# Patient Record
Sex: FEMALE | Race: WHITE | Employment: OTHER | URBAN - NONMETROPOLITAN AREA
[De-identification: names, ages, dates, MRNs, and addresses within clinical notes are randomized per-mention and may not be internally consistent; named-entity substitution may affect disease eponyms.]

---

## 2017-03-03 ENCOUNTER — HOSPITAL ENCOUNTER (OUTPATIENT)
Dept: MRI IMAGING | Age: 62
Discharge: HOME | End: 2017-03-03
Payer: COMMERCIAL

## 2017-03-03 DIAGNOSIS — M54.16 LUMBAR RADICULAR PAIN: ICD-10-CM

## 2017-03-03 DIAGNOSIS — M54.5 LOW BACK PAIN, UNSPECIFIED BACK PAIN LATERALITY, UNSPECIFIED CHRONICITY, WITH SCIATICA PRESENCE UNSPECIFIED: ICD-10-CM

## 2017-03-03 PROCEDURE — 72148 MRI LUMBAR SPINE W/O DYE: CPT

## 2017-05-04 ENCOUNTER — EMPLOYEE WELLNESS (OUTPATIENT)
Dept: OTHER | Age: 62
End: 2017-05-04

## 2017-05-04 LAB
CHOLESTEROL, TOTAL: 293 MG/DL (ref 160–199)
GLUCOSE BLD-MCNC: 81 MG/DL (ref 74–109)
HDLC SERPL-MCNC: 40 MG/DL (ref 65–121)
LDL CHOLESTEROL CALCULATED: ABNORMAL MG/DL
LDL CHOLESTEROL DIRECT: 185 MG/DL
TRIGL SERPL-MCNC: 429 MG/DL (ref 150–199)

## 2017-07-05 ENCOUNTER — HOSPITAL ENCOUNTER (EMERGENCY)
Age: 62
Discharge: HOME | End: 2017-07-05
Payer: COMMERCIAL

## 2017-07-05 VITALS
OXYGEN SATURATION: 96 % | BODY MASS INDEX: 25.76 KG/M2 | SYSTOLIC BLOOD PRESSURE: 150 MMHG | HEIGHT: 62 IN | RESPIRATION RATE: 18 BRPM | DIASTOLIC BLOOD PRESSURE: 88 MMHG | HEART RATE: 68 BPM | TEMPERATURE: 98 F | WEIGHT: 140 LBS

## 2017-07-05 DIAGNOSIS — I10 ESSENTIAL HYPERTENSION: ICD-10-CM

## 2017-07-05 LAB
ALBUMIN SERPL-MCNC: 5 G/DL (ref 3.5–5.2)
ALP BLD-CCNC: 103 U/L (ref 35–104)
ALT SERPL-CCNC: 12 U/L (ref 5–33)
ANION GAP SERPL CALCULATED.3IONS-SCNC: 14 MMOL/L (ref 7–19)
AST SERPL-CCNC: 17 U/L (ref 5–32)
BASOPHILS ABSOLUTE: 0.1 K/UL (ref 0–0.2)
BASOPHILS RELATIVE PERCENT: 0.7 % (ref 0–1)
BILIRUB SERPL-MCNC: 0.3 MG/DL (ref 0.2–1.2)
BUN BLDV-MCNC: 24 MG/DL (ref 8–23)
CALCIUM SERPL-MCNC: 10.6 MG/DL (ref 8.8–10.2)
CHLORIDE BLD-SCNC: 98 MMOL/L (ref 98–111)
CO2: 30 MMOL/L (ref 22–29)
CREAT SERPL-MCNC: 1.2 MG/DL (ref 0.5–0.9)
EOSINOPHILS ABSOLUTE: 0.1 K/UL (ref 0–0.6)
EOSINOPHILS RELATIVE PERCENT: 0.7 % (ref 0–5)
GFR NON-AFRICAN AMERICAN: 45
GLUCOSE BLD-MCNC: 112 MG/DL (ref 74–109)
HCT VFR BLD CALC: 40.1 % (ref 37–47)
HEMOGLOBIN: 13.5 G/DL (ref 12–16)
LYMPHOCYTES ABSOLUTE: 2.5 K/UL (ref 1.1–4.5)
LYMPHOCYTES RELATIVE PERCENT: 27.5 % (ref 20–40)
MCH RBC QN AUTO: 33.4 PG (ref 27–31)
MCHC RBC AUTO-ENTMCNC: 33.7 G/DL (ref 33–37)
MCV RBC AUTO: 99.3 FL (ref 81–99)
MONOCYTES ABSOLUTE: 0.6 K/UL (ref 0–0.9)
MONOCYTES RELATIVE PERCENT: 7.1 % (ref 0–10)
NEUTROPHILS ABSOLUTE: 5.7 K/UL (ref 1.5–7.5)
NEUTROPHILS RELATIVE PERCENT: 63.6 % (ref 50–65)
PDW BLD-RTO: 12.4 % (ref 11.5–14.5)
PERFORMED ON: NORMAL
PLATELET # BLD: 364 K/UL (ref 130–400)
PMV BLD AUTO: 9.4 FL (ref 9.4–12.3)
POC TROPONIN I: 0 NG/ML (ref 0–0.08)
POTASSIUM SERPL-SCNC: 4.3 MMOL/L (ref 3.5–5)
RBC # BLD: 4.04 M/UL (ref 4.2–5.4)
SODIUM BLD-SCNC: 142 MMOL/L (ref 136–145)
TOTAL PROTEIN: 8.6 G/DL (ref 6.6–8.7)
TROPONIN: <0.01 NG/ML (ref 0–0.03)
WBC # BLD: 9 K/UL (ref 4.8–10.8)

## 2017-07-05 PROCEDURE — 84484 ASSAY OF TROPONIN QUANT: CPT

## 2017-07-05 PROCEDURE — 85025 COMPLETE CBC W/AUTO DIFF WBC: CPT

## 2017-07-05 PROCEDURE — 6360000002 HC RX W HCPCS: Performed by: NURSE PRACTITIONER

## 2017-07-05 PROCEDURE — 6370000000 HC RX 637 (ALT 250 FOR IP): Performed by: NURSE PRACTITIONER

## 2017-07-05 PROCEDURE — 80053 COMPREHEN METABOLIC PANEL: CPT

## 2017-07-05 PROCEDURE — 99283 EMERGENCY DEPT VISIT LOW MDM: CPT | Performed by: NURSE PRACTITIONER

## 2017-07-05 PROCEDURE — 99283 EMERGENCY DEPT VISIT LOW MDM: CPT

## 2017-07-05 PROCEDURE — 2580000003 HC RX 258: Performed by: NURSE PRACTITIONER

## 2017-07-05 PROCEDURE — 36415 COLL VENOUS BLD VENIPUNCTURE: CPT

## 2017-07-05 PROCEDURE — 96374 THER/PROPH/DIAG INJ IV PUSH: CPT

## 2017-07-05 PROCEDURE — 93005 ELECTROCARDIOGRAM TRACING: CPT

## 2017-07-05 RX ORDER — CLONIDINE HYDROCHLORIDE 0.1 MG/1
0.1 TABLET ORAL ONCE
Status: COMPLETED | OUTPATIENT
Start: 2017-07-05 | End: 2017-07-05

## 2017-07-05 RX ORDER — 0.9 % SODIUM CHLORIDE 0.9 %
500 INTRAVENOUS SOLUTION INTRAVENOUS ONCE
Status: COMPLETED | OUTPATIENT
Start: 2017-07-05 | End: 2017-07-05

## 2017-07-05 RX ORDER — ONDANSETRON 4 MG/1
4 TABLET, ORALLY DISINTEGRATING ORAL EVERY 8 HOURS PRN
Qty: 10 TABLET | Refills: 0 | Status: SHIPPED | OUTPATIENT
Start: 2017-07-05 | End: 2017-07-05

## 2017-07-05 RX ORDER — ONDANSETRON 4 MG/1
4 TABLET, ORALLY DISINTEGRATING ORAL EVERY 8 HOURS PRN
Qty: 10 TABLET | Refills: 0 | Status: SHIPPED | OUTPATIENT
Start: 2017-07-05 | End: 2019-02-23

## 2017-07-05 RX ORDER — ONDANSETRON 2 MG/ML
4 INJECTION INTRAMUSCULAR; INTRAVENOUS ONCE
Status: COMPLETED | OUTPATIENT
Start: 2017-07-05 | End: 2017-07-05

## 2017-07-05 RX ADMIN — SODIUM CHLORIDE 500 ML: 9 INJECTION, SOLUTION INTRAVENOUS at 19:27

## 2017-07-05 RX ADMIN — CLONIDINE HYDROCHLORIDE 0.1 MG: 0.1 TABLET ORAL at 19:27

## 2017-07-05 RX ADMIN — ONDANSETRON HYDROCHLORIDE 4 MG: 2 INJECTION, SOLUTION INTRAVENOUS at 19:27

## 2017-07-06 ASSESSMENT — ENCOUNTER SYMPTOMS
VOMITING: 0
EYE DISCHARGE: 0
COUGH: 0
DIARRHEA: 0
BACK PAIN: 0
NAUSEA: 1
SORE THROAT: 0
WHEEZING: 0
SHORTNESS OF BREATH: 0
ABDOMINAL PAIN: 0

## 2017-07-07 ENCOUNTER — APPOINTMENT (OUTPATIENT)
Dept: CT IMAGING | Age: 62
End: 2017-07-07
Payer: COMMERCIAL

## 2017-07-07 ENCOUNTER — HOSPITAL ENCOUNTER (EMERGENCY)
Age: 62
Discharge: HOME | End: 2017-07-07
Attending: EMERGENCY MEDICINE
Payer: COMMERCIAL

## 2017-07-07 VITALS
TEMPERATURE: 98.6 F | OXYGEN SATURATION: 95 % | SYSTOLIC BLOOD PRESSURE: 134 MMHG | DIASTOLIC BLOOD PRESSURE: 86 MMHG | HEART RATE: 89 BPM | RESPIRATION RATE: 18 BRPM

## 2017-07-07 DIAGNOSIS — I10 ESSENTIAL HYPERTENSION: Primary | ICD-10-CM

## 2017-07-07 LAB
ALBUMIN SERPL-MCNC: 5 G/DL (ref 3.5–5.2)
ALP BLD-CCNC: 99 U/L (ref 35–104)
ALT SERPL-CCNC: 11 U/L (ref 5–33)
ANION GAP SERPL CALCULATED.3IONS-SCNC: 16 MMOL/L (ref 7–19)
AST SERPL-CCNC: 16 U/L (ref 5–32)
BASOPHILS ABSOLUTE: 0.1 K/UL (ref 0–0.2)
BASOPHILS RELATIVE PERCENT: 0.8 % (ref 0–1)
BILIRUB SERPL-MCNC: 0.4 MG/DL (ref 0.2–1.2)
BUN BLDV-MCNC: 25 MG/DL (ref 8–23)
CALCIUM SERPL-MCNC: 10.1 MG/DL (ref 8.8–10.2)
CHLORIDE BLD-SCNC: 97 MMOL/L (ref 98–111)
CO2: 25 MMOL/L (ref 22–29)
CREAT SERPL-MCNC: 1 MG/DL (ref 0.5–0.9)
EOSINOPHILS ABSOLUTE: 0.1 K/UL (ref 0–0.6)
EOSINOPHILS RELATIVE PERCENT: 0.8 % (ref 0–5)
GFR NON-AFRICAN AMERICAN: 56
GLUCOSE BLD-MCNC: 128 MG/DL (ref 74–109)
HCT VFR BLD CALC: 41.6 % (ref 37–47)
HEMOGLOBIN: 13.8 G/DL (ref 12–16)
LYMPHOCYTES ABSOLUTE: 4.6 K/UL (ref 1.1–4.5)
LYMPHOCYTES RELATIVE PERCENT: 36.5 % (ref 20–40)
MCH RBC QN AUTO: 33.7 PG (ref 27–31)
MCHC RBC AUTO-ENTMCNC: 33.2 G/DL (ref 33–37)
MCV RBC AUTO: 101.5 FL (ref 81–99)
MONOCYTES ABSOLUTE: 0.9 K/UL (ref 0–0.9)
MONOCYTES RELATIVE PERCENT: 7.1 % (ref 0–10)
NEUTROPHILS ABSOLUTE: 6.8 K/UL (ref 1.5–7.5)
NEUTROPHILS RELATIVE PERCENT: 54.2 % (ref 50–65)
PDW BLD-RTO: 12.7 % (ref 11.5–14.5)
PERFORMED ON: NORMAL
PLATELET # BLD: 374 K/UL (ref 130–400)
PMV BLD AUTO: 9.6 FL (ref 9.4–12.3)
POC TROPONIN I: 0.01 NG/ML (ref 0–0.08)
POTASSIUM SERPL-SCNC: 4 MMOL/L (ref 3.5–5)
RBC # BLD: 4.1 M/UL (ref 4.2–5.4)
SODIUM BLD-SCNC: 138 MMOL/L (ref 136–145)
TOTAL PROTEIN: 8.2 G/DL (ref 6.6–8.7)
WBC # BLD: 12.5 K/UL (ref 4.8–10.8)

## 2017-07-07 PROCEDURE — 2500000003 HC RX 250 WO HCPCS: Performed by: EMERGENCY MEDICINE

## 2017-07-07 PROCEDURE — 36415 COLL VENOUS BLD VENIPUNCTURE: CPT

## 2017-07-07 PROCEDURE — 85025 COMPLETE CBC W/AUTO DIFF WBC: CPT

## 2017-07-07 PROCEDURE — 99284 EMERGENCY DEPT VISIT MOD MDM: CPT

## 2017-07-07 PROCEDURE — 96376 TX/PRO/DX INJ SAME DRUG ADON: CPT

## 2017-07-07 PROCEDURE — 70450 CT HEAD/BRAIN W/O DYE: CPT

## 2017-07-07 PROCEDURE — 93005 ELECTROCARDIOGRAM TRACING: CPT

## 2017-07-07 PROCEDURE — 80053 COMPREHEN METABOLIC PANEL: CPT

## 2017-07-07 PROCEDURE — 84484 ASSAY OF TROPONIN QUANT: CPT

## 2017-07-07 PROCEDURE — 6370000000 HC RX 637 (ALT 250 FOR IP): Performed by: EMERGENCY MEDICINE

## 2017-07-07 PROCEDURE — 96375 TX/PRO/DX INJ NEW DRUG ADDON: CPT

## 2017-07-07 PROCEDURE — 6360000002 HC RX W HCPCS: Performed by: EMERGENCY MEDICINE

## 2017-07-07 PROCEDURE — 99283 EMERGENCY DEPT VISIT LOW MDM: CPT | Performed by: EMERGENCY MEDICINE

## 2017-07-07 PROCEDURE — 96374 THER/PROPH/DIAG INJ IV PUSH: CPT

## 2017-07-07 RX ORDER — MORPHINE SULFATE 4 MG/ML
4 INJECTION, SOLUTION INTRAMUSCULAR; INTRAVENOUS ONCE
Status: COMPLETED | OUTPATIENT
Start: 2017-07-07 | End: 2017-07-07

## 2017-07-07 RX ORDER — LABETALOL HYDROCHLORIDE 5 MG/ML
10 INJECTION, SOLUTION INTRAVENOUS ONCE
Status: COMPLETED | OUTPATIENT
Start: 2017-07-07 | End: 2017-07-07

## 2017-07-07 RX ORDER — CLONIDINE HYDROCHLORIDE 0.1 MG/1
0.1 TABLET ORAL ONCE
Status: COMPLETED | OUTPATIENT
Start: 2017-07-07 | End: 2017-07-07

## 2017-07-07 RX ORDER — PROMETHAZINE HYDROCHLORIDE 25 MG/ML
12.5 INJECTION, SOLUTION INTRAMUSCULAR; INTRAVENOUS ONCE
Status: COMPLETED | OUTPATIENT
Start: 2017-07-07 | End: 2017-07-07

## 2017-07-07 RX ORDER — LABETALOL HYDROCHLORIDE 5 MG/ML
5 INJECTION, SOLUTION INTRAVENOUS ONCE
Status: COMPLETED | OUTPATIENT
Start: 2017-07-07 | End: 2017-07-07

## 2017-07-07 RX ORDER — CLONIDINE HYDROCHLORIDE 0.1 MG/1
0.1 TABLET ORAL EVERY 8 HOURS PRN
Qty: 20 TABLET | Refills: 0 | Status: SHIPPED | OUTPATIENT
Start: 2017-07-07 | End: 2019-02-23 | Stop reason: ALTCHOICE

## 2017-07-07 RX ADMIN — PROMETHAZINE HYDROCHLORIDE 12.5 MG: 25 INJECTION INTRAMUSCULAR; INTRAVENOUS at 12:24

## 2017-07-07 RX ADMIN — CLONIDINE HYDROCHLORIDE 0.1 MG: 0.1 TABLET ORAL at 14:24

## 2017-07-07 RX ADMIN — LABETALOL HYDROCHLORIDE 5 MG: 5 INJECTION, SOLUTION INTRAVENOUS at 15:45

## 2017-07-07 RX ADMIN — LABETALOL HYDROCHLORIDE 10 MG: 5 INJECTION, SOLUTION INTRAVENOUS at 12:30

## 2017-07-07 RX ADMIN — MORPHINE SULFATE 4 MG: 4 INJECTION, SOLUTION INTRAMUSCULAR; INTRAVENOUS at 12:24

## 2017-07-07 ASSESSMENT — ENCOUNTER SYMPTOMS
BACK PAIN: 0
SHORTNESS OF BREATH: 0
VOMITING: 0
RHINORRHEA: 0
ABDOMINAL PAIN: 0
DIARRHEA: 0
NAUSEA: 1
SORE THROAT: 0

## 2017-07-07 ASSESSMENT — PAIN SCALES - GENERAL
PAINLEVEL_OUTOF10: 2
PAINLEVEL_OUTOF10: 0
PAINLEVEL_OUTOF10: 5
PAINLEVEL_OUTOF10: 4
PAINLEVEL_OUTOF10: 7
PAINLEVEL_OUTOF10: 0

## 2017-07-07 ASSESSMENT — PAIN - FUNCTIONAL ASSESSMENT: PAIN_FUNCTIONAL_ASSESSMENT: 0-10

## 2017-07-10 LAB
EKG P AXIS: 73 DEGREES
EKG P-R INTERVAL: 158 MS
EKG Q-T INTERVAL: 442 MS
EKG QRS DURATION: 92 MS
EKG QTC CALCULATION (BAZETT): 442 MS
EKG T AXIS: 71 DEGREES

## 2017-07-11 LAB
EKG P AXIS: 54 DEGREES
EKG P-R INTERVAL: 146 MS
EKG Q-T INTERVAL: 464 MS
EKG QRS DURATION: 80 MS
EKG QTC CALCULATION (BAZETT): 454 MS
EKG T AXIS: 50 DEGREES

## 2018-03-20 VITALS — WEIGHT: 143 LBS | BODY MASS INDEX: 26.16 KG/M2

## 2019-02-23 ENCOUNTER — HOSPITAL ENCOUNTER (INPATIENT)
Age: 64
LOS: 5 days | Discharge: HOME | DRG: 069 | End: 2019-02-28
Attending: FAMILY MEDICINE | Admitting: INTERNAL MEDICINE
Payer: COMMERCIAL

## 2019-02-23 ENCOUNTER — APPOINTMENT (OUTPATIENT)
Dept: CT IMAGING | Age: 64
DRG: 069 | End: 2019-02-23
Payer: COMMERCIAL

## 2019-02-23 ENCOUNTER — APPOINTMENT (OUTPATIENT)
Dept: MRI IMAGING | Age: 64
DRG: 069 | End: 2019-02-23
Payer: COMMERCIAL

## 2019-02-23 ENCOUNTER — APPOINTMENT (OUTPATIENT)
Dept: GENERAL RADIOLOGY | Age: 64
DRG: 069 | End: 2019-02-23
Payer: COMMERCIAL

## 2019-02-23 DIAGNOSIS — R42 VERTIGO: ICD-10-CM

## 2019-02-23 DIAGNOSIS — I63.9 CEREBROVASCULAR ACCIDENT (CVA), UNSPECIFIED MECHANISM (HCC): Primary | ICD-10-CM

## 2019-02-23 DIAGNOSIS — R41.82 ALTERED MENTAL STATUS, UNSPECIFIED ALTERED MENTAL STATUS TYPE: ICD-10-CM

## 2019-02-23 LAB
ACETAMINOPHEN LEVEL: <15 UG/ML
ALBUMIN SERPL-MCNC: 4.7 G/DL (ref 3.5–5.2)
ALP BLD-CCNC: 98 U/L (ref 35–104)
ALT SERPL-CCNC: 11 U/L (ref 5–33)
AMMONIA: 20 UMOL/L (ref 11–51)
AMPHETAMINE SCREEN, URINE: NEGATIVE
ANION GAP SERPL CALCULATED.3IONS-SCNC: 16 MMOL/L (ref 7–19)
AST SERPL-CCNC: 21 U/L (ref 5–32)
BACTERIA: NEGATIVE /HPF
BARBITURATE SCREEN URINE: NEGATIVE
BASOPHILS ABSOLUTE: 0.1 K/UL (ref 0–0.2)
BASOPHILS RELATIVE PERCENT: 0.7 % (ref 0–1)
BENZODIAZEPINE SCREEN, URINE: POSITIVE
BILIRUB SERPL-MCNC: 0.4 MG/DL (ref 0.2–1.2)
BILIRUBIN URINE: NEGATIVE
BLOOD, URINE: ABNORMAL
BUN BLDV-MCNC: 32 MG/DL (ref 8–23)
C-REACTIVE PROTEIN: 1.88 MG/DL (ref 0–0.5)
CALCIUM SERPL-MCNC: 9.6 MG/DL (ref 8.8–10.2)
CANNABINOID SCREEN URINE: NEGATIVE
CHLORIDE BLD-SCNC: 100 MMOL/L (ref 98–111)
CLARITY: CLEAR
CO2: 22 MMOL/L (ref 22–29)
COCAINE METABOLITE SCREEN URINE: NEGATIVE
COLOR: YELLOW
CREAT SERPL-MCNC: 1.3 MG/DL (ref 0.5–0.9)
EOSINOPHILS ABSOLUTE: 0.1 K/UL (ref 0–0.6)
EOSINOPHILS RELATIVE PERCENT: 1 % (ref 0–5)
EPITHELIAL CELLS, UA: 2 /HPF (ref 0–5)
ETHANOL: <10 MG/DL (ref 0–0.08)
GFR NON-AFRICAN AMERICAN: 41
GLUCOSE BLD-MCNC: 91 MG/DL (ref 74–109)
GLUCOSE URINE: NEGATIVE MG/DL
HCT VFR BLD CALC: 39.7 % (ref 37–47)
HEMOGLOBIN: 12.6 G/DL (ref 12–16)
HYALINE CASTS: 0 /HPF (ref 0–8)
KETONES, URINE: NEGATIVE MG/DL
LEUKOCYTE ESTERASE, URINE: NEGATIVE
LYMPHOCYTES ABSOLUTE: 2.4 K/UL (ref 1.1–4.5)
LYMPHOCYTES RELATIVE PERCENT: 20.7 % (ref 20–40)
Lab: ABNORMAL
MAGNESIUM: 1.5 MG/DL (ref 1.6–2.4)
MCH RBC QN AUTO: 32 PG (ref 27–31)
MCHC RBC AUTO-ENTMCNC: 31.7 G/DL (ref 33–37)
MCV RBC AUTO: 100.8 FL (ref 81–99)
MONOCYTES ABSOLUTE: 0.9 K/UL (ref 0–0.9)
MONOCYTES RELATIVE PERCENT: 7.8 % (ref 0–10)
NEUTROPHILS ABSOLUTE: 7.9 K/UL (ref 1.5–7.5)
NEUTROPHILS RELATIVE PERCENT: 69.4 % (ref 50–65)
NITRITE, URINE: NEGATIVE
OPIATE SCREEN URINE: POSITIVE
PDW BLD-RTO: 11.9 % (ref 11.5–14.5)
PH UA: 5.5
PHOSPHORUS: 3.2 MG/DL (ref 2.5–4.5)
PLATELET # BLD: 324 K/UL (ref 130–400)
PMV BLD AUTO: 10.3 FL (ref 9.4–12.3)
POTASSIUM SERPL-SCNC: 4.8 MMOL/L (ref 3.5–5)
PROTEIN UA: NEGATIVE MG/DL
RBC # BLD: 3.94 M/UL (ref 4.2–5.4)
RBC UA: 0 /HPF (ref 0–4)
RHEUMATOID FACTOR: <10 IU/ML
SALICYLATE, SERUM: <3 MG/DL (ref 3–10)
SEDIMENTATION RATE, ERYTHROCYTE: 54 MM/HR (ref 0–25)
SODIUM BLD-SCNC: 138 MMOL/L (ref 136–145)
SPECIFIC GRAVITY UA: 1.01
T3 FREE: 2.3 PG/ML (ref 2–4.4)
T4 TOTAL: 7.3 UG/DL (ref 4.5–11.7)
TOTAL PROTEIN: 8.1 G/DL (ref 6.6–8.7)
TROPONIN: <0.01 NG/ML (ref 0–0.03)
TSH SERPL DL<=0.05 MIU/L-ACNC: 2.37 UIU/ML (ref 0.27–4.2)
URINE REFLEX TO CULTURE: ABNORMAL
UROBILINOGEN, URINE: 0.2 E.U./DL
VITAMIN D 25-HYDROXY: 6.9 NG/ML
WBC # BLD: 11.4 K/UL (ref 4.8–10.8)
WBC UA: 4 /HPF (ref 0–5)

## 2019-02-23 PROCEDURE — 86255 FLUORESCENT ANTIBODY SCREEN: CPT

## 2019-02-23 PROCEDURE — 93005 ELECTROCARDIOGRAM TRACING: CPT

## 2019-02-23 PROCEDURE — 84484 ASSAY OF TROPONIN QUANT: CPT

## 2019-02-23 PROCEDURE — 87040 BLOOD CULTURE FOR BACTERIA: CPT

## 2019-02-23 PROCEDURE — 86617 LYME DISEASE ANTIBODY: CPT

## 2019-02-23 PROCEDURE — 36415 COLL VENOUS BLD VENIPUNCTURE: CPT

## 2019-02-23 PROCEDURE — 83655 ASSAY OF LEAD: CPT

## 2019-02-23 PROCEDURE — 99223 1ST HOSP IP/OBS HIGH 75: CPT | Performed by: PSYCHIATRY & NEUROLOGY

## 2019-02-23 PROCEDURE — 83825 ASSAY OF MERCURY: CPT

## 2019-02-23 PROCEDURE — G0480 DRUG TEST DEF 1-7 CLASSES: HCPCS

## 2019-02-23 PROCEDURE — 81001 URINALYSIS AUTO W/SCOPE: CPT

## 2019-02-23 PROCEDURE — 86038 ANTINUCLEAR ANTIBODIES: CPT

## 2019-02-23 PROCEDURE — 85025 COMPLETE CBC W/AUTO DIFF WBC: CPT

## 2019-02-23 PROCEDURE — 82306 VITAMIN D 25 HYDROXY: CPT

## 2019-02-23 PROCEDURE — 86431 RHEUMATOID FACTOR QUANT: CPT

## 2019-02-23 PROCEDURE — 6370000000 HC RX 637 (ALT 250 FOR IP): Performed by: INTERNAL MEDICINE

## 2019-02-23 PROCEDURE — 2580000003 HC RX 258: Performed by: FAMILY MEDICINE

## 2019-02-23 PROCEDURE — 99285 EMERGENCY DEPT VISIT HI MDM: CPT

## 2019-02-23 PROCEDURE — 85652 RBC SED RATE AUTOMATED: CPT

## 2019-02-23 PROCEDURE — 82607 VITAMIN B-12: CPT

## 2019-02-23 PROCEDURE — 6370000000 HC RX 637 (ALT 250 FOR IP): Performed by: FAMILY MEDICINE

## 2019-02-23 PROCEDURE — 82140 ASSAY OF AMMONIA: CPT

## 2019-02-23 PROCEDURE — 70450 CT HEAD/BRAIN W/O DYE: CPT

## 2019-02-23 PROCEDURE — 83735 ASSAY OF MAGNESIUM: CPT

## 2019-02-23 PROCEDURE — 6360000002 HC RX W HCPCS: Performed by: PSYCHIATRY & NEUROLOGY

## 2019-02-23 PROCEDURE — 99223 1ST HOSP IP/OBS HIGH 75: CPT | Performed by: INTERNAL MEDICINE

## 2019-02-23 PROCEDURE — 99285 EMERGENCY DEPT VISIT HI MDM: CPT | Performed by: FAMILY MEDICINE

## 2019-02-23 PROCEDURE — 84100 ASSAY OF PHOSPHORUS: CPT

## 2019-02-23 PROCEDURE — 84443 ASSAY THYROID STIM HORMONE: CPT

## 2019-02-23 PROCEDURE — 86140 C-REACTIVE PROTEIN: CPT

## 2019-02-23 PROCEDURE — 71045 X-RAY EXAM CHEST 1 VIEW: CPT

## 2019-02-23 PROCEDURE — 82131 AMINO ACIDS SINGLE QUANT: CPT

## 2019-02-23 PROCEDURE — 1210000000 HC MED SURG R&B

## 2019-02-23 PROCEDURE — 86592 SYPHILIS TEST NON-TREP QUAL: CPT

## 2019-02-23 PROCEDURE — 70551 MRI BRAIN STEM W/O DYE: CPT

## 2019-02-23 PROCEDURE — 80307 DRUG TEST PRSMV CHEM ANLYZR: CPT

## 2019-02-23 PROCEDURE — 84481 FREE ASSAY (FT-3): CPT

## 2019-02-23 PROCEDURE — 82175 ASSAY OF ARSENIC: CPT

## 2019-02-23 PROCEDURE — 84436 ASSAY OF TOTAL THYROXINE: CPT

## 2019-02-23 PROCEDURE — 80053 COMPREHEN METABOLIC PANEL: CPT

## 2019-02-23 RX ORDER — SODIUM CHLORIDE 0.9 % (FLUSH) 0.9 %
10 SYRINGE (ML) INJECTION EVERY 12 HOURS SCHEDULED
Status: DISCONTINUED | OUTPATIENT
Start: 2019-02-23 | End: 2019-02-28 | Stop reason: HOSPADM

## 2019-02-23 RX ORDER — ACETAMINOPHEN 325 MG/1
650 TABLET ORAL EVERY 4 HOURS PRN
Status: DISCONTINUED | OUTPATIENT
Start: 2019-02-23 | End: 2019-02-28 | Stop reason: HOSPADM

## 2019-02-23 RX ORDER — PANTOPRAZOLE SODIUM 20 MG/1
20 TABLET, DELAYED RELEASE ORAL DAILY
COMMUNITY
End: 2019-10-31 | Stop reason: SDUPTHER

## 2019-02-23 RX ORDER — PANTOPRAZOLE SODIUM 20 MG/1
20 TABLET, DELAYED RELEASE ORAL DAILY
Status: DISCONTINUED | OUTPATIENT
Start: 2019-02-23 | End: 2019-02-28 | Stop reason: HOSPADM

## 2019-02-23 RX ORDER — SODIUM CHLORIDE 0.9 % (FLUSH) 0.9 %
10 SYRINGE (ML) INJECTION PRN
Status: DISCONTINUED | OUTPATIENT
Start: 2019-02-23 | End: 2019-02-28 | Stop reason: HOSPADM

## 2019-02-23 RX ORDER — ESCITALOPRAM OXALATE 10 MG/1
20 TABLET ORAL DAILY
Status: DISCONTINUED | OUTPATIENT
Start: 2019-02-24 | End: 2019-02-28 | Stop reason: HOSPADM

## 2019-02-23 RX ORDER — ATORVASTATIN CALCIUM 80 MG/1
80 TABLET, FILM COATED ORAL NIGHTLY
Status: DISCONTINUED | OUTPATIENT
Start: 2019-02-23 | End: 2019-02-28 | Stop reason: HOSPADM

## 2019-02-23 RX ORDER — ONDANSETRON 4 MG/1
4 TABLET, ORALLY DISINTEGRATING ORAL ONCE
Status: COMPLETED | OUTPATIENT
Start: 2019-02-23 | End: 2019-02-23

## 2019-02-23 RX ORDER — ALPRAZOLAM 0.5 MG/1
0.5 TABLET ORAL NIGHTLY PRN
Status: DISCONTINUED | OUTPATIENT
Start: 2019-02-23 | End: 2019-02-28 | Stop reason: HOSPADM

## 2019-02-23 RX ORDER — ASCORBIC ACID 500 MG
1000 TABLET ORAL DAILY
Status: DISCONTINUED | OUTPATIENT
Start: 2019-02-23 | End: 2019-02-28 | Stop reason: HOSPADM

## 2019-02-23 RX ORDER — ASPIRIN 81 MG/1
81 TABLET, CHEWABLE ORAL DAILY
Status: DISCONTINUED | OUTPATIENT
Start: 2019-02-23 | End: 2019-02-24

## 2019-02-23 RX ORDER — SODIUM CHLORIDE 9 MG/ML
INJECTION, SOLUTION INTRAVENOUS CONTINUOUS
Status: DISCONTINUED | OUTPATIENT
Start: 2019-02-23 | End: 2019-02-28

## 2019-02-23 RX ORDER — OXYCODONE HYDROCHLORIDE AND ACETAMINOPHEN 5; 325 MG/1; MG/1
1 TABLET ORAL ONCE
Status: COMPLETED | OUTPATIENT
Start: 2019-02-23 | End: 2019-02-23

## 2019-02-23 RX ORDER — THIAMINE HYDROCHLORIDE 100 MG/ML
100 INJECTION, SOLUTION INTRAMUSCULAR; INTRAVENOUS DAILY
Status: DISCONTINUED | OUTPATIENT
Start: 2019-02-23 | End: 2019-02-28 | Stop reason: HOSPADM

## 2019-02-23 RX ADMIN — ACETAMINOPHEN 650 MG: 325 TABLET, FILM COATED ORAL at 22:11

## 2019-02-23 RX ADMIN — ONDANSETRON 4 MG: 4 TABLET, ORALLY DISINTEGRATING ORAL at 10:18

## 2019-02-23 RX ADMIN — PANTOPRAZOLE SODIUM 20 MG: 20 TABLET, DELAYED RELEASE ORAL at 18:14

## 2019-02-23 RX ADMIN — THIAMINE HYDROCHLORIDE 100 MG: 100 INJECTION, SOLUTION INTRAMUSCULAR; INTRAVENOUS at 15:41

## 2019-02-23 RX ADMIN — ATORVASTATIN CALCIUM 80 MG: 80 TABLET, FILM COATED ORAL at 20:14

## 2019-02-23 RX ADMIN — OXYCODONE AND ACETAMINOPHEN 1 TABLET: 5; 325 TABLET ORAL at 10:18

## 2019-02-23 RX ADMIN — ACETAMINOPHEN 650 MG: 325 TABLET, FILM COATED ORAL at 16:27

## 2019-02-23 RX ADMIN — SODIUM CHLORIDE: 9 INJECTION, SOLUTION INTRAVENOUS at 09:49

## 2019-02-23 RX ADMIN — ASPIRIN 81 MG CHEWABLE TABLET 81 MG: 81 TABLET CHEWABLE at 18:14

## 2019-02-23 RX ADMIN — OXYCODONE HYDROCHLORIDE AND ACETAMINOPHEN 1000 MG: 500 TABLET ORAL at 18:14

## 2019-02-23 ASSESSMENT — ENCOUNTER SYMPTOMS
RHINORRHEA: 0
NAUSEA: 0
DIARRHEA: 0
SINUS PAIN: 0
CONSTIPATION: 0
WHEEZING: 0
COUGH: 0
CHEST TIGHTNESS: 0
ABDOMINAL PAIN: 0
BACK PAIN: 0
COLOR CHANGE: 0
SHORTNESS OF BREATH: 0
VOMITING: 0

## 2019-02-23 ASSESSMENT — PAIN SCALES - GENERAL
PAINLEVEL_OUTOF10: 2
PAINLEVEL_OUTOF10: 4
PAINLEVEL_OUTOF10: 3
PAINLEVEL_OUTOF10: 6
PAINLEVEL_OUTOF10: 4

## 2019-02-23 ASSESSMENT — PAIN DESCRIPTION - LOCATION: LOCATION: HEAD

## 2019-02-24 ENCOUNTER — APPOINTMENT (OUTPATIENT)
Dept: MRI IMAGING | Age: 64
DRG: 069 | End: 2019-02-24
Payer: COMMERCIAL

## 2019-02-24 ENCOUNTER — APPOINTMENT (OUTPATIENT)
Dept: GENERAL RADIOLOGY | Age: 64
DRG: 069 | End: 2019-02-24
Payer: COMMERCIAL

## 2019-02-24 LAB
ANION GAP SERPL CALCULATED.3IONS-SCNC: 13 MMOL/L (ref 7–19)
APPEARANCE CSF: CLEAR
APPEARANCE CSF: CLEAR
BASOPHILS ABSOLUTE: 0.1 K/UL (ref 0–0.2)
BASOPHILS RELATIVE PERCENT: 0.8 % (ref 0–1)
BUN BLDV-MCNC: 35 MG/DL (ref 8–23)
C-REACTIVE PROTEIN: 1.95 MG/DL (ref 0–0.5)
CALCIUM SERPL-MCNC: 8.3 MG/DL (ref 8.8–10.2)
CHLORIDE BLD-SCNC: 108 MMOL/L (ref 98–111)
CHOLESTEROL, TOTAL: 205 MG/DL (ref 160–199)
CLOT EVALUATION CSF: ABNORMAL
CLOT EVALUATION CSF: ABNORMAL
CO2: 22 MMOL/L (ref 22–29)
COLOR CSF: COLORLESS
COLOR CSF: COLORLESS
CREAT SERPL-MCNC: 1.4 MG/DL (ref 0.5–0.9)
CRYPTOCOCCAL ANTIGEN CSF: NEGATIVE
EOSINOPHILS ABSOLUTE: 0.1 K/UL (ref 0–0.6)
EOSINOPHILS RELATIVE PERCENT: 2 % (ref 0–5)
GFR NON-AFRICAN AMERICAN: 38
GLUCOSE BLD-MCNC: 91 MG/DL (ref 74–109)
GLUCOSE, CSF: 53 MG/DL (ref 40–70)
HBA1C MFR BLD: 5.4 % (ref 4–6)
HCT VFR BLD CALC: 34.9 % (ref 37–47)
HDLC SERPL-MCNC: 31 MG/DL (ref 65–121)
HEMOGLOBIN: 10.9 G/DL (ref 12–16)
INR BLD: 1.02 (ref 0.88–1.18)
LDL CHOLESTEROL CALCULATED: 132 MG/DL
LV EF: 60 %
LVEF MODALITY: NORMAL
LYMPHOCYTES ABSOLUTE: 2.3 K/UL (ref 1.1–4.5)
LYMPHOCYTES RELATIVE PERCENT: 34.2 % (ref 20–40)
MCH RBC QN AUTO: 32.2 PG (ref 27–31)
MCHC RBC AUTO-ENTMCNC: 31.2 G/DL (ref 33–37)
MCV RBC AUTO: 102.9 FL (ref 81–99)
MONOCYTES ABSOLUTE: 0.8 K/UL (ref 0–0.9)
MONOCYTES RELATIVE PERCENT: 11.8 % (ref 0–10)
NEUTROPHILS ABSOLUTE: 3.3 K/UL (ref 1.5–7.5)
NEUTROPHILS RELATIVE PERCENT: 50.6 % (ref 50–65)
PDW BLD-RTO: 11.9 % (ref 11.5–14.5)
PLATELET # BLD: 283 K/UL (ref 130–400)
PMV BLD AUTO: 10.1 FL (ref 9.4–12.3)
POTASSIUM SERPL-SCNC: 4.4 MMOL/L (ref 3.5–5)
PROTEIN CSF: 55 MG/DL (ref 15–45)
PROTHROMBIN TIME: 12.8 SEC (ref 12–14.6)
RBC # BLD: 3.39 M/UL (ref 4.2–5.4)
RBC CSF: 15 /CUMM (ref 0–5)
RBC CSF: 6 /CUMM (ref 0–5)
SODIUM BLD-SCNC: 143 MMOL/L (ref 136–145)
TRIGL SERPL-MCNC: 212 MG/DL (ref 0–149)
TUBE NUMBER CSF: ABNORMAL
TUBE NUMBER CSF: ABNORMAL
WBC # BLD: 6.6 K/UL (ref 4.8–10.8)
WBC CSF: 1 /CUMM (ref 0–8)
WBC CSF: 3 /CUMM (ref 0–8)

## 2019-02-24 PROCEDURE — 87015 SPECIMEN INFECT AGNT CONCNTJ: CPT

## 2019-02-24 PROCEDURE — 83916 OLIGOCLONAL BANDS: CPT

## 2019-02-24 PROCEDURE — 87205 SMEAR GRAM STAIN: CPT

## 2019-02-24 PROCEDURE — 86592 SYPHILIS TEST NON-TREP QUAL: CPT

## 2019-02-24 PROCEDURE — 009U3ZX DRAINAGE OF SPINAL CANAL, PERCUTANEOUS APPROACH, DIAGNOSTIC: ICD-10-PCS | Performed by: RADIOLOGY

## 2019-02-24 PROCEDURE — 2709999900 FL LUMBAR PUNCTURE DIAG

## 2019-02-24 PROCEDURE — 97116 GAIT TRAINING THERAPY: CPT

## 2019-02-24 PROCEDURE — 84157 ASSAY OF PROTEIN OTHER: CPT

## 2019-02-24 PROCEDURE — 99232 SBSQ HOSP IP/OBS MODERATE 35: CPT | Performed by: INTERNAL MEDICINE

## 2019-02-24 PROCEDURE — 85025 COMPLETE CBC W/AUTO DIFF WBC: CPT

## 2019-02-24 PROCEDURE — 80061 LIPID PANEL: CPT

## 2019-02-24 PROCEDURE — 87206 SMEAR FLUORESCENT/ACID STAI: CPT

## 2019-02-24 PROCEDURE — 2580000003 HC RX 258: Performed by: INTERNAL MEDICINE

## 2019-02-24 PROCEDURE — 85610 PROTHROMBIN TIME: CPT

## 2019-02-24 PROCEDURE — 87529 HSV DNA AMP PROBE: CPT

## 2019-02-24 PROCEDURE — 36415 COLL VENOUS BLD VENIPUNCTURE: CPT

## 2019-02-24 PROCEDURE — 6370000000 HC RX 637 (ALT 250 FOR IP): Performed by: INTERNAL MEDICINE

## 2019-02-24 PROCEDURE — 97162 PT EVAL MOD COMPLEX 30 MIN: CPT

## 2019-02-24 PROCEDURE — 82042 OTHER SOURCE ALBUMIN QUAN EA: CPT

## 2019-02-24 PROCEDURE — 87070 CULTURE OTHR SPECIMN AEROBIC: CPT

## 2019-02-24 PROCEDURE — 82040 ASSAY OF SERUM ALBUMIN: CPT

## 2019-02-24 PROCEDURE — 70547 MR ANGIOGRAPHY NECK W/O DYE: CPT

## 2019-02-24 PROCEDURE — 6360000002 HC RX W HCPCS: Performed by: PSYCHIATRY & NEUROLOGY

## 2019-02-24 PROCEDURE — 2580000003 HC RX 258: Performed by: FAMILY MEDICINE

## 2019-02-24 PROCEDURE — 87116 MYCOBACTERIA CULTURE: CPT

## 2019-02-24 PROCEDURE — 87075 CULTR BACTERIA EXCEPT BLOOD: CPT

## 2019-02-24 PROCEDURE — 89050 BODY FLUID CELL COUNT: CPT

## 2019-02-24 PROCEDURE — 93306 TTE W/DOPPLER COMPLETE: CPT

## 2019-02-24 PROCEDURE — 99233 SBSQ HOSP IP/OBS HIGH 50: CPT | Performed by: PSYCHIATRY & NEUROLOGY

## 2019-02-24 PROCEDURE — 70544 MR ANGIOGRAPHY HEAD W/O DYE: CPT

## 2019-02-24 PROCEDURE — 87899 AGENT NOS ASSAY W/OPTIC: CPT

## 2019-02-24 PROCEDURE — 86140 C-REACTIVE PROTEIN: CPT

## 2019-02-24 PROCEDURE — 82945 GLUCOSE OTHER FLUID: CPT

## 2019-02-24 PROCEDURE — 1210000000 HC MED SURG R&B

## 2019-02-24 PROCEDURE — 83036 HEMOGLOBIN GLYCOSYLATED A1C: CPT

## 2019-02-24 PROCEDURE — 88112 CYTOPATH CELL ENHANCE TECH: CPT

## 2019-02-24 PROCEDURE — 82784 ASSAY IGA/IGD/IGG/IGM EACH: CPT

## 2019-02-24 PROCEDURE — 80048 BASIC METABOLIC PNL TOTAL CA: CPT

## 2019-02-24 RX ORDER — OXYCODONE HYDROCHLORIDE AND ACETAMINOPHEN 5; 325 MG/1; MG/1
1 TABLET ORAL EVERY 6 HOURS PRN
Status: DISCONTINUED | OUTPATIENT
Start: 2019-02-24 | End: 2019-02-28 | Stop reason: HOSPADM

## 2019-02-24 RX ADMIN — ASPIRIN 81 MG CHEWABLE TABLET 81 MG: 81 TABLET CHEWABLE at 08:05

## 2019-02-24 RX ADMIN — ESCITALOPRAM OXALATE 20 MG: 10 TABLET ORAL at 08:05

## 2019-02-24 RX ADMIN — SODIUM CHLORIDE: 9 INJECTION, SOLUTION INTRAVENOUS at 08:18

## 2019-02-24 RX ADMIN — OXYCODONE AND ACETAMINOPHEN 1 TABLET: 5; 325 TABLET ORAL at 21:35

## 2019-02-24 RX ADMIN — PANTOPRAZOLE SODIUM 20 MG: 20 TABLET, DELAYED RELEASE ORAL at 08:05

## 2019-02-24 RX ADMIN — OXYCODONE AND ACETAMINOPHEN 1 TABLET: 5; 325 TABLET ORAL at 13:28

## 2019-02-24 RX ADMIN — METOPROLOL TARTRATE 25 MG: 25 TABLET ORAL at 21:28

## 2019-02-24 RX ADMIN — OXYCODONE HYDROCHLORIDE AND ACETAMINOPHEN 1000 MG: 500 TABLET ORAL at 08:05

## 2019-02-24 RX ADMIN — ATORVASTATIN CALCIUM 80 MG: 80 TABLET, FILM COATED ORAL at 21:28

## 2019-02-24 RX ADMIN — ALPRAZOLAM 0.5 MG: 0.5 TABLET ORAL at 21:28

## 2019-02-24 RX ADMIN — METOPROLOL TARTRATE 25 MG: 25 TABLET ORAL at 08:05

## 2019-02-24 RX ADMIN — THIAMINE HYDROCHLORIDE 100 MG: 100 INJECTION, SOLUTION INTRAMUSCULAR; INTRAVENOUS at 08:05

## 2019-02-24 RX ADMIN — ACETAMINOPHEN 650 MG: 325 TABLET, FILM COATED ORAL at 08:18

## 2019-02-24 RX ADMIN — Medication 10 ML: at 21:30

## 2019-02-24 ASSESSMENT — PAIN SCALES - GENERAL
PAINLEVEL_OUTOF10: 3
PAINLEVEL_OUTOF10: 9
PAINLEVEL_OUTOF10: 4
PAINLEVEL_OUTOF10: 2
PAINLEVEL_OUTOF10: 4
PAINLEVEL_OUTOF10: 6

## 2019-02-24 ASSESSMENT — PAIN DESCRIPTION - LOCATION
LOCATION: HEAD
LOCATION: HEAD

## 2019-02-24 ASSESSMENT — PAIN DESCRIPTION - PAIN TYPE
TYPE: ACUTE PAIN
TYPE: ACUTE PAIN

## 2019-02-24 ASSESSMENT — PAIN DESCRIPTION - ORIENTATION: ORIENTATION: RIGHT;ANTERIOR;POSTERIOR

## 2019-02-25 PROBLEM — I67.1 ANEURYSM OF MIDDLE CEREBRAL ARTERY: Status: ACTIVE | Noted: 2019-02-25

## 2019-02-25 LAB
ANION GAP SERPL CALCULATED.3IONS-SCNC: 9 MMOL/L (ref 7–19)
BASOPHILS ABSOLUTE: 0.1 K/UL (ref 0–0.2)
BASOPHILS RELATIVE PERCENT: 0.7 % (ref 0–1)
BUN BLDV-MCNC: 20 MG/DL (ref 8–23)
CALCIUM SERPL-MCNC: 8.6 MG/DL (ref 8.8–10.2)
CHLORIDE BLD-SCNC: 109 MMOL/L (ref 98–111)
CO2: 25 MMOL/L (ref 22–29)
CREAT SERPL-MCNC: 1.1 MG/DL (ref 0.5–0.9)
EOSINOPHILS ABSOLUTE: 0.1 K/UL (ref 0–0.6)
EOSINOPHILS RELATIVE PERCENT: 1.7 % (ref 0–5)
GFR NON-AFRICAN AMERICAN: 50
GLUCOSE BLD-MCNC: 110 MG/DL (ref 74–109)
HCT VFR BLD CALC: 34.3 % (ref 37–47)
HEMOGLOBIN: 10.9 G/DL (ref 12–16)
LYMPHOCYTES ABSOLUTE: 2.4 K/UL (ref 1.1–4.5)
LYMPHOCYTES RELATIVE PERCENT: 28.3 % (ref 20–40)
MCH RBC QN AUTO: 32.2 PG (ref 27–31)
MCHC RBC AUTO-ENTMCNC: 31.8 G/DL (ref 33–37)
MCV RBC AUTO: 101.2 FL (ref 81–99)
MONOCYTES ABSOLUTE: 0.8 K/UL (ref 0–0.9)
MONOCYTES RELATIVE PERCENT: 9 % (ref 0–10)
NEUTROPHILS ABSOLUTE: 5 K/UL (ref 1.5–7.5)
NEUTROPHILS RELATIVE PERCENT: 59.8 % (ref 50–65)
PDW BLD-RTO: 11.9 % (ref 11.5–14.5)
PLATELET # BLD: 265 K/UL (ref 130–400)
PMV BLD AUTO: 9.8 FL (ref 9.4–12.3)
POTASSIUM SERPL-SCNC: 3.7 MMOL/L (ref 3.5–5)
RBC # BLD: 3.39 M/UL (ref 4.2–5.4)
RPR: NORMAL
SODIUM BLD-SCNC: 143 MMOL/L (ref 136–145)
VITAMIN B-12: 348 PG/ML (ref 211–946)
WBC # BLD: 8.4 K/UL (ref 4.8–10.8)

## 2019-02-25 PROCEDURE — 1210000000 HC MED SURG R&B

## 2019-02-25 PROCEDURE — 2580000003 HC RX 258: Performed by: INTERNAL MEDICINE

## 2019-02-25 PROCEDURE — 2580000003 HC RX 258: Performed by: FAMILY MEDICINE

## 2019-02-25 PROCEDURE — 80048 BASIC METABOLIC PNL TOTAL CA: CPT

## 2019-02-25 PROCEDURE — 99233 SBSQ HOSP IP/OBS HIGH 50: CPT | Performed by: PSYCHIATRY & NEUROLOGY

## 2019-02-25 PROCEDURE — 6370000000 HC RX 637 (ALT 250 FOR IP): Performed by: INTERNAL MEDICINE

## 2019-02-25 PROCEDURE — 36415 COLL VENOUS BLD VENIPUNCTURE: CPT

## 2019-02-25 PROCEDURE — 97165 OT EVAL LOW COMPLEX 30 MIN: CPT

## 2019-02-25 PROCEDURE — 95816 EEG AWAKE AND DROWSY: CPT

## 2019-02-25 PROCEDURE — 99232 SBSQ HOSP IP/OBS MODERATE 35: CPT | Performed by: INTERNAL MEDICINE

## 2019-02-25 PROCEDURE — 95816 EEG AWAKE AND DROWSY: CPT | Performed by: PSYCHIATRY & NEUROLOGY

## 2019-02-25 PROCEDURE — 6360000002 HC RX W HCPCS: Performed by: PSYCHIATRY & NEUROLOGY

## 2019-02-25 PROCEDURE — 85025 COMPLETE CBC W/AUTO DIFF WBC: CPT

## 2019-02-25 RX ORDER — NICOTINE 21 MG/24HR
1 PATCH, TRANSDERMAL 24 HOURS TRANSDERMAL DAILY
Status: DISCONTINUED | OUTPATIENT
Start: 2019-02-25 | End: 2019-02-28 | Stop reason: HOSPADM

## 2019-02-25 RX ADMIN — OXYCODONE HYDROCHLORIDE AND ACETAMINOPHEN 1000 MG: 500 TABLET ORAL at 09:03

## 2019-02-25 RX ADMIN — SODIUM CHLORIDE: 9 INJECTION, SOLUTION INTRAVENOUS at 05:28

## 2019-02-25 RX ADMIN — METOPROLOL TARTRATE 25 MG: 25 TABLET ORAL at 09:03

## 2019-02-25 RX ADMIN — PANTOPRAZOLE SODIUM 20 MG: 20 TABLET, DELAYED RELEASE ORAL at 09:03

## 2019-02-25 RX ADMIN — ATORVASTATIN CALCIUM 80 MG: 80 TABLET, FILM COATED ORAL at 19:51

## 2019-02-25 RX ADMIN — ESCITALOPRAM OXALATE 20 MG: 10 TABLET ORAL at 09:03

## 2019-02-25 RX ADMIN — OXYCODONE AND ACETAMINOPHEN 1 TABLET: 5; 325 TABLET ORAL at 09:03

## 2019-02-25 RX ADMIN — THIAMINE HYDROCHLORIDE 100 MG: 100 INJECTION, SOLUTION INTRAMUSCULAR; INTRAVENOUS at 09:03

## 2019-02-25 RX ADMIN — METOPROLOL TARTRATE 25 MG: 25 TABLET ORAL at 19:51

## 2019-02-25 RX ADMIN — Medication 10 ML: at 19:53

## 2019-02-25 ASSESSMENT — PAIN SCALES - GENERAL: PAINLEVEL_OUTOF10: 8

## 2019-02-26 LAB
ANA IGG, ELISA: NORMAL
ANCA IFA: NORMAL
ANION GAP SERPL CALCULATED.3IONS-SCNC: 17 MMOL/L (ref 7–19)
BASOPHILS ABSOLUTE: 0.1 K/UL (ref 0–0.2)
BASOPHILS RELATIVE PERCENT: 0.5 % (ref 0–1)
BUN BLDV-MCNC: 17 MG/DL (ref 8–23)
CALCIUM SERPL-MCNC: 9.2 MG/DL (ref 8.8–10.2)
CHLORIDE BLD-SCNC: 106 MMOL/L (ref 98–111)
CO2: 21 MMOL/L (ref 22–29)
CREAT SERPL-MCNC: 1.2 MG/DL (ref 0.5–0.9)
EKG P AXIS: 42 DEGREES
EKG P-R INTERVAL: 148 MS
EKG Q-T INTERVAL: 410 MS
EKG QRS DURATION: 80 MS
EKG QTC CALCULATION (BAZETT): 434 MS
EKG T AXIS: 26 DEGREES
EOSINOPHILS ABSOLUTE: 0.1 K/UL (ref 0–0.6)
EOSINOPHILS RELATIVE PERCENT: 1.1 % (ref 0–5)
GFR NON-AFRICAN AMERICAN: 45
GLUCOSE BLD-MCNC: 103 MG/DL (ref 74–109)
HCT VFR BLD CALC: 35.1 % (ref 37–47)
HEMOGLOBIN: 11.2 G/DL (ref 12–16)
LYME (B. BURGDORFERI) AB IGG WB: NEGATIVE
LYME AB IGM BY WB:: NEGATIVE
LYMPHOCYTES ABSOLUTE: 3.3 K/UL (ref 1.1–4.5)
LYMPHOCYTES RELATIVE PERCENT: 31.2 % (ref 20–40)
MCH RBC QN AUTO: 32.5 PG (ref 27–31)
MCHC RBC AUTO-ENTMCNC: 31.9 G/DL (ref 33–37)
MCV RBC AUTO: 101.7 FL (ref 81–99)
MONOCYTES ABSOLUTE: 0.9 K/UL (ref 0–0.9)
MONOCYTES RELATIVE PERCENT: 8.7 % (ref 0–10)
NEUTROPHILS ABSOLUTE: 6.2 K/UL (ref 1.5–7.5)
NEUTROPHILS RELATIVE PERCENT: 58 % (ref 50–65)
PDW BLD-RTO: 12 % (ref 11.5–14.5)
PLATELET # BLD: 321 K/UL (ref 130–400)
PMV BLD AUTO: 10.7 FL (ref 9.4–12.3)
POTASSIUM SERPL-SCNC: 3.7 MMOL/L (ref 3.5–5)
RBC # BLD: 3.45 M/UL (ref 4.2–5.4)
SODIUM BLD-SCNC: 144 MMOL/L (ref 136–145)
WBC # BLD: 10.6 K/UL (ref 4.8–10.8)

## 2019-02-26 PROCEDURE — 99232 SBSQ HOSP IP/OBS MODERATE 35: CPT | Performed by: PSYCHIATRY & NEUROLOGY

## 2019-02-26 PROCEDURE — 6370000000 HC RX 637 (ALT 250 FOR IP): Performed by: INTERNAL MEDICINE

## 2019-02-26 PROCEDURE — 99232 SBSQ HOSP IP/OBS MODERATE 35: CPT | Performed by: INTERNAL MEDICINE

## 2019-02-26 PROCEDURE — 6360000002 HC RX W HCPCS: Performed by: PSYCHIATRY & NEUROLOGY

## 2019-02-26 PROCEDURE — 2580000003 HC RX 258: Performed by: INTERNAL MEDICINE

## 2019-02-26 PROCEDURE — 85025 COMPLETE CBC W/AUTO DIFF WBC: CPT

## 2019-02-26 PROCEDURE — 80048 BASIC METABOLIC PNL TOTAL CA: CPT

## 2019-02-26 PROCEDURE — 36415 COLL VENOUS BLD VENIPUNCTURE: CPT

## 2019-02-26 PROCEDURE — 1210000000 HC MED SURG R&B

## 2019-02-26 RX ORDER — ATORVASTATIN CALCIUM 80 MG/1
40 TABLET, FILM COATED ORAL NIGHTLY
Qty: 30 TABLET | Refills: 3 | Status: SHIPPED | OUTPATIENT
Start: 2019-02-26 | End: 2020-10-27 | Stop reason: SDUPTHER

## 2019-02-26 RX ORDER — NICOTINE 21 MG/24HR
1 PATCH, TRANSDERMAL 24 HOURS TRANSDERMAL DAILY
Qty: 30 PATCH | Refills: 3 | Status: SHIPPED | OUTPATIENT
Start: 2019-02-27 | End: 2019-05-06

## 2019-02-26 RX ORDER — THIAMINE HYDROCHLORIDE 100 MG/ML
100 INJECTION, SOLUTION INTRAMUSCULAR; INTRAVENOUS DAILY
Qty: 1 SYRINGE | Refills: 0 | Status: SHIPPED | OUTPATIENT
Start: 2019-02-27 | End: 2019-05-06

## 2019-02-26 RX ADMIN — OXYCODONE HYDROCHLORIDE AND ACETAMINOPHEN 1000 MG: 500 TABLET ORAL at 08:33

## 2019-02-26 RX ADMIN — OXYCODONE AND ACETAMINOPHEN 1 TABLET: 5; 325 TABLET ORAL at 13:23

## 2019-02-26 RX ADMIN — OXYCODONE AND ACETAMINOPHEN 1 TABLET: 5; 325 TABLET ORAL at 02:28

## 2019-02-26 RX ADMIN — METOPROLOL TARTRATE 25 MG: 25 TABLET ORAL at 08:32

## 2019-02-26 RX ADMIN — METOPROLOL TARTRATE 25 MG: 25 TABLET ORAL at 19:59

## 2019-02-26 RX ADMIN — ESCITALOPRAM OXALATE 20 MG: 10 TABLET ORAL at 08:33

## 2019-02-26 RX ADMIN — ATORVASTATIN CALCIUM 80 MG: 80 TABLET, FILM COATED ORAL at 19:59

## 2019-02-26 RX ADMIN — ACETAMINOPHEN 650 MG: 325 TABLET, FILM COATED ORAL at 11:41

## 2019-02-26 RX ADMIN — Medication 10 ML: at 08:35

## 2019-02-26 RX ADMIN — OXYCODONE AND ACETAMINOPHEN 1 TABLET: 5; 325 TABLET ORAL at 08:47

## 2019-02-26 RX ADMIN — THIAMINE HYDROCHLORIDE 100 MG: 100 INJECTION, SOLUTION INTRAMUSCULAR; INTRAVENOUS at 08:31

## 2019-02-26 RX ADMIN — PANTOPRAZOLE SODIUM 20 MG: 20 TABLET, DELAYED RELEASE ORAL at 08:32

## 2019-02-26 ASSESSMENT — PAIN SCALES - GENERAL
PAINLEVEL_OUTOF10: 7
PAINLEVEL_OUTOF10: 5
PAINLEVEL_OUTOF10: 5
PAINLEVEL_OUTOF10: 8
PAINLEVEL_OUTOF10: 3
PAINLEVEL_OUTOF10: 5

## 2019-02-27 LAB
ALBUMIN CSF: 36 MG/DL (ref 0–35)
ALBUMIN INDEX: 10.7 RATIO (ref 0–9)
ALBUMIN, SERUM BY NEPHELOMETRY: 3360 MG/DL (ref 3500–5200)
ANION GAP SERPL CALCULATED.3IONS-SCNC: 16 MMOL/L (ref 7–19)
BASOPHILS ABSOLUTE: 0.1 K/UL (ref 0–0.2)
BASOPHILS RELATIVE PERCENT: 0.5 % (ref 0–1)
BUN BLDV-MCNC: 16 MG/DL (ref 8–23)
CALCIUM SERPL-MCNC: 8.5 MG/DL (ref 8.8–10.2)
CHLORIDE BLD-SCNC: 109 MMOL/L (ref 98–111)
CO2: 23 MMOL/L (ref 22–29)
CREAT SERPL-MCNC: 1 MG/DL (ref 0.5–0.9)
EOSINOPHILS ABSOLUTE: 0.2 K/UL (ref 0–0.6)
EOSINOPHILS RELATIVE PERCENT: 1.4 % (ref 0–5)
GFR NON-AFRICAN AMERICAN: 56
GLUCOSE BLD-MCNC: 107 MG/DL (ref 74–109)
HCT VFR BLD CALC: 32.7 % (ref 37–47)
HEMOGLOBIN: 10.4 G/DL (ref 12–16)
IGG CSF: 3 MG/DL (ref 0–6)
IGG INDEX: 0.47 RATIO (ref 0.28–0.66)
IGG SYNTHESIS RATE CSF: <0 MG/D
IGG/ALBUMIN CSF: 0.08 RATIO (ref 0.09–0.25)
IGG: 596 MG/DL (ref 768–1632)
LYMPHOCYTES ABSOLUTE: 2.2 K/UL (ref 1.1–4.5)
LYMPHOCYTES RELATIVE PERCENT: 20.8 % (ref 20–40)
MCH RBC QN AUTO: 32.9 PG (ref 27–31)
MCHC RBC AUTO-ENTMCNC: 31.8 G/DL (ref 33–37)
MCV RBC AUTO: 103.5 FL (ref 81–99)
MONOCYTES ABSOLUTE: 0.9 K/UL (ref 0–0.9)
MONOCYTES RELATIVE PERCENT: 8.5 % (ref 0–10)
MULTIPLE SCLEROSIS PANEL: ABNORMAL
NEUTROPHILS ABSOLUTE: 7.3 K/UL (ref 1.5–7.5)
NEUTROPHILS RELATIVE PERCENT: 68.4 % (ref 50–65)
OLIGOCLONAL BANDS CSF: NEGATIVE
OLIGOCLONAL BANDS NUMBER: 0 BANDS (ref 0–1)
PDW BLD-RTO: 12 % (ref 11.5–14.5)
PLATELET # BLD: 273 K/UL (ref 130–400)
PMV BLD AUTO: 10 FL (ref 9.4–12.3)
POTASSIUM SERPL-SCNC: 4.2 MMOL/L (ref 3.5–5)
RBC # BLD: 3.16 M/UL (ref 4.2–5.4)
SODIUM BLD-SCNC: 148 MMOL/L (ref 136–145)
WBC # BLD: 10.7 K/UL (ref 4.8–10.8)

## 2019-02-27 PROCEDURE — 2580000003 HC RX 258: Performed by: INTERNAL MEDICINE

## 2019-02-27 PROCEDURE — 99232 SBSQ HOSP IP/OBS MODERATE 35: CPT | Performed by: INTERNAL MEDICINE

## 2019-02-27 PROCEDURE — 85025 COMPLETE CBC W/AUTO DIFF WBC: CPT

## 2019-02-27 PROCEDURE — 36415 COLL VENOUS BLD VENIPUNCTURE: CPT

## 2019-02-27 PROCEDURE — 99232 SBSQ HOSP IP/OBS MODERATE 35: CPT | Performed by: PSYCHIATRY & NEUROLOGY

## 2019-02-27 PROCEDURE — 80048 BASIC METABOLIC PNL TOTAL CA: CPT

## 2019-02-27 PROCEDURE — 1210000000 HC MED SURG R&B

## 2019-02-27 PROCEDURE — 6360000002 HC RX W HCPCS: Performed by: PSYCHIATRY & NEUROLOGY

## 2019-02-27 PROCEDURE — 6370000000 HC RX 637 (ALT 250 FOR IP): Performed by: INTERNAL MEDICINE

## 2019-02-27 RX ADMIN — OXYCODONE HYDROCHLORIDE AND ACETAMINOPHEN 1000 MG: 500 TABLET ORAL at 08:53

## 2019-02-27 RX ADMIN — THIAMINE HYDROCHLORIDE 100 MG: 100 INJECTION, SOLUTION INTRAMUSCULAR; INTRAVENOUS at 08:52

## 2019-02-27 RX ADMIN — METOPROLOL TARTRATE 25 MG: 25 TABLET ORAL at 08:53

## 2019-02-27 RX ADMIN — ATORVASTATIN CALCIUM 80 MG: 80 TABLET, FILM COATED ORAL at 20:15

## 2019-02-27 RX ADMIN — METOPROLOL TARTRATE 25 MG: 25 TABLET ORAL at 20:15

## 2019-02-27 RX ADMIN — OXYCODONE AND ACETAMINOPHEN 1 TABLET: 5; 325 TABLET ORAL at 02:27

## 2019-02-27 RX ADMIN — ESCITALOPRAM OXALATE 20 MG: 10 TABLET ORAL at 08:52

## 2019-02-27 RX ADMIN — OXYCODONE AND ACETAMINOPHEN 1 TABLET: 5; 325 TABLET ORAL at 18:40

## 2019-02-27 RX ADMIN — Medication 10 ML: at 20:15

## 2019-02-27 RX ADMIN — PANTOPRAZOLE SODIUM 20 MG: 20 TABLET, DELAYED RELEASE ORAL at 08:52

## 2019-02-27 ASSESSMENT — PAIN SCALES - GENERAL
PAINLEVEL_OUTOF10: 7
PAINLEVEL_OUTOF10: 2
PAINLEVEL_OUTOF10: 2
PAINLEVEL_OUTOF10: 7

## 2019-02-27 ASSESSMENT — PAIN DESCRIPTION - LOCATION: LOCATION: HEAD

## 2019-02-27 ASSESSMENT — PAIN DESCRIPTION - PAIN TYPE: TYPE: ACUTE PAIN

## 2019-02-28 VITALS
OXYGEN SATURATION: 93 % | RESPIRATION RATE: 16 BRPM | HEART RATE: 76 BPM | SYSTOLIC BLOOD PRESSURE: 138 MMHG | WEIGHT: 140 LBS | HEIGHT: 62 IN | DIASTOLIC BLOOD PRESSURE: 88 MMHG | TEMPERATURE: 98.1 F | BODY MASS INDEX: 25.76 KG/M2

## 2019-02-28 LAB
ANION GAP SERPL CALCULATED.3IONS-SCNC: 12 MMOL/L (ref 7–19)
ARSENIC BLOOD: 14.2 UG/L (ref 0–12)
BASOPHILS ABSOLUTE: 0.1 K/UL (ref 0–0.2)
BASOPHILS RELATIVE PERCENT: 0.6 % (ref 0–1)
BLOOD CULTURE, ROUTINE: NORMAL
BUN BLDV-MCNC: 15 MG/DL (ref 8–23)
CALCIUM SERPL-MCNC: 9 MG/DL (ref 8.8–10.2)
CHLORIDE BLD-SCNC: 102 MMOL/L (ref 98–111)
CO2: 28 MMOL/L (ref 22–29)
CREAT SERPL-MCNC: 0.8 MG/DL (ref 0.5–0.9)
CULTURE, BLOOD 2: NORMAL
EOSINOPHILS ABSOLUTE: 0.2 K/UL (ref 0–0.6)
EOSINOPHILS RELATIVE PERCENT: 1.6 % (ref 0–5)
GFR NON-AFRICAN AMERICAN: >60
GLUCOSE BLD-MCNC: 107 MG/DL (ref 74–109)
HCT VFR BLD CALC: 33.8 % (ref 37–47)
HEMOGLOBIN: 11 G/DL (ref 12–16)
HERPES SIMPLEX VIRUS BY PCR: NOT DETECTED
HSV SOURCE: NORMAL
LEAD LEVEL BLOOD: <2 UG/DL (ref 0–4.9)
LYMPHOCYTES ABSOLUTE: 2.2 K/UL (ref 1.1–4.5)
LYMPHOCYTES RELATIVE PERCENT: 21.4 % (ref 20–40)
MCH RBC QN AUTO: 32.3 PG (ref 27–31)
MCHC RBC AUTO-ENTMCNC: 32.5 G/DL (ref 33–37)
MCV RBC AUTO: 99.1 FL (ref 81–99)
MERCURY BLOOD: <2.5 UG/L (ref 0–10)
MONOCYTES ABSOLUTE: 0.8 K/UL (ref 0–0.9)
MONOCYTES RELATIVE PERCENT: 7.9 % (ref 0–10)
NEUTROPHILS ABSOLUTE: 7 K/UL (ref 1.5–7.5)
NEUTROPHILS RELATIVE PERCENT: 68.1 % (ref 50–65)
PDW BLD-RTO: 12 % (ref 11.5–14.5)
PLATELET # BLD: 307 K/UL (ref 130–400)
PMV BLD AUTO: 10 FL (ref 9.4–12.3)
POTASSIUM SERPL-SCNC: 3.7 MMOL/L (ref 3.5–5)
RBC # BLD: 3.41 M/UL (ref 4.2–5.4)
SODIUM BLD-SCNC: 142 MMOL/L (ref 136–145)
VDRL CSF SCREEN: NON REACTIVE
WBC # BLD: 10.3 K/UL (ref 4.8–10.8)

## 2019-02-28 PROCEDURE — 99239 HOSP IP/OBS DSCHRG MGMT >30: CPT | Performed by: HOSPITALIST

## 2019-02-28 PROCEDURE — 82175 ASSAY OF ARSENIC: CPT

## 2019-02-28 PROCEDURE — 6370000000 HC RX 637 (ALT 250 FOR IP): Performed by: HOSPITALIST

## 2019-02-28 PROCEDURE — 2580000003 HC RX 258: Performed by: INTERNAL MEDICINE

## 2019-02-28 PROCEDURE — 99232 SBSQ HOSP IP/OBS MODERATE 35: CPT | Performed by: PSYCHIATRY & NEUROLOGY

## 2019-02-28 PROCEDURE — 85025 COMPLETE CBC W/AUTO DIFF WBC: CPT

## 2019-02-28 PROCEDURE — 93229 REMOTE 30 DAY ECG TECH SUPP: CPT

## 2019-02-28 PROCEDURE — 6360000002 HC RX W HCPCS: Performed by: PSYCHIATRY & NEUROLOGY

## 2019-02-28 PROCEDURE — 6360000002 HC RX W HCPCS: Performed by: INTERNAL MEDICINE

## 2019-02-28 PROCEDURE — 36415 COLL VENOUS BLD VENIPUNCTURE: CPT

## 2019-02-28 PROCEDURE — 6370000000 HC RX 637 (ALT 250 FOR IP): Performed by: INTERNAL MEDICINE

## 2019-02-28 PROCEDURE — 80048 BASIC METABOLIC PNL TOTAL CA: CPT

## 2019-02-28 RX ORDER — ERGOCALCIFEROL (VITAMIN D2) 1250 MCG
50000 CAPSULE ORAL WEEKLY
Qty: 5 CAPSULE | Refills: 0 | Status: SHIPPED | OUTPATIENT
Start: 2019-02-28 | End: 2019-08-05

## 2019-02-28 RX ORDER — CLONIDINE HYDROCHLORIDE 0.1 MG/1
0.1 TABLET ORAL EVERY 4 HOURS PRN
Qty: 30 TABLET | Refills: 0 | Status: ON HOLD | OUTPATIENT
Start: 2019-02-28 | End: 2019-05-14 | Stop reason: HOSPADM

## 2019-02-28 RX ORDER — HYDRALAZINE HYDROCHLORIDE 20 MG/ML
5 INJECTION INTRAMUSCULAR; INTRAVENOUS ONCE
Status: COMPLETED | OUTPATIENT
Start: 2019-02-28 | End: 2019-02-28

## 2019-02-28 RX ORDER — ERGOCALCIFEROL 1.25 MG/1
50000 CAPSULE ORAL WEEKLY
Status: DISCONTINUED | OUTPATIENT
Start: 2019-02-28 | End: 2019-02-28 | Stop reason: HOSPADM

## 2019-02-28 RX ORDER — CLONIDINE HYDROCHLORIDE 0.1 MG/1
0.1 TABLET ORAL EVERY 4 HOURS PRN
Status: DISCONTINUED | OUTPATIENT
Start: 2019-02-28 | End: 2019-02-28 | Stop reason: HOSPADM

## 2019-02-28 RX ADMIN — ESCITALOPRAM OXALATE 20 MG: 10 TABLET ORAL at 08:26

## 2019-02-28 RX ADMIN — PANTOPRAZOLE SODIUM 20 MG: 20 TABLET, DELAYED RELEASE ORAL at 08:26

## 2019-02-28 RX ADMIN — THIAMINE HYDROCHLORIDE 100 MG: 100 INJECTION, SOLUTION INTRAMUSCULAR; INTRAVENOUS at 08:26

## 2019-02-28 RX ADMIN — OXYCODONE AND ACETAMINOPHEN 1 TABLET: 5; 325 TABLET ORAL at 03:29

## 2019-02-28 RX ADMIN — HYDRALAZINE HYDROCHLORIDE 5 MG: 20 INJECTION INTRAMUSCULAR; INTRAVENOUS at 03:29

## 2019-02-28 RX ADMIN — Medication 10 ML: at 08:28

## 2019-02-28 RX ADMIN — METOPROLOL TARTRATE 25 MG: 25 TABLET ORAL at 08:26

## 2019-02-28 RX ADMIN — OXYCODONE HYDROCHLORIDE AND ACETAMINOPHEN 1000 MG: 500 TABLET ORAL at 08:26

## 2019-02-28 RX ADMIN — CLONIDINE HYDROCHLORIDE 0.1 MG: 0.1 TABLET ORAL at 13:45

## 2019-02-28 ASSESSMENT — PAIN SCALES - GENERAL: PAINLEVEL_OUTOF10: 5

## 2019-03-01 ENCOUNTER — CARE COORDINATION (OUTPATIENT)
Dept: CASE MANAGEMENT | Age: 64
End: 2019-03-01

## 2019-03-03 LAB
ANAEROBIC CULTURE: NORMAL
CSF CULTURE: NORMAL
GRAM STAIN RESULT: NORMAL

## 2019-03-04 ENCOUNTER — CARE COORDINATION (OUTPATIENT)
Dept: CASE MANAGEMENT | Age: 64
End: 2019-03-04

## 2019-03-07 LAB
ARSENIC(INORGANIC),U: <10 UG/L
ARSENIC(METHYLATED),U: <10 UG/L
ARSENIC(ORGANIC),UR: 26.5 UG/L

## 2019-03-11 ENCOUNTER — CARE COORDINATION (OUTPATIENT)
Dept: CASE MANAGEMENT | Age: 64
End: 2019-03-11

## 2019-03-12 ENCOUNTER — NURSE TRIAGE (OUTPATIENT)
Dept: OTHER | Facility: CLINIC | Age: 64
End: 2019-03-12

## 2019-03-13 ENCOUNTER — CARE COORDINATION (OUTPATIENT)
Dept: CASE MANAGEMENT | Age: 64
End: 2019-03-13

## 2019-03-15 ENCOUNTER — NURSE TRIAGE (OUTPATIENT)
Dept: OTHER | Facility: CLINIC | Age: 64
End: 2019-03-15

## 2019-03-18 ENCOUNTER — CARE COORDINATION (OUTPATIENT)
Dept: CASE MANAGEMENT | Age: 64
End: 2019-03-18

## 2019-03-19 ENCOUNTER — HOSPITAL ENCOUNTER (EMERGENCY)
Age: 64
Discharge: HOME | End: 2019-03-19
Attending: EMERGENCY MEDICINE
Payer: COMMERCIAL

## 2019-03-19 ENCOUNTER — APPOINTMENT (OUTPATIENT)
Dept: CT IMAGING | Age: 64
End: 2019-03-19
Payer: COMMERCIAL

## 2019-03-19 VITALS
BODY MASS INDEX: 25.76 KG/M2 | WEIGHT: 140 LBS | HEART RATE: 98 BPM | RESPIRATION RATE: 17 BRPM | TEMPERATURE: 97.5 F | OXYGEN SATURATION: 94 % | HEIGHT: 62 IN | SYSTOLIC BLOOD PRESSURE: 109 MMHG | DIASTOLIC BLOOD PRESSURE: 71 MMHG

## 2019-03-19 DIAGNOSIS — Z86.79 HISTORY OF ANEURYSM: ICD-10-CM

## 2019-03-19 DIAGNOSIS — R51.9 NONINTRACTABLE HEADACHE, UNSPECIFIED CHRONICITY PATTERN, UNSPECIFIED HEADACHE TYPE: Primary | ICD-10-CM

## 2019-03-19 LAB
ALBUMIN SERPL-MCNC: 4.7 G/DL (ref 3.5–5.2)
ALP BLD-CCNC: 103 U/L (ref 35–104)
ALT SERPL-CCNC: 22 U/L (ref 5–33)
ANION GAP SERPL CALCULATED.3IONS-SCNC: 15 MMOL/L (ref 7–19)
APTT: 33.7 SEC (ref 26–36.2)
AST SERPL-CCNC: 34 U/L (ref 5–32)
BASOPHILS ABSOLUTE: 0.1 K/UL (ref 0–0.2)
BASOPHILS RELATIVE PERCENT: 0.5 % (ref 0–1)
BILIRUB SERPL-MCNC: 0.4 MG/DL (ref 0.2–1.2)
BUN BLDV-MCNC: 20 MG/DL (ref 8–23)
CALCIUM SERPL-MCNC: 10.5 MG/DL (ref 8.8–10.2)
CHLORIDE BLD-SCNC: 101 MMOL/L (ref 98–111)
CO2: 26 MMOL/L (ref 22–29)
CREAT SERPL-MCNC: 1.5 MG/DL (ref 0.5–0.9)
EOSINOPHILS ABSOLUTE: 0.1 K/UL (ref 0–0.6)
EOSINOPHILS RELATIVE PERCENT: 0.7 % (ref 0–5)
GFR NON-AFRICAN AMERICAN: 35
GLUCOSE BLD-MCNC: 119 MG/DL (ref 74–109)
HCT VFR BLD CALC: 39 % (ref 37–47)
HEMOGLOBIN: 12.7 G/DL (ref 12–16)
INR BLD: 1.06 (ref 0.88–1.18)
LYMPHOCYTES ABSOLUTE: 2.8 K/UL (ref 1.1–4.5)
LYMPHOCYTES RELATIVE PERCENT: 21 % (ref 20–40)
MCH RBC QN AUTO: 32.3 PG (ref 27–31)
MCHC RBC AUTO-ENTMCNC: 32.6 G/DL (ref 33–37)
MCV RBC AUTO: 99.2 FL (ref 81–99)
MONOCYTES ABSOLUTE: 0.9 K/UL (ref 0–0.9)
MONOCYTES RELATIVE PERCENT: 6.7 % (ref 0–10)
NEUTROPHILS ABSOLUTE: 9.5 K/UL (ref 1.5–7.5)
NEUTROPHILS RELATIVE PERCENT: 70.6 % (ref 50–65)
PDW BLD-RTO: 11.8 % (ref 11.5–14.5)
PLATELET # BLD: 491 K/UL (ref 130–400)
PMV BLD AUTO: 9.4 FL (ref 9.4–12.3)
POTASSIUM SERPL-SCNC: 5 MMOL/L (ref 3.5–5)
PROTHROMBIN TIME: 13.2 SEC (ref 12–14.6)
RBC # BLD: 3.93 M/UL (ref 4.2–5.4)
SODIUM BLD-SCNC: 142 MMOL/L (ref 136–145)
TOTAL PROTEIN: 8.5 G/DL (ref 6.6–8.7)
WBC # BLD: 13.5 K/UL (ref 4.8–10.8)

## 2019-03-19 PROCEDURE — 99284 EMERGENCY DEPT VISIT MOD MDM: CPT | Performed by: EMERGENCY MEDICINE

## 2019-03-19 PROCEDURE — 96374 THER/PROPH/DIAG INJ IV PUSH: CPT

## 2019-03-19 PROCEDURE — 93005 ELECTROCARDIOGRAM TRACING: CPT

## 2019-03-19 PROCEDURE — 99284 EMERGENCY DEPT VISIT MOD MDM: CPT

## 2019-03-19 PROCEDURE — 96375 TX/PRO/DX INJ NEW DRUG ADDON: CPT

## 2019-03-19 PROCEDURE — 85025 COMPLETE CBC W/AUTO DIFF WBC: CPT

## 2019-03-19 PROCEDURE — 6360000004 HC RX CONTRAST MEDICATION: Performed by: EMERGENCY MEDICINE

## 2019-03-19 PROCEDURE — 36415 COLL VENOUS BLD VENIPUNCTURE: CPT

## 2019-03-19 PROCEDURE — 6360000002 HC RX W HCPCS: Performed by: EMERGENCY MEDICINE

## 2019-03-19 PROCEDURE — 6360000002 HC RX W HCPCS

## 2019-03-19 PROCEDURE — 85610 PROTHROMBIN TIME: CPT

## 2019-03-19 PROCEDURE — 85730 THROMBOPLASTIN TIME PARTIAL: CPT

## 2019-03-19 PROCEDURE — 2580000003 HC RX 258: Performed by: EMERGENCY MEDICINE

## 2019-03-19 PROCEDURE — 80053 COMPREHEN METABOLIC PANEL: CPT

## 2019-03-19 PROCEDURE — 70496 CT ANGIOGRAPHY HEAD: CPT

## 2019-03-19 RX ORDER — MORPHINE SULFATE 4 MG/ML
4 INJECTION, SOLUTION INTRAMUSCULAR; INTRAVENOUS ONCE
Status: COMPLETED | OUTPATIENT
Start: 2019-03-19 | End: 2019-03-19

## 2019-03-19 RX ORDER — ONDANSETRON 2 MG/ML
INJECTION INTRAMUSCULAR; INTRAVENOUS
Status: COMPLETED
Start: 2019-03-19 | End: 2019-03-19

## 2019-03-19 RX ORDER — LORAZEPAM 2 MG/ML
1 INJECTION INTRAMUSCULAR ONCE
Status: COMPLETED | OUTPATIENT
Start: 2019-03-19 | End: 2019-03-19

## 2019-03-19 RX ORDER — 0.9 % SODIUM CHLORIDE 0.9 %
1000 INTRAVENOUS SOLUTION INTRAVENOUS ONCE
Status: COMPLETED | OUTPATIENT
Start: 2019-03-19 | End: 2019-03-19

## 2019-03-19 RX ADMIN — SODIUM CHLORIDE 1000 ML: 9 INJECTION, SOLUTION INTRAVENOUS at 13:40

## 2019-03-19 RX ADMIN — ONDANSETRON 4 MG: 2 INJECTION INTRAMUSCULAR; INTRAVENOUS at 13:40

## 2019-03-19 RX ADMIN — IOPAMIDOL 90 ML: 755 INJECTION, SOLUTION INTRAVENOUS at 13:01

## 2019-03-19 RX ADMIN — LORAZEPAM 1 MG: 2 INJECTION INTRAMUSCULAR; INTRAVENOUS at 12:48

## 2019-03-19 RX ADMIN — MORPHINE SULFATE 4 MG: 4 INJECTION INTRAVENOUS at 13:40

## 2019-03-19 RX ADMIN — HYDROMORPHONE HYDROCHLORIDE 1 MG: 1 INJECTION, SOLUTION INTRAMUSCULAR; INTRAVENOUS; SUBCUTANEOUS at 15:20

## 2019-03-19 ASSESSMENT — ENCOUNTER SYMPTOMS
VOMITING: 0
NAUSEA: 0
COUGH: 0
PHOTOPHOBIA: 0
BACK PAIN: 0
ABDOMINAL PAIN: 0
DIARRHEA: 0
SHORTNESS OF BREATH: 0
SORE THROAT: 0
RHINORRHEA: 0

## 2019-03-19 ASSESSMENT — PAIN SCALES - GENERAL
PAINLEVEL_OUTOF10: 3
PAINLEVEL_OUTOF10: 9
PAINLEVEL_OUTOF10: 10
PAINLEVEL_OUTOF10: 9
PAINLEVEL_OUTOF10: 9
PAINLEVEL_OUTOF10: 3

## 2019-03-19 ASSESSMENT — PAIN - FUNCTIONAL ASSESSMENT: PAIN_FUNCTIONAL_ASSESSMENT: 0-10

## 2019-03-20 ENCOUNTER — NURSE TRIAGE (OUTPATIENT)
Dept: OTHER | Facility: CLINIC | Age: 64
End: 2019-03-20

## 2019-03-20 DIAGNOSIS — I67.1 BRAIN ANEURYSM: Primary | ICD-10-CM

## 2019-03-20 LAB
EKG P AXIS: 25 DEGREES
EKG P-R INTERVAL: 110 MS
EKG Q-T INTERVAL: 356 MS
EKG QRS DURATION: 92 MS
EKG QTC CALCULATION (BAZETT): 434 MS
EKG T AXIS: 11 DEGREES

## 2019-04-10 LAB
AFB CULTURE (MYCOBACTERIA): NORMAL
AFB SMEAR: NORMAL

## 2019-05-04 ENCOUNTER — ANESTHESIA EVENT (OUTPATIENT)
Dept: OPERATING ROOM | Age: 64
DRG: 025 | End: 2019-05-04
Payer: COMMERCIAL

## 2019-05-06 NOTE — PROGRESS NOTES
[facility]                          Date of Procedure 5-7-19 Time of Procedure 0900    PRIOR TO PROCEDURE DATE:  1. Please follow any guidelines/instructions prior to your procedure as advised by your surgeon. 2. Arrange for someone to drive you home and be with you for the first 24 hours after discharge for your safety after your procedure for which you received sedation. Ensure it is someone we can share information with regarding your discharge. 3. You must contact your surgeon for instructions IF:   You are taking any blood thinners, aspirin, anti-inflammatory or vitamin E.   There is a change in your physical condition such as a cold, fever, rash, cuts, sores or any other infection, especially near your surgical site. 4. Do not drink alcohol the day before or day of your procedure. 5. A Pre-op History and Physical for surgery MUST be completed by your Physician or Urgent Care within 30 days of your procedure date. Please bring a copy with you on the day of your procedure and along with any other testing performed. THE DAY OF YOUR PROCEDURE:  1. Follow instructions for ARRIVAL TIME as DIRECTED BY YOUR SURGEON. If your surgeon does not give you a specific arrival time, please arrive at  [facility].    2. Enter the MAIN entrance from [address] and follow the signs to the free [employer] or [employer] parking (offered free of charge 6am-5pm). 3. Enter the Main Entrance of the hospital (do not enter from the lower level of the parking garage). Upon entrance, check in with the  at the main desk on your left. If no one is available at the desk, proceed into the [facility] Waiting Room and go through the door directly into the [facility]. There is a Check-in desk ACROSS from Room 5 (marked with a sign hanging from the ceiling). The phone number for the surgery center is [phone].     4. Please call [phone] option #2 option #2 if you have not been preregistered yet. On the day of your procedure bring your insurance card and photo ID. You will be registered at your bedside once brought back to your room. 5. DO NOT EAT ANYTHING eight hours prior to surgery. May have 8 ounces of water 4 hours prior to surgery. 6. MEDICATIONS    Take the following medications with a SMALL sip of water: metoprolol   Use your usual dose of inhalers the morning of surgery. BRING your rescue inhaler with you to hospital.    Anesthesia does NOT want you to take insulin the morning of surgery. They will control your blood sugar while you are at the hospital. Please contact your ordering physician for instructions regarding your insulin the night before your procedure. If you have an insulin pump, please keep it set on basal rate. 7. Do not swallow water when brushing teeth. No gum, candy, mints or ice chips. Refrain from smoking or at least decrease the amount. 8. Dress in loose, comfortable clothing appropriate for redressing after your procedure. Do not wear jewelry (including body piercings), make-up (especially NO eye make-up), fingernail polish (NO toenail polish if foot/leg surgery), lotion, powders or metal hairclips. 9. Dentures, glasses, or contacts will need to be removed before your procedure. Bring cases for your glasses, contacts, dentures, or hearing aids to protect them while you are in surgery. 10. If you use a CPAP, please bring it with you on the day of your procedure. 11. We recommend that valuable personal  belongings such as cash, cell phones, e-tablets or jewelry, be left at home during your stay. The hospital will not be responsible for valuables that are not secured in the hospital safe. However, if your insurance requires a co-pay, you may want to bring a method of payment, i.e. Check or credit card, if you wish to pay your co-pay the day of surgery.       12. If you are to stay overnight, you may bring a bag with personal items. Please have any large items you may need brought in by your family after your arrival to your hospital room. 15. If you have a Living Will or Durable Power of , please bring a copy on the day of your procedure. 15. With your permission, one family member may accompany you while you are being prepared for surgery. Once you are ready, additional family members may join you. HOW WE KEEP YOU SAFE and WORK TO PREVENT SURGICAL SITE INFECTIONS:  1. Health care workers should always check your ID bracelet to verify your name and birth date. You will be asked many times to state your name, date of birth, and allergies. 2. Health care workers should always clean their hands with soap or alcohol gel before providing care to you. It is okay to ask anyone if they cleaned their hands before they touch you. 3. You will be actively involved in verifying the type of procedure you are having and ensuring the correct surgical site. This will be confirmed multiple times prior to your procedure. Do NOT aman your surgery site UNLESS instructed to by your surgeon. 4. Do not shave or wax for 72 hours prior to procedure near your operative site. Shaving with a razor can irritate your skin and make it easier to develop an infection. On the day of your procedure, any hair that needs to be removed near the surgical site will be clipped by a healthcare worker using a special clippers designed to avoid skin irritation. 5. When you are in the operating room, your surgical site will be cleansed with a special soap, and in most cases, you will be given an antibiotic before the surgery begins. What to expect AFTER YOUR PROCEDURE:  1. Immediately following your procedure, your will be taken to the PACU for the first phase of your recovery.   Your nurse will help you recover from any potential side effects of anesthesia, such as extreme drowsiness, changes in your vital signs or breathing

## 2019-05-06 NOTE — PROGRESS NOTES
[facility] ([facility]) [address] [facility], [address]    Acknowledgment of Informed Consent for Surgical or Medical Procedure and Sedation  I agree to allow doctor(s) Claritza Kelsey and his/her associates or assistants, including residents and/or other qualified medical practitioner to perform the following medical treatment or procedure and to administer or direct the administration of sedation as necessary:  Procedure(s): RIGHT CRANIOTOMY FOR CLIPPING OF MIDDLE CEREBRAL ARTERY ANEURYSM  My doctor has explained the following regarding the proposed procedure:   the explanation of the procedure   the benefits of the procedure   the potential problems that might occur during recuperation   the risks and side effects of the procedure which could include but are not limited to severe blood loss, infection, stroke or death   the benefits, risks and side effect of alternative procedures including the consequences of declining this procedure or any alternative procedures   the likelihood of achieving satisfactory results. I acknowledge no guarantee or assurance has been made to me regarding the results. I understand that during the course of this treatment/procedure, unforeseen conditions can occur which require an additional or different procedure. I agree to allow my physician or assistants to perform such extension of the original procedure as they may find necessary. I understand that sedation will often result in temporary impairment of memory and fine motor skills and that sedation can occasionally progress to a state of deep sedation or general anesthesia. I understand the risks of anesthesia for surgery include, but are not limited to, sore throat, hoarseness, injury to face, mouth, or teeth; nausea; headache; injury to blood vessels or nerves; death, brain damage, or paralysis.     I understand that if I have a Limitation of Treatment order in effect during my hospitalization, the order may or may not be in effect during this procedure. I give my doctor permission to give me blood or blood products. I understand that there are risks with receiving blood such as hepatitis, AIDS, fever, or allergic reaction. I acknowledge that the risks, benefits, and alternatives of this treatment have been explained to me and that no express or implied warranty has been given by the hospital, any blood bank, or any person or entity as to the blood or blood components transfused. At the discretion of my doctor, I agree to allow observers, equipment/product representatives and allow photographing, and/or televising of the procedure, provided my name or identity is maintained confidentially. I agree the hospital may dispose of or use for scientific or educational purposes any tissue, fluid, or body parts which may be removed.     ________________________________Date________Time______ am/pm  ([place] [facility])  Patient or Signature of Closest Relative or Legal Guardian    ________________________________Date________Time______am/pm      Page 1 of  1  Witness

## 2019-05-06 NOTE — PROGRESS NOTES
Called for most recent office notes from Dr. Ira Mancilla (PCP), ph# [phone]. Spoke to Madelin Torres at office.

## 2019-05-07 ENCOUNTER — APPOINTMENT (OUTPATIENT)
Dept: CT IMAGING | Age: 64
DRG: 025 | End: 2019-05-07
Attending: NEUROLOGICAL SURGERY
Payer: COMMERCIAL

## 2019-05-07 ENCOUNTER — HOSPITAL ENCOUNTER (INPATIENT)
Age: 64
LOS: 10 days | Discharge: HOME HEALTH | DRG: 025 | End: 2019-05-17
Attending: NEUROLOGICAL SURGERY | Admitting: NEUROLOGICAL SURGERY
Payer: COMMERCIAL

## 2019-05-07 ENCOUNTER — ANESTHESIA (OUTPATIENT)
Dept: OPERATING ROOM | Age: 64
DRG: 025 | End: 2019-05-07
Payer: COMMERCIAL

## 2019-05-07 VITALS — TEMPERATURE: 98.1 F | SYSTOLIC BLOOD PRESSURE: 93 MMHG | DIASTOLIC BLOOD PRESSURE: 52 MMHG | OXYGEN SATURATION: 95 %

## 2019-05-07 DIAGNOSIS — I10 ESSENTIAL HYPERTENSION: Primary | ICD-10-CM

## 2019-05-07 DIAGNOSIS — I67.1 ANEURYSM OF MIDDLE CEREBRAL ARTERY: ICD-10-CM

## 2019-05-07 LAB
ABO/RH: NORMAL
ANION GAP SERPL CALCULATED.3IONS-SCNC: 12 MMOL/L (ref 3–16)
ANTIBODY SCREEN: NORMAL
APTT: 34.5 SEC (ref 26–36)
BUN BLDV-MCNC: 20 MG/DL (ref 7–20)
CALCIUM SERPL-MCNC: 8.5 MG/DL (ref 8.3–10.6)
CHLORIDE BLD-SCNC: 109 MMOL/L (ref 99–110)
CO2: 21 MMOL/L (ref 21–32)
CREAT SERPL-MCNC: 0.9 MG/DL (ref 0.6–1.2)
GFR AFRICAN AMERICAN: >60
GFR NON-AFRICAN AMERICAN: >60
GLUCOSE BLD-MCNC: 102 MG/DL (ref 70–99)
GLUCOSE BLD-MCNC: 125 MG/DL (ref 70–99)
HCT VFR BLD CALC: 31.7 % (ref 36–48)
HEMOGLOBIN: 10.3 G/DL (ref 12–16)
INR BLD: 0.91 (ref 0.86–1.14)
MCH RBC QN AUTO: 32.3 PG (ref 26–34)
MCHC RBC AUTO-ENTMCNC: 32.6 G/DL (ref 31–36)
MCV RBC AUTO: 99 FL (ref 80–100)
PDW BLD-RTO: 13.8 % (ref 12.4–15.4)
PERFORMED ON: ABNORMAL
PLATELET # BLD: 252 K/UL (ref 135–450)
PMV BLD AUTO: 8.3 FL (ref 5–10.5)
POTASSIUM SERPL-SCNC: 3.6 MMOL/L (ref 3.5–5.1)
PROTHROMBIN TIME: 10.4 SEC (ref 9.8–13)
RBC # BLD: 3.2 M/UL (ref 4–5.2)
SODIUM BLD-SCNC: 142 MMOL/L (ref 136–145)
WBC # BLD: 8.5 K/UL (ref 4–11)

## 2019-05-07 PROCEDURE — 99222 1ST HOSP IP/OBS MODERATE 55: CPT | Performed by: INTERNAL MEDICINE

## 2019-05-07 PROCEDURE — 2000000000 HC ICU R&B

## 2019-05-07 PROCEDURE — 2580000003 HC RX 258: Performed by: NURSE PRACTITIONER

## 2019-05-07 PROCEDURE — 3600000004 HC SURGERY LEVEL 4 BASE: Performed by: NEUROLOGICAL SURGERY

## 2019-05-07 PROCEDURE — 2720000010 HC SURG SUPPLY STERILE: Performed by: NEUROLOGICAL SURGERY

## 2019-05-07 PROCEDURE — 6360000002 HC RX W HCPCS: Performed by: ANESTHESIOLOGY

## 2019-05-07 PROCEDURE — 6370000000 HC RX 637 (ALT 250 FOR IP): Performed by: NEUROLOGICAL SURGERY

## 2019-05-07 PROCEDURE — 6360000002 HC RX W HCPCS: Performed by: NEUROLOGICAL SURGERY

## 2019-05-07 PROCEDURE — 7100000001 HC PACU RECOVERY - ADDTL 15 MIN: Performed by: NEUROLOGICAL SURGERY

## 2019-05-07 PROCEDURE — 6360000002 HC RX W HCPCS: Performed by: NURSE PRACTITIONER

## 2019-05-07 PROCEDURE — 86850 RBC ANTIBODY SCREEN: CPT

## 2019-05-07 PROCEDURE — 85610 PROTHROMBIN TIME: CPT

## 2019-05-07 PROCEDURE — 6360000002 HC RX W HCPCS

## 2019-05-07 PROCEDURE — 86901 BLOOD TYPING SEROLOGIC RH(D): CPT

## 2019-05-07 PROCEDURE — 2500000003 HC RX 250 WO HCPCS: Performed by: NURSE ANESTHETIST, CERTIFIED REGISTERED

## 2019-05-07 PROCEDURE — 85027 COMPLETE CBC AUTOMATED: CPT

## 2019-05-07 PROCEDURE — 3600000014 HC SURGERY LEVEL 4 ADDTL 15MIN: Performed by: NEUROLOGICAL SURGERY

## 2019-05-07 PROCEDURE — C1781 MESH (IMPLANTABLE): HCPCS | Performed by: NEUROLOGICAL SURGERY

## 2019-05-07 PROCEDURE — 2580000003 HC RX 258: Performed by: ANESTHESIOLOGY

## 2019-05-07 PROCEDURE — 3700000001 HC ADD 15 MINUTES (ANESTHESIA): Performed by: NEUROLOGICAL SURGERY

## 2019-05-07 PROCEDURE — 51798 US URINE CAPACITY MEASURE: CPT

## 2019-05-07 PROCEDURE — 85730 THROMBOPLASTIN TIME PARTIAL: CPT

## 2019-05-07 PROCEDURE — 6360000002 HC RX W HCPCS: Performed by: NURSE ANESTHETIST, CERTIFIED REGISTERED

## 2019-05-07 PROCEDURE — 2780000010 HC IMPLANT OTHER: Performed by: NEUROLOGICAL SURGERY

## 2019-05-07 PROCEDURE — 70450 CT HEAD/BRAIN W/O DYE: CPT

## 2019-05-07 PROCEDURE — 80048 BASIC METABOLIC PNL TOTAL CA: CPT

## 2019-05-07 PROCEDURE — 2500000003 HC RX 250 WO HCPCS: Performed by: ANESTHESIOLOGY

## 2019-05-07 PROCEDURE — 3700000000 HC ANESTHESIA ATTENDED CARE: Performed by: NEUROLOGICAL SURGERY

## 2019-05-07 PROCEDURE — 03LG0CZ OCCLUSION OF INTRACRANIAL ARTERY WITH EXTRALUMINAL DEVICE, OPEN APPROACH: ICD-10-PCS | Performed by: NEUROLOGICAL SURGERY

## 2019-05-07 PROCEDURE — 2580000003 HC RX 258: Performed by: NEUROLOGICAL SURGERY

## 2019-05-07 PROCEDURE — 1200000000 HC SEMI PRIVATE

## 2019-05-07 PROCEDURE — C1713 ANCHOR/SCREW BN/BN,TIS/BN: HCPCS | Performed by: NEUROLOGICAL SURGERY

## 2019-05-07 PROCEDURE — 2709999900 HC NON-CHARGEABLE SUPPLY: Performed by: NEUROLOGICAL SURGERY

## 2019-05-07 PROCEDURE — 86900 BLOOD TYPING SEROLOGIC ABO: CPT

## 2019-05-07 PROCEDURE — 7100000000 HC PACU RECOVERY - FIRST 15 MIN: Performed by: NEUROLOGICAL SURGERY

## 2019-05-07 PROCEDURE — 51701 INSERT BLADDER CATHETER: CPT

## 2019-05-07 PROCEDURE — 2580000003 HC RX 258: Performed by: NURSE ANESTHETIST, CERTIFIED REGISTERED

## 2019-05-07 PROCEDURE — 2500000003 HC RX 250 WO HCPCS: Performed by: NEUROLOGICAL SURGERY

## 2019-05-07 DEVICE — PLATE BNE L12MM THK0.4MM 2 H CRANIOMAXILLOFACIAL BILAT BLU: Type: IMPLANTABLE DEVICE | Site: CRANIAL | Status: FUNCTIONAL

## 2019-05-07 DEVICE — SEALANT SURG 13 YR DURA AUTOSPRAY ADHERUS NUS106] SURGICAL ONE]: Type: IMPLANTABLE DEVICE | Site: CRANIAL | Status: FUNCTIONAL

## 2019-05-07 DEVICE — CODMAN® RANEY SCALP CLIPS 20 UNITS OF 10 CLIPS
Type: IMPLANTABLE DEVICE | Site: CRANIAL | Status: FUNCTIONAL
Brand: CODMAN®

## 2019-05-07 DEVICE — COVER BUR H DIA17MM 6 H BLU TI RIG CNTOUR W/O TAB: Type: IMPLANTABLE DEVICE | Site: CRANIAL | Status: FUNCTIONAL

## 2019-05-07 DEVICE — SCREW BNE L4MM DIA1.5MM CRANIOFACIAL TI SELF DRL: Type: IMPLANTABLE DEVICE | Site: CRANIAL | Status: FUNCTIONAL

## 2019-05-07 DEVICE — IMPLANTABLE DEVICE: Type: IMPLANTABLE DEVICE | Site: CRANIAL | Status: FUNCTIONAL

## 2019-05-07 RX ORDER — SODIUM CHLORIDE 0.9 % (FLUSH) 0.9 %
10 SYRINGE (ML) INJECTION EVERY 12 HOURS SCHEDULED
Status: DISCONTINUED | OUTPATIENT
Start: 2019-05-07 | End: 2019-05-07 | Stop reason: HOSPADM

## 2019-05-07 RX ORDER — DEXAMETHASONE SODIUM PHOSPHATE 4 MG/ML
4 INJECTION, SOLUTION INTRA-ARTICULAR; INTRALESIONAL; INTRAMUSCULAR; INTRAVENOUS; SOFT TISSUE
Status: DISCONTINUED | OUTPATIENT
Start: 2019-05-07 | End: 2019-05-07 | Stop reason: HOSPADM

## 2019-05-07 RX ORDER — LABETALOL 20 MG/4 ML (5 MG/ML) INTRAVENOUS SYRINGE
10 EVERY 4 HOURS PRN
Status: DISCONTINUED | OUTPATIENT
Start: 2019-05-07 | End: 2019-05-10

## 2019-05-07 RX ORDER — FENTANYL CITRATE 50 UG/ML
50 INJECTION, SOLUTION INTRAMUSCULAR; INTRAVENOUS
Status: DISPENSED | OUTPATIENT
Start: 2019-05-07 | End: 2019-05-09

## 2019-05-07 RX ORDER — SODIUM CHLORIDE 9 MG/ML
INJECTION, SOLUTION INTRAVENOUS CONTINUOUS
Status: DISCONTINUED | OUTPATIENT
Start: 2019-05-07 | End: 2019-05-08

## 2019-05-07 RX ORDER — SODIUM CHLORIDE, SODIUM LACTATE, POTASSIUM CHLORIDE, CALCIUM CHLORIDE 600; 310; 30; 20 MG/100ML; MG/100ML; MG/100ML; MG/100ML
INJECTION, SOLUTION INTRAVENOUS CONTINUOUS PRN
Status: DISCONTINUED | OUTPATIENT
Start: 2019-05-07 | End: 2019-05-07 | Stop reason: SDUPTHER

## 2019-05-07 RX ORDER — ATOMOXETINE 80 MG/1
1 CAPSULE ORAL DAILY
Status: ON HOLD | COMMUNITY
End: 2019-05-17 | Stop reason: HOSPADM

## 2019-05-07 RX ORDER — LEVETIRACETAM 500 MG/1
500 TABLET ORAL 2 TIMES DAILY
Status: DISCONTINUED | OUTPATIENT
Start: 2019-05-07 | End: 2019-05-07

## 2019-05-07 RX ORDER — FENTANYL CITRATE 50 UG/ML
INJECTION, SOLUTION INTRAMUSCULAR; INTRAVENOUS PRN
Status: DISCONTINUED | OUTPATIENT
Start: 2019-05-07 | End: 2019-05-07 | Stop reason: SDUPTHER

## 2019-05-07 RX ORDER — SODIUM CHLORIDE 0.9 % (FLUSH) 0.9 %
10 SYRINGE (ML) INJECTION PRN
Status: DISCONTINUED | OUTPATIENT
Start: 2019-05-07 | End: 2019-05-07 | Stop reason: HOSPADM

## 2019-05-07 RX ORDER — MIDAZOLAM HYDROCHLORIDE 1 MG/ML
INJECTION INTRAMUSCULAR; INTRAVENOUS
Status: COMPLETED
Start: 2019-05-07 | End: 2019-05-07

## 2019-05-07 RX ORDER — PROPOFOL 10 MG/ML
INJECTION, EMULSION INTRAVENOUS CONTINUOUS PRN
Status: DISCONTINUED | OUTPATIENT
Start: 2019-05-07 | End: 2019-05-07 | Stop reason: SDUPTHER

## 2019-05-07 RX ORDER — ONDANSETRON 2 MG/ML
4 INJECTION INTRAMUSCULAR; INTRAVENOUS EVERY 6 HOURS PRN
Status: CANCELLED | OUTPATIENT
Start: 2019-05-07

## 2019-05-07 RX ORDER — BISACODYL 10 MG
10 SUPPOSITORY, RECTAL RECTAL DAILY PRN
Status: DISCONTINUED | OUTPATIENT
Start: 2019-05-07 | End: 2019-05-17 | Stop reason: HOSPADM

## 2019-05-07 RX ORDER — EPHEDRINE SULFATE 50 MG/ML
INJECTION INTRAVENOUS PRN
Status: DISCONTINUED | OUTPATIENT
Start: 2019-05-07 | End: 2019-05-07 | Stop reason: SDUPTHER

## 2019-05-07 RX ORDER — MEPERIDINE HYDROCHLORIDE 25 MG/ML
12.5 INJECTION INTRAMUSCULAR; INTRAVENOUS; SUBCUTANEOUS EVERY 5 MIN PRN
Status: DISCONTINUED | OUTPATIENT
Start: 2019-05-07 | End: 2019-05-07 | Stop reason: HOSPADM

## 2019-05-07 RX ORDER — LORAZEPAM 2 MG/ML
INJECTION INTRAMUSCULAR
Status: COMPLETED
Start: 2019-05-07 | End: 2019-05-07

## 2019-05-07 RX ORDER — HYDRALAZINE HYDROCHLORIDE 20 MG/ML
5 INJECTION INTRAMUSCULAR; INTRAVENOUS EVERY 10 MIN PRN
Status: DISCONTINUED | OUTPATIENT
Start: 2019-05-07 | End: 2019-05-07 | Stop reason: HOSPADM

## 2019-05-07 RX ORDER — HYDROCODONE BITARTRATE AND ACETAMINOPHEN 5; 325 MG/1; MG/1
1 TABLET ORAL EVERY 4 HOURS PRN
Status: DISCONTINUED | OUTPATIENT
Start: 2019-05-07 | End: 2019-05-11

## 2019-05-07 RX ORDER — ATORVASTATIN CALCIUM 40 MG/1
40 TABLET, FILM COATED ORAL NIGHTLY
Status: DISCONTINUED | OUTPATIENT
Start: 2019-05-07 | End: 2019-05-17 | Stop reason: HOSPADM

## 2019-05-07 RX ORDER — PROPOFOL 10 MG/ML
INJECTION, EMULSION INTRAVENOUS PRN
Status: DISCONTINUED | OUTPATIENT
Start: 2019-05-07 | End: 2019-05-07 | Stop reason: SDUPTHER

## 2019-05-07 RX ORDER — CLOPIDOGREL BISULFATE 75 MG/1
75 TABLET ORAL DAILY
Status: ON HOLD | COMMUNITY
End: 2019-05-15 | Stop reason: SDUPTHER

## 2019-05-07 RX ORDER — MORPHINE SULFATE 2 MG/ML
2 INJECTION, SOLUTION INTRAMUSCULAR; INTRAVENOUS
Status: DISCONTINUED | OUTPATIENT
Start: 2019-05-07 | End: 2019-05-07

## 2019-05-07 RX ORDER — GLYCOPYRROLATE 0.2 MG/ML
INJECTION INTRAMUSCULAR; INTRAVENOUS PRN
Status: DISCONTINUED | OUTPATIENT
Start: 2019-05-07 | End: 2019-05-07 | Stop reason: SDUPTHER

## 2019-05-07 RX ORDER — FAMOTIDINE 20 MG/1
20 TABLET, FILM COATED ORAL 2 TIMES DAILY
Status: CANCELLED | OUTPATIENT
Start: 2019-05-07

## 2019-05-07 RX ORDER — HYDROCODONE BITARTRATE AND ACETAMINOPHEN 5; 325 MG/1; MG/1
2 TABLET ORAL EVERY 4 HOURS PRN
Status: DISCONTINUED | OUTPATIENT
Start: 2019-05-07 | End: 2019-05-11

## 2019-05-07 RX ORDER — SODIUM CHLORIDE 0.9 % (FLUSH) 0.9 %
10 SYRINGE (ML) INJECTION EVERY 12 HOURS SCHEDULED
Status: CANCELLED | OUTPATIENT
Start: 2019-05-07

## 2019-05-07 RX ORDER — SODIUM CHLORIDE 0.9 % (FLUSH) 0.9 %
10 SYRINGE (ML) INJECTION PRN
Status: DISCONTINUED | OUTPATIENT
Start: 2019-05-07 | End: 2019-05-17 | Stop reason: HOSPADM

## 2019-05-07 RX ORDER — DOCUSATE SODIUM 100 MG/1
100 CAPSULE, LIQUID FILLED ORAL 2 TIMES DAILY
Status: CANCELLED | OUTPATIENT
Start: 2019-05-07

## 2019-05-07 RX ORDER — ACETAMINOPHEN 325 MG/1
650 TABLET ORAL EVERY 4 HOURS PRN
Status: DISCONTINUED | OUTPATIENT
Start: 2019-05-07 | End: 2019-05-17 | Stop reason: HOSPADM

## 2019-05-07 RX ORDER — POLYETHYLENE GLYCOL 3350 17 G/17G
17 POWDER, FOR SOLUTION ORAL DAILY PRN
Status: DISCONTINUED | OUTPATIENT
Start: 2019-05-07 | End: 2019-05-17 | Stop reason: HOSPADM

## 2019-05-07 RX ORDER — GINSENG 100 MG
CAPSULE ORAL ONCE
Status: COMPLETED | OUTPATIENT
Start: 2019-05-07 | End: 2019-05-07

## 2019-05-07 RX ORDER — ONDANSETRON 2 MG/ML
INJECTION INTRAMUSCULAR; INTRAVENOUS PRN
Status: DISCONTINUED | OUTPATIENT
Start: 2019-05-07 | End: 2019-05-07

## 2019-05-07 RX ORDER — PANTOPRAZOLE SODIUM 20 MG/1
20 TABLET, DELAYED RELEASE ORAL DAILY
Status: DISCONTINUED | OUTPATIENT
Start: 2019-05-08 | End: 2019-05-08

## 2019-05-07 RX ORDER — LORAZEPAM 2 MG/ML
2 INJECTION INTRAMUSCULAR ONCE
Status: COMPLETED | OUTPATIENT
Start: 2019-05-07 | End: 2019-05-07

## 2019-05-07 RX ORDER — FENTANYL CITRATE 50 UG/ML
50 INJECTION, SOLUTION INTRAMUSCULAR; INTRAVENOUS EVERY 5 MIN PRN
Status: DISCONTINUED | OUTPATIENT
Start: 2019-05-07 | End: 2019-05-07 | Stop reason: HOSPADM

## 2019-05-07 RX ORDER — SODIUM CHLORIDE 9 MG/ML
INJECTION, SOLUTION INTRAVENOUS CONTINUOUS
Status: CANCELLED | OUTPATIENT
Start: 2019-05-07

## 2019-05-07 RX ORDER — LORAZEPAM 2 MG/ML
0.5 INJECTION INTRAMUSCULAR ONCE
Status: DISCONTINUED | OUTPATIENT
Start: 2019-05-07 | End: 2019-05-17 | Stop reason: HOSPADM

## 2019-05-07 RX ORDER — LABETALOL HYDROCHLORIDE 5 MG/ML
5 INJECTION, SOLUTION INTRAVENOUS EVERY 10 MIN PRN
Status: DISCONTINUED | OUTPATIENT
Start: 2019-05-07 | End: 2019-05-07 | Stop reason: HOSPADM

## 2019-05-07 RX ORDER — FENTANYL CITRATE 50 UG/ML
25 INJECTION, SOLUTION INTRAMUSCULAR; INTRAVENOUS EVERY 5 MIN PRN
Status: DISCONTINUED | OUTPATIENT
Start: 2019-05-07 | End: 2019-05-07 | Stop reason: HOSPADM

## 2019-05-07 RX ORDER — ONDANSETRON 2 MG/ML
4 INJECTION INTRAMUSCULAR; INTRAVENOUS EVERY 6 HOURS PRN
Status: DISCONTINUED | OUTPATIENT
Start: 2019-05-07 | End: 2019-05-17 | Stop reason: HOSPADM

## 2019-05-07 RX ORDER — CLONIDINE HYDROCHLORIDE 0.1 MG/1
0.1 TABLET ORAL EVERY 4 HOURS PRN
Status: DISCONTINUED | OUTPATIENT
Start: 2019-05-07 | End: 2019-05-17 | Stop reason: HOSPADM

## 2019-05-07 RX ORDER — SODIUM CHLORIDE 0.9 % (FLUSH) 0.9 %
10 SYRINGE (ML) INJECTION EVERY 12 HOURS SCHEDULED
Status: DISCONTINUED | OUTPATIENT
Start: 2019-05-07 | End: 2019-05-17 | Stop reason: HOSPADM

## 2019-05-07 RX ORDER — ATOMOXETINE 80 MG/1
80 CAPSULE ORAL DAILY
Status: DISCONTINUED | OUTPATIENT
Start: 2019-05-07 | End: 2019-05-17 | Stop reason: HOSPADM

## 2019-05-07 RX ORDER — MIDAZOLAM HYDROCHLORIDE 1 MG/ML
1 INJECTION INTRAMUSCULAR; INTRAVENOUS ONCE
Status: DISCONTINUED | OUTPATIENT
Start: 2019-05-07 | End: 2019-05-07 | Stop reason: HOSPADM

## 2019-05-07 RX ORDER — SODIUM CHLORIDE 0.9 % (FLUSH) 0.9 %
10 SYRINGE (ML) INJECTION PRN
Status: CANCELLED | OUTPATIENT
Start: 2019-05-07

## 2019-05-07 RX ORDER — BUTALBITAL, ACETAMINOPHEN AND CAFFEINE 50; 325; 40 MG/1; MG/1; MG/1
1 TABLET ORAL EVERY 4 HOURS PRN
Status: DISCONTINUED | OUTPATIENT
Start: 2019-05-07 | End: 2019-05-17 | Stop reason: HOSPADM

## 2019-05-07 RX ORDER — DEXAMETHASONE SODIUM PHOSPHATE 4 MG/ML
INJECTION, SOLUTION INTRA-ARTICULAR; INTRALESIONAL; INTRAMUSCULAR; INTRAVENOUS; SOFT TISSUE PRN
Status: DISCONTINUED | OUTPATIENT
Start: 2019-05-07 | End: 2019-05-07 | Stop reason: SDUPTHER

## 2019-05-07 RX ORDER — LIDOCAINE HYDROCHLORIDE 20 MG/ML
INJECTION, SOLUTION INTRAVENOUS PRN
Status: DISCONTINUED | OUTPATIENT
Start: 2019-05-07 | End: 2019-05-07 | Stop reason: SDUPTHER

## 2019-05-07 RX ORDER — ONDANSETRON 2 MG/ML
4 INJECTION INTRAMUSCULAR; INTRAVENOUS
Status: DISCONTINUED | OUTPATIENT
Start: 2019-05-07 | End: 2019-05-07 | Stop reason: HOSPADM

## 2019-05-07 RX ORDER — ESCITALOPRAM OXALATE 20 MG/1
20 TABLET ORAL DAILY
Status: DISCONTINUED | OUTPATIENT
Start: 2019-05-07 | End: 2019-05-17 | Stop reason: HOSPADM

## 2019-05-07 RX ORDER — SUCCINYLCHOLINE CHLORIDE 20 MG/ML
INJECTION INTRAMUSCULAR; INTRAVENOUS PRN
Status: DISCONTINUED | OUTPATIENT
Start: 2019-05-07 | End: 2019-05-07

## 2019-05-07 RX ORDER — PAPAVERINE HYDROCHLORIDE 30 MG/ML
INJECTION INTRAMUSCULAR; INTRAVENOUS PRN
Status: DISCONTINUED | OUTPATIENT
Start: 2019-05-07 | End: 2019-05-07 | Stop reason: HOSPADM

## 2019-05-07 RX ORDER — MORPHINE SULFATE 4 MG/ML
4 INJECTION, SOLUTION INTRAMUSCULAR; INTRAVENOUS
Status: DISCONTINUED | OUTPATIENT
Start: 2019-05-07 | End: 2019-05-07

## 2019-05-07 RX ORDER — BUPIVACAINE HYDROCHLORIDE AND EPINEPHRINE 5; 5 MG/ML; UG/ML
INJECTION, SOLUTION EPIDURAL; INTRACAUDAL; PERINEURAL PRN
Status: DISCONTINUED | OUTPATIENT
Start: 2019-05-07 | End: 2019-05-07 | Stop reason: HOSPADM

## 2019-05-07 RX ORDER — HYDROMORPHONE HCL 110MG/55ML
PATIENT CONTROLLED ANALGESIA SYRINGE INTRAVENOUS PRN
Status: DISCONTINUED | OUTPATIENT
Start: 2019-05-07 | End: 2019-05-07 | Stop reason: SDUPTHER

## 2019-05-07 RX ORDER — NALOXONE HYDROCHLORIDE 0.4 MG/ML
0.2 INJECTION, SOLUTION INTRAMUSCULAR; INTRAVENOUS; SUBCUTANEOUS PRN
Status: DISCONTINUED | OUTPATIENT
Start: 2019-05-07 | End: 2019-05-17 | Stop reason: HOSPADM

## 2019-05-07 RX ORDER — SUCCINYLCHOLINE CHLORIDE 20 MG/ML
INJECTION INTRAMUSCULAR; INTRAVENOUS PRN
Status: DISCONTINUED | OUTPATIENT
Start: 2019-05-07 | End: 2019-05-07 | Stop reason: SDUPTHER

## 2019-05-07 RX ORDER — ALPRAZOLAM 0.5 MG/1
0.5 TABLET ORAL NIGHTLY PRN
Status: DISCONTINUED | OUTPATIENT
Start: 2019-05-07 | End: 2019-05-08

## 2019-05-07 RX ORDER — SENNA AND DOCUSATE SODIUM 50; 8.6 MG/1; MG/1
1 TABLET, FILM COATED ORAL 2 TIMES DAILY
Status: DISCONTINUED | OUTPATIENT
Start: 2019-05-07 | End: 2019-05-14

## 2019-05-07 RX ORDER — LEVETIRACETAM 500 MG/5ML
INJECTION, SOLUTION, CONCENTRATE INTRAVENOUS PRN
Status: DISCONTINUED | OUTPATIENT
Start: 2019-05-07 | End: 2019-05-07 | Stop reason: SDUPTHER

## 2019-05-07 RX ORDER — SODIUM CHLORIDE, SODIUM LACTATE, POTASSIUM CHLORIDE, CALCIUM CHLORIDE 600; 310; 30; 20 MG/100ML; MG/100ML; MG/100ML; MG/100ML
INJECTION, SOLUTION INTRAVENOUS CONTINUOUS
Status: DISCONTINUED | OUTPATIENT
Start: 2019-05-07 | End: 2019-05-07

## 2019-05-07 RX ORDER — LIDOCAINE HYDROCHLORIDE 10 MG/ML
1 INJECTION, SOLUTION EPIDURAL; INFILTRATION; INTRACAUDAL; PERINEURAL
Status: DISCONTINUED | OUTPATIENT
Start: 2019-05-07 | End: 2019-05-07 | Stop reason: HOSPADM

## 2019-05-07 RX ADMIN — PHENYLEPHRINE HYDROCHLORIDE 100 MCG: 10 INJECTION, SOLUTION INTRAMUSCULAR; INTRAVENOUS; SUBCUTANEOUS at 09:08

## 2019-05-07 RX ADMIN — PROPOFOL 50 MG: 10 INJECTION, EMULSION INTRAVENOUS at 08:28

## 2019-05-07 RX ADMIN — DEXAMETHASONE SODIUM PHOSPHATE 4 MG: 4 INJECTION, SOLUTION INTRAMUSCULAR; INTRAVENOUS at 12:19

## 2019-05-07 RX ADMIN — DEXTROSE MONOHYDRATE 1 G: 5 INJECTION INTRAVENOUS at 19:47

## 2019-05-07 RX ADMIN — SODIUM CHLORIDE, SODIUM LACTATE, POTASSIUM CHLORIDE, AND CALCIUM CHLORIDE: 600; 310; 30; 20 INJECTION, SOLUTION INTRAVENOUS at 08:00

## 2019-05-07 RX ADMIN — EPHEDRINE SULFATE 10 MG: 50 INJECTION INTRAVENOUS at 08:30

## 2019-05-07 RX ADMIN — PHENYLEPHRINE HYDROCHLORIDE 100 MCG: 10 INJECTION, SOLUTION INTRAMUSCULAR; INTRAVENOUS; SUBCUTANEOUS at 09:21

## 2019-05-07 RX ADMIN — MIDAZOLAM 1 MG: 1 INJECTION INTRAMUSCULAR; INTRAVENOUS at 08:08

## 2019-05-07 RX ADMIN — SODIUM CHLORIDE, SODIUM LACTATE, POTASSIUM CHLORIDE, AND CALCIUM CHLORIDE: 600; 310; 30; 20 INJECTION, SOLUTION INTRAVENOUS at 08:32

## 2019-05-07 RX ADMIN — FENTANYL CITRATE 50 MCG: 50 INJECTION INTRAMUSCULAR; INTRAVENOUS at 12:19

## 2019-05-07 RX ADMIN — REMIFENTANIL HYDROCHLORIDE 0.15 MCG/KG/MIN: 1 INJECTION, POWDER, LYOPHILIZED, FOR SOLUTION INTRAVENOUS at 08:32

## 2019-05-07 RX ADMIN — SODIUM CHLORIDE, SODIUM LACTATE, POTASSIUM CHLORIDE, AND CALCIUM CHLORIDE: 600; 310; 30; 20 INJECTION, SOLUTION INTRAVENOUS at 09:30

## 2019-05-07 RX ADMIN — SODIUM CHLORIDE, SODIUM LACTATE, POTASSIUM CHLORIDE, AND CALCIUM CHLORIDE: 600; 310; 30; 20 INJECTION, SOLUTION INTRAVENOUS at 11:01

## 2019-05-07 RX ADMIN — ONDANSETRON 4 MG: 2 INJECTION INTRAMUSCULAR; INTRAVENOUS at 12:19

## 2019-05-07 RX ADMIN — PROPOFOL 100 MG: 10 INJECTION, EMULSION INTRAVENOUS at 08:25

## 2019-05-07 RX ADMIN — Medication 2 G: at 09:20

## 2019-05-07 RX ADMIN — PHENYLEPHRINE HYDROCHLORIDE 100 MCG: 10 INJECTION, SOLUTION INTRAMUSCULAR; INTRAVENOUS; SUBCUTANEOUS at 08:34

## 2019-05-07 RX ADMIN — LORAZEPAM 1 MG: 2 INJECTION INTRAMUSCULAR; INTRAVENOUS at 20:10

## 2019-05-07 RX ADMIN — GLYCOPYRROLATE 0.2 MG: 0.2 INJECTION INTRAMUSCULAR; INTRAVENOUS at 10:38

## 2019-05-07 RX ADMIN — FENTANYL CITRATE 50 MCG: 50 INJECTION INTRAMUSCULAR; INTRAVENOUS at 08:20

## 2019-05-07 RX ADMIN — BACITRACIN: 500 OINTMENT TOPICAL at 22:20

## 2019-05-07 RX ADMIN — Medication 10 ML: at 20:24

## 2019-05-07 RX ADMIN — EPHEDRINE SULFATE 10 MG: 50 INJECTION INTRAVENOUS at 08:59

## 2019-05-07 RX ADMIN — SODIUM CHLORIDE: 9 INJECTION, SOLUTION INTRAVENOUS at 15:46

## 2019-05-07 RX ADMIN — FENTANYL CITRATE 50 MCG: 50 INJECTION INTRAMUSCULAR; INTRAVENOUS at 23:05

## 2019-05-07 RX ADMIN — SODIUM CHLORIDE 1000 MG: 900 INJECTION, SOLUTION INTRAVENOUS at 20:23

## 2019-05-07 RX ADMIN — EPHEDRINE SULFATE 5 MG: 50 INJECTION INTRAVENOUS at 12:27

## 2019-05-07 RX ADMIN — SUCCINYLCHOLINE CHLORIDE 100 MG: 20 INJECTION, SOLUTION INTRAMUSCULAR; INTRAVENOUS; PARENTERAL at 08:25

## 2019-05-07 RX ADMIN — PROPOFOL 150 MCG/KG/MIN: 10 INJECTION, EMULSION INTRAVENOUS at 08:35

## 2019-05-07 RX ADMIN — SODIUM CHLORIDE, SODIUM LACTATE, POTASSIUM CHLORIDE, AND CALCIUM CHLORIDE: 600; 310; 30; 20 INJECTION, SOLUTION INTRAVENOUS at 08:18

## 2019-05-07 RX ADMIN — FENTANYL CITRATE 50 MCG: 50 INJECTION INTRAMUSCULAR; INTRAVENOUS at 09:20

## 2019-05-07 RX ADMIN — LEVETIRACETAM 1000 MG: 100 INJECTION, SOLUTION INTRAVENOUS at 09:39

## 2019-05-07 RX ADMIN — PROPOFOL 120 MCG/KG/MIN: 10 INJECTION, EMULSION INTRAVENOUS at 10:20

## 2019-05-07 RX ADMIN — PHENYLEPHRINE HYDROCHLORIDE 100 MCG: 10 INJECTION, SOLUTION INTRAMUSCULAR; INTRAVENOUS; SUBCUTANEOUS at 09:25

## 2019-05-07 RX ADMIN — HYDROMORPHONE HYDROCHLORIDE 0.6 MG: 2 INJECTION, SOLUTION INTRAMUSCULAR; INTRAVENOUS; SUBCUTANEOUS at 12:19

## 2019-05-07 RX ADMIN — FENTANYL CITRATE 50 MCG: 50 INJECTION INTRAMUSCULAR; INTRAVENOUS at 08:18

## 2019-05-07 RX ADMIN — PROPOFOL 100 MG: 10 INJECTION, EMULSION INTRAVENOUS at 11:43

## 2019-05-07 RX ADMIN — MORPHINE SULFATE 2 MG: 2 INJECTION, SOLUTION INTRAMUSCULAR; INTRAVENOUS at 20:55

## 2019-05-07 RX ADMIN — LIDOCAINE HYDROCHLORIDE 50 MG: 20 INJECTION, SOLUTION INTRAVENOUS at 08:21

## 2019-05-07 RX ADMIN — MORPHINE SULFATE 2 MG: 2 INJECTION, SOLUTION INTRAMUSCULAR; INTRAVENOUS at 17:55

## 2019-05-07 RX ADMIN — EPHEDRINE SULFATE 10 MG: 50 INJECTION INTRAVENOUS at 09:05

## 2019-05-07 RX ADMIN — PROPOFOL 50 MG: 10 INJECTION, EMULSION INTRAVENOUS at 09:19

## 2019-05-07 RX ADMIN — LORAZEPAM 1 MG: 2 INJECTION INTRAMUSCULAR at 20:10

## 2019-05-07 RX ADMIN — HYDROMORPHONE HYDROCHLORIDE 0.25 MG: 1 INJECTION, SOLUTION INTRAMUSCULAR; INTRAVENOUS; SUBCUTANEOUS at 15:28

## 2019-05-07 ASSESSMENT — PULMONARY FUNCTION TESTS
PIF_VALUE: 19
PIF_VALUE: 20
PIF_VALUE: 23
PIF_VALUE: 19
PIF_VALUE: 24
PIF_VALUE: 18
PIF_VALUE: 19
PIF_VALUE: 19
PIF_VALUE: 20
PIF_VALUE: 18
PIF_VALUE: 23
PIF_VALUE: 19
PIF_VALUE: 18
PIF_VALUE: 20
PIF_VALUE: 0
PIF_VALUE: 19
PIF_VALUE: 18
PIF_VALUE: 24
PIF_VALUE: 20
PIF_VALUE: 20
PIF_VALUE: 19
PIF_VALUE: 21
PIF_VALUE: 19
PIF_VALUE: 18
PIF_VALUE: 20
PIF_VALUE: 20
PIF_VALUE: 18
PIF_VALUE: 4
PIF_VALUE: 19
PIF_VALUE: 19
PIF_VALUE: 18
PIF_VALUE: 19
PIF_VALUE: 19
PIF_VALUE: 18
PIF_VALUE: 18
PIF_VALUE: 19
PIF_VALUE: 18
PIF_VALUE: 19
PIF_VALUE: 20
PIF_VALUE: 19
PIF_VALUE: 25
PIF_VALUE: 19
PIF_VALUE: 19
PIF_VALUE: 16
PIF_VALUE: 21
PIF_VALUE: 19
PIF_VALUE: 19
PIF_VALUE: 20
PIF_VALUE: 18
PIF_VALUE: 19
PIF_VALUE: 18
PIF_VALUE: 18
PIF_VALUE: 20
PIF_VALUE: 0
PIF_VALUE: 19
PIF_VALUE: 19
PIF_VALUE: 4
PIF_VALUE: 18
PIF_VALUE: 19
PIF_VALUE: 19
PIF_VALUE: 20
PIF_VALUE: 18
PIF_VALUE: 20
PIF_VALUE: 18
PIF_VALUE: 5
PIF_VALUE: 19
PIF_VALUE: 20
PIF_VALUE: 19
PIF_VALUE: 19
PIF_VALUE: 20
PIF_VALUE: 21
PIF_VALUE: 19
PIF_VALUE: 20
PIF_VALUE: 19
PIF_VALUE: 18
PIF_VALUE: 5
PIF_VALUE: 19
PIF_VALUE: 19
PIF_VALUE: 1
PIF_VALUE: 21
PIF_VALUE: 18
PIF_VALUE: 22
PIF_VALUE: 20
PIF_VALUE: 19
PIF_VALUE: 18
PIF_VALUE: 19
PIF_VALUE: 17
PIF_VALUE: 24
PIF_VALUE: 19
PIF_VALUE: 19
PIF_VALUE: 20
PIF_VALUE: 19
PIF_VALUE: 18
PIF_VALUE: 19
PIF_VALUE: 4
PIF_VALUE: 19
PIF_VALUE: 18
PIF_VALUE: 1
PIF_VALUE: 19
PIF_VALUE: 20
PIF_VALUE: 19
PIF_VALUE: 3
PIF_VALUE: 1
PIF_VALUE: 18
PIF_VALUE: 18
PIF_VALUE: 19
PIF_VALUE: 19
PIF_VALUE: 18
PIF_VALUE: 19
PIF_VALUE: 19
PIF_VALUE: 18
PIF_VALUE: 19
PIF_VALUE: 18
PIF_VALUE: 18
PIF_VALUE: 19
PIF_VALUE: 18
PIF_VALUE: 20
PIF_VALUE: 20
PIF_VALUE: 19
PIF_VALUE: 19
PIF_VALUE: 22
PIF_VALUE: 11
PIF_VALUE: 19
PIF_VALUE: 17
PIF_VALUE: 18
PIF_VALUE: 19
PIF_VALUE: 18
PIF_VALUE: 19
PIF_VALUE: 20
PIF_VALUE: 20
PIF_VALUE: 17
PIF_VALUE: 20
PIF_VALUE: 21
PIF_VALUE: 20
PIF_VALUE: 19
PIF_VALUE: 1
PIF_VALUE: 18
PIF_VALUE: 18
PIF_VALUE: 20
PIF_VALUE: 20
PIF_VALUE: 18
PIF_VALUE: 20
PIF_VALUE: 28
PIF_VALUE: 18
PIF_VALUE: 19
PIF_VALUE: 20
PIF_VALUE: 0
PIF_VALUE: 22
PIF_VALUE: 18
PIF_VALUE: 20
PIF_VALUE: 19
PIF_VALUE: 18
PIF_VALUE: 19
PIF_VALUE: 18
PIF_VALUE: 20
PIF_VALUE: 9
PIF_VALUE: 19
PIF_VALUE: 20
PIF_VALUE: 20
PIF_VALUE: 18
PIF_VALUE: 19
PIF_VALUE: 18
PIF_VALUE: 25
PIF_VALUE: 18
PIF_VALUE: 18
PIF_VALUE: 20
PIF_VALUE: 20
PIF_VALUE: 19
PIF_VALUE: 20
PIF_VALUE: 19
PIF_VALUE: 19
PIF_VALUE: 18
PIF_VALUE: 18
PIF_VALUE: 4
PIF_VALUE: 18
PIF_VALUE: 20
PIF_VALUE: 18
PIF_VALUE: 17
PIF_VALUE: 18
PIF_VALUE: 20
PIF_VALUE: 1
PIF_VALUE: 5
PIF_VALUE: 19
PIF_VALUE: 18
PIF_VALUE: 20
PIF_VALUE: 18
PIF_VALUE: 20
PIF_VALUE: 22
PIF_VALUE: 20
PIF_VALUE: 18
PIF_VALUE: 18
PIF_VALUE: 19
PIF_VALUE: 19
PIF_VALUE: 21
PIF_VALUE: 20
PIF_VALUE: 19
PIF_VALUE: 18
PIF_VALUE: 5
PIF_VALUE: 20
PIF_VALUE: 19
PIF_VALUE: 20
PIF_VALUE: 25
PIF_VALUE: 18
PIF_VALUE: 19
PIF_VALUE: 18
PIF_VALUE: 19
PIF_VALUE: 19
PIF_VALUE: 20
PIF_VALUE: 24
PIF_VALUE: 19
PIF_VALUE: 22
PIF_VALUE: 5
PIF_VALUE: 19
PIF_VALUE: 19
PIF_VALUE: 20
PIF_VALUE: 18
PIF_VALUE: 4
PIF_VALUE: 18
PIF_VALUE: 18
PIF_VALUE: 19
PIF_VALUE: 24
PIF_VALUE: 19
PIF_VALUE: 20
PIF_VALUE: 18
PIF_VALUE: 19
PIF_VALUE: 18
PIF_VALUE: 18
PIF_VALUE: 19
PIF_VALUE: 19
PIF_VALUE: 1
PIF_VALUE: 19
PIF_VALUE: 18
PIF_VALUE: 19
PIF_VALUE: 5
PIF_VALUE: 19
PIF_VALUE: 20
PIF_VALUE: 6
PIF_VALUE: 18
PIF_VALUE: 20
PIF_VALUE: 21
PIF_VALUE: 19
PIF_VALUE: 20
PIF_VALUE: 18
PIF_VALUE: 20
PIF_VALUE: 19
PIF_VALUE: 20
PIF_VALUE: 19
PIF_VALUE: 18
PIF_VALUE: 21
PIF_VALUE: 7
PIF_VALUE: 18
PIF_VALUE: 19
PIF_VALUE: 20
PIF_VALUE: 20
PIF_VALUE: 5
PIF_VALUE: 17
PIF_VALUE: 19
PIF_VALUE: 19
PIF_VALUE: 18
PIF_VALUE: 10
PIF_VALUE: 19
PIF_VALUE: 19
PIF_VALUE: 18

## 2019-05-07 ASSESSMENT — PAIN SCALES - GENERAL
PAINLEVEL_OUTOF10: 6
PAINLEVEL_OUTOF10: 5
PAINLEVEL_OUTOF10: 6

## 2019-05-07 ASSESSMENT — PAIN DESCRIPTION - PAIN TYPE: TYPE: SURGICAL PAIN

## 2019-05-07 ASSESSMENT — PAIN DESCRIPTION - LOCATION: LOCATION: HEAD

## 2019-05-07 ASSESSMENT — PAIN SCALES - WONG BAKER: WONGBAKER_NUMERICALRESPONSE: 6

## 2019-05-07 ASSESSMENT — PAIN - FUNCTIONAL ASSESSMENT: PAIN_FUNCTIONAL_ASSESSMENT: 0-10

## 2019-05-07 NOTE — PROGRESS NOTES
No family present per patient, lives out of town. Family to be notified per surgeon after procedure.

## 2019-05-07 NOTE — CONSULTS
ICU History and Physical  PGY1     PCP: LIGIA Sutton CNP    Code:Prior  Admit Date: 5/7/2019  IV Access:  Right PIV x2, A-line x1   Central Line: no           IV Fluids:  NS @ 125 mL/hr  Vasopressors:  None  Antibiotics: Ancef x 1 dose   Indication: pre-op prophylaxis             Patten Catheter: None              Diet: No diet orders on file     CC: Headache and speech changes     HISTORY OF PRESENT ILLNESS:    Patient is a 58 y.o. female PMH significant for ischemic stroke, HTN  presenting with 4 month hx of headache, speech changes and right eye blurriness.     Admitted in 2/2019 for confusion and vision changes thought to be secondary to TIA. MRI showed chronic white matter infarct in right periventricular region, but nothing acute. MRA at the time did show 8 mm right MCA aneurysm. Follow up CTA in 3/2019 showed increase in size to 9mm x 6mm at the distal M1 segment of the R MCA.     Admitted to [facility] for aneurysmal clipping with right fronto-temporal craniotomy. Placed in ICU post-op for continued monitoring. Procedure went well without complications. Vitals stable wnl.   Patient was very somnolent from procedure and could not give a proper history.     Past Medical / Surgical History:    Past Medical History        Past Medical History:   Diagnosis Date    Anxiety      Arthritis      Cerebral aneurysm      Former smoker      GERD (gastroesophageal reflux disease)      Hypertension           Past Surgical History         Past Surgical History:   Procedure Laterality Date    CHOLECYSTECTOMY        HEMORRHOID SURGERY        HYSTERECTOMY                Medications Prior to Admission:    No current facility-administered medications on file prior to encounter.              Current Outpatient Medications on File Prior to Encounter   Medication Sig Dispense Refill    aspirin 81 MG tablet Take 81 mg by mouth daily        THIAMINE HCL PO Take 1 tablet by mouth daily        vitamin D (ERGOCALCIFEROL) 49819 units capsule Take 1 capsule by mouth once a week for 5 doses 5 capsule 0    atorvastatin (LIPITOR) 80 MG tablet Take 0.5 tablets by mouth nightly 30 tablet 3    vitamin C (VITAMIN C) 1000 MG tablet Take 1 tablet by mouth daily 30 tablet 3    pantoprazole (PROTONIX) 20 MG tablet Take 20 mg by mouth daily        escitalopram (LEXAPRO) 20 MG tablet Take 20 mg by mouth daily        metoprolol tartrate (LOPRESSOR) 25 MG tablet Take 25 mg by mouth 2 times daily        ALPRAZolam (XANAX) 0.5 MG tablet Take 0.5 mg by mouth nightly as needed for Sleep        HYDROcodone-acetaminophen (NORCO) 5-325 MG per tablet Take 2 tablets by mouth every 6 hours as needed for Pain 20 tablet 0    atomoxetine (STRATTERA) 80 MG capsule Take 1 capsule by mouth daily        Azilsartan-Chlorthalidone (EDARBYCLOR) 40-12.5 MG TABS Take 1 tablet by mouth daily        clopidogrel (PLAVIX) 75 MG tablet Take 75 mg by mouth daily        cloNIDine (CATAPRES) 0.1 MG tablet Take 1 tablet by mouth every 4 hours as needed for High Blood Pressure (sbp above 155) 30 tablet 0         Allergies:  Patient has no known allergies.     Social History:   TOBACCO:   reports that she quit smoking about 2 months ago. Her smoking use included cigarettes. She has a 20.00 pack-year smoking history. She has never used smokeless tobacco.     ETOH:   reports that she drank alcohol.     Family History:   Family History   History reviewed. No pertinent family history.        ROS: Review of Systems - History unobtainable from patient due to prior anesthesia.      PHYSICAL EXAM:  BP (!) 117/53   Pulse 56   Temp 97 °F (36.1 °C) (Temporal)   Resp 22   Ht 5' 2\" (1.575 m)   Wt 130 lb (59 kg)   SpO2 95%   BMI 23.78 kg/m²       Recent Labs     05/07/19  0759   POCGLU 102*      · General appearance: Appears comfortable at rest, somnolent, would waken to voice  · Head: Right CLAYTON drain in place in right temporal parietal region  · Eyes: PERRL.  No scleral icterus. · ENT: Oral mucosa is moist and free of lesions. No tracheal deviation. No JVD. · Respiratory: Normal respiratory effort. No wheezes, crackles or stridor  · Cardiovascular: Regular rate and rhythm with normal S1/S2. No murmers, rubs or gallops. · Abdomen: Normoactive bowel sounds. Abdomen soft, non-tender, non-distended. No guarding or rebound tenderness  · Musculoskeletal: No clubbing, cyanosis, no lower extremity edema, peripheral pulses present. 5/5 strength in upper and lower extremities bilaterally. · Skin: Normal skin color, texture and turgor. No rashes or bruises noted  · Neurologic: Alert and oriented x4. No focal motor or sensory deficits noted. CN II-XII in tact bilaterally        LABS:  No results for input(s): WBC, HGB, HCT, PLT in the last 72 hours. No results for input(s): NA, K, CL, CO2, BUN, CREATININE, GLUCOSE in the last 72 hours.     Invalid input(s):  CA,  PHOS  No results for input(s): AST, ALT, ALB, BILITOT, ALKPHOS in the last 72 hours. No results for input(s): TROPONINI in the last 72 hours. No results for input(s): BNP in the last 72 hours. No results found for: PHART, SDZ9TVV, PO2ART      Recent Labs     05/07/19  0750   INR 0.91      No results for input(s): NITRITE, COLORU, PHUR, LABCAST, WBCUA, RBCUA, MUCUS, TRICHOMONAS, YEAST, BACTERIA, CLARITYU, SPECGRAV, LEUKOCYTESUR, UROBILINOGEN, BILIRUBINUR, BLOODU, GLUCOSEU, AMORPHOUS in the last 72 hours.     Invalid input(s): KETONESU      IMAGING:  No orders to display         Assessment & Plan:    Patient is a 58 y.o. female PMH significant for ischemic stroke, HTN  presenting with 4 month hx of headache, speech changes and right eye blurriness. Admitted to ICU post-op from right MCA aneurysm clipping.     Right MCA aneurysm s/p clipping   Hx of headache, vision and speech changes.   Found to have R MCA aneurysm in 2/2019 at which time she complained of similar symptoms. Repeat CT in 3/2019 showed size was 9 mm. Presented for aneurysmal clipping. No complications from procedure and was placed in ICU for continued monitoring  - Repeat CT head @ 1900  - Keppra 500 BID  - Norco prn with morphine for breakthrough pain  - Neurosurgery following   - Contact Dr. Romero Mins at [phone] if incision separates, copious bleeding, or signs of infection  - Labetalol prn for SBP > 160  - Holding home ASA     HTN  BP stable in 110s-120s post-op  - home azilsartan-chlorthalidone 40-12.5  - Lopressor 25 mg BID  - pressures have been soft post-op; placed holding parameters     Chronic Ischemic Stroke  - home Lipitor  - Holding home ASA    Depression    - home lexapro and strattera     Code Status:  Full  FEN: General Diet / NS @ 125  PPX: Protonix / SCDs / no AC at this time     DISPO: ICU for management post-op from aneurysmal clipping  -----------------------------  Florian Bernstein, PGY-1  [employer]  5/5/4928  5:68 PM    Pulmonary/Critical Care    Patient seen and examined. I agree with Dr. He Nunez history, physical, lab findings, assessment and plan.     Renee Padron MD

## 2019-05-07 NOTE — ANESTHESIA POSTPROCEDURE EVALUATION
Department of Anesthesiology  Postprocedure Note    Patient: Samanta Dickinson  MRN: [id number]  YOB: 1955  Date of evaluation: 5/7/2019  Time:  2:51 PM     Procedure Summary     Date:  05/07/19 Room / Location:  [facility]    Anesthesia Start:  0818 Anesthesia Stop:  1247    Procedure:  RIGHT CRANIOTOMY FOR CLIPPING OF MIDDLE CEREBRAL ARTERY ANEURYSM (Right ) Diagnosis:  (CEREBRAL ANEURYSM)    Surgeon:  Tsering Kimble MD Responsible Provider:  Odell Echavarria MD    Anesthesia Type:  general ASA Status:  3          Anesthesia Type: No value filed. Ryan Phase I: Ryan Score: 4    Ryan Phase II:      Last vitals: Reviewed and per EMR flowsheets. Anesthesia Post Evaluation    Patient location during evaluation: PACU  Patient participation: complete - patient participated  Level of consciousness: awake and alert, responsive to verbal stimuli and sleepy but conscious  Pain score: 0  Airway patency: patent  Nausea & Vomiting: no nausea and no vomiting  Complications: no  Cardiovascular status: blood pressure returned to baseline and hemodynamically stable  Respiratory status: acceptable  Hydration status: stable  Comments: Right brachial artery arterial monitor removed in PACU. Pressure applied. Right hand warm and well perfused. Patient is sleepy, but is following verbal commands.

## 2019-05-07 NOTE — PROGRESS NOTES
Admitted to ICU from PACU. Awake, answers yes and no questions, able to state name. MARION spontaneously, does not follow commands. NS infusing at 75 ml/hr. CLAYTON to bulb suction with scant bloody drainage.

## 2019-05-07 NOTE — H&P
Parth Man    [id number]      Department of General Surgery    Surgical Services     Pre-operative History and Physical      INDICATION:   CEREBRAL ANEURYSM    PROCEDURE:  LA VERTEBROBASILLAR CIRC, SIMPLE SURG [34761] (RIGHT CRANIOTOMY FOR CLIPPING OF MIDDLE CEREBRAL ARTERY ANEURYSM)    CHIEF COMPLAINT:  Cerebral aneurysm     HISTORY:   Patient with HA, speech changes and right eye blurriness with an onset of February. Patient was found to have 9 x 6 right middle cerebral artery aneurysm. Weight loss/gain:  No  Recent Hx Steroid use: No  History of allergic reaction to anesthesia:  No    Past Medical History:        Diagnosis Date    Anxiety     Arthritis     Cerebral aneurysm     GERD (gastroesophageal reflux disease)     Hypertension      Past Surgical History:        Procedure Laterality Date    CHOLECYSTECTOMY      HEMORRHOID SURGERY      HYSTERECTOMY         Medications Prior to Admission:   Prior to Admission medications    Medication Sig Start Date End Date Taking?  Authorizing Provider   THIAMINE HCL PO Take 1 tablet by mouth daily   Yes Historical Provider, MD   cloNIDine (CATAPRES) 0.1 MG tablet Take 1 tablet by mouth every 4 hours as needed for High Blood Pressure (sbp above 155) 2/28/19  Yes Bart Johnston MD   atorvastatin (LIPITOR) 80 MG tablet Take 0.5 tablets by mouth nightly 2/26/19  Yes Russell Fitch MD   vitamin C (VITAMIN C) 1000 MG tablet Take 1 tablet by mouth daily 2/27/19  Yes Russell Fitch MD   pantoprazole (PROTONIX) 20 MG tablet Take 20 mg by mouth daily   Yes Historical Provider, MD   escitalopram (LEXAPRO) 20 MG tablet Take 20 mg by mouth daily   Yes Historical Provider, MD   metoprolol tartrate (LOPRESSOR) 25 MG tablet Take 25 mg by mouth 2 times daily   Yes Historical Provider, MD   ALPRAZolam (XANAX) 0.5 MG tablet Take 0.5 mg by mouth nightly as needed for Sleep   Yes Historical Provider, MD   HYDROcodone-acetaminophen (NORCO) 5-325 MG per tablet Take 2 tablets by mouth every 6 hours as needed for Pain 16  Yes Cici Eng MD   vitamin D (ERGOCALCIFEROL) 65423 units capsule Take 1 capsule by mouth once a week for 5 doses 2/28/19 3/29/19  Isamar Akhtar MD       Allergies:  Patient has no known allergies. Social History:   Social History     Socioeconomic History    Marital status:      Spouse name: None    Number of children: None    Years of education: None    Highest education level: None   Occupational History    None   Social Needs    Financial resource strain: None    Food insecurity:     Worry: None     Inability: None    Transportation needs:     Medical: None     Non-medical: None   Tobacco Use    Smoking status: Former Smoker     Packs/day: 1.00     Years: 20.00     Pack years: 20.00     Types: Cigarettes     Last attempt to quit: 3/6/2019     Years since quittin.1    Smokeless tobacco: Never Used   Substance and Sexual Activity    Alcohol use: Not Currently    Drug use: No    Sexual activity: None   Lifestyle    Physical activity:     Days per week: None     Minutes per session: None    Stress: None   Relationships    Social connections:     Talks on phone: None     Gets together: None     Attends [employer]ology service: None     Active member of club or organization: None     Attends meetings of clubs or organizations: None     Relationship status: None    Intimate partner violence:     Fear of current or ex partner: None     Emotionally abused: None     Physically abused: None     Forced sexual activity: None   Other Topics Concern    None   Social History Narrative    None         Family History:   History reviewed. No pertinent family history. REVIEW OF SYSTEMS:    Constitutional: Negative for chills, fatigue and fever. HENT: Negative for loss of hearing   Eyes: Positive for visual disturbance . Respiratory: Negative for  cough, chest tightness, shortness of breath and wheezing.

## 2019-05-07 NOTE — ANESTHESIA PROCEDURE NOTES
Arterial Line:    An arterial line was placed using ultrasound guidance and surface landmarks, in the OR for the following indication(s): continuous blood pressure monitoring and blood sampling needed. A 20 gauge (size), 8 cm (length), Arrow (type) catheter was placed, Seldinger technique used, into the right brachial artery, secured by tape and Tegaderm. Anesthesia type: General    Events:  patient tolerated procedure well with no complications. Additional notes:  Circulation to bilateral hands evaluated preoperatively via Allens test.   In OR: Left radial attempt x 1 by TANJA, then by Selvin Parker MD. Attempt left brachial by TANJA under supervision MD Nisreen. Await ultrasound machine. SP and D right brachial and sterile placement of right brachial arterial line with US guidance as above by Nisreen.   5/7/2019 8:55 AM5/7/2019 9:10 AM  Anesthesiologist: Vijaya Hernandez MD  Preanesthetic Checklist  Completed: patient identified, site marked, surgical consent, pre-op evaluation, timeout performed, IV checked, risks and benefits discussed, monitors and equipment checked, anesthesia consent given, oxygen available and patient being monitored

## 2019-05-07 NOTE — PROGRESS NOTES
PACU Transfer Note    Vitals:    05/07/19 1545   BP: 117/53   Pulse: 56   Resp: 17   Temp: 97   SpO2: 95%       In: 226 [I.V.:226]  Out: 15 [Drains:15]    Pain assessment:  None  Pain Level: Patient states that headache is \"better\"              Report called to Amber Bishop in ICU. Spoke with son to update on status. Patient transferred with all belongings. Continues to have random movement x 4 extremities, answering questions with short answers and dozing at intervals.     5/7/2019 4:15 PM

## 2019-05-07 NOTE — OP NOTE
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 Assistant Surgeon: Toney Lombard, MD    Indications:     Ms. Elkin Roy is a 58year old female who presents who was found with an incidental aneurysm After discussing the options of management and in view of the patient's clinical history and radiological findings, it was decided to perform a craniotomy and clipping of aneurysm. The patient was well informed of the current condition, options in management, benefits, risks and potential complications of the procedure, including but not limited to: worsening of the mental status, possible need for further procedures, risk of infection, headaches, CSF leak, seizures, hemorrhage, stroke, loss of language function, paralysis, coma and even death. Informed consent was obtained after the patient voiced understanding and decided to proceed with the operation. Description of the Procedure: The patient was brought in the operating room and given prophylactic antibiotics. Eye abrasion prevention and Harjit hugger were done by anesthesia. A Patten catheter was inserted. The patient was positioned supine and after anesthesia clearance, a Pompa clamp was applied. The head was slightly extended and turned to the left. Pressure points were carefully padded. The hair was clipped over the area of incision. Pre- prepping was done with alcohol. Then, prepping was done with Chloraprep and draping was accomplished in the usual sterile fashion.  Local anesthesia was infiltrated along the planned surgical incision. The electrophysiology team inserted needles in the proper locations. Skin opening was done sharply with a scalpel blade to the level of the periosteum. The temporalis muscle and periosteum were sharply dissected of the bone. The musculocutaneous/skin flap was reflected anteriorly and held with fishhooks. A high speed drill was used to perform the natividad holes and the craniotomy. The dura was with durotomy. Tacking sutures were done. Hemostasis was achieved with bipolar electrocautery, gelfoam, irrigation, and bone wax. Further drilling of the lateral sphenoid wing was done. The keyon halo was attached to the [place] head [facility]. Dural opening was done with scalpel and scissors. The operating scope was draped with a sterile drape and microsurgical dissection of the Sylvian fissure was performed. Exposure of the internal carotid artery and proximal middle cerebral artery and meticulous aneurysm microdissection was done. The aneurysm is described as unruptured, with a daughter sac, wide neck, and small branch arising from the aneurysm dome. The branch was dissected. Permanent clipping was done by applying [place] clips, -10, -02, -01 to the neck of the aneurysm. Doppler ultrasound and ICG were utilized to assess the flow of the parent vessel. No aneurysmal filling and adequate parent vessel flow was noted. Copious irrigation and hemostasis was performed. The cranial cavity was then filled with sterile normal saline solution. At this time counts were completed by RN and surgical technologist and were correct. Reapproximation of the dura was done with 4-0 Nurolon. Duroplasty was performed with Adherus. The bone flap was secured with miniplates and screws from [employer]. A 5 cm frontal orbital sphenoidal cranioplasty was performed with a [employer] plate. The temporary muscle was reapproximated with 2-0 Vicryl. The wound was irrigated with antibiotic solution.  A subgaleal CLAYTON drain was left

## 2019-05-07 NOTE — ANESTHESIA PRE PROCEDURE
Department of Anesthesiology  Preprocedure Note       Name:  Kacie Steward   Age:  58 y.o.  :  1955                                          MRN:  [id number]         Date:  2019      Surgeon: Zeenat James):  MD Marilee Valenzuela MD    Procedure: RIGHT CRANIOTOMY FOR CLIPPING OF MIDDLE CEREBRAL ARTERY ANEURYSM (Right )    Medications prior to admission:   Prior to Admission medications    Medication Sig Start Date End Date Taking?  Authorizing Provider   aspirin 81 MG tablet Take 81 mg by mouth daily   Yes Historical Provider, MD   THIAMINE HCL PO Take 1 tablet by mouth daily   Yes Historical Provider, MD   vitamin D (ERGOCALCIFEROL) 28571 units capsule Take 1 capsule by mouth once a week for 5 doses 19 Yes Deneen Smith MD   atorvastatin (LIPITOR) 80 MG tablet Take 0.5 tablets by mouth nightly 19  Yes Romel Thomas MD   vitamin C (VITAMIN C) 1000 MG tablet Take 1 tablet by mouth daily 19  Yes Romel Thomas MD   pantoprazole (PROTONIX) 20 MG tablet Take 20 mg by mouth daily   Yes Historical Provider, MD   escitalopram (LEXAPRO) 20 MG tablet Take 20 mg by mouth daily   Yes Historical Provider, MD   metoprolol tartrate (LOPRESSOR) 25 MG tablet Take 25 mg by mouth 2 times daily   Yes Historical Provider, MD   ALPRAZolam Audria Husbands) 0.5 MG tablet Take 0.5 mg by mouth nightly as needed for Sleep   Yes Historical Provider, MD   HYDROcodone-acetaminophen (NORCO) 5-325 MG per tablet Take 2 tablets by mouth every 6 hours as needed for Pain 16  Yes Sanna Rodríguez MD   atomoxetine (STRATTERA) 80 MG capsule Take 1 capsule by mouth daily    Historical Provider, MD   Azilsartan-Chlorthalidone (EDARBYCLOR) 40-12.5 MG TABS Take 1 tablet by mouth daily    Historical Provider, MD   clopidogrel (PLAVIX) 75 MG tablet Take 75 mg by mouth daily    Historical Provider, MD   cloNIDine (CATAPRES) 0.1 MG tablet Take 1 tablet by mouth every 4 hours as needed for High Blood Pressure (sbp above 155) 2/28/19   Cortez Donohue MD       Current medications:    Current Facility-Administered Medications   Medication Dose Route Frequency Provider Last Rate Last Dose    lactated ringers infusion   Intravenous Continuous Steve Fowler  mL/hr at 05/07/19 0800      sodium chloride flush 0.9 % injection 10 mL  10 mL Intravenous 2 times per day Steve Fowler MD        sodium chloride flush 0.9 % injection 10 mL  10 mL Intravenous PRN Steve Fowler MD        lidocaine PF 1 % injection 1 mL  1 mL Intradermal Once PRN Steve Fowler MD        remifentanil (ULTIVA) 2,000 mcg in sodium chloride 0.9 % 100 mL infusion  0.1 mcg/kg/min Intravenous Continuous Sujata Crane MD 26.6 mL/hr at 05/07/19 0944 0.15 mcg/kg/min at 05/07/19 0944    midazolam (VERSED) injection 1 mg  1 mg Intravenous Once Sujata Crane MD        bupivacaine-EPINEPHrine PF (MARCAINE-w/EPINEPHRINE) 0.5% -1:[id number] injection    PRN Kimberly Hernandez MD   10 mL at 05/07/19 0393     Facility-Administered Medications Ordered in Other Encounters   Medication Dose Route Frequency Provider Last Rate Last Dose    lactated ringers infusion    Continuous PRN Roberta Eng, APRN - CRNA        propofol 1000 MG/100ML injection    Continuous PRN Roberta Eng, APRN - CRNA 53.1 mL/hr at 05/07/19 0944 150 mcg/kg/min at 05/07/19 0944    fentaNYL (SUBLIMAZE) injection    PRN Roberta Eng, APRN - CRNA   50 mcg at 05/07/19 0920    propofol injection    PRN Roberta Eng, APRN - CRNA   50 mg at 05/07/19 0919    succinylcholine (ANECTINE) injection    PRN Roberta Eng, APRN - CRNA   100 mg at 05/07/19 6774    levETIRAcetam (KEPPRA) injection    PRN Roberta Eng, APRN - CRNA   1,000 mg at 05/07/19 1286       Allergies:  No Known Allergies    Problem List:    Patient Active Problem List   Diagnosis Code    Ischemic stroke ([facility] [address] I63.9    Hypertension I10    Anxiety F41.9    Cerebrovascular accident (CVA) ([facility] [address] I63.9    Vertigo R42    Nonintractable episodic headache R51    Aneurysm of middle cerebral artery I67.1    TIA (transient ischemic attack) G45.9       Past Medical History:        Diagnosis Date    Anxiety     Arthritis     Cerebral aneurysm     Former smoker     GERD (gastroesophageal reflux disease)     Hypertension        Past Surgical History:        Procedure Laterality Date    CHOLECYSTECTOMY      HEMORRHOID SURGERY      HYSTERECTOMY         Social History:    Social History     Tobacco Use    Smoking status: Former Smoker     Packs/day: 1.00     Years: 20.00     Pack years: 20.00     Types: Cigarettes     Last attempt to quit: 3/6/2019     Years since quittin.1    Smokeless tobacco: Never Used   Substance Use Topics    Alcohol use: Not Currently                                Counseling given: Not Answered      Vital Signs (Current):   Vitals:    19 1342 19 1349 19 0728   BP:   131/80   Pulse:   62   Resp:   20   Temp:   97.6 °F (36.4 °C)   TempSrc:   Oral   SpO2:   97%   Weight:  130 lb (59 kg) 130 lb (59 kg)   Height: 5' 2\" (1.575 m)  5' 2\" (1.575 m)                                              BP Readings from Last 3 Encounters:   19 131/80   19 109/71   19 138/88       NPO Status: Time of last liquid consumption:                         Time of last solid consumption:                         Date of last liquid consumption: 19                        Date of last solid food consumption: 19    BMI:   Wt Readings from Last 3 Encounters:   19 130 lb (59 kg)   19 140 lb (63.5 kg)   19 140 lb (63.5 kg)     Body mass index is 23.78 kg/m².     CBC:   Lab Results   Component Value Date    WBC 13.5 2019    RBC 3.93 2019    HGB 12.7 2019    HCT 39.0 2019    MCV 99.2 2019    RDW 11.8 2019     2019       CMP:   Lab Results   Component Value Date     03/19/2019    K 5.0 03/19/2019     03/19/2019    CO2 26 03/19/2019    BUN 20 03/19/2019    CREATININE 1.5 03/19/2019    LABGLOM 35 03/19/2019    GLUCOSE 119 03/19/2019    PROT 8.5 03/19/2019    CALCIUM 10.5 03/19/2019    BILITOT 0.4 03/19/2019    ALKPHOS 103 03/19/2019    AST 34 03/19/2019    ALT 22 03/19/2019       POC Tests:   Recent Labs     05/07/19  0759   POCGLU 102*       Coags:   Lab Results   Component Value Date    PROTIME 10.4 05/07/2019    INR 0.91 05/07/2019    APTT 34.5 05/07/2019       HCG (If Applicable): No results found for: PREGTESTUR, PREGSERUM, HCG, HCGQUANT     ABGs: No results found for: PHART, PO2ART, THY2QUF, HQH1URV, BEART, R7JWIDAB     Type & Screen (If Applicable):  No results found for: LABABO, [address]    Anesthesia Evaluation  Patient summary reviewed and Nursing notes reviewed  Airway: Mallampati: II  TM distance: >3 FB   Neck ROM: full  Mouth opening: > = 3 FB Dental:    (+) upper dentures and lower dentures      Pulmonary:Negative Pulmonary ROS breath sounds clear to auscultation                             Cardiovascular:Negative CV ROS          ECG reviewed  Rhythm: regular  Rate: normal  Echocardiogram reviewed                  Neuro/Psych:   Negative Neuro/Psych ROS              GI/Hepatic/Renal: Neg GI/Hepatic/Renal ROS            Endo/Other: Negative Endo/Other ROS                    Abdominal:           Vascular: negative vascular ROS. Anesthesia Plan      general     ASA 3     (Discussed GA/OETT, arterial monitor, possible central line, possible circ arrest, placement of pacemaker pads, prolonged mechanical ventilation, possible ICU awakening, possible blood transfusion. Patient understands and consents. No family members or others in attendance with patient.)  Induction: intravenous. arterial line  MIPS: Postoperative opioids intended and Prophylactic antiemetics administered.   Anesthetic plan and risks discussed with patient.         Attending anesthesiologist reviewed and agrees with Shary Lanes, MD   5/7/2019

## 2019-05-07 NOTE — PROGRESS NOTES
NEUROSURGERY HANDOFF    Patient Information  Name:WILLIE SMILEY KYI:[id number]   AGN:[id number] Code Status:Prior   No Known Allergies Extended Emergency Contact Information  Primary Emergency Contact: Efrain Aparicio   [address] Phone: [phone]  Relation: Child     Admission Information  Date of Admission:5/7/2019  7:00 AM Location:OR/NONE   Admission Diagnosis:CEREBRAL ANEURYSM Attending Rachel Ferrer MD   Procedure(s) (LRB):  RIGHT CRANIOTOMY FOR CLIPPING OF MIDDLE CEREBRAL ARTERY ANEURYSM (Right) Neurosurgeon:Milton Rangel MD     Patient Summary  Gus Carrion is a 58 y.o. female patient with CEREBRAL ANEURYSM who under went a Procedure(s) (LRB):  RIGHT CRANIOTOMY FOR CLIPPING OF MIDDLE CEREBRAL ARTERY ANEURYSM (Right) today by Donnie Salinas MD.    History of Present Illness:  Patient had pre-op complaints of HA, speech changes and right eye blurriness with an onset of February. Patient was found to have 9 x 6 right middle cerebral artery aneurysm that was unresponsive to conservative treatments.      Vital Signs:  /80 Comment: 122/77 left arm  Pulse 62   Temp 97.6 °F (36.4 °C) (Oral)   Resp 20   Ht 5' 2\" (1.575 m)   Wt 130 lb (59 kg)   SpO2 97%   BMI 23.78 kg/m²     Situation Awareness  Contingency Planning  If the patient has:  · LOC  · Sudden change in neuro exam that includes:   · Numbness or tingling in extremities  · Weakness in extremities  · Incision begins to separate  · Copious amount of blood or fluid draining from the incision  · Signs of infection, such as:   · Temperature exceeds 101° F  · Increased pain, swelling, warmth, or redness around incision site  · Red streaks leading from the site  · Pus draining from the site    You MUST contact Donnie Salinas MD at [phone]    You can also contact the [facility] Neurosurgery NP Monday-Friday 7am-7pm @ [phone]    Electronically signed by LIGIA Rowley CNP on 5/7/2019 at 1:38 PM

## 2019-05-07 NOTE — PROGRESS NOTES
Significant Event:    Patient had witnessed GTC at approximately 6pm - taken for CT scan. Dr. Tao Thorne notified. No unexpected findings on Head CT by Dr. Tao Thorne review. Patient's exam post seizure - GCS: 2/3/5 - Pupils reactive & equal at 3mm. Moving all extremities, though less movement from LUE. With not returning to baseline after GTC will get stat cvEEG, IV keppra 1gm BID, PRN ativan 1-2mg for seizure activity. Critical care team / nurse present at event and updated on new orders after discussion w/ attending.      Axel Bunch  7:46 PM  05/07/19

## 2019-05-07 NOTE — PROGRESS NOTES
Patient restless off and on. Oral airway removed at this time. Patient still not responding by opening her eyes to her name purposely. Moving all 4 extremities randomly in bed.

## 2019-05-07 NOTE — PROGRESS NOTES
Patient received from the OR to PACU #11 post RIGHT CRANIOTOMY FOR CLIPPING OF MIDDLE CEREBRAL ARTERY ANEURYSM (Right) of Dr. Yovany Cristina. Placed on PACU monitoring equipment. On arrival, patient requires chin lift for airway stabilization/oral airway in place. Still sleepy from surgery with no purposeful movement. ALONSO. Anesthesia remains at bedside with CRNAs. Arterial line with no waveform, cannot flush or get blood return. Removed per protocol per Dr. Anju Andre' request. Cuff pressure stable.

## 2019-05-08 ENCOUNTER — APPOINTMENT (OUTPATIENT)
Dept: GENERAL RADIOLOGY | Age: 64
DRG: 025 | End: 2019-05-08
Attending: NEUROLOGICAL SURGERY
Payer: COMMERCIAL

## 2019-05-08 ENCOUNTER — APPOINTMENT (OUTPATIENT)
Dept: CT IMAGING | Age: 64
DRG: 025 | End: 2019-05-08
Attending: NEUROLOGICAL SURGERY
Payer: COMMERCIAL

## 2019-05-08 PROBLEM — R56.9 SEIZURES (HCC): Status: ACTIVE | Noted: 2019-05-08

## 2019-05-08 PROBLEM — J18.9 PNEUMONIA: Status: ACTIVE | Noted: 2019-05-08

## 2019-05-08 LAB
AMORPHOUS: ABNORMAL /HPF
ANION GAP SERPL CALCULATED.3IONS-SCNC: 16 MMOL/L (ref 3–16)
BASOPHILS ABSOLUTE: 0.1 K/UL (ref 0–0.2)
BASOPHILS RELATIVE PERCENT: 0.5 %
BILIRUBIN URINE: NEGATIVE
BLOOD, URINE: ABNORMAL
BUN BLDV-MCNC: 13 MG/DL (ref 7–20)
CALCIUM SERPL-MCNC: 9.2 MG/DL (ref 8.3–10.6)
CHLORIDE BLD-SCNC: 105 MMOL/L (ref 99–110)
CLARITY: CLEAR
CO2: 22 MMOL/L (ref 21–32)
COLOR: YELLOW
CREAT SERPL-MCNC: 0.9 MG/DL (ref 0.6–1.2)
EOSINOPHILS ABSOLUTE: 0 K/UL (ref 0–0.6)
EOSINOPHILS RELATIVE PERCENT: 0 %
EPITHELIAL CELLS, UA: ABNORMAL /HPF
GFR AFRICAN AMERICAN: >60
GFR NON-AFRICAN AMERICAN: >60
GLUCOSE BLD-MCNC: 127 MG/DL (ref 70–99)
GLUCOSE URINE: NEGATIVE MG/DL
HCT VFR BLD CALC: 31.5 % (ref 36–48)
HCT VFR BLD CALC: 36.4 % (ref 36–48)
HEMOGLOBIN: 10.4 G/DL (ref 12–16)
HEMOGLOBIN: 11.1 G/DL (ref 12–16)
KETONES, URINE: NEGATIVE MG/DL
LEUKOCYTE ESTERASE, URINE: ABNORMAL
LYMPHOCYTES ABSOLUTE: 1.3 K/UL (ref 1–5.1)
LYMPHOCYTES RELATIVE PERCENT: 9.6 %
MCH RBC QN AUTO: 31.9 PG (ref 26–34)
MCH RBC QN AUTO: 32.2 PG (ref 26–34)
MCHC RBC AUTO-ENTMCNC: 30.5 G/DL (ref 31–36)
MCHC RBC AUTO-ENTMCNC: 32.8 G/DL (ref 31–36)
MCV RBC AUTO: 104.6 FL (ref 80–100)
MCV RBC AUTO: 98.1 FL (ref 80–100)
MICROSCOPIC EXAMINATION: YES
MONOCYTES ABSOLUTE: 1 K/UL (ref 0–1.3)
MONOCYTES RELATIVE PERCENT: 7.4 %
NEUTROPHILS ABSOLUTE: 10.9 K/UL (ref 1.7–7.7)
NEUTROPHILS RELATIVE PERCENT: 82.5 %
NITRITE, URINE: NEGATIVE
PDW BLD-RTO: 13.6 % (ref 12.4–15.4)
PDW BLD-RTO: 15.2 % (ref 12.4–15.4)
PH UA: 6.5 (ref 5–8)
PLATELET # BLD: 346 K/UL (ref 135–450)
PLATELET # BLD: 350 K/UL (ref 135–450)
PMV BLD AUTO: 8.5 FL (ref 5–10.5)
PMV BLD AUTO: 8.6 FL (ref 5–10.5)
POTASSIUM REFLEX MAGNESIUM: 3.8 MMOL/L (ref 3.5–5.1)
PROTEIN UA: NEGATIVE MG/DL
RBC # BLD: 3.21 M/UL (ref 4–5.2)
RBC # BLD: 3.47 M/UL (ref 4–5.2)
RBC UA: ABNORMAL /HPF (ref 0–2)
SODIUM BLD-SCNC: 143 MMOL/L (ref 136–145)
SPECIFIC GRAVITY UA: 1.01 (ref 1–1.03)
URINE REFLEX TO CULTURE: YES
URINE TYPE: ABNORMAL
UROBILINOGEN, URINE: 0.2 E.U./DL
WBC # BLD: 10.8 K/UL (ref 4–11)
WBC # BLD: 13.2 K/UL (ref 4–11)
WBC UA: ABNORMAL /HPF (ref 0–5)

## 2019-05-08 PROCEDURE — 2500000003 HC RX 250 WO HCPCS: Performed by: INTERNAL MEDICINE

## 2019-05-08 PROCEDURE — 87040 BLOOD CULTURE FOR BACTERIA: CPT

## 2019-05-08 PROCEDURE — 95951 HC EEG MONITOR/VIDEO LESS THAN 24: CPT

## 2019-05-08 PROCEDURE — 99233 SBSQ HOSP IP/OBS HIGH 50: CPT | Performed by: INTERNAL MEDICINE

## 2019-05-08 PROCEDURE — 94770 HC ETCO2 MONITOR DAILY: CPT

## 2019-05-08 PROCEDURE — 6360000002 HC RX W HCPCS: Performed by: STUDENT IN AN ORGANIZED HEALTH CARE EDUCATION/TRAINING PROGRAM

## 2019-05-08 PROCEDURE — 51702 INSERT TEMP BLADDER CATH: CPT

## 2019-05-08 PROCEDURE — 6360000002 HC RX W HCPCS: Performed by: NURSE PRACTITIONER

## 2019-05-08 PROCEDURE — C1751 CATH, INF, PER/CENT/MIDLINE: HCPCS

## 2019-05-08 PROCEDURE — 2000000000 HC ICU R&B

## 2019-05-08 PROCEDURE — 80048 BASIC METABOLIC PNL TOTAL CA: CPT

## 2019-05-08 PROCEDURE — 85025 COMPLETE CBC W/AUTO DIFF WBC: CPT

## 2019-05-08 PROCEDURE — 87070 CULTURE OTHR SPECIMN AEROBIC: CPT

## 2019-05-08 PROCEDURE — 6370000000 HC RX 637 (ALT 250 FOR IP): Performed by: NURSE PRACTITIONER

## 2019-05-08 PROCEDURE — 6360000002 HC RX W HCPCS: Performed by: NEUROLOGICAL SURGERY

## 2019-05-08 PROCEDURE — 51798 US URINE CAPACITY MEASURE: CPT

## 2019-05-08 PROCEDURE — 2700000000 HC OXYGEN THERAPY PER DAY

## 2019-05-08 PROCEDURE — 2580000003 HC RX 258: Performed by: INTERNAL MEDICINE

## 2019-05-08 PROCEDURE — 87086 URINE CULTURE/COLONY COUNT: CPT

## 2019-05-08 PROCEDURE — 2580000003 HC RX 258: Performed by: NURSE PRACTITIONER

## 2019-05-08 PROCEDURE — 70450 CT HEAD/BRAIN W/O DYE: CPT

## 2019-05-08 PROCEDURE — 81001 URINALYSIS AUTO W/SCOPE: CPT

## 2019-05-08 PROCEDURE — 2500000003 HC RX 250 WO HCPCS: Performed by: STUDENT IN AN ORGANIZED HEALTH CARE EDUCATION/TRAINING PROGRAM

## 2019-05-08 PROCEDURE — 74018 RADEX ABDOMEN 1 VIEW: CPT

## 2019-05-08 PROCEDURE — 36415 COLL VENOUS BLD VENIPUNCTURE: CPT

## 2019-05-08 PROCEDURE — 2580000003 HC RX 258: Performed by: STUDENT IN AN ORGANIZED HEALTH CARE EDUCATION/TRAINING PROGRAM

## 2019-05-08 PROCEDURE — 94761 N-INVAS EAR/PLS OXIMETRY MLT: CPT

## 2019-05-08 PROCEDURE — 85027 COMPLETE CBC AUTOMATED: CPT

## 2019-05-08 PROCEDURE — 36569 INSJ PICC 5 YR+ W/O IMAGING: CPT

## 2019-05-08 PROCEDURE — 6370000000 HC RX 637 (ALT 250 FOR IP): Performed by: STUDENT IN AN ORGANIZED HEALTH CARE EDUCATION/TRAINING PROGRAM

## 2019-05-08 PROCEDURE — C9113 INJ PANTOPRAZOLE SODIUM, VIA: HCPCS | Performed by: NURSE PRACTITIONER

## 2019-05-08 PROCEDURE — 87205 SMEAR GRAM STAIN: CPT

## 2019-05-08 PROCEDURE — 6370000000 HC RX 637 (ALT 250 FOR IP): Performed by: INTERNAL MEDICINE

## 2019-05-08 PROCEDURE — 1200000000 HC SEMI PRIVATE

## 2019-05-08 PROCEDURE — 02HV33Z INSERTION OF INFUSION DEVICE INTO SUPERIOR VENA CAVA, PERCUTANEOUS APPROACH: ICD-10-PCS | Performed by: INTERNAL MEDICINE

## 2019-05-08 PROCEDURE — 71045 X-RAY EXAM CHEST 1 VIEW: CPT

## 2019-05-08 PROCEDURE — 6360000002 HC RX W HCPCS: Performed by: INTERNAL MEDICINE

## 2019-05-08 RX ORDER — LEVETIRACETAM 500 MG/1
1000 TABLET ORAL ONCE
Status: DISCONTINUED | OUTPATIENT
Start: 2019-05-08 | End: 2019-05-08

## 2019-05-08 RX ORDER — LEVETIRACETAM 10 MG/ML
1000 INJECTION INTRAVASCULAR EVERY 12 HOURS
Status: DISCONTINUED | OUTPATIENT
Start: 2019-05-08 | End: 2019-05-08

## 2019-05-08 RX ORDER — PANTOPRAZOLE SODIUM 40 MG/10ML
40 INJECTION, POWDER, LYOPHILIZED, FOR SOLUTION INTRAVENOUS DAILY
Status: DISCONTINUED | OUTPATIENT
Start: 2019-05-08 | End: 2019-05-11

## 2019-05-08 RX ORDER — LORAZEPAM 2 MG/ML
1 INJECTION INTRAMUSCULAR ONCE
Status: COMPLETED | OUTPATIENT
Start: 2019-05-08 | End: 2019-05-08

## 2019-05-08 RX ORDER — LORAZEPAM 2 MG/ML
2 INJECTION INTRAMUSCULAR
Status: COMPLETED | OUTPATIENT
Start: 2019-05-08 | End: 2019-05-09

## 2019-05-08 RX ORDER — SODIUM CHLORIDE 0.9 % (FLUSH) 0.9 %
10 SYRINGE (ML) INJECTION EVERY 12 HOURS SCHEDULED
Status: DISCONTINUED | OUTPATIENT
Start: 2019-05-08 | End: 2019-05-17 | Stop reason: HOSPADM

## 2019-05-08 RX ORDER — SODIUM CHLORIDE 0.9 % (FLUSH) 0.9 %
10 SYRINGE (ML) INJECTION PRN
Status: DISCONTINUED | OUTPATIENT
Start: 2019-05-08 | End: 2019-05-17 | Stop reason: HOSPADM

## 2019-05-08 RX ORDER — LIDOCAINE HYDROCHLORIDE 10 MG/ML
5 INJECTION, SOLUTION EPIDURAL; INFILTRATION; INTRACAUDAL; PERINEURAL ONCE
Status: COMPLETED | OUTPATIENT
Start: 2019-05-08 | End: 2019-05-08

## 2019-05-08 RX ORDER — ALPRAZOLAM 0.5 MG/1
0.5 TABLET ORAL NIGHTLY
Status: DISCONTINUED | OUTPATIENT
Start: 2019-05-08 | End: 2019-05-17 | Stop reason: HOSPADM

## 2019-05-08 RX ORDER — HALOPERIDOL 5 MG/ML
5 INJECTION INTRAMUSCULAR EVERY 6 HOURS PRN
Status: DISCONTINUED | OUTPATIENT
Start: 2019-05-08 | End: 2019-05-17 | Stop reason: HOSPADM

## 2019-05-08 RX ORDER — ACETAMINOPHEN 650 MG/1
650 SUPPOSITORY RECTAL EVERY 4 HOURS PRN
Status: DISCONTINUED | OUTPATIENT
Start: 2019-05-08 | End: 2019-05-17 | Stop reason: HOSPADM

## 2019-05-08 RX ORDER — ACETAMINOPHEN 650 MG/1
650 SUPPOSITORY RECTAL EVERY 4 HOURS PRN
Status: DISCONTINUED | OUTPATIENT
Start: 2019-05-08 | End: 2019-05-08 | Stop reason: SDUPTHER

## 2019-05-08 RX ADMIN — FENTANYL CITRATE 50 MCG: 50 INJECTION INTRAMUSCULAR; INTRAVENOUS at 06:10

## 2019-05-08 RX ADMIN — DEXTROSE MONOHYDRATE 1 G: 5 INJECTION INTRAVENOUS at 01:49

## 2019-05-08 RX ADMIN — SENNOSIDES AND DOCUSATE SODIUM 1 TABLET: 8.6; 5 TABLET ORAL at 21:17

## 2019-05-08 RX ADMIN — LORAZEPAM 1 MG: 2 INJECTION INTRAMUSCULAR; INTRAVENOUS at 11:24

## 2019-05-08 RX ADMIN — METOPROLOL TARTRATE 25 MG: 25 TABLET ORAL at 21:17

## 2019-05-08 RX ADMIN — FENTANYL CITRATE 50 MCG: 50 INJECTION INTRAMUSCULAR; INTRAVENOUS at 19:24

## 2019-05-08 RX ADMIN — SODIUM CHLORIDE 1000 MG: 900 INJECTION, SOLUTION INTRAVENOUS at 07:28

## 2019-05-08 RX ADMIN — ACETAMINOPHEN 650 MG: 650 SUPPOSITORY RECTAL at 11:35

## 2019-05-08 RX ADMIN — LEVETIRACETAM 1000 MG: 100 INJECTION, SOLUTION INTRAVENOUS at 04:04

## 2019-05-08 RX ADMIN — Medication 10 ML: at 21:27

## 2019-05-08 RX ADMIN — SODIUM CHLORIDE 1000 MG: 900 INJECTION, SOLUTION INTRAVENOUS at 19:36

## 2019-05-08 RX ADMIN — Medication 10 ML: at 12:04

## 2019-05-08 RX ADMIN — FENTANYL CITRATE 50 MCG: 50 INJECTION INTRAMUSCULAR; INTRAVENOUS at 01:13

## 2019-05-08 RX ADMIN — ATORVASTATIN CALCIUM 40 MG: 40 TABLET, FILM COATED ORAL at 21:17

## 2019-05-08 RX ADMIN — LORAZEPAM 2 MG: 2 INJECTION INTRAMUSCULAR; INTRAVENOUS at 23:18

## 2019-05-08 RX ADMIN — LORAZEPAM 1 MG: 2 INJECTION INTRAMUSCULAR; INTRAVENOUS at 03:15

## 2019-05-08 RX ADMIN — FENTANYL CITRATE 50 MCG: 50 INJECTION INTRAMUSCULAR; INTRAVENOUS at 04:04

## 2019-05-08 RX ADMIN — DEXMEDETOMIDINE 0.4 MCG/KG/HR: 100 INJECTION, SOLUTION, CONCENTRATE INTRAVENOUS at 20:21

## 2019-05-08 RX ADMIN — Medication 10 ML: at 21:17

## 2019-05-08 RX ADMIN — ALPRAZOLAM 0.5 MG: 0.5 TABLET ORAL at 19:24

## 2019-05-08 RX ADMIN — FENTANYL CITRATE 50 MCG: 50 INJECTION INTRAMUSCULAR; INTRAVENOUS at 08:13

## 2019-05-08 RX ADMIN — LIDOCAINE HYDROCHLORIDE 5 ML: 10 INJECTION, SOLUTION EPIDURAL; INFILTRATION; INTRACAUDAL; PERINEURAL at 09:13

## 2019-05-08 RX ADMIN — PANTOPRAZOLE SODIUM 40 MG: 40 INJECTION, POWDER, FOR SOLUTION INTRAVENOUS at 12:04

## 2019-05-08 RX ADMIN — Medication 10 ML: at 10:44

## 2019-05-08 RX ADMIN — ACETAMINOPHEN 650 MG: 650 SUPPOSITORY RECTAL at 01:30

## 2019-05-08 RX ADMIN — CLONIDINE HYDROCHLORIDE 0.1 MG: 0.1 TABLET ORAL at 18:20

## 2019-05-08 RX ADMIN — ACETAMINOPHEN 650 MG: 650 SUPPOSITORY RECTAL at 19:24

## 2019-05-08 RX ADMIN — SODIUM CHLORIDE: 9 INJECTION, SOLUTION INTRAVENOUS at 04:04

## 2019-05-08 RX ADMIN — HALOPERIDOL LACTATE 5 MG: 5 INJECTION INTRAMUSCULAR at 15:15

## 2019-05-08 ASSESSMENT — PAIN DESCRIPTION - LOCATION
LOCATION: HEAD
LOCATION: HEAD

## 2019-05-08 ASSESSMENT — PAIN SCALES - GENERAL
PAINLEVEL_OUTOF10: 7
PAINLEVEL_OUTOF10: 0
PAINLEVEL_OUTOF10: 7
PAINLEVEL_OUTOF10: 6
PAINLEVEL_OUTOF10: 7

## 2019-05-08 ASSESSMENT — PAIN DESCRIPTION - PAIN TYPE
TYPE: SURGICAL PAIN
TYPE: SURGICAL PAIN

## 2019-05-08 NOTE — PROGRESS NOTES
RN came into room at 1800 and found pt to be rolling around in bed complaining of pain. RN Silvio Gale had just given pt 1mg morphine. CT called for scan but RN declined that pt would not be still enough. At [facility] NP informed RN that scan needed to be STAT, despite movement. RN called to request transport. RN walked into room and witnessed pt Look up to Left and have seizure. 1mg ativan given per Bhavna Kirby NP. RN called CT and [employer] that RN was taking pt down herself, scan needed to be emergent. When RN and NP got to CT with pt at 1830, scanner was full. RN waited 30 minutes. Scan completed at 1905 and pt taken back up to unit.

## 2019-05-08 NOTE — PROGRESS NOTES
Physical Therapy/Occupational Therapy     Orders received. Chart Reviewed. Pt currently inappropriate for PT/OT eval due to pt on cEEG. PT/OT will attempt to evaluate pt at a later time vs later date as appropriate and as schedule permits. Discussed with RN.      Grzegorz Nicole, PT, DPT [id number]   Avi Nicholas, OTR/L, 0330

## 2019-05-08 NOTE — PROCEDURES
Order noted for PICC, bedside PICC not an option at this time due to pt fever and blood cultures pending.

## 2019-05-08 NOTE — PROGRESS NOTES
CONTINUOUS EEG    Name:  Caio Boyer  Medical Record Number:  [id number]  Age: 58 y.o. Gender: female  : 1955  Today's Date:  2019  Room:  [facility]-  Vital Signs   BP (!) 156/79   Pulse 66   Temp 102.6 °F (39.2 °C) (Oral)   Resp 13   Ht 5' 2\" (1.575 m)   Wt 130 lb (59 kg)   SpO2 92%   BMI 23.78 kg/m²       Patient currently on continuous EEG monitoring. All EEG leads are currently in place with no current issues. Verified [employer] connection via team viewer. Checked in with patient RN for current plan of care. Comments: Continue monitoring. Impedence check performed. Leads re-attached.     Electronically signed by Silvio Boyd on 2019 at 12:31 PM

## 2019-05-08 NOTE — PROGRESS NOTES
Patient has temp of 101.8F. ICU residents made aware. No intervention/new orders at this time. Will re-check in 1 hour.

## 2019-05-08 NOTE — PROGRESS NOTES
Pt remains agitated, moaning and screaming \"help me. \"  Pulled off EEG leads again; tech aware. Temp down to 99.8* after Tylenol suppository. Pt straight cathed x1, urine output 750 mL. Neurosurgery NP called for CLAYTON drain compression issues.

## 2019-05-08 NOTE — CONSULTS
Clinical Pharmacy Consult Note    Admit date: 5/7/2019    Subjective/Objective:  59 yo female is admitted with incidental aneurysm, Seizures and found to be febrile. Pharmacy is consulted to dose Vancomycin per Dr. Mando Nathan    Pertinent Medications:  Zosyn 3.375 gm every 8 hours - day #1  Zncef 2 g x1    PERTINENT LABS:  Recent Labs     05/07/19  1757      K 3.6      CO2 21   BUN 20   CREATININE 0.9       Estimated Creatinine Clearance: 50 mL/min (based on SCr of 0.9 mg/dL). Recent Labs     05/08/19  0209 05/08/19  0538   WBC 13.2* 10.8   HGB 11.1* 10.4*   HCT 36.4 31.5*   .6* 98.1    346       Height:  5' 2\" (157.5 cm)  130 lb (59 kg)    Micro: Wound and blood cx pending  Assessment/Plan:  Vancomycin  · Will start therapy with vancomycin IV 1000 mg every 18 hours ( ~ 17 mg/kg)   · Clinical pharmacist will follow-up in AM.  · Renal function will be monitored closely and dosing will be adjusted as appropriate. Please call with any questions. Thank you for consulting pharmacy!   Hermilo Barillas Rph    5/8/2019 6:16 AM

## 2019-05-08 NOTE — PROGRESS NOTES
CONTINUOUS VIDEO EEG MONITORING    DATE OF SERVICE: 5/7/2019 TO 5/10/2019    NAME: Maco Clemente   YOB: 1955  SEX: female  MEDICAL RECORD ZDMEIY:[id number]    EEG-CCTV study:   The patient is a 58 y.o.y old female monitored at the request of the team for altered mental status and concern for subclinical seizures. EEG-VIDEO MONITORING METHODOLOGY:   Time-locked EEG-video monitoring was performed using the 32-channel [employer] monitoring system. Analyses of the monitoring data were performed using the following techniques:   1. Review of the relevant EEG-video data. 2. Review of events detected by the computer system in detail. 3. Review of clinical seizures, with both detailed review of EEG and video and playback using multiple montages. A variety of referential and bipolar montages were used. CLINICAL AND EEG ANALYSIS:  Video EEG recording was reviewed in real time by a technologist, and then reviewed at least twice a day when available on the  with maximum possible sampling. Results of the monitoring were related to the treating team frequently throughout the study, at least once a day to help guide treatment via verbal or written communication as brief notes or direct text messages or emails. Updates and response to treatment was communicated as requested by the requesting physician or the team.    5/7/2019    Seizures - None  Push buttons - None  Background - Generalized moderate amplitude delta with superimposed theta frequencies, max anteriorly        5/8/2019      Seizures - None  Push buttons - None  Background - Generalized moderate amplitude delta with superimposed theta frequencies, max anteriorly. Loss of electrode integrity around 02:30. Briefly restored in the afternoon, with return of artifact from movement, loss of electrode integrity.   When visualized, appears to be focal fast, increased amplitude left temporal.       5/9/2019    Seizures - None  Push buttons - None  Background - Generalized moderate amplitude delta with superimposed theta frequencies, max anteriorly. Much of EEG still unable to be interpreted due to loss of electrode integrity and patient agitation. Visualized electrodes chains with intermittent bursts of rhythmic delta. 5/10/2019      Seizures - None  Push buttons - None  Background - Generalized moderate amplitude delta with superimposed theta frequencies, max anteriorly. Intermittent generalized delta activity. Intermittent focal slowing left temporal.          CLINICAL INTERPRETATION: This is an abnormal video EEG study which is significantly technically limited due to loss of several electrodes for much of this recording  1. Generalized background abnormalities indicative of an underlying diffuse encephalopathy of non-specific etiology. 2.  Loss of electrode integrity around 02:30 on 5/8/19 significantly limiting interpretation. Much improved artifact on 5/10/19.  3. Intermittent bursts of rhythmic delta activity left temporal.  Periods of artifact limiting interpretation. 4.  Intermittent left temporal slowing consistent with underlying focal neuronal dysfunction of non-specific etiology.

## 2019-05-08 NOTE — PROGRESS NOTES
EEG tech Jodi at bedside and patient extremely agitated/restless. ICU residents at bedside. Precedex ordered. Will monitor.

## 2019-05-08 NOTE — PROGRESS NOTES
ICU Progress Note  PGY-1    Admit Date: 5/7/2019  Hospital Day: 2    ICU DAY  Code:Full Code  PCP: Errol Godfrey, APRN - CNP       SUBJECTIVE:   Interval Hx: Patient had 2 tonic clonic seizures yesterday, one just prior to getting follow-up CT scan @ 1800 and another in the am.  Patient was unresponsive, turned her head to the left with gaze deviation and shaking of extremities. Patient was protecting her airway throughout the event. She was given 1x ativan and 1000 keppra each time. CT scans both times showed post-op changes, but no significant bleeding. Developed fever 102.1, WBC 13.2  CXR showed possible right basilar infiltrate. Discussed with neurosurgery, who recommended to hold antibiotics until 24 hrs post-op. Dr. Humaira Sequeira came in to reposition her CLAYTON drain. This morning, patient is very somnolent, but will become agitated when moved. Not following commands and unable to give history. Patient had approximately 240 mL output from CLAYTON drain. BP has been moderately elevated to 150s-160s this am. Fever is improved and leukocytosis resolved without administration of antibiotics.     Access:                                 -Peripheral Access Day#:1                               Patten Day#:1                                                      MEDICATIONS:   Scheduled Meds:   piperacillin-tazobactam  3.375 g Intravenous Q8H    vancomycin  15 mg/kg Intravenous Q18H    lidocaine 1 % injection  5 mL Intradermal Once    sodium chloride flush  10 mL Intravenous 2 times per day    atomoxetine  80 mg Oral Daily    atorvastatin  40 mg Oral Nightly    [Held by provider] Azilsartan-Chlorthalidone  1 tablet Oral Daily    escitalopram  20 mg Oral Daily    metoprolol tartrate  25 mg Oral BID    pantoprazole  20 mg Oral Daily    sodium chloride flush  10 mL Intravenous 2 times per day    sennosides-docusate sodium  1 tablet Oral BID    levetiracetam  1,000 mg Intravenous Q12H    LORazepam  0.5 mg Intravenous Once      Continuous Infusions:  PRN Meds:acetaminophen, sodium chloride flush, butalbital-acetaminophen-caffeine, ALPRAZolam, cloNIDine, sodium chloride flush, ondansetron, acetaminophen, HYDROcodone 5 mg - acetaminophen **OR** HYDROcodone 5 mg - acetaminophen, naloxone, polyethylene glycol, bisacodyl, labetalol, fentanNYL  Allergies: No Known Allergies    PHYSICAL EXAM:       Vitals: BP (!) 155/74   Pulse 60   Temp 100.8 °F (38.2 °C) (Core)   Resp 27   Ht 5' 2\" (1.575 m)   Wt 130 lb (59 kg)   SpO2 98%   BMI 23.78 kg/m²   I/O:      Intake/Output Summary (Last 24 hours) at 5/8/2019 0645  Last data filed at 5/8/2019 0600  Gross per 24 hour   Intake 4926 ml   Output 4330 ml   Net 596 ml     I/O this shift:  In: 1600 [I.V.:1600]  Out: 2090 [Urine:2000; Drains:90]  I/O last 3 completed shifts: In: 7085 [I.V.:3326]  Out: 2240 [NWOUR:3711; Drains:115; Blood:150]    Physical Examination:   · General appearance: somnolent, but arouses to voice. Unable to follow commands  · Skin: right frontoparietal incision from craniotomy with LCAYTON drain in place. No active bleeding or oozing. · HEENT: Right CLAYTON drain in place w/ staples from recent craniotomy. No oozing or bleeding. PERRL. No gaze deviation  · Neck: no adenopathy, no carotid bruit, no JVD, supple, symmetrical, trachea midline and thyroid not enlarged, symmetric, no tenderness/mass/nodules  · Lungs: clear to auscultation bilaterally, limited due to patient positioning  · Heart: regular rate and rhythm, S1, S2 normal, no murmur, click, rub or gallop  · Abdomen: soft, non-tender; bowel sounds normal; no masses,  no organomegaly  · Extremities: extremities normal, atraumatic, no cyanosis or edema  · Lymphatic: No significant lymph node enlargement papable  · Neurologic: limited in the setting of altered mental status. Patient moving all extremities. Pupillary reflex present.       DATA:       Labs:  CBC:   Recent Labs     05/07/19  1757 05/08/19  0209 05/08/19  0538   WBC 8.5 13.2* 10.8   HGB 10.3* 11.1* 10.4*   HCT 31.7* 36.4 31.5*    350 346       BMP:   Recent Labs     05/07/19  1757 05/08/19  0538    143   K 3.6 3.8    105   CO2 21 22   BUN 20 13   CREATININE 0.9 0.9   GLUCOSE 125* 127*     ABGs: No results for input(s): PHART, BCI1XGH, PO2ART in the last 72 hours. Rad:   EKG:     ASSESSMENT AND PLAN:   Patient is a 59 y. o. female PMH significant for ischemic stroke, HTN  presenting with 4 month hx of headache, speech changes and right eye blurriness. Admitted to ICU post-op from right MCA aneurysm clipping.     Seizure in the setting of recent aneurysm clipping  Patient had 1 seizure yesterday 2-3 hours post-op and a second overnight (2nd was 6 minutes long). Was given ativan 1 mg and keppra 1000 mg both times. Placed on eeg. Repeat CT showed post-op changes, but no significant bleeding  - Continue keppra 1000 q12  - neurosurgery following  - neurology consulted  - EEG  - NG tube     Fever and leukocytosis likely 2/2 Aspiration PNA in the setting of seizures  Fever 102.1 following seizure. CXR showed right basilar opacity. WBC 13.  Placed on vanc/zosyn with improvement in leukocytosis. Fever improved  - will hold antibiotics for now and trend WBC; if elevated after 24 hrs post-op or fever worsens, start unasyn  - Blood cultures sent  - UA w/ cultures  - Daily CBC     Right MCA aneurysm s/p clipping   Hx of headache, vision and speech changes. Found to have R MCA aneurysm in 2/2019 at which time she complained of similar symptoms. Repeat CT in 3/2019 showed size was 9 mm. Presented for aneurysmal clipping.   No complications from procedure and was placed in ICU for continued monitoring  - Repeat CT head showed post-op changes - no sig bleeding  - Keppra 1000 BID in the setting of seizures  - morphine prn   - Neurosurgery following             - Contact Dr. Nelli Mccord at [phone] for changes  - Labetalol prn for SBP > 160  - Holding home ASA     Acute Metabolic Encephalopathy  2/2 Seizures  - management as above    HTN  BP stable in 110s-120s post-op. - BP slightly elevated today  - holding home azilsartan-chlorthalidone  - lopressor BID - holding parameters in place  - labetalol prn SBP > 160  - pressures have been soft post-op     Chronic Ischemic Stroke  - home Lipitor once able to take PO  - Holding home ASA     Depression    - holding home lexapro and strattera     Code Status:  Full  FEN: NPO / NS @ 125  PPX: Protonix / SCDs / no AC at this time    Discussed with attending   -----------------------------  Beau Dubose M.D.   PGY-1  Pager: [phone]  5/8/2019 6:45 AM     Pulmonary/Critical Care     Patient seen and examined. I agree with Dr. Ori Mesa history, physical, lab findings, assessment and plan. Assessment  1. Right MCA aneurysm POD #1 clipping  2. New-onset seizure  3. Acute encephalopathy  4. Hypertension  5. Generalized anxiety disorder on chronic Xanax  6. GERD  7. Fever with right basilar opacity on chest x-ray  8. Hypoxemia requiring oxygen    Plan  1. cvEEG  2. Neurology consult - AED management per Dr. Amanda Wheatley  3. Continue Keppra 1000 mg IV twice a day  4. Blood cultures in lab  5. If fevers persist or hypoxemia worsens would start Unasyn for possible aspiration pneumonia. I would also cover with azithromycin for atypical pathogens. 6.  Wean oxygen for goal oxygen saturation of 88%  7. Place NG tube and start Xanax 0.5 mg nightly to prevent withdrawal  8. Goal SBP < 160  9.  Keep in ICU    Donavon Wheeler MD

## 2019-05-08 NOTE — PROGRESS NOTES
CONTINUOUS EEG    Name:  Paula Fortune  Medical Record Number:  [id number]  Age: 58 y.o. Gender: female  : 1955  Today's Date:  2019  Room:  [address] [place] [address]  Vital Signs   /80   Pulse 63   Temp 101.8 °F (38.8 °C) (Axillary)   Resp 16   Ht 5' 2\" (1.575 m)   Wt 130 lb (59 kg)   SpO2 95%   BMI 23.78 kg/m²           Continuous EEG Testing Start Time:  7450 PM    Continuous EEG Testing End Time:       Comments: Khushbu Penny at [facility] contacted [phone] to begin monitoring. Impedence in high on some leads due to pt inability to stay on her back as well as frontal incision and staples. Plan of Care: Begin monitoring.     Electronically signed by Fred Mondragon on 2019 at 11:58 PM

## 2019-05-08 NOTE — PROGRESS NOTES
Fever @ 2300:  101.6 F. On recheck @ 0025, fever to 102.2F. Rectal acetaminophen ordered. Fluid culture from drain ordered. Will notify Dr. Adonay Whalen.       Signed,    Jimbo Domínguez MD  PGY3  Resident physician  Internal Medicine

## 2019-05-08 NOTE — PROGRESS NOTES
Dr. Fatoumata Barber at bedside to see patient. Informed that drain has not been draining since seizure. Dr. Fatoumata Barber undressed incision on head and placed drain in correct position. 50ml of serosanguinous drainage returned. Instructed to leave incision site open to air and put bacetracin ointment over incision site. He discontinued morphine and replaced with fentanyl 50mcg q2 PRN. Will continue to monitor.

## 2019-05-08 NOTE — PROGRESS NOTES
CONTINUOUS EEG    Name:  Liz Chaparro  Medical Record Number:  [id number]  Age: 58 y.o. Gender: female  : 1955  Today's Date:  2019  Room:  [address] [place] [address]  Vital Signs   BP (!) 155/83   Pulse 74   Temp 101.8 °F (38.8 °C) (Oral)   Resp 21   Ht 5' 2\" (1.575 m)   Wt 130 lb (59 kg)   SpO2 91%   BMI 23.78 kg/m²       Patient currently on continuous EEG monitoring. All EEG leads are currently in place with no current issues. Verified [facility] connection via team viewer. Checked in with patient RN for current plan of care. Comments: Continue monitoring. All leads within 10K limit.     Electronically signed by Lionel Kohli on 2019 at 2:21 PM

## 2019-05-08 NOTE — PROGRESS NOTES
[facility] called and informed of possible seizure activity. Patient found off the monitor and without gown drooling and very loud snore. Patient un-restrained for time being, suctioned and ICU residents came to bedside. Patient was placed back on the monitor. 1mg ativan ordered and give. Dr. Francine Diaz called and made aware of patients status. Orders to be placed. [facility] notified this RN that seizure was 6 min long in total. ICU residents and Dr. Francine Diaz made aware.

## 2019-05-08 NOTE — SIGNIFICANT EVENT
Called to pt's room by nurse, Summer Kuhn, at 2:45am for the following reason:  seizure    On arrival, pt currently post-ictal with rhonchorous breath sounds. Confirmed EEG spike on monitor at 2:38am.  Following minimal command. Answering yes/no questions. Temp: 102.2F (earlier). VS stable. O2 sats in mid/high 90s on 2L via NC. Right arm/leg with spontaneous motor movement. Left arm/leg movement to pain stimuli. Exam largely unchanged from prior, but seems to be moving left side a bit better than earlier. Pt dislodged several EEG leads. Assessment:   - Seizure, second occurrence      Plan:    - ativan 1mg   - additional dose of Keppra 1,000mg ordered  - CT head w/o contrast  - CBC  - continue cvEEG, will need some lead replacement   - neuro consult, may require additional AEDs        Team discussed plan by phone with Dr. Anastasia Clements. Will be updated with CT results.           Signed,    Robert Martell MD PGY3  Internal Medicine

## 2019-05-08 NOTE — CONSULTS
Neurology Consult Note  Reason for Consult: seizure    Chief complaint: elective aneurysmal clip placement    History of Present Illness:  Hope Mejia is a 58 y.o. female, with PMH of known cerebral aneurysm (diagnosed 2/2019), HTN, p/w worsening headache and R eye blurriness with dysarthria. Patient was admitted for R craniotomy with MCA aneurysmal clip placement (5/7) due to enlargment. Patient was transferred to ICU for post op monitoring. Patient was found to have a witnessed seizure with left lateral gaze and tonic-clonic seizure around 5/7 6PM. Ativan 1mg was given with resolution. STAT CT head showed expected findings per Dr. Luigi Peterson' review. Post seizure, GCS score of 2/3/5, pupils reactive and equal at 3mm, moving all extremities with less in LUE. Patient's mental status did not return to baseline and cEEG was ordered with Keppra 1g BID IV in place. Patient had to be placed on bilateral wrist restraints due to pulling off cEEG leads. Dr. Luigi Peterson undressed incision on head and placed drain in correct position. Patient then had a fever of 101.8 then 102.2 with Tylenol given. Patient was then again found to have tonic-clonic seizure which lasted about 6 minutes. CT head then again showed expected post-op changes. Patient is currently non alert, AOx0, lying in bed with some staples on frontal head. Patient has most lead off at this time and EEG tech is in the room in attempt to replace the leads. EEG reading report by Dr. Marie :  5/7/2019     Seizures - None  Push buttons - None  Background - Generalized moderate amplitude delta with superimposed theta frequencies, max anteriorly     5/8/2019     Review  07:00     Seizures - None  Push buttons - None  Background - Generalized moderate amplitude delta with superimposed theta frequencies, max anteriorly. Loss of electrode integrity around 02:30     CLINICAL INTERPRETATION: This is an abnormal video EEG study  1.   Generalized background abnormalities indicative of an underlying diffuse encephalopathy of non-specific etiology. 2.  Loss of electrode integrity around 02:30 on 19 significantly limiting interpretation. Medical History:  Past Medical History:   Diagnosis Date    Anxiety     Arthritis     Cerebral aneurysm     Former smoker     GERD (gastroesophageal reflux disease)     Hypertension      Past Surgical History:   Procedure Laterality Date    CHOLECYSTECTOMY      HEMORRHOID SURGERY      HYSTERECTOMY       Medications Prior to Admission: aspirin 81 MG tablet, Take 81 mg by mouth daily  THIAMINE HCL PO, Take 1 tablet by mouth daily  vitamin D (ERGOCALCIFEROL) 58920 units capsule, Take 1 capsule by mouth once a week for 5 doses  atorvastatin (LIPITOR) 80 MG tablet, Take 0.5 tablets by mouth nightly  vitamin C (VITAMIN C) 1000 MG tablet, Take 1 tablet by mouth daily  pantoprazole (PROTONIX) 20 MG tablet, Take 20 mg by mouth daily  escitalopram (LEXAPRO) 20 MG tablet, Take 20 mg by mouth daily  metoprolol tartrate (LOPRESSOR) 25 MG tablet, Take 25 mg by mouth 2 times daily  ALPRAZolam (XANAX) 0.5 MG tablet, Take 0.5 mg by mouth nightly as needed for Sleep  HYDROcodone-acetaminophen (NORCO) 5-325 MG per tablet, Take 2 tablets by mouth every 6 hours as needed for Pain  atomoxetine (STRATTERA) 80 MG capsule, Take 1 capsule by mouth daily  Azilsartan-Chlorthalidone (EDARBYCLOR) 40-12.5 MG TABS, Take 1 tablet by mouth daily  clopidogrel (PLAVIX) 75 MG tablet, Take 75 mg by mouth daily  cloNIDine (CATAPRES) 0.1 MG tablet, Take 1 tablet by mouth every 4 hours as needed for High Blood Pressure (sbp above 155)  No Known Allergies  History reviewed. No pertinent family history.   Social History     Tobacco Use   Smoking Status Former Smoker    Packs/day: 1.00    Years: 20.00    Pack years: 20.00    Types: Cigarettes    Last attempt to quit: 3/6/2019    Years since quittin.1   Smokeless Tobacco Never Used     Social History Substance and Sexual Activity   Drug Use No     Social History     Substance and Sexual Activity   Alcohol Use Not Currently       ROS:  Unable to be obtained due to current mental status      Exam:  Blood pressure (!) 140/73, pulse 59, temperature 100.9 °F (38.3 °C), temperature source Core, resp. rate 17, height 5' 2\" (1.575 m), weight 130 lb (59 kg), SpO2 (!) 89 %, not currently breastfeeding. Constitutional    Vital signs: BP, HR, and RR reviewed   General nonalert, no distress, well-nourished  Eyes: unable to be examined due to AMS  Cardiovascular: pulses symmetric in all 4 extremities  Psychiatric: unable to be examined due to AMS  Neurologic  Mental status:   Orientation to [facility]   [facility] of knowledge unable to be examined due to [address] unable to be examined due to AMS   Attention unable to be examined due to AMS   Language unable to be examined due to AMS   Comprehension unable to be examined due to AMS  Cranial nerves:   CN2: unable to be examined due to AMS  CN 3,4,6: unable to be examined due to AMS  CN5: unable to be examined due to AMS  CN7: unable to be examined due to AMS   CN8: unable to be examined due to AMS  CN9: unable to be examined due to AMS  CN11: unable to be examined due to AMS  CN12: unable to be examined due to AMS  Strength: unable to be examined due to AMS  Deep tendon reflexes: unable to be examined due to AMS  Sensory: unable to be examined due to AMS  Cerebellar/coordination: unable to be examined due to AMS  Tone: unable to be examined due to AMS  Gait: unable to be examined due to AMS      Labs  No significant lab abnormalities    Studies  CT head with expected postop changes    Impression:  Parth Man is a 58 y.o. female with a history of known cerebral aneurysm presented to [facility] (5/7/2019) for elective aneurysmal clip placement due to worsening symptoms and enlargement of the aneurysm.  Patient later that evening had witnessed GTC seizure with significant post

## 2019-05-08 NOTE — PROGRESS NOTES
Patient placed in bilateral soft wrist restraints due to patient pulling at  gown/leads/SCD/head drain and unable to re-direct patient. Will monitor Q1 per protocol.

## 2019-05-08 NOTE — PROGRESS NOTES
Neurosurgery Progress Note    Patient: Samanta Dickinson MRN: [id number]     YOB: 1955  Age: 58 y.o.   Sex: female    Unit: [facility] T[facility] Room/Bed: 4505/4505-01 Location: [address] [place] [address]     Admitting Physician: Yahaira Campso    Primary Care Physician: LIGIA Cantu CNP          LOS: 1 day     POD:  Right craniotomy and MCA aneurysm clip      Subjective:   Patient has had two tonic clinic seizures, fevers and was recently started on antibiotics due to suspected pneumonia    Objective:     BP (!) 155/74   Pulse 60   Temp 100.8 °F (38.2 °C) (Core)   Resp 27   Ht 5' 2\" (1.575 m)   Wt 130 lb (59 kg)   SpO2 98%   BMI 23.78 kg/m²    Temp (24hrs), Av °F (35.6 °C), Min:94.6 °F (34.8 °C), Max:102.2 °F (39 °C)    Patient Vitals for the past 24 hrs:   BP Temp Temp src Pulse Resp SpO2 Height Weight   19 0630 (!) 155/74 -- -- 60 27 -- -- --   19 0600 (!) 161/68 -- -- 52 16 -- -- --   19 0530 (!) 154/62 -- -- 51 27 -- -- --   19 0500 (!) 146/118 -- -- 73 16 -- -- --   19 0430 (!) 140/74 -- -- 75 18 -- -- --   19 0400 (!) 143/68 100.8 °F (38.2 °C) CORE 88 21 98 % -- --   19 0330 (!) 125/59 -- -- 74 17 -- -- --   19 0300 120/69 -- -- 75 16 -- -- --   19 0230 (!) 132/58 -- -- 63 -- -- -- --   19 0200 (!) 149/74 -- -- 63 18 -- -- --   05/08/19 0130 139/72 -- -- 58 15 -- -- --   19 0100 139/75 -- -- 93 20 -- -- --   19 0030 (!) 139/90 102.2 °F (39 °C) CORE 100 18 -- -- --   19 0000 (!) 148/78 101.6 °F (38.7 °C) Axillary 71 18 95 % -- --   19 2330 136/80 -- -- 63 16 -- -- --   19 2300 (!) 157/70 101.8 °F (38.8 °C) Axillary 91 23 95 % -- --   19 2230 (!) 146/74 -- -- 78 19 -- -- --   19 2200 (!) 142/75 -- -- 60 13 -- -- --   19 2130 129/83 -- -- 85 21 -- -- --   19 2100 (!) 144/76 -- -- 59 15 -- -- --   19 2030 122/67 98 °F (36.7 °C) Axillary 89 20 95 % -- --   19 2000 93/70 -- -- 87 14 -- -- --   05/07/19 1930 -- -- -- 55 13 -- -- --   05/07/19 1900 126/75 -- -- 63 14 -- -- --   05/07/19 1700 (!) 114/57 -- -- 54 10 -- -- --   05/07/19 1607 113/78 97.9 °F (36.6 °C) Axillary 71 17 92 % -- --   05/07/19 1545 (!) 117/53 97 °F (36.1 °C) Temporal 56 22 95 % -- --   05/07/19 1530 (!) 121/59 -- -- 62 20 94 % -- --   05/07/19 1515 -- -- -- 58 -- -- -- --   05/07/19 1500 (!) 105/54 -- -- 59 17 95 % -- --   05/07/19 1455 -- -- -- 58 18 95 % -- --   05/07/19 1445 125/70 -- -- 58 16 95 % -- --   05/07/19 1430 118/72 -- -- 59 15 94 % -- --   05/07/19 1415 126/71 -- -- 59 15 94 % -- --   05/07/19 1400 113/69 -- -- 66 14 95 % -- --   05/07/19 1345 122/74 -- -- 59 19 94 % -- --   05/07/19 1335 (!) 153/75 -- -- 59 17 100 % -- --   05/07/19 1330 (!) 145/79 -- -- 60 16 98 % -- --   05/07/19 1325 (!) 147/73 -- -- 61 16 96 % -- --   05/07/19 1320 (!) 143/72 -- -- 69 19 93 % -- --   05/07/19 1315 (!) 144/75 -- -- 62 16 (!) 74 % -- --   05/07/19 1313 (!) 145/63 97.6 °F (36.4 °C) Temporal 62 (!) 6 (!) 86 % -- --   05/07/19 0728 131/80 97.6 °F (36.4 °C) Oral 62 20 97 % 5' 2\" (1.575 m) 130 lb (59 kg)     05/07 2301 - 05/08 0700  In: 1600 [I.V.:1600]  Out: 2090 [Urine:2000; Drains:90]    Intake/Output Summary (Last 24 hours) at 5/8/2019 0647  Last data filed at 5/8/2019 0600  Gross per 24 hour   Intake 4926 ml   Output 4330 ml   Net 596 ml     Yesterday:  05/07 0701 - 05/08 0700  In: 4131 [I.V.:4926]  Out: 4330 [Urine:3975; Drains:205]    PHYSICAL EXAM:   No distress  4 - Opens eyes on own  4 - Seems confused, disoriented  5 - Pushes away noxious stimulus  Motor: All extremities are mobile.  Unable to assess strength    Sensory: Unable to assess  Abdomen/Groin: NT/ND  Pulses: Present  Incision: Dry and clean    Data Review  Recent Results (from the past 24 hour(s))   TYPE AND SCREEN    Collection Time: 05/07/19  7:50 AM   Result Value Ref Range    ABO/Rh O POS     Antibody Screen NEG    PROTIME-INR Collection Time: 05/07/19  7:50 AM   Result Value Ref Range    Protime 10.4 9.8 - 13.0 sec    INR 0.91 0.86 - 1.14   APTT    Collection Time: 05/07/19  7:50 AM   Result Value Ref Range    aPTT 34.5 26.0 - 36.0 sec   POCT Glucose    Collection Time: 05/07/19  7:59 AM   Result Value Ref Range    POC Glucose 102 (H) 70 - 99 mg/dl    Performed on ACCU-CHEK    CBC    Collection Time: 05/07/19  5:57 PM   Result Value Ref Range    WBC 8.5 4.0 - 11.0 K/uL    RBC 3.20 (L) 4.00 - 5.20 M/uL    Hemoglobin 10.3 (L) 12.0 - 16.0 g/dL    Hematocrit 31.7 (L) 36.0 - 48.0 %    MCV 99.0 80.0 - 100.0 fL    MCH 32.3 26.0 - 34.0 pg    MCHC 32.6 31.0 - 36.0 g/dL    RDW 13.8 12.4 - 15.4 %    Platelets 195 340 - 072 K/uL    MPV 8.3 5.0 - 10.5 fL   BASIC METABOLIC PANEL    Collection Time: 05/07/19  5:57 PM   Result Value Ref Range    Sodium 142 136 - 145 mmol/L    Potassium 3.6 3.5 - 5.1 mmol/L    Chloride 109 99 - 110 mmol/L    CO2 21 21 - 32 mmol/L    Anion Gap 12 3 - 16    Glucose 125 (H) 70 - 99 mg/dL    BUN 20 7 - 20 mg/dL    CREATININE 0.9 0.6 - 1.2 mg/dL    GFR Non-African American >60 >60    GFR African American >60 >60    Calcium 8.5 8.3 - 10.6 mg/dL   CBC auto differential    Collection Time: 05/08/19  2:09 AM   Result Value Ref Range    WBC 13.2 (H) 4.0 - 11.0 K/uL    RBC 3.47 (L) 4.00 - 5.20 M/uL    Hemoglobin 11.1 (L) 12.0 - 16.0 g/dL    Hematocrit 36.4 36.0 - 48.0 %    .6 (H) 80.0 - 100.0 fL    MCH 31.9 26.0 - 34.0 pg    MCHC 30.5 (L) 31.0 - 36.0 g/dL    RDW 15.2 12.4 - 15.4 %    Platelets 836 027 - 280 K/uL    MPV 8.6 5.0 - 10.5 fL    Neutrophils % 82.5 %    Lymphocytes % 9.6 %    Monocytes % 7.4 %    Eosinophils % 0.0 %    Basophils % 0.5 %    Neutrophils # 10.9 (H) 1.7 - 7.7 K/uL    Lymphocytes # 1.3 1.0 - 5.1 K/uL    Monocytes # 1.0 0.0 - 1.3 K/uL    Eosinophils # 0.0 0.0 - 0.6 K/uL    Basophils # 0.1 0.0 - 0.2 K/uL   Basic Metabolic Panel w/ Reflex to MG    Collection Time: 05/08/19  5:38 AM   Result Value Ref Range Sodium 143 136 - 145 mmol/L    Potassium reflex Magnesium 3.8 3.5 - 5.1 mmol/L    Chloride 105 99 - 110 mmol/L    CO2 22 21 - 32 mmol/L    Anion Gap 16 3 - 16    Glucose 127 (H) 70 - 99 mg/dL    BUN 13 7 - 20 mg/dL    CREATININE 0.9 0.6 - 1.2 mg/dL    GFR Non-African American >60 >60    GFR African American >60 >60    Calcium 9.2 8.3 - 10.6 mg/dL   CBC    Collection Time: 05/08/19  5:38 AM   Result Value Ref Range    WBC 10.8 4.0 - 11.0 K/uL    RBC 3.21 (L) 4.00 - 5.20 M/uL    Hemoglobin 10.4 (L) 12.0 - 16.0 g/dL    Hematocrit 31.5 (L) 36.0 - 48.0 %    MCV 98.1 80.0 - 100.0 fL    MCH 32.2 26.0 - 34.0 pg    MCHC 32.8 31.0 - 36.0 g/dL    RDW 13.6 12.4 - 15.4 %    Platelets 234 087 - 594 K/uL    MPV 8.5 5.0 - 10.5 fL         Scheduled Meds:   piperacillin-tazobactam  3.375 g Intravenous Q8H    vancomycin  15 mg/kg Intravenous Q18H    lidocaine 1 % injection  5 mL Intradermal Once    sodium chloride flush  10 mL Intravenous 2 times per day    atomoxetine  80 mg Oral Daily    atorvastatin  40 mg Oral Nightly    [Held by provider] Azilsartan-Chlorthalidone  1 tablet Oral Daily    escitalopram  20 mg Oral Daily    metoprolol tartrate  25 mg Oral BID    pantoprazole  20 mg Oral Daily    sodium chloride flush  10 mL Intravenous 2 times per day    sennosides-docusate sodium  1 tablet Oral BID    levetiracetam  1,000 mg Intravenous Q12H    LORazepam  0.5 mg Intravenous Once       Continuous Infusions:      PRN Meds:  acetaminophen, sodium chloride flush, butalbital-acetaminophen-caffeine, ALPRAZolam, cloNIDine, sodium chloride flush, ondansetron, acetaminophen, HYDROcodone 5 mg - acetaminophen **OR** HYDROcodone 5 mg - acetaminophen, naloxone, polyethylene glycol, bisacodyl, labetalol, fentanNYL    Imaging Review:  XR CHEST PORTABLE   Final Result   1. Right basilar opacities, concerning for pneumonia in the proper    clinical setting.           CT HEAD WO CONTRAST   Final Result     Expected postoperative changes          CT Head wo Contrast   Final Result      1. Status post right frontotemporal craniotomy and aneurysm clipping in the right sylvian fissure. 2.  Bifrontal pneumocephalus. Small right frontal subdural fluid collection and small amount of hyperattenuating hematoma. 3.  Right frontal lobe hyperdense subarachnoid hemorrhage and small focus of subdural hematoma over the superior convexity. 4.  Diffuse right hemispheric sulcal effacement suggesting edema. 5.  No significant midline shift. Results discussed with the ICU nurse, Nichelle Negrete, 5/7/19 at 7:35 PM                  PA VERTEBROBASILLAR CIRC, SIMPLE SURG [id number] (RIGHT CRANIOTOMY FOR CLIPPING OF MIDDLE CEREBRAL ARTERY ANEURYSM)    Assessment/Plan:     Active Problems:    Brain aneurysm  Resolved Problems:    * No resolved hospital problems.  *  Fevers  Pneumonia  Seizures  Continue ICU - Q 1 hour neurocheck  OOB as tolerated  Neurology consult to assess and manage seizures  Appreciate internal medicine and intensive care management  Advance diet as tolerated   Bowel regimen  Mechanical DVT prophylaxis      Signed By: Felicity Brochure     May 8, 2019

## 2019-05-09 ENCOUNTER — APPOINTMENT (OUTPATIENT)
Dept: GENERAL RADIOLOGY | Age: 64
DRG: 025 | End: 2019-05-09
Attending: NEUROLOGICAL SURGERY
Payer: COMMERCIAL

## 2019-05-09 LAB
ANION GAP SERPL CALCULATED.3IONS-SCNC: 11 MMOL/L (ref 3–16)
BASOPHILS ABSOLUTE: 0 K/UL (ref 0–0.2)
BASOPHILS RELATIVE PERCENT: 0.5 %
BUN BLDV-MCNC: 10 MG/DL (ref 7–20)
CALCIUM SERPL-MCNC: 9 MG/DL (ref 8.3–10.6)
CHLORIDE BLD-SCNC: 106 MMOL/L (ref 99–110)
CO2: 25 MMOL/L (ref 21–32)
CREAT SERPL-MCNC: 0.8 MG/DL (ref 0.6–1.2)
EOSINOPHILS ABSOLUTE: 0 K/UL (ref 0–0.6)
EOSINOPHILS RELATIVE PERCENT: 0.5 %
GFR AFRICAN AMERICAN: >60
GFR NON-AFRICAN AMERICAN: >60
GLUCOSE BLD-MCNC: 122 MG/DL (ref 70–99)
HCT VFR BLD CALC: 27.6 % (ref 36–48)
HEMOGLOBIN: 9.2 G/DL (ref 12–16)
LYMPHOCYTES ABSOLUTE: 1.6 K/UL (ref 1–5.1)
LYMPHOCYTES RELATIVE PERCENT: 17.7 %
MCH RBC QN AUTO: 32.3 PG (ref 26–34)
MCHC RBC AUTO-ENTMCNC: 33.3 G/DL (ref 31–36)
MCV RBC AUTO: 97.1 FL (ref 80–100)
MONOCYTES ABSOLUTE: 1.3 K/UL (ref 0–1.3)
MONOCYTES RELATIVE PERCENT: 13.6 %
NEUTROPHILS ABSOLUTE: 6.3 K/UL (ref 1.7–7.7)
NEUTROPHILS RELATIVE PERCENT: 67.7 %
PDW BLD-RTO: 13.6 % (ref 12.4–15.4)
PLATELET # BLD: 275 K/UL (ref 135–450)
PMV BLD AUTO: 8.3 FL (ref 5–10.5)
POTASSIUM SERPL-SCNC: 3.7 MMOL/L (ref 3.5–5.1)
RBC # BLD: 2.85 M/UL (ref 4–5.2)
SODIUM BLD-SCNC: 142 MMOL/L (ref 136–145)
WBC # BLD: 9.3 K/UL (ref 4–11)

## 2019-05-09 PROCEDURE — 6370000000 HC RX 637 (ALT 250 FOR IP): Performed by: NURSE PRACTITIONER

## 2019-05-09 PROCEDURE — 2580000003 HC RX 258

## 2019-05-09 PROCEDURE — 99233 SBSQ HOSP IP/OBS HIGH 50: CPT | Performed by: INTERNAL MEDICINE

## 2019-05-09 PROCEDURE — 6360000002 HC RX W HCPCS: Performed by: STUDENT IN AN ORGANIZED HEALTH CARE EDUCATION/TRAINING PROGRAM

## 2019-05-09 PROCEDURE — 2580000003 HC RX 258: Performed by: STUDENT IN AN ORGANIZED HEALTH CARE EDUCATION/TRAINING PROGRAM

## 2019-05-09 PROCEDURE — 2000000000 HC ICU R&B

## 2019-05-09 PROCEDURE — 2580000003 HC RX 258: Performed by: NURSE PRACTITIONER

## 2019-05-09 PROCEDURE — 80048 BASIC METABOLIC PNL TOTAL CA: CPT

## 2019-05-09 PROCEDURE — 85025 COMPLETE CBC W/AUTO DIFF WBC: CPT

## 2019-05-09 PROCEDURE — 2500000003 HC RX 250 WO HCPCS: Performed by: STUDENT IN AN ORGANIZED HEALTH CARE EDUCATION/TRAINING PROGRAM

## 2019-05-09 PROCEDURE — 6370000000 HC RX 637 (ALT 250 FOR IP): Performed by: STUDENT IN AN ORGANIZED HEALTH CARE EDUCATION/TRAINING PROGRAM

## 2019-05-09 PROCEDURE — 1200000000 HC SEMI PRIVATE

## 2019-05-09 PROCEDURE — 36415 COLL VENOUS BLD VENIPUNCTURE: CPT

## 2019-05-09 PROCEDURE — 6360000002 HC RX W HCPCS: Performed by: NURSE PRACTITIONER

## 2019-05-09 PROCEDURE — 95951 HC EEG MONITOR/VIDEO LESS THAN 24: CPT

## 2019-05-09 PROCEDURE — C9113 INJ PANTOPRAZOLE SODIUM, VIA: HCPCS | Performed by: NURSE PRACTITIONER

## 2019-05-09 PROCEDURE — 71045 X-RAY EXAM CHEST 1 VIEW: CPT

## 2019-05-09 PROCEDURE — 2580000003 HC RX 258: Performed by: INTERNAL MEDICINE

## 2019-05-09 PROCEDURE — 6360000002 HC RX W HCPCS: Performed by: INTERNAL MEDICINE

## 2019-05-09 RX ORDER — LORAZEPAM 1 MG/1
1 TABLET ORAL
Status: DISCONTINUED | OUTPATIENT
Start: 2019-05-09 | End: 2019-05-09

## 2019-05-09 RX ORDER — SODIUM CHLORIDE 9 MG/ML
INJECTION, SOLUTION INTRAVENOUS
Status: COMPLETED
Start: 2019-05-09 | End: 2019-05-09

## 2019-05-09 RX ORDER — LORAZEPAM 2 MG/ML
1 INJECTION INTRAMUSCULAR
Status: DISCONTINUED | OUTPATIENT
Start: 2019-05-09 | End: 2019-05-09

## 2019-05-09 RX ORDER — LORAZEPAM 2 MG/ML
2 INJECTION INTRAMUSCULAR
Status: DISCONTINUED | OUTPATIENT
Start: 2019-05-09 | End: 2019-05-09

## 2019-05-09 RX ORDER — LORAZEPAM 1 MG/1
4 TABLET ORAL
Status: DISCONTINUED | OUTPATIENT
Start: 2019-05-09 | End: 2019-05-09

## 2019-05-09 RX ORDER — LORAZEPAM 2 MG/ML
4 INJECTION INTRAMUSCULAR
Status: DISCONTINUED | OUTPATIENT
Start: 2019-05-09 | End: 2019-05-09

## 2019-05-09 RX ORDER — LORAZEPAM 1 MG/1
2 TABLET ORAL
Status: DISCONTINUED | OUTPATIENT
Start: 2019-05-09 | End: 2019-05-09

## 2019-05-09 RX ORDER — LORAZEPAM 2 MG/ML
3 INJECTION INTRAMUSCULAR
Status: DISCONTINUED | OUTPATIENT
Start: 2019-05-09 | End: 2019-05-09

## 2019-05-09 RX ORDER — LORAZEPAM 1 MG/1
3 TABLET ORAL
Status: DISCONTINUED | OUTPATIENT
Start: 2019-05-09 | End: 2019-05-09

## 2019-05-09 RX ORDER — SODIUM CHLORIDE 9 MG/ML
INJECTION, SOLUTION INTRAVENOUS CONTINUOUS
Status: DISCONTINUED | OUTPATIENT
Start: 2019-05-09 | End: 2019-05-11

## 2019-05-09 RX ORDER — HEPARIN SODIUM 5000 [USP'U]/ML
5000 INJECTION, SOLUTION INTRAVENOUS; SUBCUTANEOUS EVERY 8 HOURS SCHEDULED
Status: DISCONTINUED | OUTPATIENT
Start: 2019-05-09 | End: 2019-05-17 | Stop reason: HOSPADM

## 2019-05-09 RX ADMIN — Medication 10 ML: at 09:07

## 2019-05-09 RX ADMIN — ACETAMINOPHEN 650 MG: 325 TABLET ORAL at 13:06

## 2019-05-09 RX ADMIN — ESCITALOPRAM OXALATE 20 MG: 20 TABLET ORAL at 09:07

## 2019-05-09 RX ADMIN — SENNOSIDES AND DOCUSATE SODIUM 1 TABLET: 8.6; 5 TABLET ORAL at 20:59

## 2019-05-09 RX ADMIN — Medication 10 ML: at 20:59

## 2019-05-09 RX ADMIN — LORAZEPAM 2 MG: 2 INJECTION INTRAMUSCULAR; INTRAVENOUS at 02:02

## 2019-05-09 RX ADMIN — SENNOSIDES AND DOCUSATE SODIUM 1 TABLET: 8.6; 5 TABLET ORAL at 09:07

## 2019-05-09 RX ADMIN — ALPRAZOLAM 0.5 MG: 0.5 TABLET ORAL at 20:59

## 2019-05-09 RX ADMIN — SODIUM CHLORIDE 500 ML: 9 INJECTION, SOLUTION INTRAVENOUS at 07:54

## 2019-05-09 RX ADMIN — DEXMEDETOMIDINE 1.2 MCG/KG/HR: 100 INJECTION, SOLUTION, CONCENTRATE INTRAVENOUS at 13:39

## 2019-05-09 RX ADMIN — HEPARIN SODIUM 5000 UNITS: 5000 INJECTION INTRAVENOUS; SUBCUTANEOUS at 14:40

## 2019-05-09 RX ADMIN — DEXMEDETOMIDINE 1.4 MCG/KG/HR: 100 INJECTION, SOLUTION, CONCENTRATE INTRAVENOUS at 00:45

## 2019-05-09 RX ADMIN — DEXMEDETOMIDINE 1.1 MCG/KG/HR: 100 INJECTION, SOLUTION, CONCENTRATE INTRAVENOUS at 19:48

## 2019-05-09 RX ADMIN — SODIUM CHLORIDE 1000 MG: 900 INJECTION, SOLUTION INTRAVENOUS at 07:53

## 2019-05-09 RX ADMIN — LORAZEPAM 2 MG: 2 INJECTION INTRAMUSCULAR; INTRAVENOUS at 00:59

## 2019-05-09 RX ADMIN — POLYETHYLENE GLYCOL (3350) 17 G: 17 POWDER, FOR SOLUTION ORAL at 09:07

## 2019-05-09 RX ADMIN — SODIUM CHLORIDE: 9 INJECTION, SOLUTION INTRAVENOUS at 18:47

## 2019-05-09 RX ADMIN — DEXMEDETOMIDINE 1.2 MCG/KG/HR: 100 INJECTION, SOLUTION, CONCENTRATE INTRAVENOUS at 06:28

## 2019-05-09 RX ADMIN — AZITHROMYCIN DIHYDRATE 500 MG: 500 INJECTION, POWDER, LYOPHILIZED, FOR SOLUTION INTRAVENOUS at 17:26

## 2019-05-09 RX ADMIN — SODIUM CHLORIDE 1.5 G: 900 INJECTION INTRAVENOUS at 15:03

## 2019-05-09 RX ADMIN — AMPICILLIN SODIUM AND SULBACTAM SODIUM 3 G: 2; 1 INJECTION, POWDER, FOR SOLUTION INTRAMUSCULAR; INTRAVENOUS at 21:04

## 2019-05-09 RX ADMIN — PANTOPRAZOLE SODIUM 40 MG: 40 INJECTION, POWDER, FOR SOLUTION INTRAVENOUS at 09:07

## 2019-05-09 RX ADMIN — ATORVASTATIN CALCIUM 40 MG: 40 TABLET, FILM COATED ORAL at 20:59

## 2019-05-09 RX ADMIN — SODIUM CHLORIDE 1000 MG: 900 INJECTION, SOLUTION INTRAVENOUS at 19:48

## 2019-05-09 RX ADMIN — HEPARIN SODIUM 5000 UNITS: 5000 INJECTION INTRAVENOUS; SUBCUTANEOUS at 20:59

## 2019-05-09 ASSESSMENT — PAIN SCALES - GENERAL
PAINLEVEL_OUTOF10: 0
PAINLEVEL_OUTOF10: 0

## 2019-05-09 NOTE — PROGRESS NOTES
Neurology Progress Note    Seem for encephalopathy    Updates:  - Exam improving  - Now on Precedex drip    ROS:  - Unable to assess due to encephalopathy    Exam:  Blood pressure (!) 140/88, pulse 53, temperature 96.8 °F (36 °C), temperature source Core, resp. rate 18, height 5' 2\" (1.575 m), weight 134 lb 14.7 oz (61.2 kg), SpO2 93 %, not currently breastfeeding. Constitutional    Vital signs: BP, HR, and RR reviewed   General Alert, no distress, well-nourished  Eyes: fundoscopic exam revealed no hemorrhage   Cardiovascular: pulses symmetric in all 4 extremities. No peripheral edema. Psychiatric: cooperative with examination, no  psychotic behavior noted.   Neurologic  Mental status: Eyes open spontaneously  orientation to person, place, month, year   General fund of knowledge unable to name current or preceding president   Memory grossly intact, able to name first president   Attention intact as able to attend well to the exam     Language fluent in conversation, no apparent aphasia   Comprehension intact; follows simple commands ( show me your thumb, show me two fingers)  Cranial nerves:   CN2: visual fields intact, however she did cheat  CN 3,4,6: EOMI  CN5: facial sensation symmetric   CN7:face symmetric without dysarthria  CN8: hearing grossly intact  CN9: palate elevated symmetrically  CN11: trap full strength on shoulder shrug  CN12: tongue midline with protrusion  Strength:  strength 5/5 bilaterally, bilateral bicep/tricep 5/5, BLEs 5/5, limited exam given bilateral wrist restraints  Cerebellar/coordination: Limited exam given bilateral wrist restraints  Tone: normal in all 4 extremities  Gait: Deferred for safety    Labs:  Glucose: 122  WBC: 9.3    Blood culture x 2: NGTD    Studies:  cvEEG  5/7/2019  Seizures - None  Push buttons - None  Background - Generalized moderate amplitude delta with superimposed theta frequencies, max anteriorly       5/8/2019  Seizures - None  Push buttons - None  Background - Generalized moderate amplitude delta with superimposed theta frequencies, max anteriorly. Loss of electrode integrity around 02:30. Briefly restored in the afternoon, with return of artifact from movement, loss of electrode integrity. When visualized, appears to be focal fast, increased amplitude left temporal.      5/9/2019  Seizures - None  Push buttons - None  Background - Generalized moderate amplitude delta with superimposed theta frequencies, max anteriorly. Much of EEG still unable to be interpreted due to loss of electrode integrity and patient agitation. Visualized electrodes chains with intermittent bursts of rhythmic delta.       CLINICAL INTERPRETATION: This is an abnormal video EEG study which is significantly technically limited due to loss of several electrodes for much of this recording  1. Generalized background abnormalities indicative of an underlying diffuse encephalopathy of non-specific etiology. 2.  Loss of electrode integrity around 02:30 on 5/8/19 significantly limiting interpretation. 3.  Visible portions of EEG with focal slowing, left temporal.  Intermittent bursts of rhythmic delta activity left temporal.  These bursts could be concerning, however, loss of contact with other electrodes limits ability to determine if bursts are evolving      Head CT with some edema and hemorrhagic changes on right with clip noted. Impression:  1. Status epilepticus  2. Recent right MCA aneurysm clipping  3. History of alcohol abuse    59 yo lady with right MCA aneurysm clip and subsequent convulsive seizures meeting criteria for status epilepticus. CT stable to post-op scan with edema and mild hemorrhage. Her exam is improving. Recommendations:  - Continue Keppra 1000 mg BID  - Continue cvEEG for another 24 hours  -If further seizure, I would give 2500 IV keppra in addition to her daily doses and increase to 1500 bid.   If seizure persist phenytoin would be next line AED  - Will follow    A copy of this note was provided for MD Shawn Tran NP  [address] [place] [address] [phone] Neurology

## 2019-05-09 NOTE — PROGRESS NOTES
Physical Therapy/Occupational Therapy    Came to see pt for PT/OT evaluation. Pt continues on continuous EEG monitoring and is on strict bedrest.  Will check back 5/10/19.     [address], OT [address]

## 2019-05-09 NOTE — PROGRESS NOTES
ICU Progress Note  PGY-1    Admit Date: 5/7/2019  Hospital Day: 3    ICU DAY  Code:Full Code  PCP: Errol Godfrey, APRN - CNP       SUBJECTIVE:   Interval Hx: Patient's son told team overnight that patient has a history of alcoholism and may be contributing to her symptoms. Was placed on precedex gtt and CIWA protocol. Received 3x ativan 2 mg overnight. BP stable in 140s-150s this am (120s overnight), bradycardic to upper 40s. Hgb dropped to 9.2 from 10.4. Afebrile. No leukocytosis. CLAYTON drain removed overnight without complication. Once weaned off precedex this am, patient was much more alert, oriented x4 and answering questions appropriately. No specific complaints. Denies chest pain, palpitations, shortness of breath or tremors.     Access:                                 -Peripheral Access Day#:2                               Patten Day#: 2                                                     MEDICATIONS:   Scheduled Meds:   sodium chloride flush  10 mL Intravenous 2 times per day    ALPRAZolam  0.5 mg Oral Nightly    pantoprazole  40 mg Intravenous Daily    sodium chloride flush  10 mL Intravenous 2 times per day    atomoxetine  80 mg Oral Daily    atorvastatin  40 mg Oral Nightly    Azilsartan-Chlorthalidone  1 tablet Oral Daily    escitalopram  20 mg Oral Daily    metoprolol tartrate  25 mg Oral BID    sodium chloride flush  10 mL Intravenous 2 times per day    sennosides-docusate sodium  1 tablet Oral BID    levetiracetam  1,000 mg Intravenous Q12H    LORazepam  0.5 mg Intravenous Once      Continuous Infusions:   dexmedetomidine (PRECEDEX) IV infusion 1.1 mcg/kg/hr (05/09/19 0630)     PRN Meds:LORazepam **OR** LORazepam **OR** LORazepam **OR** LORazepam **OR** LORazepam **OR** LORazepam **OR** LORazepam **OR** LORazepam, sodium chloride flush, haloperidol lactate, acetaminophen, sodium chloride flush, butalbital-acetaminophen-caffeine, cloNIDine, sodium chloride flush, ondansetron, acetaminophen, HYDROcodone 5 mg - acetaminophen **OR** HYDROcodone 5 mg - acetaminophen, naloxone, polyethylene glycol, bisacodyl, labetalol, fentanNYL  Allergies: No Known Allergies    PHYSICAL EXAM:       Vitals: BP (!) 149/86   Pulse (!) 48   Temp 97.2 °F (36.2 °C) (Core)   Resp 18   Ht 5' 2\" (1.575 m)   Wt 134 lb 14.7 oz (61.2 kg)   SpO2 100%   BMI 24.68 kg/m²   I/O:      Intake/Output Summary (Last 24 hours) at 5/9/2019 0741  Last data filed at 5/9/2019 0600  Gross per 24 hour   Intake 622.5 ml   Output 2204 ml   Net -1581.5 ml     No intake/output data recorded. I/O last 3 completed shifts: In: 722.5 [I.V.:622.5; IV Piggyback:100]  Out: 6795 [Urine:2385; Drains:39]    Physical Examination:   · General appearance: Resting comfortably. In no acute distress  · Skin: right frontoparietal incision from craniotomy. CLAYTON drain removed. No active bleeding or oozing. · HEENT: staples from recent craniotomy in right frontotemporal region. No oozing or bleeding. PERRL. EOMI bilaterally. · Neck: no adenopathy, no carotid bruit, no JVD, supple, symmetrical, trachea midline and thyroid not enlarged, symmetric, no tenderness/mass/nodules  · Lungs: clear to auscultation bilaterally, limited due to patient positioning  · Heart: regular rate and rhythm, S1, S2 normal, no murmur, click, rub or gallop  · Abdomen: soft, non-tender; bowel sounds normal; no masses,  no organomegaly  · Extremities: extremities normal, atraumatic, no cyanosis or edema  · Lymphatic: No significant lymph node enlargement papable  · Neurologic: A&O3. Following commands when weaned down on precedex. Patient moving all extremities. Pupillary reflex present.       DATA:       Labs:  CBC:   Recent Labs     05/08/19  0209 05/08/19  0538 05/09/19  0447   WBC 13.2* 10.8 9.3   HGB 11.1* 10.4* 9.2*   HCT 36.4 31.5* 27.6*    346 275       BMP:   Recent Labs     05/07/19  1757 05/08/19  0538 05/09/19  0447    143 142   K 3.6 3.8 3.7   CL date  5. Repeat chest x-ray. Start Unasyn and azithromycin  6. Wean oxygen for goal oxygen saturation of 88%  7.   continue Xanax 0.5 mg nightly to prevent withdrawal  8. Goal SBP < 160  9.   Preferentially use Precedex for alcohol withdrawal.  Dose Ativan as needed instead of CIWA protocol    Keep in ICU     Renee Padron MD

## 2019-05-10 LAB
ANION GAP SERPL CALCULATED.3IONS-SCNC: 15 MMOL/L (ref 3–16)
BASOPHILS ABSOLUTE: 0.1 K/UL (ref 0–0.2)
BASOPHILS RELATIVE PERCENT: 0.8 %
BUN BLDV-MCNC: 11 MG/DL (ref 7–20)
CALCIUM SERPL-MCNC: 8.7 MG/DL (ref 8.3–10.6)
CHLORIDE BLD-SCNC: 105 MMOL/L (ref 99–110)
CO2: 22 MMOL/L (ref 21–32)
CREAT SERPL-MCNC: 0.8 MG/DL (ref 0.6–1.2)
EOSINOPHILS ABSOLUTE: 0.1 K/UL (ref 0–0.6)
EOSINOPHILS RELATIVE PERCENT: 1.4 %
GFR AFRICAN AMERICAN: >60
GFR NON-AFRICAN AMERICAN: >60
GLUCOSE BLD-MCNC: 92 MG/DL (ref 70–99)
GLUCOSE BLD-MCNC: 96 MG/DL (ref 70–99)
HCT VFR BLD CALC: 26.8 % (ref 36–48)
HEMOGLOBIN: 8.9 G/DL (ref 12–16)
LYMPHOCYTES ABSOLUTE: 2 K/UL (ref 1–5.1)
LYMPHOCYTES RELATIVE PERCENT: 23 %
MCH RBC QN AUTO: 32.4 PG (ref 26–34)
MCHC RBC AUTO-ENTMCNC: 33.4 G/DL (ref 31–36)
MCV RBC AUTO: 96.8 FL (ref 80–100)
MONOCYTES ABSOLUTE: 1 K/UL (ref 0–1.3)
MONOCYTES RELATIVE PERCENT: 11.2 %
NEUTROPHILS ABSOLUTE: 5.5 K/UL (ref 1.7–7.7)
NEUTROPHILS RELATIVE PERCENT: 63.6 %
PDW BLD-RTO: 13.3 % (ref 12.4–15.4)
PERFORMED ON: NORMAL
PLATELET # BLD: 272 K/UL (ref 135–450)
PMV BLD AUTO: 8.1 FL (ref 5–10.5)
POTASSIUM SERPL-SCNC: 3.3 MMOL/L (ref 3.5–5.1)
RBC # BLD: 2.76 M/UL (ref 4–5.2)
SODIUM BLD-SCNC: 142 MMOL/L (ref 136–145)
URINE CULTURE, ROUTINE: NORMAL
WBC # BLD: 8.7 K/UL (ref 4–11)

## 2019-05-10 PROCEDURE — 6370000000 HC RX 637 (ALT 250 FOR IP): Performed by: NURSE PRACTITIONER

## 2019-05-10 PROCEDURE — 2580000003 HC RX 258: Performed by: NURSE PRACTITIONER

## 2019-05-10 PROCEDURE — 6360000002 HC RX W HCPCS: Performed by: NURSE PRACTITIONER

## 2019-05-10 PROCEDURE — 6360000002 HC RX W HCPCS: Performed by: INTERNAL MEDICINE

## 2019-05-10 PROCEDURE — 1200000000 HC SEMI PRIVATE

## 2019-05-10 PROCEDURE — 97530 THERAPEUTIC ACTIVITIES: CPT

## 2019-05-10 PROCEDURE — 97166 OT EVAL MOD COMPLEX 45 MIN: CPT

## 2019-05-10 PROCEDURE — 92526 ORAL FUNCTION THERAPY: CPT

## 2019-05-10 PROCEDURE — 2580000003 HC RX 258: Performed by: INTERNAL MEDICINE

## 2019-05-10 PROCEDURE — 6370000000 HC RX 637 (ALT 250 FOR IP): Performed by: INTERNAL MEDICINE

## 2019-05-10 PROCEDURE — 99233 SBSQ HOSP IP/OBS HIGH 50: CPT | Performed by: INTERNAL MEDICINE

## 2019-05-10 PROCEDURE — 92523 SPEECH SOUND LANG COMPREHEN: CPT

## 2019-05-10 PROCEDURE — C9113 INJ PANTOPRAZOLE SODIUM, VIA: HCPCS | Performed by: NURSE PRACTITIONER

## 2019-05-10 PROCEDURE — 95951 HC EEG MONITOR/VIDEO LESS THAN 24: CPT

## 2019-05-10 PROCEDURE — 97162 PT EVAL MOD COMPLEX 30 MIN: CPT

## 2019-05-10 PROCEDURE — 2580000003 HC RX 258: Performed by: STUDENT IN AN ORGANIZED HEALTH CARE EDUCATION/TRAINING PROGRAM

## 2019-05-10 PROCEDURE — 80048 BASIC METABOLIC PNL TOTAL CA: CPT

## 2019-05-10 PROCEDURE — 97535 SELF CARE MNGMENT TRAINING: CPT

## 2019-05-10 PROCEDURE — 2500000003 HC RX 250 WO HCPCS: Performed by: STUDENT IN AN ORGANIZED HEALTH CARE EDUCATION/TRAINING PROGRAM

## 2019-05-10 PROCEDURE — 92610 EVALUATE SWALLOWING FUNCTION: CPT

## 2019-05-10 PROCEDURE — 85025 COMPLETE CBC W/AUTO DIFF WBC: CPT

## 2019-05-10 PROCEDURE — 36415 COLL VENOUS BLD VENIPUNCTURE: CPT

## 2019-05-10 RX ORDER — LOSARTAN POTASSIUM 50 MG/1
50 TABLET ORAL DAILY
Status: DISCONTINUED | OUTPATIENT
Start: 2019-05-10 | End: 2019-05-11

## 2019-05-10 RX ORDER — POTASSIUM CHLORIDE 29.8 MG/ML
40 INJECTION INTRAVENOUS ONCE
Status: DISCONTINUED | OUTPATIENT
Start: 2019-05-10 | End: 2019-05-10 | Stop reason: ALTCHOICE

## 2019-05-10 RX ORDER — POTASSIUM CHLORIDE 7.45 MG/ML
10 INJECTION INTRAVENOUS
Status: COMPLETED | OUTPATIENT
Start: 2019-05-10 | End: 2019-05-10

## 2019-05-10 RX ORDER — HYDRALAZINE HYDROCHLORIDE 20 MG/ML
5 INJECTION INTRAMUSCULAR; INTRAVENOUS EVERY 4 HOURS PRN
Status: DISCONTINUED | OUTPATIENT
Start: 2019-05-10 | End: 2019-05-10

## 2019-05-10 RX ADMIN — Medication 10 ML: at 09:19

## 2019-05-10 RX ADMIN — Medication 10 ML: at 21:24

## 2019-05-10 RX ADMIN — SODIUM CHLORIDE 1000 MG: 900 INJECTION, SOLUTION INTRAVENOUS at 18:12

## 2019-05-10 RX ADMIN — POTASSIUM CHLORIDE 10 MEQ: 10 INJECTION, SOLUTION INTRAVENOUS at 15:29

## 2019-05-10 RX ADMIN — LOSARTAN POTASSIUM 50 MG: 50 TABLET ORAL at 09:30

## 2019-05-10 RX ADMIN — PANTOPRAZOLE SODIUM 40 MG: 40 INJECTION, POWDER, FOR SOLUTION INTRAVENOUS at 09:19

## 2019-05-10 RX ADMIN — AMPICILLIN SODIUM AND SULBACTAM SODIUM 3 G: 2; 1 INJECTION, POWDER, FOR SOLUTION INTRAMUSCULAR; INTRAVENOUS at 14:59

## 2019-05-10 RX ADMIN — Medication 10 ML: at 21:27

## 2019-05-10 RX ADMIN — POTASSIUM CHLORIDE 10 MEQ: 10 INJECTION, SOLUTION INTRAVENOUS at 17:15

## 2019-05-10 RX ADMIN — SODIUM CHLORIDE 1000 MG: 900 INJECTION, SOLUTION INTRAVENOUS at 09:18

## 2019-05-10 RX ADMIN — ATORVASTATIN CALCIUM 40 MG: 40 TABLET, FILM COATED ORAL at 21:24

## 2019-05-10 RX ADMIN — HEPARIN SODIUM 5000 UNITS: 5000 INJECTION INTRAVENOUS; SUBCUTANEOUS at 06:00

## 2019-05-10 RX ADMIN — METOPROLOL TARTRATE 25 MG: 25 TABLET ORAL at 21:24

## 2019-05-10 RX ADMIN — CLONIDINE HYDROCHLORIDE 0.1 MG: 0.1 TABLET ORAL at 17:18

## 2019-05-10 RX ADMIN — AMPICILLIN SODIUM AND SULBACTAM SODIUM 3 G: 2; 1 INJECTION, POWDER, FOR SOLUTION INTRAMUSCULAR; INTRAVENOUS at 02:51

## 2019-05-10 RX ADMIN — AMPICILLIN SODIUM AND SULBACTAM SODIUM 3 G: 2; 1 INJECTION, POWDER, FOR SOLUTION INTRAMUSCULAR; INTRAVENOUS at 09:20

## 2019-05-10 RX ADMIN — AMPICILLIN SODIUM AND SULBACTAM SODIUM 3 G: 2; 1 INJECTION, POWDER, FOR SOLUTION INTRAMUSCULAR; INTRAVENOUS at 21:25

## 2019-05-10 RX ADMIN — POTASSIUM CHLORIDE 10 MEQ: 10 INJECTION, SOLUTION INTRAVENOUS at 16:23

## 2019-05-10 RX ADMIN — POTASSIUM CHLORIDE 10 MEQ: 10 INJECTION, SOLUTION INTRAVENOUS at 14:12

## 2019-05-10 RX ADMIN — ACETAMINOPHEN 650 MG: 325 TABLET ORAL at 09:19

## 2019-05-10 RX ADMIN — HEPARIN SODIUM 5000 UNITS: 5000 INJECTION INTRAVENOUS; SUBCUTANEOUS at 14:59

## 2019-05-10 RX ADMIN — HEPARIN SODIUM 5000 UNITS: 5000 INJECTION INTRAVENOUS; SUBCUTANEOUS at 21:24

## 2019-05-10 RX ADMIN — AZITHROMYCIN DIHYDRATE 500 MG: 500 INJECTION, POWDER, LYOPHILIZED, FOR SOLUTION INTRAVENOUS at 16:35

## 2019-05-10 RX ADMIN — BUTALBITAL, ACETAMINOPHEN AND CAFFEINE 1 TABLET: 50; 325; 40 TABLET ORAL at 14:59

## 2019-05-10 RX ADMIN — ESCITALOPRAM OXALATE 20 MG: 20 TABLET ORAL at 09:19

## 2019-05-10 RX ADMIN — SENNOSIDES AND DOCUSATE SODIUM 1 TABLET: 8.6; 5 TABLET ORAL at 09:19

## 2019-05-10 RX ADMIN — ALPRAZOLAM 0.5 MG: 0.5 TABLET ORAL at 21:24

## 2019-05-10 RX ADMIN — ACETAMINOPHEN 650 MG: 325 TABLET ORAL at 03:12

## 2019-05-10 RX ADMIN — ACETAMINOPHEN 650 MG: 325 TABLET ORAL at 17:18

## 2019-05-10 RX ADMIN — DEXMEDETOMIDINE 0.7 MCG/KG/HR: 100 INJECTION, SOLUTION, CONCENTRATE INTRAVENOUS at 03:42

## 2019-05-10 ASSESSMENT — PAIN SCALES - GENERAL
PAINLEVEL_OUTOF10: 0
PAINLEVEL_OUTOF10: 8
PAINLEVEL_OUTOF10: 6

## 2019-05-10 ASSESSMENT — PAIN DESCRIPTION - LOCATION
LOCATION: HEAD
LOCATION: HEAD

## 2019-05-10 NOTE — PLAN OF CARE
Completed bedside swallow evaluation. Please see report in electronic medical record.      Shirley Escobar M.A., [place]  Speech-Language Pathologist

## 2019-05-10 NOTE — PROGRESS NOTES
Patient alert and oriented X4, she is following commands. EEG monitoring discontinued this afternoon. Patient complaining of headache medicated with intermittent tylenol and fiorcet. Swelling to right side of face into th right eye from surgical incision, Neurosurgery NP aware. PERRL. Safety precautions in place.  Will continue to monitor

## 2019-05-10 NOTE — PROGRESS NOTES
Speech Language Pathology  Facility/Department: [facility]  Dysphagia Daily Treatment Note    NAME: Royce Jaramillo  : 1955  MRN: [id number]    Patient Diagnosis(es):   Patient Active Problem List    Diagnosis Date Noted    Pneumonia 2019    Seizures ([facility] [address] 2019    Brain aneurysm 2019    TIA (transient ischemic attack)     Aneurysm of middle cerebral artery 2019    Nonintractable episodic headache     Ischemic stroke ([facility] [address] 2019    Hypertension     Anxiety     Cerebrovascular accident (CVA) ([facility] [address]     Vertigo          CXR (19)-  Impression       No change in atelectasis or small patchy pneumonia right lower lobe. Previous MBS - none    Chart reviewed. Medical Diagnosis: Brain aneurysm   Treatment Diagnosis: Oropharyngeal Dysphagia    BSE Impression (5/10)-  Dysphagia Impression : No mastication of mechanically altered solids was appreciated; pt wears denture at baseline and reports she does not eat anything without dentures present. No overt s/s of aspiration with any textures. Patient was unable to follow commands for 3 ounce water but instead took 3 ounces in 3 very large bolus. No overt s/s of aspiration and tolerated without fluctuations in oxygen saturation or SOB. Pt does have RLL pna per chest xray but had not been eating orally prior to this eval / since surgery per chart. MBS results - Pending re-assessment    Pain: Intermittent reports of pain. Current Diet : NPO (Previous BSE this AM with rec for Dysphagia I (Puree) / Thins)    Treatment:  Pt seen bedside to address the following goals:  1. The patient will tolerate recommended diet without observed clinical signs of aspiration  5/10 - Pt seen bedside as RN had found the patients dentures since BSE. Patient tolerated initial trials of thins without overt s/s of aspiration.  Patient given soft solids which resulted in very slow and prolonged mastication and pt utilized a liquid rinse with each trial resulting in immediate weak cough on 2/3 trials. Pt given mechanically altered solids again with immediate weak cough following liquid rinse for 1/2 trials. Patient then began having an immediate weak cough inconsistently with trials of thins. Education regarding recommendation for aggressive oral care and ice chips with re-evaluation tomorrow. Pt able to complete oral care bedside with suction toothette system. Pt more lethargic for dysphagia treatment session but the increased alertness and motor agitation as cog-eval initiated (see documentation). Modify goal to: Patient will tolerate least restrictive diet without observed clinical signs of aspiration. 2. The patient/caregiver will demonstrate understanding of compensatory strategies for improved swallowing safety. 5/10 - Pt educated on rationale for diet downgrade at this time, importance of oral care and re-check tomorrow. No family present. Continue goal.     Patient/Family/Caregiver Education:  No family present. Education as above. Compensatory Strategies:  Ice chips only  Aggressive oral care     Plan:  Continued daily Dysphagia treatment with goals per  plan of care. Diet recommendations: Ice chips only with aggressive oral care  DC recommendation: Ongoing dysphagia treatment is indicated at this time  Treatment: 15 minutes  D/W nursingYue  Needs met prior to leaving room, call button in reach. Sandhya Michael M.A., [address] [facility].[id number]  Speech-Language Pathologist  Pg.  # [phone]    If patient is discharged prior to next treatment, this note will serve as the discharge summary

## 2019-05-10 NOTE — PROGRESS NOTES
ICU Progress Note  PGY-1    Admit Date: 5/7/2019  Hospital Day: 4    ICU DAY  Code:Full Code  PCP: LIGIA Antony - BRADY       SUBJECTIVE:   Interval Hx: Patient still febrile to 101 overnight, responding to tylenol. Patient complains of headache at the suture site, but otherwise has no complaints. Precedex weaned to 0.6, no ativan required. Weaned off oxygen. BP elevated to 634H systolic overnight, bradycardic. Her urine output decreased yesterday and was placed on fluids with improvement. 925 mL out past 24 hrs.     Access:                                 -Peripheral Access Day#:3                               Patten Day#: 3                                                     MEDICATIONS:   Scheduled Meds:   heparin (porcine)  5,000 Units Subcutaneous 3 times per day    ampicillin-sulbactam  3 g Intravenous Q6H    azithromycin  500 mg Intravenous Q24H    sodium chloride flush  10 mL Intravenous 2 times per day    ALPRAZolam  0.5 mg Oral Nightly    pantoprazole  40 mg Intravenous Daily    sodium chloride flush  10 mL Intravenous 2 times per day    atomoxetine  80 mg Oral Daily    atorvastatin  40 mg Oral Nightly    Azilsartan-Chlorthalidone  1 tablet Oral Daily    escitalopram  20 mg Oral Daily    metoprolol tartrate  25 mg Oral BID    sodium chloride flush  10 mL Intravenous 2 times per day    sennosides-docusate sodium  1 tablet Oral BID    levetiracetam  1,000 mg Intravenous Q12H    LORazepam  0.5 mg Intravenous Once      Continuous Infusions:   sodium chloride 75 mL/hr at 05/09/19 1847    dexmedetomidine (PRECEDEX) IV infusion 0.6 mcg/kg/hr (05/10/19 0449)     PRN Meds:sodium chloride flush, haloperidol lactate, acetaminophen, sodium chloride flush, butalbital-acetaminophen-caffeine, cloNIDine, sodium chloride flush, ondansetron, acetaminophen, HYDROcodone 5 mg - acetaminophen **OR** HYDROcodone 5 mg - acetaminophen, naloxone, polyethylene glycol, bisacodyl, labetalol  Allergies: No Known Allergies    PHYSICAL EXAM:       Vitals: /63   Pulse (!) 47   Temp 101.1 °F (38.4 °C) (Core)   Resp 23   Ht 5' 2\" (1.575 m)   Wt 134 lb 14.7 oz (61.2 kg)   SpO2 90%   BMI 24.68 kg/m²   I/O:      Intake/Output Summary (Last 24 hours) at 5/10/2019 0725  Last data filed at 5/10/2019 0446  Gross per 24 hour   Intake 1701 ml   Output 925 ml   Net 776 ml     No intake/output data recorded. I/O last 3 completed shifts: In: 1701 [I.V.:1501; NG/GT:200]  Out: 925 [Urine:925]    Physical Examination:   · General appearance: Resting comfortably. In no acute distress  · Skin: right frontoparietal incision from craniotomy. CLAYTON drain removed. No active bleeding or oozing. · HEENT: staples from recent craniotomy in right frontotemporal region. No oozing or bleeding; unchanged. PERRL. EOMI bilaterally. · Neck: no adenopathy, no carotid bruit, no JVD, supple, symmetrical, trachea midline and thyroid not enlarged, symmetric, no tenderness/mass/nodules  · Lungs: clear to auscultation bilaterally, limited due to patient positioning  · Heart: regular rate and rhythm, S1, S2 normal, no murmur, click, rub or gallop  · Abdomen: soft, non-tender; bowel sounds normal; no masses,  no organomegaly  · Extremities: extremities normal, atraumatic, no cyanosis or edema  · Lymphatic: No significant lymph node enlargement papable  · Neurologic: A&O3. Following commands when weaned down on precedex. Patient moving all extremities. DATA:       Labs:  CBC:   Recent Labs     05/08/19  0538 05/09/19 0447 05/10/19  0356   WBC 10.8 9.3 8.7   HGB 10.4* 9.2* 8.9*   HCT 31.5* 27.6* 26.8*    275 272       BMP:   Recent Labs     05/08/19  0538 05/09/19  0447 05/10/19  0356    142 142   K 3.8 3.7 3.3*    106 105   CO2 22 25 22   BUN 13 10 11   CREATININE 0.9 0.8 0.8   GLUCOSE 127* 122* 96     ABGs: No results for input(s): PHART, MTN7KER, PO2ART in the last 72 hours.   Rad:   EKG:     ASSESSMENT AND PLAN: Patient is a 59 y. o. female PMH significant for ischemic stroke, HTN  presenting with 4 month hx of headache, speech changes and right eye blurriness. Admitted to ICU post-op from right MCA aneurysm clipping.     Tonic-clonic seizures  Patient had 2 tonic-clonic seizures following aneurysm clipping. Repeat CT showed post-op changes, but no significant bleeding. Given ativan and started on keppra 1000 BID. Placed on eeg. Hx of alcoholism; Possible alcohol withdrawal component vs xanax withdrawal.  - Continue keppra 1000 q12  - neurosurgery following  - neurology following  - continue precedex gtt  - continue CIWA assessment - ativan prn, not within protocol  - Continue xanax through   - SLP today  - EEG    Fever and leukocytosis likely 2/2 Aspiration PNA in the setting of seizures  Fever 102.1 following seizure. CXR showed right basilar opacity. WBC 13. Fever and leukocytosis resolved later that day, but then spiked another fever 102. Started on unasyn for coverage of likely aspiration  - Unasyn  - Blood and wound culture from  site sent   - gram stain from  site showed 1+ gram negative clarke; NGTD - possible contaminant  - UA negative     Right MCA aneurysm s/p clipping   R MCA aneurysm in 2/2019. Repeat CT in 3/2019 showed size was 9 mm. Presented to [place] [id number] for aneurysmal clipping. No complications during procedure, but developed seizures soon after as above. Repeat CT head showed post-op changes - no sig bleeding  - Keppra 1000 BID in the setting of seizures  - Wean sedation as tolerated  - morphine prn   - Neurosurgery following  - Labetalol prn for SBP > 160  - Holding home ASA     Acute Metabolic Encephalopathy  2/2 Seizures vs. Alcohol withdrawal vs xanax withdrawal. Got 6 mg ativan with improvement, but also started on scheduled xanax.     - Precedex gtt + CIWA assessment; no ativan required overnight  - management as above    HTN  - Started losartan 50  - lopressor BID - holding parameters in place; patient has been bradycardic     Chronic Ischemic Stroke  - home Lipitor once able to take PO  - Holding home ASA     Depression    - holding home lexapro and strattera     Code Status:  Full  FEN: NPO / NS @ 125  PPX: Protonix / SCDs / no AC at this time    Dispo: Patient continues to improve in mental status. Continues on precedex, down to 0.4. No ativan required past 24 hrs. SLP to evaluate today. Continues to get xanax through NG tube in the meantime. Started losartan 50. Discussed with attending   -----------------------------  Nancy Thomas M.D.   PGY-1  Pager: [phone]  5/10/2019 7:25 AM     Pulmonary/Critical Care     Patient seen and examined. I agree with Dr. Lama's history, physical, lab findings, assessment and plan.     Overall mental status seems to be improved today compared to yesterday     Assessment  1.  Right MCA aneurysm POD #3 clipping  2.  New-onset seizure - no seizures last 48 hours  3.  Acute encephalopathy - likely alcohol withdrawal  4.  Hypertension  5.  Generalized anxiety disorder on chronic Xanax  6.  GERD  7.  Fever - possible right lower lobe aspiration pneumonia. Fever curve appears to be improving  8.  Hypoxemia requiring oxygen - improved     Plan  1.   stop  cvEEG  2.  Neurology following - AED management per Dr. Pattie Pratt  3.  Continue Keppra 1000 mg IV twice a day  4.  Blood cultures in lab - no growth to date  5.  continue Unasyn and azithromycin  6.  Goal oxygen saturation > 88%  7.  Continue Xanax 0.5 mg nightly to prevent withdrawal  8. Goal SBP < 160. Start losartan 50 mg daily. Continue metoprolol 25 twice a day  9. Precedex has been weaned off. Watch closely for recurrence of alcohol withdrawal  10.  Swallow eval     Keep in ICU     Yaquelin Sarkar MD

## 2019-05-10 NOTE — PROGRESS NOTES
Neurology Progress Note     Seen for encephalopathy     Updates:  - Right ptosis on exam, this is a new finding. Does have right facial swelling on exam as well  - Bedside RN states that she has been able to slowing titrate down on the Precedex gtt. Gtt currently at 0.7 mcg/kg/hr     ROS:  - Unable to assess due to encephalopathy    Exam: ON low dose pecedex gtt at time of exam  Blood pressure (!) 163/81, pulse 59, temperature 100.4 °F (38 °C), resp. rate 21, height 5' 2\" (1.575 m), weight 134 lb 14.7 oz (61.2 kg), SpO2 90 %, not currently breastfeeding. Constitutional                          Vital signs: BP, HR, and RR reviewed            General Drowsy, minimally sedated, no distress, well-nourished  Eyes: fundoscopic exam revealed no hemorrhage   Cardiovascular: pulses symmetric in all 4 extremities. No peripheral edema. Psychiatric: cooperative with examination, no  psychotic behavior noted.   Neurologic  Mental status: Eyes open to voice  orientation to person, place, month, year              General fund of knowledge unable to name current or preceding president              Memory grossly intact, able to name first president              Attention intact as able to attend well to the exam                Language fluent in conversation, no apparent aphasia              Comprehension intact; follows simple commands as well as 2 step commands crossing midline  Cranial nerves:   CN2: visual fields intact, however she did cheat  CN 3,4,6: Right eye ptosis(new), EOMI  CN5: facial sensation symmetric   CN7:face symmetric without dysarthria  CN8: hearing grossly intact  CN9: palate elevated symmetrically  CN11: trap full strength on shoulder shrug  CN12: tongue midline with protrusion  Strength: 5/5 strength throughout  Cerebellar/coordination: Finger nose finger without ataxia  Tone: normal in all 4 extremities  Gait: Deferred for safety     Labs:  Glucose: 92  WBC: 8.7     Blood culture x 2: NGTD  Urine culture: NGTD     Studies:  cvEEG  5/7/2019   Seizures - None  Push buttons - None  Background - Generalized moderate amplitude delta with superimposed theta frequencies, max anteriorly        5/8/2019   Seizures - None  Push buttons - None  Background - Generalized moderate amplitude delta with superimposed theta frequencies, max anteriorly. Loss of electrode integrity around 02:30. Briefly restored in the afternoon, with return of artifact from movement, loss of electrode integrity. When visualized, appears to be focal fast, increased amplitude left temporal.       5/9/2019   Seizures - None  Push buttons - None  Background - Generalized moderate amplitude delta with superimposed theta frequencies, max anteriorly. Much of EEG still unable to be interpreted due to loss of electrode integrity and patient agitation. Visualized electrodes chains with intermittent bursts of rhythmic delta.       5/10/2019   Seizures - None  Push buttons - None  Background - Generalized moderate amplitude delta with superimposed theta frequencies, max anteriorly. Intermittent generalized delta activity. Intermittent focal slowing left temporal.       CLINICAL INTERPRETATION: This is an abnormal video EEG study which is significantly technically limited due to loss of several electrodes for much of this recording  1. Generalized background abnormalities indicative of an underlying diffuse encephalopathy of non-specific etiology. 2.  Loss of electrode integrity around 02:30 on 5/8/19 significantly limiting interpretation. Much improved artifact on 5/10/19.  3. Intermittent bursts of rhythmic delta activity left temporal.  Periods of artifact limiting interpretation. 4.  Intermittent left temporal slowing consistent with underlying focal neuronal dysfunction of non-specific etiology. Head CT with some edema and hemorrhagic changes on right with clip noted.     Impression:  1. Status epilepticus  2.  Recent right MCA aneurysm clipping  3.  History of alcohol abuse     57 yo lady with right MCA aneurysm clip and subsequent convulsive seizures meeting criteria for status epilepticus.  CT stable to post-op scan with edema and mild hemorrhage.       Her exam continues to improve.     Recommendations:  - Continue Keppra 1000 mg BID  - OK to discontinue cvEEG   -If further seizure, I would give 2500 IV keppra in addition to her daily doses and increase to 1500 bid.  If seizure persist phenytoin would be next line AED  - Consider head CT given new finding of right eye ptosis  - Will discuss further recommendations with attending neurologist     A copy of this note was provided for MD Navjot Lugo NP  [address] [place] [address] [address] Neurology

## 2019-05-10 NOTE — PROGRESS NOTES
Physical Therapy    Facility/Department: [facility]  Initial Assessment / treatment    NAME: Caio Boyer  : 1955  MRN: [id number]    Date of Service: 5/10/2019    Discharge Recommendations: Caio Boyer scored a 7 on the AM-PAC short mobility form. Current research shows that an AM-PAC score of 17 or less is typically not associated with a discharge to the patient's home setting. Based on the patients AM-PAC score and their current functional mobility deficits, it is recommended that the patient have 5-7 sessions per week of Physical Therapy at d/c to increase the patients independence. PT Equipment Recommendations  Equipment Needed: No  Other: defer to next level of care    Assessment   Body structures, Functions, Activity limitations: Decreased functional mobility   Assessment: Pt is well below her functional baseline s/p craniotomy. Pt is typically very independent including living alone and working full time. Pt now requires 2 person assist with mobility. Mobility limited today due to lethargy and fatigue. Rec continued IP PT. Will follow. Treatment Diagnosis: impaired gait and transfers  Prognosis: Good  Decision Making: Medium Complexity  Patient Education: role of PT, use of call light, d/c planning; pt demonstrates understanding. REQUIRES PT FOLLOW UP: Yes       Patient Diagnosis(es): There were no encounter diagnoses. has a past medical history of Anxiety, Arthritis, Cerebral aneurysm, Former smoker, GERD (gastroesophageal reflux disease), and Hypertension. has a past surgical history that includes Hysterectomy; Cholecystectomy; Hemorrhoid surgery; and craniotomy (Right, 2019).     Restrictions  Position Activity Restriction  Other position/activity restrictions: ambulate  Vision/Hearing  Vision: Impaired(states she is supposed to wear glasses at all times but they're broken, R eye swollen - difficulty opening)  Hearing: Within functional limits Subjective  General  Chart Reviewed: Yes  Additional Pertinent Hx: Admit 5/7 for craniotomy, clipping of MCA aneurysm; PMHx: anxiety, arthritis, cerebral aneurysm, GERD, HTN  Referring Practitioner: LIGIA Cooper CNP  Diagnosis: Brain Aneurysm   Subjective  Subjective: Pt found supine in bed, sleeping. Wakes easily but frequently falls back to sleep. Pain Screening  Patient Currently in Pain: Yes(Intermittent c/o headache.  RN aware)       Orientation  Orientation  Overall Orientation Status: Within Functional Limits  Social/Functional History  Social/Functional History  Lives With: Alone  Type of Home: Apartment  Home Layout: One level  Home Access: Level entry  Bathroom Shower/Tub: Tub/Shower unit  Bathroom Toilet: Standard  Bathroom Equipment: (none)  Home Equipment: (none)  ADL Assistance: Independent  Homemaking Assistance: Independent  Homemaking Responsibilities: Yes  Bill Paying/Finance Responsibility: Primary(independent with online bill paying)  Health Care Management: Primary(independent out of bottle)  Ambulation Assistance: Independent  Transfer Assistance: Independent  Active : Yes  Education: 12th grade  Occupation: Full time employment  Type of occupation: [employer] in [facility] [employer] in [employer] [facility]  [address]: TV  Cognition        Objective          AROM RLE (degrees)  RLE AROM: WFL  AROM LLE (degrees)  LLE AROM : WFL  Strength RLE  Comment: grossly 3/5 throughout  Strength LLE  Comment: grossly 3/5 throughout        Bed mobility  Supine to Sit: Dependent/Total(min + mod A)  Scooting: Minimal assistance  Transfers  Sit to Stand: Dependent/Total(min A x 2)  Stand to sit: Dependent/Total(min A x 2)  Bed to Chair: Dependent/Total(min A x 2 via stand step transfer with HHA)        Balance  Sitting - Static: Fair  Sitting - Dynamic: Fair(CGA to min A sitting EOB)  Standing - Static: Poor(mod A x 1 with HHA while pericare was performed)  Standing - Dynamic: Poor(min A x 2 with HHA)      Treatment included bed mobility, balance, and transfer training, pt education. Plan   Plan  Times per week: 5-7  Current Treatment Recommendations: Strengthening, Gait Training, Patient/Caregiver Education & Training, Equipment Evaluation, Education, & procurement, Balance Training, Functional Mobility Training, Transfer Training  Safety Devices  Type of devices: Call light within reach, Chair alarm in place, Nurse notified, Gait belt, Left in chair    G-Code       OutComes Score                                                  AM-PAC Score  AM-PAC Inpatient Mobility Raw Score : 7  AM-PAC Inpatient T-Scale Score : 26.42  Mobility Inpatient CMS 0-100% Score: 92.36  Mobility Inpatient CMS G-Code Modifier : CM          Goals  Short term goals  Time Frame for Short term goals: discharge  Short term goal 1: Pt will transfer supine <--> sit with min A x 1  Short term goal 2: Pt will transfer sit <--> stand with min A x 1  Short term goal 3: Pt will perform bed <--> chair transfer with min A x 1  Short term goal 4: Pt will participate in gait assessment. Patient Goals   Patient goals : eventually return home       Therapy Time   Individual Concurrent Group Co-treatment   Time In 1330         Time Out 1410         Minutes 40                 Timed Code Treatment Minutes:  25    Total Treatment Minutes:  40    If patient is discharged prior to next treatment, this note will serve as the discharge summary.   No Hdz, PT, DPT  [id number]

## 2019-05-10 NOTE — PROGRESS NOTES
Occupational Therapy   Occupational Therapy Initial Assessment/Treatment  Date: 5/10/2019   Patient Name: Liz Chaparro  MRN: [id number]     : 1955    Date of Service: 5/10/2019    Discharge Recommendations:  Liz Chaparro scored a 14/24 on the AM-PAC ADL Inpatient form. Current research shows that an AM-PAC score of 17 or less is typically not associated with a discharge to the patient's home setting. Based on the patients AM-PAC score and their current ADL deficits, it is recommended that the patient have 5-7 sessions per week of Occupational Therapy at d/c to increase the patients independence. OT Equipment Recommendations  Equipment Needed: No  Other: defer    Assessment   Performance deficits / Impairments: Decreased functional mobility ; Decreased ADL status; Decreased cognition;Decreased safe awareness;Decreased balance;Decreased endurance  Assessment: PTA, pt highly independent and works full time. Pt currently presents significantly below her baseline, requiring assist of 2 for transfer to chair and VC for safety/sequencing of tasks. Pt demo decreased safety awareness, impairted balance and strength. PT would benefit from intensive OT tx to maximize functional independence with ADL/IADLs  Treatment Diagnosis: Impaired ADLs, mobility, activity tolerance  Prognosis: Good  Decision Making: Medium Complexity  Patient Education: OT role, d/c rec, safe transfers- pt verb understanding, reinforce as needed  REQUIRES OT FOLLOW UP: Yes  Activity Tolerance  Activity Tolerance: Patient limited by fatigue  Safety Devices  Safety Devices in place: Yes  Type of devices: Nurse notified; Chair alarm in place;Call light within reach; Left in chair           Patient Diagnosis(es): There were no encounter diagnoses. has a past medical history of Anxiety, Arthritis, Cerebral aneurysm, Former smoker, GERD (gastroesophageal reflux disease), and Hypertension.    has a past surgical history that includes Hysterectomy; Cholecystectomy; Hemorrhoid surgery; and craniotomy (Right, 5/7/2019). Treatment Diagnosis: Impaired ADLs, mobility, activity tolerance      Restrictions  Position Activity Restriction  Other position/activity restrictions: ambulate    Subjective   General  Chart Reviewed: Yes  Additional Pertinent Hx: 58 y.o. F admitted 5/7 for elective R MCA aneurysm clipping complicated by post op seizures / post-ictal agitation PMHX: anxiety, HTN, GERD  Family / Caregiver Present: No  Diagnosis: brain aneurysm  Subjective  Subjective: Pt sleeping in bed and initially lethargic, became more alert as session progressed. agreeable to evaluation. reported HA, not rated. RN aware    Social/Functional History  Social/Functional History  Lives With: Alone  Type of Home: Apartment  Home Layout: One level  Home Access: Level entry  Bathroom Shower/Tub: Tub/Shower unit  Bathroom Toilet: Standard  Bathroom Equipment: (none)  Home Equipment: (none)  ADL Assistance: Independent  Homemaking Assistance: Independent  Homemaking Responsibilities: Yes  Bill Paying/Finance Responsibility: Primary(independent with online bill paying)  Health Care Management: Primary(independent out of bottle)  Ambulation Assistance: Independent  Transfer Assistance: Independent  Active : Yes  Education: 12th grade  Occupation: Full time employment  Type of occupation: [address] [facility] [employer] in [facility] in [place])  [address]: TV       Objective   Vision: Impaired(states she is supposed to wear glasses at all times but they're broken, R eye swollen - difficulty opening)  Hearing: Within functional limits    Orientation  Overall Orientation Status: Within Functional Limits     Balance  Sitting Balance: Contact guard assistance(seated eob, impulsive)  Standing Balance:  Moderate assistance(varied mod-min A static stance with both hands supported on therapist's forearm)  Standing Balance  Time: 1 minute  Activity: static stance, transfer to chair ADL  Grooming: Contact guard assistance;Verbal cueing; Increased time to complete(wash face, cues and cga to prevent pulling on NG Tube (Seated in recliner))  LE Dressing: Dependent/Total(don socks)  Toileting: Dependent/Total(mendenhall catheter; assist pericare 2/2 small BM smear upon standing)       Tone RUE  RUE Tone: Normotonic  Tone LUE  LUE Tone: Normotonic  Coordination  Movements Are Fluid And Coordinated: Yes        Bed mobility  Supine to Sit: Dependent/Total(min + mod A)  Scooting: Minimal assistance       Transfers  Stand Step Transfers: Dependent/Total(min A of 2 bed>recliner, B HHA, VC for sequencing)  Sit to stand: Dependent/Total(eob w/ min A of 2; impulsive, VC for safety/sequencing)  Stand to sit: Minimal assistance(recliner, VC for safety)        Cognition  Overall Cognitive Status: Exceptions  Following Commands: Follows one step commands with repetition; Follows one step commands with increased time  Safety Judgement: Decreased awareness of need for assistance  Problem Solving: Assistance required to correct errors made  Insights: Decreased awareness of deficits  Initiation: Requires cues for some  Sequencing: Requires cues for some  Cognition Comment: limited by lethargy        Sensation  Overall Sensation Status: WFL        LUE AROM (degrees)  LUE AROM : WNL  RUE AROM (degrees)  RUE AROM : WNL  LUE Strength  Gross LUE Strength: WFL(4/5)  L Hand Grasp: 4+/5  RUE Strength  Gross RUE Strength: WFL(4/5)  R Hand Grasp: 4+/5               Treatment consisted of:  ADLs, transfer training, education on activity promotion and fall precautions.         Plan   Plan  Times per week: 5-7x  Times per day: Daily             AM-PAC Score        AM-[facility] Inpatient Daily Activity Raw Score: 14  AM-PAC Inpatient ADL T-Scale Score : 33.39  ADL Inpatient CMS 0-100% Score: 59.67  ADL Inpatient CMS G-Code Modifier : CK    Goals  Short term goals  Time Frame for Short term goals: by discharge  Short term goal 1: min A bed mobility  Short term goal 2: min A functional ADL transfers using LRAD  Short term goal 3: cga standing balance x3 min with UE support prn  Short term goal 4: mod A doff/don pants  Patient Goals   Patient goals : d/c home       Therapy Time   Individual Concurrent Group Co-treatment   Time In 1330         Time Out 1410         Minutes 40         Timed Code Treatment Minutes:   25  Total Treatment Minutes:  40     If patient is d/c prior to next treatment session, this note will serve as the discharge summary    Donya Mohr OTR/L, [address], OT

## 2019-05-10 NOTE — PLAN OF CARE
Problem: Neurological  Intervention: PT Evaluation/treatment  Note:   Increase safety and independence with functional mobility.

## 2019-05-10 NOTE — PROGRESS NOTES
Yes  Duration/Frequency of Treatment: 3-5x per week for length of stay  D/C Recommendations: (Pending PT/OT evals)     Recommended Diet and Intervention  Diet Solids Recommendation: Dysphagia I Pureed(until dentition found)  Liquid Consistency Recommendation: Thin  Recommended Form of Meds: PO  Recommendations: Dysphagia treatment  Therapeutic Interventions: Diet tolerance monitoring; Therapeutic PO trials with SLP;Patient/Family education    Compensatory Swallowing Strategies  Compensatory Swallowing Strategies: Small bites/sips    Treatment/Goals  Dysphagia Goals: The patient will tolerate recommended diet without observed clinical signs of aspiration; The patient/caregiver will demonstrate understanding of compensatory strategies for improved swallowing safety. General  Chart Reviewed: Yes  Comments: Pt admitted 5/7 for planned surgery for aneurysm clipping d/t symptoms of headache/speech difficulties. Patient's post op has been complicated by post op seizures and post-ictal and started on CIWA for possible alcohol withdrawal which all may be contributing to her confusion. Behavior/Cognition: Cooperative;Pleasant mood; Lethargic  Temperature Spikes Noted: Yes  Respiratory Status: Room air  O2 Device: None (Room air)  Communication Observation: Functional  Follows Directions: Simple  Dentition: Edentulous(Unable to locate dentures in room - pt reports she does NOT eat without her teeth; RN notified)  Patient Positioning: Upright in bed  Baseline Vocal Quality: Normal  Volitional Cough: Weak  Volitional Swallow: (adequate laryngeal elevation on palpation)  Prior Dysphagia History: None per pt and chart review  Consistencies Administered: Mechanical soft;Puree; Thin - teaspoon; Thin - cup; Thin - straw    Vision/Hearing  Vision  Vision: Within Functional Limits(reduced eye opening)  Hearing  Hearing: Within functional limits    Oral Motor Deficits  Oral/Motor  Oral Motor:  Within functional limits(significant lingual coating)    Oral Phase Dysfunction  Oral Phase  Oral Phase: Exceptions  Oral Phase Dysfunction  Impaired Mastication: Mechanical soft  Oral Phase  Oral Phase - Comment: No mastication of mechanically altered solids was appreciated; pt wears denture at baseline and reports she does not eat anything without dentures present     Indicators of Pharyngeal Phase Dysfunction   Pharyngeal Phase  Pharyngeal Phase: WFL  Pharyngeal Phase   Pharyngeal: No overt s/s of aspiraiton with any texture    Prognosis  Prognosis  Prognosis for safe diet advancement: excellent  Barriers to reach goals: age  Individuals consulted  Consulted and agree with results and recommendations: Patient;RN    Education  Patient Education: Educated patient on rationale for bedside swallow evaluation, general aspiration precautions, rationale for modified diet at this time given that patient does not have dentition, importance of oral care. Patient Education Response: Verbalizes understanding;Needs reinforcement  Safety Devices in place: Yes  Type of devices: Call light within reach; Bed alarm in place       Therapy Time  SLP Individual Minutes  Time In: 1100  Time Out: 1130  Minutes: 30  SLP Co-Treatment Minutes  Time In: 0000  Time Out: 0000  Minutes: 0  SLP Total Treatment Time  Timed Code Treatment Minutes: 0 Minutes  Total Treatment Time: 30    Shirley Escobar M.A., [id number] [place], [place] [id number]  Speech-Language Pathologist

## 2019-05-10 NOTE — PROGRESS NOTES
CONTINUOUS EEG    Name:  Jess Sarah  Medical Record Number:  [id number]  Age: 58 y.o. Gender: female  : 1955  Today's Date:  5/10/2019  Room:  [address] [place] [address]  Vital Signs   BP (!) 163/81   Pulse 59   Temp 100.4 °F (38 °C)   Resp 21   Ht 5' 2\" (1.575 m)   Wt 134 lb 14.7 oz (61.2 kg)   SpO2 90%   BMI 24.68 kg/m²       Patient currently on continuous EEG monitoring. All EEG leads are currently in place with no current issues. Verified Corticare connection via team viewer. Checked in with patient RN for current plan of care. Comments: Continue monitoring. Impedence check performed.     Electronically signed by Evy Garcia on 5/10/2019 at 10:09 AM

## 2019-05-11 LAB
ANION GAP SERPL CALCULATED.3IONS-SCNC: 17 MMOL/L (ref 3–16)
BASOPHILS ABSOLUTE: 0.1 K/UL (ref 0–0.2)
BASOPHILS RELATIVE PERCENT: 1 %
BUN BLDV-MCNC: 10 MG/DL (ref 7–20)
CALCIUM SERPL-MCNC: 9.1 MG/DL (ref 8.3–10.6)
CHLORIDE BLD-SCNC: 102 MMOL/L (ref 99–110)
CO2: 23 MMOL/L (ref 21–32)
CREAT SERPL-MCNC: 0.7 MG/DL (ref 0.6–1.2)
EOSINOPHILS ABSOLUTE: 0.1 K/UL (ref 0–0.6)
EOSINOPHILS RELATIVE PERCENT: 0.8 %
GFR AFRICAN AMERICAN: >60
GFR NON-AFRICAN AMERICAN: >60
GLUCOSE BLD-MCNC: 88 MG/DL (ref 70–99)
GRAM STAIN RESULT: NORMAL
HCT VFR BLD CALC: 30.2 % (ref 36–48)
HEMOGLOBIN: 10 G/DL (ref 12–16)
LYMPHOCYTES ABSOLUTE: 2.3 K/UL (ref 1–5.1)
LYMPHOCYTES RELATIVE PERCENT: 23.2 %
MCH RBC QN AUTO: 31.9 PG (ref 26–34)
MCHC RBC AUTO-ENTMCNC: 33.3 G/DL (ref 31–36)
MCV RBC AUTO: 95.9 FL (ref 80–100)
MONOCYTES ABSOLUTE: 1 K/UL (ref 0–1.3)
MONOCYTES RELATIVE PERCENT: 10.3 %
NEUTROPHILS ABSOLUTE: 6.3 K/UL (ref 1.7–7.7)
NEUTROPHILS RELATIVE PERCENT: 64.7 %
PDW BLD-RTO: 13.2 % (ref 12.4–15.4)
PLATELET # BLD: 351 K/UL (ref 135–450)
PMV BLD AUTO: 8.5 FL (ref 5–10.5)
POTASSIUM SERPL-SCNC: 3.6 MMOL/L (ref 3.5–5.1)
RBC # BLD: 3.15 M/UL (ref 4–5.2)
SODIUM BLD-SCNC: 142 MMOL/L (ref 136–145)
WBC # BLD: 9.7 K/UL (ref 4–11)
WOUND/ABSCESS: NORMAL

## 2019-05-11 PROCEDURE — 6370000000 HC RX 637 (ALT 250 FOR IP): Performed by: NURSE PRACTITIONER

## 2019-05-11 PROCEDURE — 85025 COMPLETE CBC W/AUTO DIFF WBC: CPT

## 2019-05-11 PROCEDURE — 2580000003 HC RX 258: Performed by: NURSE PRACTITIONER

## 2019-05-11 PROCEDURE — 97535 SELF CARE MNGMENT TRAINING: CPT

## 2019-05-11 PROCEDURE — 6360000002 HC RX W HCPCS: Performed by: NURSE PRACTITIONER

## 2019-05-11 PROCEDURE — 6370000000 HC RX 637 (ALT 250 FOR IP): Performed by: NEUROLOGICAL SURGERY

## 2019-05-11 PROCEDURE — 36415 COLL VENOUS BLD VENIPUNCTURE: CPT

## 2019-05-11 PROCEDURE — 92526 ORAL FUNCTION THERAPY: CPT

## 2019-05-11 PROCEDURE — 97530 THERAPEUTIC ACTIVITIES: CPT

## 2019-05-11 PROCEDURE — 80048 BASIC METABOLIC PNL TOTAL CA: CPT

## 2019-05-11 PROCEDURE — 6370000000 HC RX 637 (ALT 250 FOR IP): Performed by: INTERNAL MEDICINE

## 2019-05-11 PROCEDURE — 99233 SBSQ HOSP IP/OBS HIGH 50: CPT | Performed by: INTERNAL MEDICINE

## 2019-05-11 PROCEDURE — 97116 GAIT TRAINING THERAPY: CPT

## 2019-05-11 PROCEDURE — C9113 INJ PANTOPRAZOLE SODIUM, VIA: HCPCS | Performed by: NURSE PRACTITIONER

## 2019-05-11 PROCEDURE — 1200000000 HC SEMI PRIVATE

## 2019-05-11 RX ORDER — PANTOPRAZOLE SODIUM 40 MG/1
40 TABLET, DELAYED RELEASE ORAL
Status: DISCONTINUED | OUTPATIENT
Start: 2019-05-12 | End: 2019-05-17 | Stop reason: HOSPADM

## 2019-05-11 RX ORDER — LEVETIRACETAM 500 MG/1
1000 TABLET ORAL 2 TIMES DAILY
Status: DISCONTINUED | OUTPATIENT
Start: 2019-05-11 | End: 2019-05-17 | Stop reason: HOSPADM

## 2019-05-11 RX ORDER — LOSARTAN POTASSIUM 50 MG/1
50 TABLET ORAL ONCE
Status: COMPLETED | OUTPATIENT
Start: 2019-05-11 | End: 2019-05-11

## 2019-05-11 RX ORDER — AMOXICILLIN AND CLAVULANATE POTASSIUM 875; 125 MG/1; MG/1
1 TABLET, FILM COATED ORAL EVERY 12 HOURS SCHEDULED
Status: COMPLETED | OUTPATIENT
Start: 2019-05-11 | End: 2019-05-13

## 2019-05-11 RX ORDER — LOSARTAN POTASSIUM 100 MG/1
100 TABLET ORAL DAILY
Status: DISCONTINUED | OUTPATIENT
Start: 2019-05-12 | End: 2019-05-12

## 2019-05-11 RX ADMIN — ACETAMINOPHEN 650 MG: 325 TABLET ORAL at 04:03

## 2019-05-11 RX ADMIN — AMOXICILLIN AND CLAVULANATE POTASSIUM 1 TABLET: 875; 125 TABLET, FILM COATED ORAL at 20:26

## 2019-05-11 RX ADMIN — HEPARIN SODIUM 5000 UNITS: 5000 INJECTION INTRAVENOUS; SUBCUTANEOUS at 14:15

## 2019-05-11 RX ADMIN — SENNOSIDES AND DOCUSATE SODIUM 1 TABLET: 8.6; 5 TABLET ORAL at 08:44

## 2019-05-11 RX ADMIN — LOSARTAN POTASSIUM 50 MG: 50 TABLET ORAL at 11:53

## 2019-05-11 RX ADMIN — ATORVASTATIN CALCIUM 40 MG: 40 TABLET, FILM COATED ORAL at 20:25

## 2019-05-11 RX ADMIN — Medication 10 ML: at 20:27

## 2019-05-11 RX ADMIN — METOPROLOL TARTRATE 25 MG: 25 TABLET ORAL at 20:26

## 2019-05-11 RX ADMIN — CLONIDINE HYDROCHLORIDE 0.1 MG: 0.1 TABLET ORAL at 07:36

## 2019-05-11 RX ADMIN — AMPICILLIN SODIUM AND SULBACTAM SODIUM 3 G: 2; 1 INJECTION, POWDER, FOR SOLUTION INTRAMUSCULAR; INTRAVENOUS at 03:43

## 2019-05-11 RX ADMIN — BUTALBITAL, ACETAMINOPHEN AND CAFFEINE 1 TABLET: 50; 325; 40 TABLET ORAL at 12:57

## 2019-05-11 RX ADMIN — LEVETIRACETAM 1000 MG: 500 TABLET ORAL at 20:25

## 2019-05-11 RX ADMIN — AMOXICILLIN AND CLAVULANATE POTASSIUM 1 TABLET: 875; 125 TABLET, FILM COATED ORAL at 11:53

## 2019-05-11 RX ADMIN — LOSARTAN POTASSIUM 50 MG: 50 TABLET ORAL at 08:44

## 2019-05-11 RX ADMIN — METOPROLOL TARTRATE 25 MG: 25 TABLET ORAL at 08:44

## 2019-05-11 RX ADMIN — Medication 10 ML: at 08:45

## 2019-05-11 RX ADMIN — PANTOPRAZOLE SODIUM 40 MG: 40 INJECTION, POWDER, FOR SOLUTION INTRAVENOUS at 08:45

## 2019-05-11 RX ADMIN — ESCITALOPRAM OXALATE 20 MG: 20 TABLET ORAL at 08:45

## 2019-05-11 RX ADMIN — HEPARIN SODIUM 5000 UNITS: 5000 INJECTION INTRAVENOUS; SUBCUTANEOUS at 07:00

## 2019-05-11 RX ADMIN — SODIUM CHLORIDE 1000 MG: 900 INJECTION, SOLUTION INTRAVENOUS at 08:48

## 2019-05-11 RX ADMIN — HEPARIN SODIUM 5000 UNITS: 5000 INJECTION INTRAVENOUS; SUBCUTANEOUS at 21:56

## 2019-05-11 RX ADMIN — Medication 10 ML: at 20:28

## 2019-05-11 RX ADMIN — ALPRAZOLAM 0.5 MG: 0.5 TABLET ORAL at 20:25

## 2019-05-11 RX ADMIN — AMPICILLIN SODIUM AND SULBACTAM SODIUM 3 G: 2; 1 INJECTION, POWDER, FOR SOLUTION INTRAMUSCULAR; INTRAVENOUS at 08:46

## 2019-05-11 RX ADMIN — SENNOSIDES AND DOCUSATE SODIUM 1 TABLET: 8.6; 5 TABLET ORAL at 20:25

## 2019-05-11 ASSESSMENT — PAIN SCALES - GENERAL
PAINLEVEL_OUTOF10: 0
PAINLEVEL_OUTOF10: 8
PAINLEVEL_OUTOF10: 8
PAINLEVEL_OUTOF10: 4
PAINLEVEL_OUTOF10: 0
PAINLEVEL_OUTOF10: 0

## 2019-05-11 ASSESSMENT — ENCOUNTER SYMPTOMS
BLOOD IN STOOL: 0
RESPIRATORY NEGATIVE: 1
CONSTIPATION: 0
VOMITING: 0
SORE THROAT: 0
GASTROINTESTINAL NEGATIVE: 1
COUGH: 0
CHEST TIGHTNESS: 0
ABDOMINAL PAIN: 0
SHORTNESS OF BREATH: 0
RHINORRHEA: 0
NAUSEA: 0
WHEEZING: 0
DIARRHEA: 0

## 2019-05-11 ASSESSMENT — PAIN DESCRIPTION - LOCATION: LOCATION: HEAD

## 2019-05-11 ASSESSMENT — PAIN DESCRIPTION - PAIN TYPE: TYPE: SURGICAL PAIN

## 2019-05-11 NOTE — PROGRESS NOTES
Neurosurgery Progress Note    Patient: Tarsha Whitt MRN: [id number]     YOB: 1955  Age: 58 y.o. Sex: female    Unit: [facility] ICU TOWER Room/Bed: 4523/4523-01 Location: [address] [place] [address]     Admitting Physician: Monica Perera    Primary Care Physician: Branden Breen, APRN - CNP          LOS: 4 days     POD: 4  Right craniotomy and clipping of aneurysm      Subjective:   Patient has improved over night    Objective:     BP (!) 181/102   Pulse 65   Temp 97.8 °F (36.6 °C) (Core)   Resp 18   Ht 5' 2\" (1.575 m)   Wt 134 lb 14.7 oz (61.2 kg)   SpO2 95%   BMI 24.68 kg/m²    Temp (24hrs), Av.1 °F (37.3 °C), Min:97.8 °F (36.6 °C), Max:99.9 °F (37.7 °C)    Patient Vitals for the past 24 hrs:   BP Temp Temp src Pulse Resp SpO2   19 0956 -- -- -- 65 -- --   19 0800 -- -- -- 63 -- --   19 0731 (!) 181/102 97.8 °F (36.6 °C) CORE 64 18 95 %   19 0410 (!) 141/100 -- -- 72 -- --   19 0300 133/78 99 °F (37.2 °C) CORE 59 -- --   05/10/19 2200 (!) 121/98 -- -- 58 13 --   05/10/19 1800 (!) 154/93 -- -- 66 21 --   05/10/19 1725 (!) 158/100 -- -- 77 19 --   05/10/19 1600 (!) 170/105 99.9 °F (37.7 °C) CORE 63 21 95 %   05/10/19 1500 (!) 160/89 -- -- 77 21 --   05/10/19 1300 (!) 150/91 -- -- 52 18 --   05/10/19 1200 (!) 112/98 99.7 °F (37.6 °C) CORE 61 20 93 %       Intake/Output Summary (Last 24 hours) at 2019 1109  Last data filed at 2019 0645  Gross per 24 hour   Intake 408 ml   Output 3500 ml   Net -3092 ml     Yesterday:  05/10 0701 -  0700  In: 408 [I.V.:408]  Out: 3675 [Urine:3675]    PHYSICAL EXAM:   No distress  Tolerating apple sauce  4 - Opens eyes on own  5 - Alert and oriented  6 - Follows simple motor commands    Motor:      D EF EE WF WE IO HF HE KF KE PF DF EHL   Left 5 5 5 5 5 5 5 5 5 5 5 5 5   Right 5 5 5 5 5 5 5 5 5 5 5 5 5     Sensory: No deficits  Abdomen/Groin: NT/ND  Pulses: Present  Incision: drya and clean.  Mild facial edema      Data Review  Recent Results (from the past 24 hour(s))   CBC auto differential    Collection Time: 05/11/19  4:16 AM   Result Value Ref Range    WBC 9.7 4.0 - 11.0 K/uL    RBC 3.15 (L) 4.00 - 5.20 M/uL    Hemoglobin 10.0 (L) 12.0 - 16.0 g/dL    Hematocrit 30.2 (L) 36.0 - 48.0 %    MCV 95.9 80.0 - 100.0 fL    MCH 31.9 26.0 - 34.0 pg    MCHC 33.3 31.0 - 36.0 g/dL    RDW 13.2 12.4 - 15.4 %    Platelets 909 039 - 093 K/uL    MPV 8.5 5.0 - 10.5 fL    Neutrophils % 64.7 %    Lymphocytes % 23.2 %    Monocytes % 10.3 %    Eosinophils % 0.8 %    Basophils % 1.0 %    Neutrophils # 6.3 1.7 - 7.7 K/uL    Lymphocytes # 2.3 1.0 - 5.1 K/uL    Monocytes # 1.0 0.0 - 1.3 K/uL    Eosinophils # 0.1 0.0 - 0.6 K/uL    Basophils # 0.1 0.0 - 0.2 K/uL   Basic metabolic panel    Collection Time: 05/11/19  4:16 AM   Result Value Ref Range    Sodium 142 136 - 145 mmol/L    Potassium 3.6 3.5 - 5.1 mmol/L    Chloride 102 99 - 110 mmol/L    CO2 23 21 - 32 mmol/L    Anion Gap 17 (H) 3 - 16    Glucose 88 70 - 99 mg/dL    BUN 10 7 - 20 mg/dL    CREATININE 0.7 0.6 - 1.2 mg/dL    GFR Non-African American >60 >60    GFR African American >60 >60    Calcium 9.1 8.3 - 10.6 mg/dL         Scheduled Meds:   losartan  50 mg Oral Daily    heparin (porcine)  5,000 Units Subcutaneous 3 times per day    ampicillin-sulbactam  3 g Intravenous Q6H    azithromycin  500 mg Intravenous Q24H    sodium chloride flush  10 mL Intravenous 2 times per day    ALPRAZolam  0.5 mg Oral Nightly    pantoprazole  40 mg Intravenous Daily    sodium chloride flush  10 mL Intravenous 2 times per day    atomoxetine  80 mg Oral Daily    atorvastatin  40 mg Oral Nightly    escitalopram  20 mg Oral Daily    metoprolol tartrate  25 mg Oral BID    sodium chloride flush  10 mL Intravenous 2 times per day    sennosides-docusate sodium  1 tablet Oral BID    levetiracetam  1,000 mg Intravenous Q12H    LORazepam  0.5 mg Intravenous Once       Continuous Infusions:   sodium chloride 75 BID  Discontinue Patten catheter  Antibiotics as per intensive care physician  Patient to be re-evaluated by PT for discharge disposition  Ambulate as tolerated, with assistance  Continue Chemical and mechanical DVT prophylaxis  Bowel regimen  Advance diet to soft and then as tolerated to regular          Signed By: Bang Bazzi     May 11, 2019

## 2019-05-11 NOTE — PROGRESS NOTES
Pulmonary progress note    Admit Date: 5/7/2019  Hospital Day: 5    ICU DAY  Code:Full Code  PCP: Errol Godfrey, APRN - CNP       SUBJECTIVE:   Interval Hx:   Patient was weaned off Precedex yesterday and has had no evidence of recurrent alcohol withdrawal.  Other then her nightly Xanax she's not required any benzodiazepines in the last 24 hours. Her last fever was yesterday morning at 8 AM.  She is quite alert and offers no complaints. She is sitting up in bed.   She is on room air    Access:                                 -Peripheral Access Day#:3                               Patten Day#: 3                                                     MEDICATIONS:   Scheduled Meds:   [START ON 5/12/2019] pantoprazole  40 mg Oral QAM AC    amoxicillin-clavulanate  1 tablet Oral 2 times per day    levETIRAcetam  1,000 mg Oral BID    [START ON 5/12/2019] losartan  100 mg Oral Daily    losartan  50 mg Oral Once    heparin (porcine)  5,000 Units Subcutaneous 3 times per day    sodium chloride flush  10 mL Intravenous 2 times per day    ALPRAZolam  0.5 mg Oral Nightly    sodium chloride flush  10 mL Intravenous 2 times per day    atomoxetine  80 mg Oral Daily    atorvastatin  40 mg Oral Nightly    escitalopram  20 mg Oral Daily    metoprolol tartrate  25 mg Oral BID    sodium chloride flush  10 mL Intravenous 2 times per day    sennosides-docusate sodium  1 tablet Oral BID    LORazepam  0.5 mg Intravenous Once      Continuous Infusions:    PRN Meds:sodium chloride flush, haloperidol lactate, acetaminophen, sodium chloride flush, butalbital-acetaminophen-caffeine, cloNIDine, sodium chloride flush, ondansetron, acetaminophen, naloxone, polyethylene glycol, bisacodyl  Allergies: No Known Allergies    PHYSICAL EXAM:       Vitals: BP (!) 181/102   Pulse 65   Temp 97.8 °F (36.6 °C) (Core)   Resp 18   Ht 5' 2\" (1.575 m)   Wt 134 lb 14.7 oz (61.2 kg)   SpO2 95%   BMI 24.68 kg/m²   I/O:      Intake/Output Summary (Last 24 hours) at 5/11/2019 1139  Last data filed at 5/11/2019 0645  Gross per 24 hour   Intake 408 ml   Output 3500 ml   Net -3092 ml     No intake/output data recorded. I/O last 3 completed shifts: In: 408 [I.V.:408]  Out: 6419 [Urine:3675]    Physical Examination:   · General appearance: Resting comfortably. In no acute distress  · Skin: right frontoparietal incision from craniotomy. CLAYTON drain removed. No active bleeding or oozing. · HEENT: staples from recent craniotomy in right frontotemporal region. No oozing or bleeding; unchanged. PERRL. EOMI bilaterally. · Neck: no adenopathy, no carotid bruit, no JVD, supple, symmetrical, trachea midline and thyroid not enlarged, symmetric, no tenderness/mass/nodules  · Lungs: clear to auscultation bilaterally, limited due to patient positioning  · Heart: regular rate and rhythm, S1, S2 normal, no murmur, click, rub or gallop  · Abdomen: soft, non-tender; bowel sounds normal; no masses,  no organomegaly  · Extremities: extremities normal, atraumatic, no cyanosis or edema  · Lymphatic: No significant lymph node enlargement papable  · Neurologic: A&O3. Following commands. Patient moving all extremities. DATA:       Labs:  CBC:   Recent Labs     05/09/19 0447 05/10/19  0356 05/11/19  0416   WBC 9.3 8.7 9.7   HGB 9.2* 8.9* 10.0*   HCT 27.6* 26.8* 30.2*    272 351       BMP:   Recent Labs     05/09/19 0447 05/10/19  0356 05/11/19  0416    142 142   K 3.7 3.3* 3.6    105 102   CO2 25 22 23   BUN 10 11 10   CREATININE 0.8 0.8 0.7   GLUCOSE 122* 96 88     Blood cultures: No growth to date    Chest x-ray May 9     HISTORY: 26-year-old woman with lethargy, malaise and fever       Rotated portable chest demonstrates tip of NG tube excluded from radiograph extending at least into proximal body of stomach.       No change in atelectasis or small patchy pneumonia right lower lobe since portable chest 5/8/2019.  No consolidated pneumonia, pneumothorax

## 2019-05-11 NOTE — PROGRESS NOTES
Patient removed corpak, tube intact, site c/d/i. Patient states she doesn't remember doing it. No indication to replace at this time.  Speech to reevaluate this Brigitte Moctezuma RN

## 2019-05-11 NOTE — PROCEDURES
CONTINUOUS VIDEO EEG MONITORING    DATE OF SERVICE: 5/7/2019 TO 5/10/2019    NAME: Kacie Steward   YOB: 1955  SEX: female  MEDICAL RECORD [id number]    EEG-CCTV study:   The patient is a 58 y.o.y old female monitored at the request of the team for altered mental status and concern for subclinical seizures. EEG-VIDEO MONITORING METHODOLOGY:   Time-locked EEG-video monitoring was performed using the 32-channel [employer] monitoring system. Analyses of the monitoring data were performed using the following techniques:   1. Review of the relevant EEG-video data. 2. Review of events detected by the computer system in detail. 3. Review of clinical seizures, with both detailed review of EEG and video and playback using multiple montages. A variety of referential and bipolar montages were used. CLINICAL AND EEG ANALYSIS:  Video EEG recording was reviewed in real time by a technologist, and then reviewed at least twice a day when available on the  with maximum possible sampling. Results of the monitoring were related to the treating team frequently throughout the study, at least once a day to help guide treatment via verbal or written communication as brief notes or direct text messages or emails. Updates and response to treatment was communicated as requested by the requesting physician or the team.    5/7/2019    Seizures - None  Push buttons - None  Background - Generalized moderate amplitude delta with superimposed theta frequencies, max anteriorly        5/8/2019      Seizures - None  Push buttons - None  Background - Generalized moderate amplitude delta with superimposed theta frequencies, max anteriorly. Loss of electrode integrity around 02:30. Briefly restored in the afternoon, with return of artifact from movement, loss of electrode integrity.   When visualized, appears to be focal fast, increased amplitude left temporal.       5/9/2019    Seizures - None  Push buttons - None  Background - Generalized moderate amplitude delta with superimposed theta frequencies, max anteriorly. Much of EEG still unable to be interpreted due to loss of electrode integrity and patient agitation. Visualized electrodes chains with intermittent bursts of rhythmic delta. 5/10/2019      Seizures - None  Push buttons - None  Background - Generalized moderate amplitude delta with superimposed theta frequencies, max anteriorly. Intermittent generalized delta activity. Intermittent focal slowing left temporal.          CLINICAL INTERPRETATION: This is an abnormal video EEG study which is significantly technically limited due to loss of several electrodes for much of this recording  1. Generalized background abnormalities indicative of an underlying diffuse encephalopathy of non-specific etiology. 2.  Loss of electrode integrity around 02:30 on 5/8/19 significantly limiting interpretation. Much improved artifact on 5/10/19.  3. Intermittent bursts of rhythmic delta activity left temporal.  Periods of artifact limiting interpretation. 4.  Intermittent left temporal slowing consistent with underlying focal neuronal dysfunction of non-specific etiology.

## 2019-05-11 NOTE — PROGRESS NOTES
Speech Language Pathology  Facility/Department: [facility]  Dysphagia Daily Treatment Note    NAME: Tarsha Whitt  : 1955  MRN: [id number]    Patient Diagnosis(es):   Patient Active Problem List    Diagnosis Date Noted    Pneumonia 2019    Seizures ([facility] [address] 2019    Brain aneurysm 2019    TIA (transient ischemic attack)     Aneurysm of middle cerebral artery 2019    Nonintractable episodic headache     Ischemic stroke ([facility] [address] 2019    Hypertension     Anxiety     Cerebrovascular accident (CVA) ([facility] [address] [facility]     Vertigo          CXR (19)-  Impression       No change in atelectasis or small patchy pneumonia right lower lobe. Previous MBS - none    Chart reviewed. Medical Diagnosis: Brain aneurysm   Treatment Diagnosis: Oropharyngeal Dysphagia    BSE Impression (5/10)-  Dysphagia Impression : No mastication of mechanically altered solids was appreciated; pt wears denture at baseline and reports she does not eat anything without dentures present. No overt s/s of aspiration with any textures. Patient was unable to follow commands for 3 ounce water but instead took 3 ounces in 3 very large bolus. No overt s/s of aspiration and tolerated without fluctuations in oxygen saturation or SOB. Pt does have RLL pna per chest xray but had not been eating orally prior to this eval / since surgery per chart. MBS results - Pending re-assessment    Pain: Intermittent reports of pain. Current Diet : NPO (Previous BSE this AM with rec for Dysphagia I (Puree) / Thins)    Treatment:  Pt seen bedside to address the following goals:  1. The patient will tolerate recommended diet without observed clinical signs of aspiration  5/10 - Pt seen bedside as RN had found the patients dentures since BSE. Patient tolerated initial trials of thins without overt s/s of aspiration.  Patient given soft solids which resulted in very slow and prolonged mastication and pt utilized a liquid rinse with each recommendations: Ice chips only with aggressive oral care  DC recommendation: Ongoing dysphagia treatment is indicated at this time  Treatment: 15 minutes  D/W nursing  Needs met prior to leaving room, call button in reach. See Scott M.A. CF-SLP [id number]  Speech-Language Pathologist     Pg.  # [phone]    If patient is discharged prior to next treatment, this note will serve as the discharge summary

## 2019-05-11 NOTE — PROGRESS NOTES
Occupational Therapy  Facility/Department: [facility] ICU  Daily Treatment Note  NAME: Patel Nunez  : 1955  MRN: [id number]    Date of Service: 2019    Discharge Recommendations:  Patel Nunez scored a 20/24 on the AM-PAC ADL Inpatient form. Current research shows that an AM-PAC score of 18 or greater is typically associated with a discharge to the patient's home setting. Based on the patients AM-PAC score and their current ADL deficits, it is recommended that the patient have 2-3 sessions per week of Occupational Therapy at d/c to increase the patients independence. OT Equipment Recommendations  Other: defer    Assessment   Performance deficits / Impairments: Decreased functional mobility ; Decreased ADL status; Decreased cognition;Decreased safe awareness;Decreased balance;Decreased endurance  Assessment: Pt demonstrating functional mobility to and from bathroom and down linares with CGA - Min A with unsteady gait requiring VCs for safety awarness. Pt completed ADL grooming tasks in stance at sink with SBA. Pt with quick impulsive movements. Pt plans for home discharge in care of daughte and son. If family unable to provide 24 supervision then pt is unsafe to return home. Continue OT per POC  Treatment Diagnosis: Impaired ADLs, mobility, activity tolerance  Patient Education: Role of OT, safe home set up, planning activity- pt verb understanding, may need reinforcement  REQUIRES OT FOLLOW UP: Yes  Activity Tolerance  Activity Tolerance: Patient Tolerated treatment well  Safety Devices  Safety Devices in place: Yes  Type of devices: Left in bed;Bed alarm in place;Call light within reach;Nurse notified;Gait belt         Patient Diagnosis(es): There were no encounter diagnoses. has a past medical history of Anxiety, Arthritis, Cerebral aneurysm, Former smoker, GERD (gastroesophageal reflux disease), and Hypertension. has a past surgical history that includes Hysterectomy;  Cholecystectomy; Hemorrhoid surgery; and craniotomy (Right, 5/7/2019). Restrictions  Position Activity Restriction  Other position/activity restrictions: ambulate  Subjective   General  Chart Reviewed: Yes  Additional Pertinent Hx: 58 y.o. F admitted 5/7 for elective R MCA aneurysm clipping complicated by post op seizures / post-ictal agitation PMHX: anxiety, HTN, GERD  Response to previous treatment: Patient with no complaints from previous session  Family / Caregiver Present: Yes(daughter)  Diagnosis: brain aneurysm  Subjective  Subjective: Pt supine in bed and agreeable to OT treatment.   Vital Signs  Patient Currently in Pain: Denies        Objective    ADL  Grooming: Contact guard assistance(CGA for steadying stance for oral care at sink)  UE Dressing: Minimal assistance(gena mayer with VCs for orientaion)        Balance  Sitting Balance: Supervision  Standing Balance: Minimal assistance(fluctuating assist from CGA to Min A )  Standing Balance  Time: 5 min + 2 min + 2 min   Activity: functional mobiltiy, transfers, ADL stance at sink  Comment: Pt with quick impulsive movements   Bed mobility  Supine to Sit: Stand by assistance(HOB elevated)  Sit to Supine: Contact guard assistance(Pt with quick movements crawled into bed )  Transfers  Stand Step Transfers: Contact guard assistance(from EOB to recliner chair with VCs for safe hand placment)  Sit to stand: Contact guard assistance(from EOB)  Stand to sit: Contact guard assistance  Transfer Comments: VCs for safe hand placemnt         Plan  If pt discharges prior to next treatment, this note will serve as discharge summary  Plan  Times per week: 5-7x  Times per day: Daily           AM-PAC Score        AM-PAC Inpatient Daily Activity Raw Score: 20  AM-PAC Inpatient ADL T-Scale Score : 42.03  ADL Inpatient CMS 0-100% Score: 38.32  ADL Inpatient CMS G-Code Modifier : CJ    Goals (as determined and assessed by primary OT)  Short term goals  Time Frame for Short term goals: by discharge  Short term goal 1: min A bed mobility - goal met 5/11  Short term goal 2: min A functional ADL transfers using LRAD - ongoing  Short term goal 3: cga standing balance x3 min with UE support prn - ongoing  Short term goal 4: mod A doff/don pants - ongoing  Patient Goals   Patient goals : d/c home       Therapy Time   Individual Concurrent Group Co-treatment   Time In 3108         Time Out 1447         Minutes 25         Timed Code Treatment Minutes: 25 Minutes   total Treatment Minutes 25 min     [address] [place] [address], Ne, SANTIZO/L

## 2019-05-11 NOTE — PROGRESS NOTES
Physical Therapy  Facility/Department: [facility]  Daily Treatment Note  NAME: Mariela Pierson  : 1955  MRN: [id number]    Date of Service: 2019    Discharge Recommendations:  Mariela Pierson scored a 18/24 on the AM-PAC short mobility form. Current research shows that an AM-PAC score of 18 or greater is typically associated with a discharge to the patient's home setting. Based on the patients AM-PAC score and their current functional mobility deficits, it is recommended that the patient have 2-3 sessions per week of Physical Therapy at d/c to increase the patients independence. PT Equipment Recommendations  Equipment Needed: No    Patient Diagnosis(es): There were no encounter diagnoses. has a past medical history of Anxiety, Arthritis, Cerebral aneurysm, Former smoker, GERD (gastroesophageal reflux disease), and Hypertension. has a past surgical history that includes Hysterectomy; Cholecystectomy; Hemorrhoid surgery; and craniotomy (Right, 2019). Restrictions  Position Activity Restriction  Other position/activity restrictions: ambulate  Subjective   General  Chart Reviewed: Yes  Additional Pertinent Hx: Admit  for craniotomy, clipping of MCA aneurysm; PMHx: anxiety, arthritis, cerebral aneurysm, GERD, HTN  Family / Caregiver Present: Yes(Daughter)  Referring Practitioner: LIGIA Cooper CNP  Subjective  Subjective: \"Can you get me out of here faster? \"  General Comment  Comments: Pt found supine in bed upon PT arrival.  Pt agreeable to therapy session. Pt impulsive occasionally throughout session.   Pain Screening  Patient Currently in Pain: Denies  Vital Signs  Patient Currently in Pain: Denies       Orientation     Cognition      Objective   Bed mobility  Supine to Sit: Stand by assistance(HOB elevated, use of bedrail)  Sit to Supine: Contact guard assistance(HOB elevated, pt crawling forward suddenly into bed requiring CGA for safety)  Comment: Pt donning socks while long sitting in bed, see OT note for comments. Transfers  Sit to Stand: Minimal Assistance(from EOB to no AD)  Stand to sit: Contact guard assistance(verbal cues for hand placement)  Ambulation  Ambulation?: Yes  Ambulation 1  Surface: level tile  Device: No Device  Assistance: Minimal assistance;Contact guard assistance(pt fluctuating between CGA-Gaurang)  Quality of Gait: decreased B step length, occasional L foot drag (able to correct with verbal cues), intermittent minimal LOB when pt becomes distracted by passersby in hallway requiring Gaurang to correct  Distance: 200' (including stepping over [employer] cord x2 trials)  Comments: verbal cues to redirect when pt becomes distracted in hallway  Stairs/Curb  Stairs?: No     Balance  Comments: SBA-CGA to maintain standing balance at sink while performing dynamic standing tasks for oral care and grooming, approximately 5 minutes, see OT note for comments. Assessment   Body structures, Functions, Activity limitations: Decreased functional mobility   Assessment: Pt with significant improvement in functional mobility this session, requiring assist x1 for all transfers and ambulation. Pt's daughter stating that she and her brother plan to piece together 24 hr supervision initially at d/c for increased safety. PT recommends continued therapy for increased strength and independence. Continue POC.   Treatment Diagnosis: impaired gait and transfers  Patient Education: Safety, including current need for bed alarm due to impulsivity  REQUIRES PT FOLLOW UP: Yes  Activity Tolerance  Activity Tolerance: Patient Tolerated treatment well     G-Code     OutComes Score                                                  AM-PAC Score  AM-PAC Inpatient Mobility Raw Score : 18  AM-PAC Inpatient T-Scale Score : 43.63  Mobility Inpatient CMS 0-100% Score: 46.58  Mobility Inpatient CMS G-Code Modifier : CK          Goals  Short term goals  Time Frame for Short term goals: discharge  Short term goal 1: Pt will transfer supine <--> sit with min A x 1 -met 5/11; GOAL UPDATED: supervision  Short term goal 2: Pt will transfer sit <--> stand with min A x 1 -met 5/11; GOAL UPDATED: SBA  Short term goal 3: Pt will perform bed <--> chair transfer with min A x 1 - ongoing  Short term goal 4: Pt will participate in gait assessment. - met 5/11; GOAL UPDATED: 150' with SBA  Patient Goals   Patient goals : eventually return home    Plan    Plan  Times per week: 5-7  Current Treatment Recommendations: Strengthening, Gait Training, Patient/Caregiver Education & Training, Equipment Evaluation, Education, & procurement, Balance Training, Functional Mobility Training, Transfer Training  Safety Devices  Type of devices: Call light within reach, Bed alarm in place, Gait belt, Left in bed     Therapy Time   Individual Concurrent Group Co-treatment   Time In 1422         Time Out 1447         Minutes 25              Timed Code Treatment Minutes:   25    Total Treatment Minutes:  88 Rachel Huston, PT, DPT, CLT    This note to serve as discharge summary if patient discharged before next session.

## 2019-05-12 LAB
ANION GAP SERPL CALCULATED.3IONS-SCNC: 12 MMOL/L (ref 3–16)
BUN BLDV-MCNC: 17 MG/DL (ref 7–20)
CALCIUM SERPL-MCNC: 9.3 MG/DL (ref 8.3–10.6)
CHLORIDE BLD-SCNC: 101 MMOL/L (ref 99–110)
CO2: 26 MMOL/L (ref 21–32)
CREAT SERPL-MCNC: 1.1 MG/DL (ref 0.6–1.2)
GFR AFRICAN AMERICAN: >60
GFR NON-AFRICAN AMERICAN: 50
GLUCOSE BLD-MCNC: 124 MG/DL (ref 70–99)
HCT VFR BLD CALC: 30.5 % (ref 36–48)
HEMOGLOBIN: 10 G/DL (ref 12–16)
MCH RBC QN AUTO: 31.6 PG (ref 26–34)
MCHC RBC AUTO-ENTMCNC: 32.9 G/DL (ref 31–36)
MCV RBC AUTO: 96 FL (ref 80–100)
PDW BLD-RTO: 13.5 % (ref 12.4–15.4)
PLATELET # BLD: 393 K/UL (ref 135–450)
PMV BLD AUTO: 8.5 FL (ref 5–10.5)
POTASSIUM SERPL-SCNC: 4.1 MMOL/L (ref 3.5–5.1)
RBC # BLD: 3.17 M/UL (ref 4–5.2)
SODIUM BLD-SCNC: 139 MMOL/L (ref 136–145)
WBC # BLD: 11.7 K/UL (ref 4–11)

## 2019-05-12 PROCEDURE — 94761 N-INVAS EAR/PLS OXIMETRY MLT: CPT

## 2019-05-12 PROCEDURE — 80048 BASIC METABOLIC PNL TOTAL CA: CPT

## 2019-05-12 PROCEDURE — 99232 SBSQ HOSP IP/OBS MODERATE 35: CPT | Performed by: INTERNAL MEDICINE

## 2019-05-12 PROCEDURE — 1200000000 HC SEMI PRIVATE

## 2019-05-12 PROCEDURE — 6370000000 HC RX 637 (ALT 250 FOR IP): Performed by: NURSE PRACTITIONER

## 2019-05-12 PROCEDURE — 6370000000 HC RX 637 (ALT 250 FOR IP): Performed by: INTERNAL MEDICINE

## 2019-05-12 PROCEDURE — 6370000000 HC RX 637 (ALT 250 FOR IP): Performed by: NEUROLOGICAL SURGERY

## 2019-05-12 PROCEDURE — 2580000003 HC RX 258: Performed by: NURSE PRACTITIONER

## 2019-05-12 PROCEDURE — 6360000002 HC RX W HCPCS: Performed by: NURSE PRACTITIONER

## 2019-05-12 PROCEDURE — 92526 ORAL FUNCTION THERAPY: CPT

## 2019-05-12 PROCEDURE — 85027 COMPLETE CBC AUTOMATED: CPT

## 2019-05-12 PROCEDURE — 36415 COLL VENOUS BLD VENIPUNCTURE: CPT

## 2019-05-12 PROCEDURE — 97530 THERAPEUTIC ACTIVITIES: CPT

## 2019-05-12 PROCEDURE — 6370000000 HC RX 637 (ALT 250 FOR IP)

## 2019-05-12 PROCEDURE — 97110 THERAPEUTIC EXERCISES: CPT

## 2019-05-12 RX ORDER — LOSARTAN POTASSIUM 50 MG/1
50 TABLET ORAL DAILY
Status: DISCONTINUED | OUTPATIENT
Start: 2019-05-12 | End: 2019-05-12

## 2019-05-12 RX ORDER — LOSARTAN POTASSIUM 50 MG/1
50 TABLET ORAL ONCE
Status: COMPLETED | OUTPATIENT
Start: 2019-05-12 | End: 2019-05-12

## 2019-05-12 RX ORDER — LIDOCAINE 4 G/G
1 PATCH TOPICAL DAILY
Status: DISCONTINUED | OUTPATIENT
Start: 2019-05-12 | End: 2019-05-17 | Stop reason: HOSPADM

## 2019-05-12 RX ORDER — UREA 10 %
3 LOTION (ML) TOPICAL EVERY EVENING
Status: DISCONTINUED | OUTPATIENT
Start: 2019-05-12 | End: 2019-05-12 | Stop reason: SDUPTHER

## 2019-05-12 RX ORDER — UREA 10 %
3 LOTION (ML) TOPICAL NIGHTLY
Status: DISCONTINUED | OUTPATIENT
Start: 2019-05-12 | End: 2019-05-17 | Stop reason: HOSPADM

## 2019-05-12 RX ORDER — LOSARTAN POTASSIUM 100 MG/1
100 TABLET ORAL DAILY
Status: DISCONTINUED | OUTPATIENT
Start: 2019-05-13 | End: 2019-05-17 | Stop reason: HOSPADM

## 2019-05-12 RX ADMIN — PANTOPRAZOLE SODIUM 40 MG: 40 TABLET, DELAYED RELEASE ORAL at 06:57

## 2019-05-12 RX ADMIN — LOSARTAN POTASSIUM 50 MG: 50 TABLET ORAL at 15:00

## 2019-05-12 RX ADMIN — Medication 10 ML: at 22:17

## 2019-05-12 RX ADMIN — LEVETIRACETAM 1000 MG: 500 TABLET ORAL at 09:02

## 2019-05-12 RX ADMIN — Medication 10 ML: at 22:18

## 2019-05-12 RX ADMIN — SENNOSIDES AND DOCUSATE SODIUM 1 TABLET: 8.6; 5 TABLET ORAL at 09:02

## 2019-05-12 RX ADMIN — CLONIDINE HYDROCHLORIDE 0.1 MG: 0.1 TABLET ORAL at 12:30

## 2019-05-12 RX ADMIN — AMOXICILLIN AND CLAVULANATE POTASSIUM 1 TABLET: 875; 125 TABLET, FILM COATED ORAL at 22:09

## 2019-05-12 RX ADMIN — ATORVASTATIN CALCIUM 40 MG: 40 TABLET, FILM COATED ORAL at 22:10

## 2019-05-12 RX ADMIN — LEVETIRACETAM 1000 MG: 500 TABLET ORAL at 22:09

## 2019-05-12 RX ADMIN — BUTALBITAL, ACETAMINOPHEN AND CAFFEINE 1 TABLET: 50; 325; 40 TABLET ORAL at 12:30

## 2019-05-12 RX ADMIN — LOSARTAN POTASSIUM 50 MG: 50 TABLET ORAL at 09:18

## 2019-05-12 RX ADMIN — HEPARIN SODIUM 5000 UNITS: 5000 INJECTION INTRAVENOUS; SUBCUTANEOUS at 15:00

## 2019-05-12 RX ADMIN — BUTALBITAL, ACETAMINOPHEN AND CAFFEINE 1 TABLET: 50; 325; 40 TABLET ORAL at 20:04

## 2019-05-12 RX ADMIN — HEPARIN SODIUM 5000 UNITS: 5000 INJECTION INTRAVENOUS; SUBCUTANEOUS at 06:13

## 2019-05-12 RX ADMIN — Medication 10 ML: at 09:02

## 2019-05-12 RX ADMIN — ESCITALOPRAM OXALATE 20 MG: 20 TABLET ORAL at 09:02

## 2019-05-12 RX ADMIN — HEPARIN SODIUM 5000 UNITS: 5000 INJECTION INTRAVENOUS; SUBCUTANEOUS at 22:10

## 2019-05-12 RX ADMIN — BUTALBITAL, ACETAMINOPHEN AND CAFFEINE 1 TABLET: 50; 325; 40 TABLET ORAL at 09:02

## 2019-05-12 RX ADMIN — SENNOSIDES AND DOCUSATE SODIUM 1 TABLET: 8.6; 5 TABLET ORAL at 22:10

## 2019-05-12 RX ADMIN — Medication 3 MG: at 22:10

## 2019-05-12 RX ADMIN — AMOXICILLIN AND CLAVULANATE POTASSIUM 1 TABLET: 875; 125 TABLET, FILM COATED ORAL at 09:02

## 2019-05-12 RX ADMIN — ALPRAZOLAM 0.5 MG: 0.5 TABLET ORAL at 22:10

## 2019-05-12 RX ADMIN — METOPROLOL TARTRATE 25 MG: 25 TABLET ORAL at 22:10

## 2019-05-12 ASSESSMENT — PAIN DESCRIPTION - PROGRESSION: CLINICAL_PROGRESSION: GRADUALLY WORSENING

## 2019-05-12 ASSESSMENT — PAIN DESCRIPTION - PAIN TYPE: TYPE: ACUTE PAIN

## 2019-05-12 ASSESSMENT — PAIN SCALES - GENERAL
PAINLEVEL_OUTOF10: 8
PAINLEVEL_OUTOF10: 9
PAINLEVEL_OUTOF10: 8

## 2019-05-12 ASSESSMENT — PAIN DESCRIPTION - ONSET: ONSET: ON-GOING

## 2019-05-12 ASSESSMENT — PAIN DESCRIPTION - DESCRIPTORS: DESCRIPTORS: ACHING

## 2019-05-12 ASSESSMENT — PAIN DESCRIPTION - FREQUENCY: FREQUENCY: CONTINUOUS

## 2019-05-12 ASSESSMENT — PAIN DESCRIPTION - LOCATION: LOCATION: HEAD

## 2019-05-12 NOTE — PROGRESS NOTES
Occupational Therapy  Facility/Department: [facility] ICU  Daily Treatment Note  NAME: Ginger Gupta  : 1955  MRN: [id number]    Date of Service: 2019    Discharge Recommendations:  Ginger Gupta scored a 17/24 on the AM-PAC ADL Inpatient form. Current research shows that an AM-PAC score of 17 or less is typically not associated with a discharge to the patient's home setting. Based on the patients AM-PAC score and their current ADL deficits, it is recommended that the patient have 5-7 sessions per week of Occupational Therapy at d/c to increase the patients independence. OT Equipment Recommendations  Other: defer    Assessment   Performance deficits / Impairments: Decreased functional mobility ; Decreased ADL status; Decreased cognition;Decreased safe awareness;Decreased balance;Decreased endurance  Assessment: Pt with decreased activity tolerance this session limited by fatigue and pain. Pt with eyes closed requiring VCs for opening eyes. Pt completed in chair UE exercises for increasing strength and endurance for functional tasks. Pt would benefit from LE and UE dressing assesment, was not completed this  session secondary to pain and falling asleep. Pt lives alone and has family availible to help for two weeks. Pt is unsafe to return home alone and may require short IP stay for increasing safety and independence. Continue OT per POC  Treatment Diagnosis: Impaired ADLs, mobility, activity tolerance  Prognosis: Good  Patient Education: Role of OT, safe discharge planning - pt and daughter verb understanding  REQUIRES OT FOLLOW UP: Yes  Activity Tolerance  Activity Tolerance: Patient limited by fatigue;Patient limited by pain  Activity Tolerance: Pt with difficulty keeping eyes open during session. Safety Devices  Safety Devices in place: Yes  Type of devices: Call light within reach; Left in chair;Chair alarm in place;Nurse notified;Gait belt(in care of RN at end of session) Restrictions  Position Activity Restriction  Other position/activity restrictions: ambulate  Subjective   General  Chart Reviewed: Yes  Additional Pertinent Hx: 58 y.o. F admitted 5/7 for elective R MCA aneurysm clipping complicated by post op seizures / post-ictal agitation PMHX: anxiety, HTN, GERD  Response to previous treatment: Patient with no complaints from previous session  Family / Caregiver Present: Yes(daughter)  Diagnosis: brain aneurysm  Subjective  Subjective: Pt seated in chair and agreeable to OT treatment  Pain Assessment  Pain Level: 8  Vital Signs  Patient Currently in Pain: Yes(RN aware, repositioned to pt comfort)        Objective             Balance  Sitting Balance: Contact guard assistance(sitting unsupported in recliner chair with SBA - CGA secondary to falling asleep.  Trunk strengthening exercise completed x4 reps for increased sitting balance, trunk rotation with 5 second hold)  Bed mobility  Scooting: (Pt seated in chair scooting forward with CGA)         Type of ROM/Therapeutic Exercise  Type of ROM/Therapeutic Exercise: AROM  Comment: BUE exercise completed seated unsupported in chair for increasing strength and endurance for functional tasks  Exercises  Scapular Protraction: x3 reps  Scapular Retraction: x3 reps  Shoulder Depression: x3 reps  Shoulder Elevation: x3 reps  Shoulder Flexion: x3 reps  Shoulder Extension: x3 reps                    Plan  If pt discharges prior to next treatment, this note will serve as discharge summary  Plan  Times per week: 5-7x  Times per day: Daily    AM-PAC Score        AM-PAC Inpatient Daily Activity Raw Score: 17  AM-PAC Inpatient ADL T-Scale Score : 37.26  ADL Inpatient CMS 0-100% Score: 50.11  ADL Inpatient CMS G-Code Modifier : CK    Goals (as determined and assessed by primary OT)  Short term goals  Time Frame for Short term goals: by discharge  Short term goal 1: min A bed mobility - goal met 5/11  Short term goal 2: min A functional ADL transfers using LRAD - ongoing  Short term goal 3: cga standing balance x3 min with UE support prn - ongoing  Short term goal 4: mod A doff/don pants - ongoing  Patient Goals   Patient goals : d/c home       Therapy Time   Individual Concurrent Group Co-treatment   Time In 1320         Time Out 1343         Minutes 23         Timed Code Treatment Minutes: 23 Minutes   Total Treatment Minutes: 1920 RUDY Alfredo

## 2019-05-12 NOTE — PROGRESS NOTES
Pulmonary progress note    Admit Date: 5/7/2019  Hospital Day: 6    ICU DAY  Code:Full Code  PCP: Errol Godfrey, APRN - CNP       SUBJECTIVE:   Interval Hx:   No new complaints today    Access:                                 -Peripheral Access Day#:3                               Patten Day#: 3                                                     MEDICATIONS:   Scheduled Meds:   [START ON 5/13/2019] losartan  100 mg Oral Daily    losartan  50 mg Oral Once    melatonin  3 mg Oral QPM    pantoprazole  40 mg Oral QAM AC    amoxicillin-clavulanate  1 tablet Oral 2 times per day    levETIRAcetam  1,000 mg Oral BID    heparin (porcine)  5,000 Units Subcutaneous 3 times per day    sodium chloride flush  10 mL Intravenous 2 times per day    ALPRAZolam  0.5 mg Oral Nightly    sodium chloride flush  10 mL Intravenous 2 times per day    atomoxetine  80 mg Oral Daily    atorvastatin  40 mg Oral Nightly    escitalopram  20 mg Oral Daily    metoprolol tartrate  25 mg Oral BID    sodium chloride flush  10 mL Intravenous 2 times per day    sennosides-docusate sodium  1 tablet Oral BID    LORazepam  0.5 mg Intravenous Once      Continuous Infusions:    PRN Meds:sodium chloride flush, haloperidol lactate, acetaminophen, sodium chloride flush, butalbital-acetaminophen-caffeine, cloNIDine, sodium chloride flush, ondansetron, acetaminophen, naloxone, polyethylene glycol, bisacodyl  Allergies: No Known Allergies    PHYSICAL EXAM:       Vitals: BP (!) 157/99   Pulse 56   Temp 97.6 °F (36.4 °C)   Resp 18   Ht 5' 2\" (1.575 m)   Wt 134 lb 14.7 oz (61.2 kg)   SpO2 92%   BMI 24.68 kg/m²   I/O:    No intake or output data in the 24 hours ending 05/12/19 1345  No intake/output data recorded. No intake/output data recorded. Physical Examination:   · General appearance: Resting comfortably. In no acute distress  · Skin: right frontoparietal incision from craniotomy. No active bleeding or oozing.    · HEENT: staples from recent craniotomy in right frontotemporal region. No oozing or bleeding; unchanged. PERRL. EOMI bilaterally. · Neck: no adenopathy, no carotid bruit, no JVD, supple, symmetrical, trachea midline and thyroid not enlarged, symmetric, no tenderness/mass/nodules  · Lungs: clear to auscultation bilaterally, limited due to patient positioning  · Heart: regular rate and rhythm, S1, S2 normal, no murmur, click, rub or gallop  · Abdomen: soft, non-tender; bowel sounds normal; no masses,  no organomegaly  · Extremities: extremities normal, atraumatic, no cyanosis or edema  · Lymphatic: No significant lymph node enlargement papable  · Neurologic: A&O3. Following commands. Patient moving all extremities. DATA:       Labs:  CBC:   Recent Labs     05/10/19  0356 05/11/19 0416 05/12/19  0407   WBC 8.7 9.7 11.7*   HGB 8.9* 10.0* 10.0*   HCT 26.8* 30.2* 30.5*    351 393       BMP:   Recent Labs     05/10/19  0356 05/11/19 0416 05/12/19  0408    142 139   K 3.3* 3.6 4.1    102 101   CO2 22 23 26   BUN 11 10 17   CREATININE 0.8 0.7 1.1   GLUCOSE 96 88 124*     Blood cultures: No growth to date    Chest x-ray May 9     HISTORY: 58-year-old woman with lethargy, malaise and fever       Rotated portable chest demonstrates tip of NG tube excluded from radiograph extending at least into proximal body of stomach.       No change in atelectasis or small patchy pneumonia right lower lobe since portable chest 5/8/2019. No consolidated pneumonia, pneumothorax or pulmonary venous congestion.       No change in borderline cardiomegaly.           Impression       No change in atelectasis or small patchy pneumonia right lower lobe. Assessment  1.  Right MCA aneurysm POD #5 clipping  2.  New-onset seizure - no seizures last 96 hours  3.  Acute encephalopathy - likely alcohol withdrawal. Resolved  4.  Hypertension - needs better control  5.  Generalized anxiety disorder with depression on chronic Xanax  6.  GERD  7.  Fever - possible right lower lobe aspiration pneumonia. Afebrile last 48 hrs  8.  Hypoxemia requiring oxygen - resolved. On RA       Plan  1.    2.  Neurology following - AED management per Dr. Cristy Sullivan  3.  Continue Keppra 1000 mg BID PO  4.  Blood cultures - no growth to date  5.  Continue Augmentin D#4 to complete a 5 days course   6.  Goal oxygen saturation > 88%. Remains off oxygen  7.  Continue Xanax 0.5 mg nightly and lexapro  8. Goal SBP < 160. Continue losartan 100 mg daily, which was increased yesterday. Continue metoprolol 25 twice a day  9.   tolerating dysphagia 2 diet  10. Continue Protonix 40 by mouth daily  11.   Continue heparin 5000 units subcutaneously TID and SCD for DVT prophylaxis      Patient is a [address] in ICU     Blanca Carroll MD

## 2019-05-12 NOTE — PROGRESS NOTES
Speech Language Pathology  Facility/Department: [facility] ICU  Dysphagia Daily Treatment Note    NAME: Paula Fortune  : 1955  MRN: [id number]    Patient Diagnosis(es):   Patient Active Problem List    Diagnosis Date Noted    Pneumonia 2019    Seizures ([facility] [address] 2019    Brain aneurysm 2019    TIA (transient ischemic attack)     Aneurysm of middle cerebral artery 2019    Nonintractable episodic headache     Ischemic stroke ([facility] [address] 2019    HTN (hypertension)     Anxiety     Cerebrovascular accident (CVA) ([facility] [address]     Vertigo        CXR (19):  No change in atelectasis or small patchy pneumonia right lower lobe since portable chest 2019. No consolidated pneumonia, pneumothorax or pulmonary venous congestion. Previous MBS - none     Chart reviewed.     Medical Diagnosis: Brain aneurysm   Treatment Diagnosis: Oropharyngeal Dysphagia     BSE Impression (5/10)-  Dysphagia Impression : No mastication of mechanically altered solids was appreciated; pt wears denture at baseline and reports she does not eat anything without dentures present. No overt s/s of aspiration with any textures. Patient was unable to follow commands for 3 ounce water but instead took 3 ounces in 3 very large bolus. No overt s/s of aspiration and tolerated without fluctuations in oxygen saturation or SOB. Pt does have RLL pna per chest xray but had not been eating orally prior to this eval / since surgery per chart.      MBS results -Scheduled for      Pain: Head (3) when coughing     Current Diet : NPO (Previous BSE this AM with rec for Dysphagia I with thins)     Treatment:  Pt seen bedside to address the following goals:  1. The patient will tolerate recommended diet without observed clinical signs of aspiration  5/10 - Pt seen bedside as RN had found the patients dentures since BSE. Patient tolerated initial trials of thins without overt s/s of aspiration.  Patient given soft solids which resulted in very slow and prolonged mastication and pt utilized a liquid rinse with each trial resulting in immediate weak cough on 2/3 trials. Pt given mechanically altered solids again with immediate weak cough following liquid rinse for 1/2 trials. Patient then began having an immediate weak cough inconsistently with trials of thins. Education regarding recommendation for aggressive oral care and ice chips with re-evaluation tomorrow. Pt able to complete oral care bedside with suction toothette system. Pt more lethargic for dysphagia treatment session but the increased alertness and motor agitation as cog-eval initiated (see documentation). Modify goal to: Patient will tolerate least restrictive diet without observed clinical signs of aspiration. 5/11: Pt seen in room, upright in bed. Upon SLP arrival, pt was lethargic but awoken to verbal stimuli. Throughout session, pt required max verbal and tactile cueing to sustain adequate alertness to participate in PO trials. Pt accepted thin via cup and straw, tolerating without overt s/s of penetration/aspiration. Puree applesauce trialed x1. Pt demonstrated timely AP transit, however delayed cough noted. SLP discontinued PO trials d/t pt's lethargy. Recommend continuation of NPO with ice chips following aggressive oral care. Continue goal.  2. The patient/caregiver will demonstrate understanding of compensatory strategies for improved swallowing safety. 5/10 - Pt educated on rationale for diet downgrade at this time, importance of oral care and re-check tomorrow. No family present. Continue goal.   5/11: Pt educated on rationale for continuation of NPO with ice chips and importance of oral care. No family present. RN verbalized understanding and in agreement. No evidence of learning demonstrated by pt.  Continue goal.  5/12: Patient with improved alertness and presents with improving oropharyngeal dysphagia characterized by delayed swallow initiation, resulting in s/s aspiration (immediate cough) with mixed consistency (dilip cracker followed by thin liquid wash). There are no s/s aspiration with thin liquid in isolation, puree, soft solid or hard solid in isolation. Mastication is prolonged yet effective with soft solid. With hard solid there is ineffective mastication of hard solid with majority of unchewed bolus sitting on posterior tongue after initial swallow. When given a liquid wash to soften bolus and promote oral clearance, immediate cough is observed. There is adequate oral clearance with soft solid. Based on today's treatment, recommend Dysphagia 2 with thin liquids, NO MIXED CONSISTENCIES. Given current chest imaging concerning for aspiration and s/s with mixed consistency, recommend obtain MBS to further assess pharyngeal function, trial compensatory strategies, and r/o aspiration. Cont. Goal       Patient/Family/Caregiver Education:  Education as above.     Compensatory Strategies:  Excellent oral care  Small bolus size  Slow rate  Upright positioning      Plan: Continued dysphagia treatment per POC. Diet Recommendations: Dysphagia 2 diet with thin liquid, NO MIXED CONSISTENCIES (no solid/liquid combinations, no cereal, chunky soups with broth, no juicy fruits i.e., pineapple or watermelon)  MBS 5/13  Treatment: 15 minutes  Discharge Plan:  TBD closer to discharge. Discussed with RN. Needs within reach. Electronically Signed by:  Greg Perez Certified Clinician  FEES Certified Clinician  Speech-Language Pathologist  [phone]  Pager #[phone]    This document will serve as a discharge summary if pt discharge before next treatment session.

## 2019-05-12 NOTE — PROGRESS NOTES
Resident Progress Note    Admit Date: 2019    PCP: LIGIA Norton CNP                  : 1955  MRN: [id number]     CC: post op medical management    Subjective: Interval History: no acute event overnight. Pt laying in bed sleeping. Denies chest pain, shortness of breath. Diet: DIET DYSPHAGIA III ADVANCED; Dysphagia III Advanced    Data:   Scheduled Meds:   losartan  50 mg Oral Daily    pantoprazole  40 mg Oral QAM AC    amoxicillin-clavulanate  1 tablet Oral 2 times per day    levETIRAcetam  1,000 mg Oral BID    heparin (porcine)  5,000 Units Subcutaneous 3 times per day    sodium chloride flush  10 mL Intravenous 2 times per day    ALPRAZolam  0.5 mg Oral Nightly    sodium chloride flush  10 mL Intravenous 2 times per day    atomoxetine  80 mg Oral Daily    atorvastatin  40 mg Oral Nightly    escitalopram  20 mg Oral Daily    metoprolol tartrate  25 mg Oral BID    sodium chloride flush  10 mL Intravenous 2 times per day    sennosides-docusate sodium  1 tablet Oral BID    LORazepam  0.5 mg Intravenous Once     Continuous Infusions:  PRN Meds:sodium chloride flush, haloperidol lactate, acetaminophen, sodium chloride flush, butalbital-acetaminophen-caffeine, cloNIDine, sodium chloride flush, ondansetron, acetaminophen, naloxone, polyethylene glycol, bisacodyl  No intake/output data recorded. No intake/output data recorded. No intake or output data in the 24 hours ending 19 1213        Objective:     Vitals: BP (!) 161/71   Pulse 65   Temp 98.5 °F (36.9 °C) (Oral)   Resp 18   Ht 5' 2\" (1.575 m)   Wt 134 lb 14.7 oz (61.2 kg)   SpO2 (!) 88%   BMI 24.68 kg/m²     Physical Exam   Constitutional: She is oriented to person, place, and time. She appears well-developed and well-nourished. No distress. HENT:   Head: Normocephalic and atraumatic. Mouth/Throat: Oropharynx is clear and moist.   Right eye swelling improved.  Right fronto-temporal craniotomy staples present, results for input(s): NITRITE, COLORU, PHUR, LABCAST, WBCUA, RBCUA, MUCUS, TRICHOMONAS, YEAST, BACTERIA, CLARITYU, SPECGRAV, LEUKOCYTESUR, UROBILINOGEN, BILIRUBINUR, BLOODU, GLUCOSEU, AMORPHOUS in the last 72 hours. Invalid input(s): Silva Hilton   -----------------------------------------------------------------  RAD:   XR CHEST PORTABLE   Final Result      No change in atelectasis or small patchy pneumonia right lower lobe. XR ABDOMEN (KUB) (SINGLE AP VIEW)   Final Result      1. Feeding tube tip consistent with placement within mid stomach. XR CHEST PORTABLE   Final Result   1. Right basilar opacities, concerning for pneumonia in the proper    clinical setting. CT HEAD WO CONTRAST   Final Result     Expected postoperative changes          CT Head wo Contrast   Final Result      1. Status post right frontotemporal craniotomy and aneurysm clipping in the right sylvian fissure. 2.  Bifrontal pneumocephalus. Small right frontal subdural fluid collection and small amount of hyperattenuating hematoma. 3.  Right frontal lobe hyperdense subarachnoid hemorrhage and small focus of subdural hematoma over the superior convexity. 4.  Diffuse right hemispheric sulcal effacement suggesting edema. 5.  No significant midline shift. Results discussed with the ICU nurse, Katerin Burk, 5/7/19 at 7:35 PM                       Echo: 2/2019- Summary   Normal LV size and function; LV ejection fraction 60%   Grade 1 diastolic dysfunction   Normal RV size and function   Mild to moderate left atrial enlargement   Normal right atrial size   Normal mitral valve structure with no significant regurgitation   Aortic valve trileaflet with trace AI   Mildly dilated ascending aorta measuring 3.6 cm   Negative bubble study with no evidence for intracardiac shunt         Assessment/Plan:   Patient is a 59 y. o. female PMH significant for ischemic stroke, HTN  presenting with 4 month hx of headache, speech changes and right eye blurriness.  Admitted to ICU post-op from right MCA aneurysm clipping.     Tonic-clonic seizures  Patient had 2 tonic-clonic seizures following aneurysm clipping. Repeat CT showed post-op changes, but no significant bleeding. Given ativan and started on keppra 1000 BID.  Placed on eeg.  Hx of alcoholism; Possible alcohol withdrawal component vs xanax withdrawal.  - Continue keppra 1000 q12- may need to consider another antiepileptic as keppra known to worsen depression symptoms  - neurosurgery following  - neurology following  - continue CIWA assessment  - Continue xanax scheduled   - EEG revealed no certain seizure activity     Fever and leukocytosis likely 2/2 Aspiration PNA in the setting of seizures  Fever 102.1 following seizure.  CXR showed right basilar opacity.  WBC 13. Fever and leukocytosis resolved later that day, but then spiked another fever 102.  Started on unasyn for coverage of likely aspiration  - Unasyn transitioned to augmentin  - Blood and wound culture from  site sent             - gram stain from  site showed 1+ gram negative clarke; NGTD - possible contaminant  - UA negative      Right MCA aneurysm s/p clipping   R MCA aneurysm in 2/2019.  Repeat CT in 3/2019 showed size was 9 mm.  Presented to [facility] for aneurysmal clipping.  No complications during procedure, but developed seizures soon after as above. Repeat CT head showed post-op changes - no sig bleeding  - Keppra 1000 BID in the setting of seizures  - Neurosurgery following  - Holding home ASA     Acute Metabolic Encephalopathy  2/2 Seizures vs. Alcohol withdrawal vs xanax withdrawal. Got 6 mg ativan with improvement, but also started on scheduled xanax.    - resolved  - continue scheduled xanax     HTN- BP: (157-161)/(71-99) , not well controlled  - Started losartan 100mg qd  - lopressor BID - holding parameters in place; patient has been bradycardic     Chronic Ischemic Stroke  - continue home Lipitor   - Holding home ASA     Depression    - continue home lexapro and strattera          Code Status:Full Code  FEN: DIET DYSPHAGIA III ADVANCED;  Dysphagia III Advanced  PPX:  Sub q heparin  DISPO: ICU to med/surg floor- dispo per neurosurgery team        Joyce Lockhart MD  Internal Medicine, PGY-3  Pager: [phone] or via GetMaid  05/12/19  12:13 PM    This patient has been staffed and discussed with attending Clark Murray MD.

## 2019-05-12 NOTE — PROGRESS NOTES
EHL   Left 5 5 5 5 5 5 5 5 5 5 5 5 5   Right 5 5 5 5 5 5 5 5 5 5 5 5 5     Sensory: normal  Incision: dry and clean      Data Review  Recent Results (from the past 24 hour(s))   CBC    Collection Time: 05/12/19  4:07 AM   Result Value Ref Range    WBC 11.7 (H) 4.0 - 11.0 K/uL    RBC 3.17 (L) 4.00 - 5.20 M/uL    Hemoglobin 10.0 (L) 12.0 - 16.0 g/dL    Hematocrit 30.5 (L) 36.0 - 48.0 %    MCV 96.0 80.0 - 100.0 fL    MCH 31.6 26.0 - 34.0 pg    MCHC 32.9 31.0 - 36.0 g/dL    RDW 13.5 12.4 - 15.4 %    Platelets 570 306 - 891 K/uL    MPV 8.5 5.0 - 10.5 fL   Basic metabolic panel    Collection Time: 05/12/19  4:08 AM   Result Value Ref Range    Sodium 139 136 - 145 mmol/L    Potassium 4.1 3.5 - 5.1 mmol/L    Chloride 101 99 - 110 mmol/L    CO2 26 21 - 32 mmol/L    Anion Gap 12 3 - 16    Glucose 124 (H) 70 - 99 mg/dL    BUN 17 7 - 20 mg/dL    CREATININE 1.1 0.6 - 1.2 mg/dL    GFR Non-African American 50 (A) >60    GFR African American >60 >60    Calcium 9.3 8.3 - 10.6 mg/dL         Scheduled Meds:   losartan  50 mg Oral Daily    pantoprazole  40 mg Oral QAM AC    amoxicillin-clavulanate  1 tablet Oral 2 times per day    levETIRAcetam  1,000 mg Oral BID    heparin (porcine)  5,000 Units Subcutaneous 3 times per day    sodium chloride flush  10 mL Intravenous 2 times per day    ALPRAZolam  0.5 mg Oral Nightly    sodium chloride flush  10 mL Intravenous 2 times per day    atomoxetine  80 mg Oral Daily    atorvastatin  40 mg Oral Nightly    escitalopram  20 mg Oral Daily    metoprolol tartrate  25 mg Oral BID    sodium chloride flush  10 mL Intravenous 2 times per day    sennosides-docusate sodium  1 tablet Oral BID    LORazepam  0.5 mg Intravenous Once       Continuous Infusions:      PRN Meds:  sodium chloride flush, haloperidol lactate, acetaminophen, sodium chloride flush, butalbital-acetaminophen-caffeine, cloNIDine, sodium chloride flush, ondansetron, acetaminophen, naloxone, polyethylene glycol,

## 2019-05-12 NOTE — PROGRESS NOTES
Pt arrived from ICU in a bed. Pt daughter is at bedside. Pt has staples that are open to air on the right anterior side of the head, DINO, no drainage noted, no redness. There is swelling around the right orbit, right eye is manually opened. Pt is alert and oriented at this time. She stated that she does have a headache but deferred pharmacological interventions at this time. She does have light sensitivity and all blinds have been drawn shut using the black out shades. Midline in left arm with dressing intact.

## 2019-05-13 ENCOUNTER — APPOINTMENT (OUTPATIENT)
Dept: GENERAL RADIOLOGY | Age: 64
DRG: 025 | End: 2019-05-13
Attending: NEUROLOGICAL SURGERY
Payer: COMMERCIAL

## 2019-05-13 LAB
ANION GAP SERPL CALCULATED.3IONS-SCNC: 15 MMOL/L (ref 3–16)
BASOPHILS ABSOLUTE: 0.1 K/UL (ref 0–0.2)
BASOPHILS RELATIVE PERCENT: 1.1 %
BLOOD CULTURE, ROUTINE: NORMAL
BUN BLDV-MCNC: 10 MG/DL (ref 7–20)
CALCIUM SERPL-MCNC: 9.3 MG/DL (ref 8.3–10.6)
CHLORIDE BLD-SCNC: 101 MMOL/L (ref 99–110)
CO2: 26 MMOL/L (ref 21–32)
CREAT SERPL-MCNC: 0.8 MG/DL (ref 0.6–1.2)
CULTURE, BLOOD 2: NORMAL
EOSINOPHILS ABSOLUTE: 0.3 K/UL (ref 0–0.6)
EOSINOPHILS RELATIVE PERCENT: 3.2 %
GFR AFRICAN AMERICAN: >60
GFR NON-AFRICAN AMERICAN: >60
GLUCOSE BLD-MCNC: 136 MG/DL (ref 70–99)
HCT VFR BLD CALC: 29 % (ref 36–48)
HEMOGLOBIN: 9.6 G/DL (ref 12–16)
LYMPHOCYTES ABSOLUTE: 2.3 K/UL (ref 1–5.1)
LYMPHOCYTES RELATIVE PERCENT: 22.6 %
MCH RBC QN AUTO: 31.8 PG (ref 26–34)
MCHC RBC AUTO-ENTMCNC: 33 G/DL (ref 31–36)
MCV RBC AUTO: 96.3 FL (ref 80–100)
MONOCYTES ABSOLUTE: 0.9 K/UL (ref 0–1.3)
MONOCYTES RELATIVE PERCENT: 8.5 %
NEUTROPHILS ABSOLUTE: 6.5 K/UL (ref 1.7–7.7)
NEUTROPHILS RELATIVE PERCENT: 64.6 %
PDW BLD-RTO: 13.5 % (ref 12.4–15.4)
PLATELET # BLD: 405 K/UL (ref 135–450)
PMV BLD AUTO: 7.9 FL (ref 5–10.5)
POTASSIUM SERPL-SCNC: 3.8 MMOL/L (ref 3.5–5.1)
RBC # BLD: 3.01 M/UL (ref 4–5.2)
SODIUM BLD-SCNC: 142 MMOL/L (ref 136–145)
WBC # BLD: 10.1 K/UL (ref 4–11)

## 2019-05-13 PROCEDURE — 97110 THERAPEUTIC EXERCISES: CPT

## 2019-05-13 PROCEDURE — 97116 GAIT TRAINING THERAPY: CPT

## 2019-05-13 PROCEDURE — 6370000000 HC RX 637 (ALT 250 FOR IP): Performed by: NURSE PRACTITIONER

## 2019-05-13 PROCEDURE — 74230 X-RAY XM SWLNG FUNCJ C+: CPT

## 2019-05-13 PROCEDURE — 2580000003 HC RX 258: Performed by: NURSE PRACTITIONER

## 2019-05-13 PROCEDURE — 6370000000 HC RX 637 (ALT 250 FOR IP): Performed by: NEUROLOGICAL SURGERY

## 2019-05-13 PROCEDURE — 6360000002 HC RX W HCPCS: Performed by: NURSE PRACTITIONER

## 2019-05-13 PROCEDURE — 6370000000 HC RX 637 (ALT 250 FOR IP): Performed by: INTERNAL MEDICINE

## 2019-05-13 PROCEDURE — 80048 BASIC METABOLIC PNL TOTAL CA: CPT

## 2019-05-13 PROCEDURE — 1200000000 HC SEMI PRIVATE

## 2019-05-13 PROCEDURE — 92526 ORAL FUNCTION THERAPY: CPT

## 2019-05-13 PROCEDURE — 92611 MOTION FLUOROSCOPY/SWALLOW: CPT

## 2019-05-13 PROCEDURE — 97530 THERAPEUTIC ACTIVITIES: CPT

## 2019-05-13 PROCEDURE — 6370000000 HC RX 637 (ALT 250 FOR IP)

## 2019-05-13 PROCEDURE — 36415 COLL VENOUS BLD VENIPUNCTURE: CPT

## 2019-05-13 PROCEDURE — 85025 COMPLETE CBC W/AUTO DIFF WBC: CPT

## 2019-05-13 RX ORDER — HYDROCODONE BITARTRATE AND ACETAMINOPHEN 5; 325 MG/1; MG/1
1 TABLET ORAL EVERY 6 HOURS PRN
Status: DISCONTINUED | OUTPATIENT
Start: 2019-05-13 | End: 2019-05-17 | Stop reason: HOSPADM

## 2019-05-13 RX ORDER — POLYVINYL ALCOHOL 14 MG/ML
SOLUTION/ DROPS OPHTHALMIC
Status: DISCONTINUED | OUTPATIENT
Start: 2019-05-13 | End: 2019-05-17 | Stop reason: HOSPADM

## 2019-05-13 RX ADMIN — BUTALBITAL, ACETAMINOPHEN AND CAFFEINE 1 TABLET: 50; 325; 40 TABLET ORAL at 17:41

## 2019-05-13 RX ADMIN — BUTALBITAL, ACETAMINOPHEN AND CAFFEINE 1 TABLET: 50; 325; 40 TABLET ORAL at 01:25

## 2019-05-13 RX ADMIN — Medication 10 ML: at 08:41

## 2019-05-13 RX ADMIN — BUTALBITAL, ACETAMINOPHEN AND CAFFEINE 1 TABLET: 50; 325; 40 TABLET ORAL at 07:24

## 2019-05-13 RX ADMIN — Medication 10 ML: at 08:37

## 2019-05-13 RX ADMIN — ATORVASTATIN CALCIUM 40 MG: 40 TABLET, FILM COATED ORAL at 20:50

## 2019-05-13 RX ADMIN — Medication 10 ML: at 20:51

## 2019-05-13 RX ADMIN — HYDROCODONE BITARTRATE AND ACETAMINOPHEN 1 TABLET: 5; 325 TABLET ORAL at 20:41

## 2019-05-13 RX ADMIN — LEVETIRACETAM 1000 MG: 500 TABLET ORAL at 08:36

## 2019-05-13 RX ADMIN — HEPARIN SODIUM 5000 UNITS: 5000 INJECTION INTRAVENOUS; SUBCUTANEOUS at 15:28

## 2019-05-13 RX ADMIN — ALPRAZOLAM 0.5 MG: 0.5 TABLET ORAL at 20:41

## 2019-05-13 RX ADMIN — Medication 10 ML: at 08:40

## 2019-05-13 RX ADMIN — HEPARIN SODIUM 5000 UNITS: 5000 INJECTION INTRAVENOUS; SUBCUTANEOUS at 20:41

## 2019-05-13 RX ADMIN — SENNOSIDES AND DOCUSATE SODIUM 1 TABLET: 8.6; 5 TABLET ORAL at 20:41

## 2019-05-13 RX ADMIN — Medication 3 MG: at 20:41

## 2019-05-13 RX ADMIN — HEPARIN SODIUM 5000 UNITS: 5000 INJECTION INTRAVENOUS; SUBCUTANEOUS at 07:24

## 2019-05-13 RX ADMIN — LOSARTAN POTASSIUM 100 MG: 100 TABLET ORAL at 08:36

## 2019-05-13 RX ADMIN — AMOXICILLIN AND CLAVULANATE POTASSIUM 1 TABLET: 875; 125 TABLET, FILM COATED ORAL at 08:35

## 2019-05-13 RX ADMIN — PANTOPRAZOLE SODIUM 40 MG: 40 TABLET, DELAYED RELEASE ORAL at 07:24

## 2019-05-13 RX ADMIN — POLYVINYL ALCOHOL: 14 SOLUTION/ DROPS OPHTHALMIC at 15:42

## 2019-05-13 RX ADMIN — ESCITALOPRAM OXALATE 20 MG: 20 TABLET ORAL at 08:35

## 2019-05-13 RX ADMIN — METOPROLOL TARTRATE 25 MG: 25 TABLET ORAL at 20:41

## 2019-05-13 RX ADMIN — SENNOSIDES AND DOCUSATE SODIUM 1 TABLET: 8.6; 5 TABLET ORAL at 08:36

## 2019-05-13 RX ADMIN — METOPROLOL TARTRATE 25 MG: 25 TABLET ORAL at 08:36

## 2019-05-13 RX ADMIN — LEVETIRACETAM 1000 MG: 500 TABLET ORAL at 20:41

## 2019-05-13 ASSESSMENT — PAIN DESCRIPTION - ONSET
ONSET: ON-GOING
ONSET: ON-GOING

## 2019-05-13 ASSESSMENT — PAIN DESCRIPTION - DESCRIPTORS
DESCRIPTORS: ACHING;CONSTANT
DESCRIPTORS: ACHING;THROBBING

## 2019-05-13 ASSESSMENT — PAIN SCALES - GENERAL
PAINLEVEL_OUTOF10: 7
PAINLEVEL_OUTOF10: 4
PAINLEVEL_OUTOF10: 10
PAINLEVEL_OUTOF10: 4
PAINLEVEL_OUTOF10: 7
PAINLEVEL_OUTOF10: 4
PAINLEVEL_OUTOF10: 7
PAINLEVEL_OUTOF10: 3

## 2019-05-13 ASSESSMENT — PAIN DESCRIPTION - LOCATION
LOCATION: HEAD
LOCATION: HEAD

## 2019-05-13 ASSESSMENT — PAIN DESCRIPTION - PROGRESSION
CLINICAL_PROGRESSION: NOT CHANGED
CLINICAL_PROGRESSION: NOT CHANGED

## 2019-05-13 ASSESSMENT — PAIN DESCRIPTION - FREQUENCY
FREQUENCY: CONTINUOUS
FREQUENCY: CONTINUOUS

## 2019-05-13 ASSESSMENT — PAIN - FUNCTIONAL ASSESSMENT: PAIN_FUNCTIONAL_ASSESSMENT: ACTIVITIES ARE NOT PREVENTED

## 2019-05-13 ASSESSMENT — PAIN DESCRIPTION - ORIENTATION: ORIENTATION: MID

## 2019-05-13 ASSESSMENT — PAIN DESCRIPTION - PAIN TYPE
TYPE: ACUTE PAIN
TYPE: SURGICAL PAIN

## 2019-05-13 NOTE — PROGRESS NOTES
per day    atomoxetine  80 mg Oral Daily    atorvastatin  40 mg Oral Nightly    escitalopram  20 mg Oral Daily    metoprolol tartrate  25 mg Oral BID    sodium chloride flush  10 mL Intravenous 2 times per day    sennosides-docusate sodium  1 tablet Oral BID    LORazepam  0.5 mg Intravenous Once       ASSESSMENT:   59 y/o female  with right MCA aneurysm clip and subsequent convulsive seizures meeting criteria for status epilepticus. CT stable to post-op scan with edema and mild hemorrhage. CvEEG showed slowing but no electrographic seizures. 1. Seizure  2. Right MCA aneurysm s/p clipping. RECOMMENDATIONS:   -Continue keppra 1000 bid.  -Seizure Precautions  -Patient advised and instructed on seizure precautions she cannot engage in any activity where loss of consciousness would be dangerous to herself  or others (e.g. driving, climbing, operating heavy machinery, swimming alone, etc.) until approved by her physician (seizure-free for at least 3-6 months). She  expressed understanding of all instructions given. -No further neurological workup. Please call with any questions or concerns.   -She will require outpatient Neurology Follow up.         A copy of this note was provided for Dr Kayla Pedroza MD     [address] [place] [address] Neuroscience  [phone]  Evenings, weekends, and off weeks please discuss with the covering neurologist.

## 2019-05-13 NOTE — PROGRESS NOTES
NEUROSURGERY PROGRESS NOTE    5/13/2019 11:27 AM                               Lavern Toro                      LOS: 6 days   POD#6  s/p Procedure(s) (LRB):  RIGHT CRANIOTOMY FOR CLIPPING OF MIDDLE CEREBRAL ARTERY ANEURYSM (Right)    Subjective:  No acute events overnight. Patient has no specific complaints this am.         Physical Exam:  Patient seen and examined    Vitals:    05/13/19 0830   BP: (!) 154/92   Pulse: 74   Resp: 18   Temp: 99.3 °F (37.4 °C)   SpO2: 93%       GCS:  4 - Opens eyes on own  5 - Alert and oriented  6 - Follows simple motor commands  General: Well developed. Alert and cooperative in no acute distress. HENT: atraumatic, neck supple  Eyes: Optic discs: Not tested  Pulmonary: unlabored respiratory effort  Cardiovascular:  Warm well perfused. No peripheral edema  Gastrointestinal: abdomen soft, NT, ND    Neurological:  Mental Status: Awake, alert, oriented x 4, speech clear and appropriate  Attention: Intact  Language: No aphasia or dysarthria noted  Sensation: Intact to all extremities to light touch  Coordination: Intact    Cranial Nerves:  Cranial Nerves:  II: Visual acuity not tested, denies new visual changes / diplopia  III, IV, VI: PERRL, 3 mm bilaterally, EOMI, no nystagmus noted  V: Facial sensation intact bilaterally to touch  VII: Face symmetric  VIII: Hearing intact bilaterally to spoken voice  IX: Palate movement equal bilaterally  XI: Shoulder shrug equal bilaterally  XII: Tongue midline    Musculoskeletal:   Gait: Not tested   Assist devices: None   Tone: Normal  Motor strength:    Right  Left    Right  Left    Deltoid  5 5  Hip Flex  5 5   Biceps  5 5  Knee Extensors  5 5   Triceps  5 5  Knee Flexors  5 5   Wrist Ext  5 5  Ankle Dorsiflex. 5 5   Wrist Flex  5 5  Ankle Plantarflex. 5 5   Handgrip  5 5  Ext Lalo Longus  5 5   Thumb Ext  5 5         Incision: CDI    Radiological Findings:  Ct Head Wo Contrast  Result Date: 5/7/2019  1.   Status post right frontotemporal craniotomy and aneurysm clipping in the right sylvian fissure. 2.  Bifrontal pneumocephalus. Small right frontal subdural fluid collection and small amount of hyperattenuating hematoma. 3.  Right frontal lobe hyperdense subarachnoid hemorrhage and small focus of subdural hematoma over the superior convexity. 4.  Diffuse right hemispheric sulcal effacement suggesting edema. 5.  No significant midline shift. Ct Head Wo Contrast  Result Date: 5/8/2019  Expected postoperative changes        Labs:  Recent Labs     05/13/19  0532   WBC 10.1   HGB 9.6*   HCT 29.0*          Recent Labs     05/13/19  0532      K 3.8      CO2 26   BUN 10   CREATININE 0.8   GLUCOSE 136*   CALCIUM 9.3       No results for input(s): PROTIME, INR, APTT in the last 72 hours. Patient Active Problem List    Diagnosis Date Noted    Pneumonia 05/08/2019    Seizures ([facility] [address] 05/08/2019    Brain aneurysm 05/07/2019    TIA (transient ischemic attack)     Aneurysm of middle cerebral artery 02/25/2019    Nonintractable episodic headache     Ischemic stroke ([facility] [address] 02/23/2019    HTN (hypertension)     Anxiety     Cerebrovascular accident (CVA) ([facility] [address]     Vertigo        Assessment:  Patient is a 58 y.o. female s/p Procedure(s) (LRB):  RIGHT CRANIOTOMY FOR CLIPPING OF MIDDLE CEREBRAL ARTERY ANEURYSM (Right)    Plan:  1. Neurologically stable  2. Neurologic exams frequency:  - Floor: Q4H until otherwise directed  3. For change in exam MUST contact neurosurgery team along with critical care or primary team  4. Seizure prophylaxis: Keppra 1 g BID  5. Sepsis:  - Fever resolved, afebrile for 72 hours  - CXR showed RLL atelectasis  - Blood cultures, NGTD  - Abx started by critical care team, Complete today  6. Bowel Regimen: Senokot-S  7. Pain control: PRN Fioricet  8. DVT Prophylaxis: SCD's & SQ Heparin in AM  9. Mobility:  - Advance as tolerates  - PT/OT consulted, appreciate recs  - PMR consulted, appreciate recs  10.  Diet:  - ST consulted, appreciate rec; Dysphagia II diet  - MBS showed aspiration on thin liquids    DISPO: Remain inpatient until tolerating PO, pain controlled with PO meds and evaluated by PT/OT from neurosurgery standpoint. Patient was seen and examined with Dr. Josue Floyd who agrees with above assessment and plan.      Electronically signed by: EDI Mcdonough, 5/13/2019 11:27 AM  [phone]

## 2019-05-13 NOTE — PROCEDURES
INSTRUMENTAL SWALLOW REPORT  MODIFIED BARIUM SWALLOW/treatment     NAME: Liz Chaparro   : 1955  MRN: [id number]       Date of Eval: 2019     Ordering Physician: Fatoumata Barber  Radiologist: Charles Espinosa     Referring Diagnosis(es): Referring Diagnosis: brain aneurysm     Past Medical History:  has a past medical history of Anxiety, Arthritis, Cerebral aneurysm, Former smoker, GERD (gastroesophageal reflux disease), and Hypertension. Past Surgical History:  has a past surgical history that includes Hysterectomy; Cholecystectomy; Hemorrhoid surgery; and craniotomy (Right, 2019). Current Diet Solid Consistency: Dysphagia II Mechanically Altered  Current Diet Liquid Consistency: Thin       Type of Study: Initial MBS        CT head 19  FINDINGS:     Status post right frontal craniectomy.  There is mild residual    postoperative hemorrhage.  Chronic right basal ganglia infarction again    seen.  Decreased bifrontal pneumocephalus.  No new areas of edema or    hemorrhage.           CXR 19:  No change in atelectasis or small patchy pneumonia right lower lobe since portable chest 2019. No consolidated pneumonia, pneumothorax or pulmonary venous congestion. Patient Complaints/Reason for Referral:  Liz Chaparro was referred for a MBS to assess the efficiency of his/her swallow function, assess for aspiration, and to make recommendations regarding safe dietary consistencies, effective compensatory strategies, and safe eating environment. Patient complaints: pt with no complaints     Onset of problem:   Date of Onset: 19            Behavior/Cognition/Vision/Hearing:  Behavior/Cognition: Alert; Cooperative;Pleasant mood    Impressions: The pt presents with mild oral phase, mild-moderate pharyngeal phase dysphagia  Oral- pt with only lower denture in place.  Mastication with cracker was slow but functional.   Pharyngeal- pt noted to have decreased tongue base retraction and incomplete will tolerate recommended diet level without s/s aspiration/respiratory decline    2. The patient/caregiver will demonstrate understanding of compensatory strategies for improved swallowing safety. 5/10 - Pt educated on rationale for diet downgrade at this time, importance of oral care and re-check tomorrow. No family present. Continue goal.   5/11: Pt educated on rationale for continuation of NPO with ice chips and importance of oral care. No family present. RN verbalized understanding and in agreement. No evidence of learning demonstrated by pt. Continue goal.  5/13-  The daughter was present for the MBS. The pt and daughter were educated to the results of the MBS, anatomy and physiology of the swallow, diet recommendations, rational for nectar thick liquids and plan to implement exercises to improve swallow exercises. Both stated comprehension. con't goal     3- The pt will participate in [facility]  5/13- goal met    New goal following MBS-  4-The pt will perform exercises to improve swallowing function with min cues          Oral Preparation / Oral Phase    pt with only lower denture in place. Mastication with cracker was slow but functional.       Pharyngeal Phase    pt noted to have decreased tongue base retraction and incomplete epiglottal closure which resulted in aspiration of thin liquids by cup and by straw with and without a cough reflex. In an attempt to protect the airway, the Pt was instructed to perform chin tuck with thin by cup, however, this did NOT aid with airway protection as the bolus spilled over the base of the tongue before pt was able to tuck chin and was aspirated during the swallow with a delayed cough reflex. There was no aspiration or penetration with puree, cracker and nectar by cup and straw. Pt had no residue remaining in valleculae or pyriform after the swallow with any consistency.        Pain   Patient Currently in Pain: Denies      Plan:  Continue goals per POC  Recommended diet: dysphagia II with nectar thick liquids  meds in applesauce, pudding or with nectar thick liquids   Total treatment time:20 dx, 15 tx  Discharge Plan: The pt would benefit from ongoing Speech Therapy to address dysphagia  at next level of care   Discussed with RNAilyn   Needs within reach.        Pa November, [address] [place], [facility]- S[facility]  [id number]  Pg # [phone]  This document will serve as a discharge summary if pt discharge before next treatment   session        Therapy Time:   Individual Concurrent Group Co-treatment   Time In 1100         Time Out 1135         Minutes 35

## 2019-05-13 NOTE — PLAN OF CARE
Problem: Pain:  Goal: Pain level will decrease  Description  Pain level will decrease  5/13/2019 0930 by Echo Mckeon RN  Outcome: Ongoing  5/13/2019 2369 by Thelma Banuelos RN  Note:   Headache pain well controlled with Fioricet, vitals stable, incision well approximated    Goal: Control of acute pain  Description  Control of acute pain  Outcome: Ongoing  Goal: Control of chronic pain  Description  Control of chronic pain  Outcome: Ongoing     Problem: Risk for Impaired Skin Integrity  Goal: Tissue integrity - skin and mucous membranes  Description  Structural intactness and normal physiological function of skin and  mucous membranes. Outcome: Ongoing     Problem: Falls - Risk of:  Goal: Will remain free from falls  Description  Will remain free from falls  5/13/2019 0930 by Echo Mckeon RN  Outcome: Ongoing  5/13/2019 0632 by Thelma Banuelos RN  Note:    Pt free from injury or falls at this time, fall precautions in place, bed in low position, side rail up x2, Roca Fall Risk: High (45 and higher), up with assist, calls with needs, call light in reach, will continue to monitor. Pt verbalizes understanding of fall risk procedures.     Goal: Absence of physical injury  Description  Absence of physical injury  Outcome: Ongoing

## 2019-05-13 NOTE — PROGRESS NOTES
goal 1: Pt will transfer supine <--> sit with min A x 1 -met 5/11; GOAL UPDATED: supervision  ONGOING  Short term goal 2: Pt will transfer sit <--> stand with min A x 1 -met 5/11; GOAL UPDATED: SBA  ONGOING  Short term goal 3: Pt will perform bed <--> chair transfer with min A x 1 - ongoing  Short term goal 4: Pt will participate in gait assessment. - met 5/11; GOAL UPDATED: 150' with SBA  ONGOING  Patient Goals   Patient goals : eventually return home    Plan    Plan  Times per week: 5-7  Current Treatment Recommendations: Strengthening, Gait Training, Patient/Caregiver Education & Training, Equipment Evaluation, Education, & procurement, Balance Training, Functional Mobility Training, Transfer Training  Safety Devices  Type of devices: Call light within reach, Bed alarm in place, Left in bed, Nurse notified     Therapy Time   Individual Concurrent Group Co-treatment   Time In 0930         Time Out 1017         Minutes 47           Timed Code Treatment Minutes:   47    Total Treatment Minutes:  47     If pt d/c'd prior to next treatment, this note serves as a discharge note.   [place], [address] [facility]

## 2019-05-13 NOTE — PROGRESS NOTES
Speech Language Pathology      Chart reviewed. Scheduled pt for MBS for this morning. Discussed with RN. Full report to follow.     Ramu Gregorio, Valerie Aparicio  Speech-Language Pathologist  Pager [phone]

## 2019-05-13 NOTE — PROGRESS NOTES
Resident Progress Note    Admit Date: 2019    PCP: Pamela Vazquez, APRN - [place]                  : 1955  MRN: [id number]     CC: post op medical management    Subjective: Interval History: no acute events overnight. Pt eating in bed. C/o headache overnight relieved with Fioricet. Denies chest pain, shortness of breath. Diet: DIET DYSPHAGIA II MECHANICALLY ALTERED; Dysphagia II Mechanically Altered    Data:   Scheduled Meds:   losartan  100 mg Oral Daily    lidocaine  1 patch Transdermal Daily    melatonin  3 mg Oral Nightly    pantoprazole  40 mg Oral QAM AC    levETIRAcetam  1,000 mg Oral BID    heparin (porcine)  5,000 Units Subcutaneous 3 times per day    sodium chloride flush  10 mL Intravenous 2 times per day    ALPRAZolam  0.5 mg Oral Nightly    sodium chloride flush  10 mL Intravenous 2 times per day    atomoxetine  80 mg Oral Daily    atorvastatin  40 mg Oral Nightly    escitalopram  20 mg Oral Daily    metoprolol tartrate  25 mg Oral BID    sodium chloride flush  10 mL Intravenous 2 times per day    sennosides-docusate sodium  1 tablet Oral BID    LORazepam  0.5 mg Intravenous Once     Continuous Infusions:  PRN Meds:polyvinyl alcohol, sodium chloride flush, haloperidol lactate, acetaminophen, sodium chloride flush, butalbital-acetaminophen-caffeine, cloNIDine, sodium chloride flush, ondansetron, acetaminophen, naloxone, polyethylene glycol, bisacodyl  I/O last 3 completed shifts: In: 720 [P.O.:720]  Out: -   No intake/output data recorded. Intake/Output Summary (Last 24 hours) at 2019 1844  Last data filed at 2019 0929  Gross per 24 hour   Intake 720 ml   Output --   Net 720 ml           Objective:     Vitals: BP (!) 159/89   Pulse 63   Temp 98.8 °F (37.1 °C) (Oral)   Resp 16   Ht 5' 2\" (1.575 m)   Wt 134 lb 14.7 oz (61.2 kg)   SpO2 93%   BMI 24.68 kg/m²     Physical Exam   Constitutional: She is oriented to person, place, and time.  She appears well-developed and well-nourished. No distress. HENT:   Head: Normocephalic and atraumatic. Mouth/Throat: Oropharynx is clear and moist.   Right eye swelling improved. Right fronto-temporal craniotomy staples present, c/d/i    Eyes: Conjunctivae and EOM are normal. Right eye exhibits no discharge. Left eye exhibits no discharge. No scleral icterus. Neck: Normal range of motion. Neck supple. Cardiovascular: Normal rate, regular rhythm, normal heart sounds and intact distal pulses. Exam reveals no gallop and no friction rub. No murmur heard. Pulmonary/Chest: Effort normal and breath sounds normal. No respiratory distress. She has no wheezes. She has no rales. She exhibits no tenderness. Abdominal: Soft. She exhibits no distension and no mass. There is no tenderness. There is no rebound and no guarding. Musculoskeletal: Normal range of motion. She exhibits no edema, tenderness or deformity. Neurological: She is alert and oriented to person, place, and time. No cranial nerve deficit. Coordination normal.   Skin: Skin is warm and dry. No rash noted. She is not diaphoretic. No erythema. No pallor. Psychiatric: She has a normal mood and affect. Her behavior is normal. Judgment and thought content normal.   Nursing note and vitals reviewed. LABS:    CBC:   Recent Labs     05/11/19  0416 05/12/19  0407 05/13/19  0532   WBC 9.7 11.7* 10.1   HGB 10.0* 10.0* 9.6*   HCT 30.2* 30.5* 29.0*   MCV 95.9 96.0 96.3    393 405                                                                BMP:    Recent Labs     05/11/19  0416 05/12/19  0408 05/13/19  0532    139 142   K 3.6 4.1 3.8    101 101   CO2 23 26 26   BUN 10 17 10   CREATININE 0.7 1.1 0.8   GLUCOSE 88 124* 136*       LFT's: No results for input(s): AST, ALT, ALB, BILITOT, ALKPHOS in the last 72 hours. Troponin: No results for input(s): TROPONINI in the last 72 hours.     BNP: No results for input(s): BNP in the last 72 hours. Lipids: No results for input(s): CHOL, HDL in the last 72 hours. Invalid input(s): LDLCALCU    ABGs: No results found for: PHART, GEC3TIF, PO2ART    INR: No results for input(s): INR in the last 72 hours. U/A:No results for input(s): NITRITE, COLORU, PHUR, LABCAST, WBCUA, RBCUA, MUCUS, TRICHOMONAS, YEAST, BACTERIA, CLARITYU, SPECGRAV, LEUKOCYTESUR, UROBILINOGEN, BILIRUBINUR, BLOODU, GLUCOSEU, AMORPHOUS in the last 72 hours. Invalid input(s): Michelle Getting   -----------------------------------------------------------------  RAD:   FL MODIFIED BARIUM SWALLOW W VIDEO   Final Result      Aspiration of thin barium. XR CHEST PORTABLE   Final Result      No change in atelectasis or small patchy pneumonia right lower lobe. XR ABDOMEN (KUB) (SINGLE AP VIEW)   Final Result      1. Feeding tube tip consistent with placement within mid stomach. XR CHEST PORTABLE   Final Result   1. Right basilar opacities, concerning for pneumonia in the proper    clinical setting. CT HEAD WO CONTRAST   Final Result     Expected postoperative changes          CT Head wo Contrast   Final Result      1. Status post right frontotemporal craniotomy and aneurysm clipping in the right sylvian fissure. 2.  Bifrontal pneumocephalus. Small right frontal subdural fluid collection and small amount of hyperattenuating hematoma. 3.  Right frontal lobe hyperdense subarachnoid hemorrhage and small focus of subdural hematoma over the superior convexity. 4.  Diffuse right hemispheric sulcal effacement suggesting edema. 5.  No significant midline shift.       Results discussed with the ICU nurse, Dimitri Corey, 5/7/19 at 7:35 PM                       Echo: 2/2019- Summary   Normal LV size and function; LV ejection fraction 60%   Grade 1 diastolic dysfunction   Normal RV size and function   Mild to moderate left atrial enlargement   Normal right atrial size   Normal mitral valve structure with no significant regurgitation   Aortic valve trileaflet with trace AI   Mildly dilated ascending aorta measuring 3.6 cm   Negative bubble study with no evidence for intracardiac shunt         Assessment/Plan:   Patient is a 59 y. o. female PMH significant for ischemic stroke, HTN  presenting with 4 month hx of headache, speech changes and right eye blurriness.  Admitted to ICU post-op from right MCA aneurysm clipping.       Tonic-clonic seizures  Patient had 2 tonic-clonic seizures following aneurysm clipping. Repeat CT showed post-op changes, but no significant bleeding. Given ativan and started on keppra 1000 BID.  Placed on eeg.  Hx of alcoholism; Possible alcohol withdrawal component vs xanax withdrawal.  - Continue keppra 1000 q12- may need to consider another antiepileptic as keppra known to worsen depression symptoms  - neurosurgery following  - neurology following  - continue CIWA assessment  - Continue xanax scheduled   - EEG revealed no certain seizure activity     Fever and leukocytosis likely 2/2 Aspiration PNA in the setting of seizures  Fever 102.1 following seizure.  CXR showed right basilar opacity.  WBC 13. Fever and leukocytosis resolved later that day, but then spiked another fever 102.  Started on unasyn for coverage of likely aspiration  - Unasyn transitioned to augmentin  - Blood and wound culture from  site sent             - gram stain from  site showed 1+ gram negative clarke; NGTD - possible contaminant  - UA negative      Right MCA aneurysm s/p clipping   R MCA aneurysm in 2/2019.  Repeat CT in 3/2019 showed size was 9 mm.  Presented to [facility] for aneurysmal clipping.  No complications during procedure, but developed seizures soon after as above. Repeat CT head showed post-op changes - no sig bleeding  - Keppra 1000 BID in the setting of seizures  - Neurosurgery following  - Holding home ASA     Acute Metabolic Encephalopathy  2/2 Seizures vs. Alcohol withdrawal vs xanax withdrawal. Got 6 mg ativan with improvement, but also started on scheduled xanax.    - resolved  - continue scheduled xanax     HTN- BP: (132-159)/(78-89) , not well controlled  - Started losartan 100mg qd  - lopressor BID - holding parameters in place; patient has been bradycardic     Chronic Ischemic Stroke  - continue home Lipitor   - Holding home ASA     Depression    - continue home lexapro and strattera          Code Status:Full Code  FEN: DIET DYSPHAGIA II MECHANICALLY ALTERED; Dysphagia II Mechanically Altered  PPX:  Sub q heparin  DISPO: med/surg floor- dispo per neurosurgery team        Lizbet Ann MD  Internal Medicine, PGY-3  Pager: [phone] or via [address] [place] [address]  05/13/19  6:44 PM    This patient has been staffed and discussed with attending Bernice Raza MD.

## 2019-05-13 NOTE — CARE COORDINATION
2019  [facility] ([facility])  Clinical Case Management Department    Consult received to assist with discharge needs. Pt is employed at [employer] in [place] and would like to go there to rehab upon discharge. I called and made the referral with them. Faxed all information to them to review. Family is working on transport to the facility when the time comes. Will continue to follow. Patient: Rebecca Nava  MRN: [id number] / : 1955  ACCT: [de-identified]          Admission Documentation  Attending Provider: Dolores Parekh MD  Admit date/time: 2019  7:00 AM  Status: Inpatient [101]  Diagnosis: Brain aneurysm     Readmission within last 30 days:  no     Living Situation  Discharge Planning  Type of Residence: Private Residence  Living Arrangements: Alone  Support Systems: Family Members  Patient expects to be discharged to[de-identified] [facility] [address] (For Healthcare)  Pre-existing DNR Comfort Care/DNR Arrest/DNI Order: No  Healthcare Directive: No, patient does not have an advance directive for healthcare treatment                        Destination  acute rehab    Durable Medical Equipment   deferred    Home Health/Skilled Nursing  Services at Discharge: [facility]  No Provider: SAVAGE Provider Phone: [address]  NA  Pharmacy: [facility]  Potential Assistance Purchasing Medications:     Does patient want to participate in local refill/meds to beds program?: Not Assessed    Goals of Care  Patient expects to be discharged to[de-identified] HOME  Patient plans for [address]         Mode of transport from hospital:family will transport    Factors facilitating achievement of predicted outcomes: Family support, Cooperative, Pleasant and Sense of humor    Barriers to discharge: will need [facility] [place] precert     Diana William RN  [facility]  Case Management Department  Ph: [phone] Fax: [phone]

## 2019-05-13 NOTE — PROGRESS NOTES
SLP was working with the patient and found a pill in the patients bed. The pil was identified using the pill identifier as Norco/Duongn. Spoke to patients family who stated they were aware the patient had multiple medications in her purse. The daughter volunteered to collect all the patients medications, she proceeded to dump all personal belongings on the couch including the purse and suitcase. Daughter collected about 1/2 to 2/3 of patient belongs bad of medications. These medications were taken home with the patient daughter. Pt was instructed to not take any medications that the staff had not given her. She verbalized agreement.

## 2019-05-13 NOTE — PROGRESS NOTES
Patient up with assist, vitals stable, patient rates headache 8/10, Fioricet given for pain control, assisted to position of comfort, patient encouraged to call with needs, call light in reach, items within reach, will continue to monitor

## 2019-05-14 ENCOUNTER — APPOINTMENT (OUTPATIENT)
Dept: INTERVENTIONAL RADIOLOGY/VASCULAR | Age: 64
DRG: 025 | End: 2019-05-14
Attending: NEUROLOGICAL SURGERY
Payer: COMMERCIAL

## 2019-05-14 LAB
ANION GAP SERPL CALCULATED.3IONS-SCNC: 15 MMOL/L (ref 3–16)
BASOPHILS ABSOLUTE: 0.1 K/UL (ref 0–0.2)
BASOPHILS RELATIVE PERCENT: 0.8 %
BUN BLDV-MCNC: 11 MG/DL (ref 7–20)
CALCIUM SERPL-MCNC: 9.6 MG/DL (ref 8.3–10.6)
CHLORIDE BLD-SCNC: 102 MMOL/L (ref 99–110)
CO2: 23 MMOL/L (ref 21–32)
CREAT SERPL-MCNC: 0.9 MG/DL (ref 0.6–1.2)
EOSINOPHILS ABSOLUTE: 0.4 K/UL (ref 0–0.6)
EOSINOPHILS RELATIVE PERCENT: 3.5 %
GFR AFRICAN AMERICAN: >60
GFR NON-AFRICAN AMERICAN: >60
GLUCOSE BLD-MCNC: 103 MG/DL (ref 70–99)
HCT VFR BLD CALC: 33.7 % (ref 36–48)
HEMOGLOBIN: 10.8 G/DL (ref 12–16)
LYMPHOCYTES ABSOLUTE: 2.3 K/UL (ref 1–5.1)
LYMPHOCYTES RELATIVE PERCENT: 20.1 %
MCH RBC QN AUTO: 31 PG (ref 26–34)
MCHC RBC AUTO-ENTMCNC: 32 G/DL (ref 31–36)
MCV RBC AUTO: 97 FL (ref 80–100)
MONOCYTES ABSOLUTE: 0.9 K/UL (ref 0–1.3)
MONOCYTES RELATIVE PERCENT: 8 %
NEUTROPHILS ABSOLUTE: 7.6 K/UL (ref 1.7–7.7)
NEUTROPHILS RELATIVE PERCENT: 67.6 %
PDW BLD-RTO: 13.6 % (ref 12.4–15.4)
PLATELET # BLD: 428 K/UL (ref 135–450)
PMV BLD AUTO: 8.1 FL (ref 5–10.5)
POTASSIUM SERPL-SCNC: 3.8 MMOL/L (ref 3.5–5.1)
RBC # BLD: 3.47 M/UL (ref 4–5.2)
SODIUM BLD-SCNC: 140 MMOL/L (ref 136–145)
WBC # BLD: 11.2 K/UL (ref 4–11)

## 2019-05-14 PROCEDURE — 6360000002 HC RX W HCPCS: Performed by: NURSE PRACTITIONER

## 2019-05-14 PROCEDURE — 2060000000 HC ICU INTERMEDIATE R&B

## 2019-05-14 PROCEDURE — C1769 GUIDE WIRE: HCPCS

## 2019-05-14 PROCEDURE — 99153 MOD SED SAME PHYS/QHP EA: CPT | Performed by: NEUROLOGICAL SURGERY

## 2019-05-14 PROCEDURE — 6370000000 HC RX 637 (ALT 250 FOR IP): Performed by: NURSE PRACTITIONER

## 2019-05-14 PROCEDURE — 99152 MOD SED SAME PHYS/QHP 5/>YRS: CPT | Performed by: NEUROLOGICAL SURGERY

## 2019-05-14 PROCEDURE — 6370000000 HC RX 637 (ALT 250 FOR IP): Performed by: NEUROLOGICAL SURGERY

## 2019-05-14 PROCEDURE — 85025 COMPLETE CBC W/AUTO DIFF WBC: CPT

## 2019-05-14 PROCEDURE — 97110 THERAPEUTIC EXERCISES: CPT

## 2019-05-14 PROCEDURE — 2500000003 HC RX 250 WO HCPCS

## 2019-05-14 PROCEDURE — 6370000000 HC RX 637 (ALT 250 FOR IP): Performed by: INTERNAL MEDICINE

## 2019-05-14 PROCEDURE — 97116 GAIT TRAINING THERAPY: CPT

## 2019-05-14 PROCEDURE — B3161ZZ FLUOROSCOPY OF RIGHT INTERNAL CAROTID ARTERY USING LOW OSMOLAR CONTRAST: ICD-10-PCS | Performed by: NEUROLOGICAL SURGERY

## 2019-05-14 PROCEDURE — 2580000003 HC RX 258: Performed by: NURSE PRACTITIONER

## 2019-05-14 PROCEDURE — 36415 COLL VENOUS BLD VENIPUNCTURE: CPT

## 2019-05-14 PROCEDURE — C1760 CLOSURE DEV, VASC: HCPCS

## 2019-05-14 PROCEDURE — 80048 BASIC METABOLIC PNL TOTAL CA: CPT

## 2019-05-14 PROCEDURE — C1887 CATHETER, GUIDING: HCPCS

## 2019-05-14 PROCEDURE — C1894 INTRO/SHEATH, NON-LASER: HCPCS

## 2019-05-14 PROCEDURE — 6360000002 HC RX W HCPCS

## 2019-05-14 PROCEDURE — 36224 PLACE CATH CAROTD ART: CPT | Performed by: NEUROLOGICAL SURGERY

## 2019-05-14 PROCEDURE — 2709999900 HC NON-CHARGEABLE SUPPLY

## 2019-05-14 PROCEDURE — 6370000000 HC RX 637 (ALT 250 FOR IP)

## 2019-05-14 PROCEDURE — 92526 ORAL FUNCTION THERAPY: CPT

## 2019-05-14 RX ORDER — SENNA AND DOCUSATE SODIUM 50; 8.6 MG/1; MG/1
2 TABLET, FILM COATED ORAL 2 TIMES DAILY
Status: DISCONTINUED | OUTPATIENT
Start: 2019-05-14 | End: 2019-05-17 | Stop reason: HOSPADM

## 2019-05-14 RX ORDER — BISACODYL 10 MG
10 SUPPOSITORY, RECTAL RECTAL ONCE
Status: DISCONTINUED | OUTPATIENT
Start: 2019-05-14 | End: 2019-05-17 | Stop reason: HOSPADM

## 2019-05-14 RX ORDER — METHOCARBAMOL 500 MG/1
1000 TABLET, FILM COATED ORAL 4 TIMES DAILY
Status: DISCONTINUED | OUTPATIENT
Start: 2019-05-14 | End: 2019-05-17 | Stop reason: HOSPADM

## 2019-05-14 RX ORDER — CHLORTHALIDONE 25 MG/1
12.5 TABLET ORAL DAILY
Qty: 15 TABLET | Refills: 0 | Status: SHIPPED | OUTPATIENT
Start: 2019-05-14 | End: 2019-08-05

## 2019-05-14 RX ORDER — LOSARTAN POTASSIUM 100 MG/1
100 TABLET ORAL DAILY
Qty: 30 TABLET | Refills: 3 | Status: SHIPPED | OUTPATIENT
Start: 2019-05-15 | End: 2019-10-31 | Stop reason: SDUPTHER

## 2019-05-14 RX ADMIN — BUTALBITAL, ACETAMINOPHEN AND CAFFEINE 1 TABLET: 50; 325; 40 TABLET ORAL at 06:25

## 2019-05-14 RX ADMIN — HEPARIN SODIUM 5000 UNITS: 5000 INJECTION INTRAVENOUS; SUBCUTANEOUS at 06:25

## 2019-05-14 RX ADMIN — Medication 10 ML: at 20:31

## 2019-05-14 RX ADMIN — LEVETIRACETAM 1000 MG: 500 TABLET ORAL at 20:30

## 2019-05-14 RX ADMIN — PANTOPRAZOLE SODIUM 40 MG: 40 TABLET, DELAYED RELEASE ORAL at 06:25

## 2019-05-14 RX ADMIN — HEPARIN SODIUM 5000 UNITS: 5000 INJECTION INTRAVENOUS; SUBCUTANEOUS at 21:34

## 2019-05-14 RX ADMIN — Medication 10 ML: at 20:30

## 2019-05-14 RX ADMIN — HEPARIN SODIUM 5000 UNITS: 5000 INJECTION INTRAVENOUS; SUBCUTANEOUS at 14:54

## 2019-05-14 RX ADMIN — ATORVASTATIN CALCIUM 40 MG: 40 TABLET, FILM COATED ORAL at 20:30

## 2019-05-14 RX ADMIN — METHOCARBAMOL TABLETS 1000 MG: 500 TABLET, COATED ORAL at 14:53

## 2019-05-14 RX ADMIN — METHOCARBAMOL TABLETS 1000 MG: 500 TABLET, COATED ORAL at 20:30

## 2019-05-14 RX ADMIN — ALPRAZOLAM 0.5 MG: 0.5 TABLET ORAL at 20:30

## 2019-05-14 RX ADMIN — METOPROLOL TARTRATE 25 MG: 25 TABLET ORAL at 20:30

## 2019-05-14 RX ADMIN — Medication 3 MG: at 20:30

## 2019-05-14 RX ADMIN — SENNOSIDES AND DOCUSATE SODIUM 2 TABLET: 8.6; 5 TABLET ORAL at 10:40

## 2019-05-14 RX ADMIN — HYDROCODONE BITARTRATE AND ACETAMINOPHEN 1 TABLET: 5; 325 TABLET ORAL at 18:21

## 2019-05-14 RX ADMIN — SENNOSIDES AND DOCUSATE SODIUM 2 TABLET: 8.6; 5 TABLET ORAL at 20:30

## 2019-05-14 RX ADMIN — HYDROCODONE BITARTRATE AND ACETAMINOPHEN 1 TABLET: 5; 325 TABLET ORAL at 04:09

## 2019-05-14 RX ADMIN — BUTALBITAL, ACETAMINOPHEN AND CAFFEINE 1 TABLET: 50; 325; 40 TABLET ORAL at 00:18

## 2019-05-14 RX ADMIN — Medication 10 ML: at 10:59

## 2019-05-14 RX ADMIN — LEVETIRACETAM 1000 MG: 500 TABLET ORAL at 10:40

## 2019-05-14 RX ADMIN — HYDROCODONE BITARTRATE AND ACETAMINOPHEN 1 TABLET: 5; 325 TABLET ORAL at 10:59

## 2019-05-14 RX ADMIN — ESCITALOPRAM OXALATE 20 MG: 20 TABLET ORAL at 10:40

## 2019-05-14 RX ADMIN — LOSARTAN POTASSIUM 100 MG: 100 TABLET ORAL at 10:40

## 2019-05-14 ASSESSMENT — PAIN - FUNCTIONAL ASSESSMENT
PAIN_FUNCTIONAL_ASSESSMENT: ACTIVITIES ARE NOT PREVENTED

## 2019-05-14 ASSESSMENT — PAIN SCALES - GENERAL
PAINLEVEL_OUTOF10: 0
PAINLEVEL_OUTOF10: 8
PAINLEVEL_OUTOF10: 0
PAINLEVEL_OUTOF10: 7
PAINLEVEL_OUTOF10: 7
PAINLEVEL_OUTOF10: 0
PAINLEVEL_OUTOF10: 8
PAINLEVEL_OUTOF10: 10
PAINLEVEL_OUTOF10: 8
PAINLEVEL_OUTOF10: 7

## 2019-05-14 ASSESSMENT — PAIN DESCRIPTION - LOCATION
LOCATION: HEAD

## 2019-05-14 ASSESSMENT — PAIN DESCRIPTION - ONSET
ONSET: ON-GOING

## 2019-05-14 ASSESSMENT — PAIN DESCRIPTION - FREQUENCY
FREQUENCY: CONTINUOUS

## 2019-05-14 ASSESSMENT — PAIN DESCRIPTION - PROGRESSION
CLINICAL_PROGRESSION: NOT CHANGED
CLINICAL_PROGRESSION: GRADUALLY WORSENING
CLINICAL_PROGRESSION: GRADUALLY WORSENING

## 2019-05-14 ASSESSMENT — PAIN DESCRIPTION - PAIN TYPE
TYPE: SURGICAL PAIN
TYPE: ACUTE PAIN
TYPE: SURGICAL PAIN

## 2019-05-14 ASSESSMENT — PAIN DESCRIPTION - ORIENTATION
ORIENTATION: RIGHT;MID
ORIENTATION: MID

## 2019-05-14 ASSESSMENT — PAIN DESCRIPTION - DESCRIPTORS
DESCRIPTORS: ACHING;CONSTANT
DESCRIPTORS: ACHING
DESCRIPTORS: ACHING;CONSTANT

## 2019-05-14 NOTE — PROGRESS NOTES
(color):  2 - Able to maintain oxygen saturation >92% on room air  Mallampati: II (soft palate, uvula, fauces visible)  ASA 3 - Patient with moderate systemic disease with functional limitations      Data Review  Recent Results (from the past 24 hour(s))   CBC auto differential    Collection Time: 05/14/19  5:49 AM   Result Value Ref Range    WBC 11.2 (H) 4.0 - 11.0 K/uL    RBC 3.47 (L) 4.00 - 5.20 M/uL    Hemoglobin 10.8 (L) 12.0 - 16.0 g/dL    Hematocrit 33.7 (L) 36.0 - 48.0 %    MCV 97.0 80.0 - 100.0 fL    MCH 31.0 26.0 - 34.0 pg    MCHC 32.0 31.0 - 36.0 g/dL    RDW 13.6 12.4 - 15.4 %    Platelets 167 450 - 593 K/uL    MPV 8.1 5.0 - 10.5 fL    Neutrophils % 67.6 %    Lymphocytes % 20.1 %    Monocytes % 8.0 %    Eosinophils % 3.5 %    Basophils % 0.8 %    Neutrophils # 7.6 1.7 - 7.7 K/uL    Lymphocytes # 2.3 1.0 - 5.1 K/uL    Monocytes # 0.9 0.0 - 1.3 K/uL    Eosinophils # 0.4 0.0 - 0.6 K/uL    Basophils # 0.1 0.0 - 0.2 K/uL   Basic metabolic panel    Collection Time: 05/14/19  5:49 AM   Result Value Ref Range    Sodium 140 136 - 145 mmol/L    Potassium 3.8 3.5 - 5.1 mmol/L    Chloride 102 99 - 110 mmol/L    CO2 23 21 - 32 mmol/L    Anion Gap 15 3 - 16    Glucose 103 (H) 70 - 99 mg/dL    BUN 11 7 - 20 mg/dL    CREATININE 0.9 0.6 - 1.2 mg/dL    GFR Non-African American >60 >60    GFR African American >60 >60    Calcium 9.6 8.3 - 10.6 mg/dL         Scheduled Meds:   losartan  100 mg Oral Daily    lidocaine  1 patch Transdermal Daily    melatonin  3 mg Oral Nightly    pantoprazole  40 mg Oral QAM AC    levETIRAcetam  1,000 mg Oral BID    heparin (porcine)  5,000 Units Subcutaneous 3 times per day    sodium chloride flush  10 mL Intravenous 2 times per day    ALPRAZolam  0.5 mg Oral Nightly    sodium chloride flush  10 mL Intravenous 2 times per day    atomoxetine  80 mg Oral Daily    atorvastatin  40 mg Oral Nightly    escitalopram  20 mg Oral Daily    metoprolol tartrate  25 mg Oral BID    sodium needed  Norco for pain as needed  Ice packs to surgical area, as needed  Continue anti-seizure medications as per Neurology  Patient to be re-evaluated by PT ad social work to define discharge  Ambulate as tolerated  Continue chemical and mechanical DVT prophylaxis  Bowel regimen  Advance diet as tolerated  Remove staples  Patient scheduled to have conventional angiogram to evaluate adequacy of clipping        Signed By: Mark Spain     May 14, 2019

## 2019-05-14 NOTE — PROGRESS NOTES
Nutrition Assessment    Type and Reason for Visit: Initial(LOS eval )    Nutrition Recommendations:   · Dysphagia II diet w/ nectar thick liquids, continue safest diet per speech recommendations   · Trial Magic cup, BID to aid w/ intake   · Monitor bowels and nutritional adequacy    Nutrition Assessment: Pt is nutritionally compromised d/t increased nutritional needs s/p right fronto-temporal craniotomy and NPO x 5 days w/ varied po intake since diet advanced. She is at risk for further nutritional decline r/t constipation and need for Dysphagia II diet w/ nectar thick liquids per Speech. No weight loss noted. RD will start Magic cup and monitor nutritional adequacy. Malnutrition Assessment:  · Malnutrition Status: At risk for malnutrition  · Context: Acute illness or injury  · Findings of the 6 clinical characteristics of malnutrition (Minimum of 2 out of 6 clinical characteristics is required to make the diagnosis of moderate or severe Protein Calorie Malnutrition based on AND/ASPEN Guidelines):  1. Energy Intake-Less than or equal to 75% of estimated energy requirement, Greater than or equal to 7 days    2. Weight Loss-No significant weight loss,    3. Fat Loss-No significant subcutaneous fat loss,    4. Muscle Loss-No significant muscle mass loss,    5. Fluid Accumulation-No significant fluid accumulation,    6.  Strength-Not measured    Nutrition Risk Level:  Moderate    Nutrient Needs:  · Estimated Daily Total Kcal: 0398-1481(14-49)  · Estimated Daily Protein (g): 79-92(1.3-1.5)  · Estimated Daily Total Fluid (ml/day): 7487    Nutrition Diagnosis:   · Problem: Increased nutrient needs  · Etiology: related to Increased demand for energy/nutrients     Signs and symptoms:  as evidenced by Presence of wounds    Objective Information:  · Nutrition-Focused Physical Findings: no BM throughout admit; trace facial edema   · Wound Type: Surgical Wound  · Current Nutrition Therapies:  · Oral Diet Orders: Dysphagia 2, Nectar Thick   · Oral Diet intake: 1-25%, 26-50%, 51-75%, %  · Oral Nutrition Supplement (ONS) Orders: None  · Anthropometric Measures:  · Ht: 5' 2\" (157.5 cm)   · Current Body Wt: 134 lb 14.7 oz (61.2 kg)  · Admission Body Wt: 130 lb (59 kg)  · Usual Body Wt: 135 lb (61.2 kg)(to 140lb per pt )  · Ideal Body Wt: 110 lb (49.9 kg)   · BMI Classification: BMI 18.5 - 24.9 Normal Weight    Nutrition Interventions:   Continue current diet, Start ONS  Continued Inpatient Monitoring    Nutrition Evaluation:   · Evaluation: Goals set   · Goals: pt will tolerate diet adv w/ po intakes of 50% or more of all meals and supplements     · Monitoring: Meal Intake, Supplement Intake, Diet Tolerance, Weight, Constipation      Electronically signed by Yakelin Watson RD, MARY on 5/14/19 at 3:35 PM    ALEX GREENE, MARY  Pager:  [phone]  Office:  [phone]

## 2019-05-14 NOTE — PROGRESS NOTES
Speech Language Pathology  Treatment attempt    Chart reviewed. Attempted to see pt again this date, but pt just finished eating breakfast so was no longer hungry. RN also needed to see pt for assessment. Will attempt to see again later as time permits.     Diana Floyd, [address] [facility] Valerie Chacon 39  Speech-Language Pathologist  Pager [phone]

## 2019-05-14 NOTE — PLAN OF CARE
Problem: Pain:  Goal: Pain level will decrease  Description  Pain level will decrease  Outcome: Ongoing   Numeric pain rating scale being used. Patient repositioned for comfort. Patient is tolerating PO pain medicine. Will continue to assess. Problem: Falls - Risk of:  Goal: Will remain free from falls  Description  Will remain free from falls  Outcome: Ongoing   Patient has remained free of falls. 2/4 bed rails up, bed locked and in lowest position, call light within reach. Patient instructed on use of call light and uses appropriately. Bed alarm on. Non-skid footwear and fall band on. Will continue to monitor.

## 2019-05-14 NOTE — PROGRESS NOTES
Physical Therapy  Daily Treatment Note    Discharge Recommendations: Norris Shah scored a 17/24 on the AM-PAC short mobility form. Current research shows that an AM-PAC score of 17 or less is typically not associated with a discharge to the patient's home setting. Based on the patients AM-PAC score and their current functional mobility deficits, it is recommended that the patient have 5-7 sessions per week of Physical Therapy at d/c to increase the patients independence. Assessment:  Pt with good endurance. Multiple gait deviations noted which worsen with fatigue. Occasional min assist needed for ambulation. Pt would benefit from continued skilled PT to maximize independence prior to D/C home. Equipment Needs: Defer to next level of care (None anticipated)    Chart Reviewed: Yes     Other position/activity restrictions: ambulate   Additional Pertinent Hx: Admit 5/7 for craniotomy, clipping of MCA aneurysm; PMHx: anxiety, arthritis, cerebral aneurysm, GERD, HTN      Diagnosis: Brain Aneurysm    Treatment Diagnosis: impaired gait and transfers    Subjective: Pt in bed initially. Daughter present. Bedrest (s/p angio procedure) lifted per RN. Pt agreeable to working with PT after min encouragement. C/o feeling tired. \"I don't feel like I'm walking right. \"    Pain: Denies    Objective:    Bed mobility  Supine to sit: SBA, HOB up partially with use of rail  Sit to Supine: SBA, HOB up partially    Transfers  Sit to stand: CGA from bed (twice)  Stand to sit: CGA    Ambulation  Assistance Level: CGA to Min assist  Assistive device: None  Distance: 250 ft x 2  Quality of gait: Decreased foot clearance (worse on L); lacking arm swing; occasional mild LOB with side-stepping; decreased pace; decreased step length.      Exercises  15 reps B LE ex while sitting EOB: heel raises, toe raises, hip abd/add, hip flexion, LAQ with SBA    Balance  Sat EOB statically with SBA  Exercises while sitting EOB SBA, but cueing needed for midline as pt tended to drift L  Static stance CGA  Ambulation with wheeled walker CGA to Min asssit    Patient Education  Calling for assist with needs. Expressed understanding. Safety Devices  Pt left with needs in reach. In bed with bed alarm on. RN updated. Daughter present. AM-PAC score  AM-PAC Inpatient Mobility Raw Score : 17  AM-PAC Inpatient T-Scale Score : 42.13  Mobility Inpatient CMS 0-100% Score: 50.57  Mobility Inpatient CMS G-Code Modifier : CK    Goals: (as determined and assessed by primary PT)  Time Frame for Short term goals: discharge  Short term goal 1: Pt will transfer supine <--> sit with min A x 1 -met 5/11; GOAL UPDATED: supervision  ONGOING   Short term goal 2: Pt will transfer sit <--> stand with min A x 1 -met 5/11; GOAL UPDATED: SBA  ONGOING   Short term goal 3: Pt will perform bed <--> chair transfer with min A x 1 - ongoing  Short term goal 4: Pt will participate in gait assessment. - met 5/11; GOAL UPDATED: 150' with SBA  ONGOING     Plan:  Times per week: 5-7;    Current Treatment Recommendations: Strengthening, Gait Training, Patient/Caregiver Education & Training, Equipment Evaluation, Education, & procurement, Balance Training, Functional Mobility Training, Transfer Training    Therapy Time    Individual  Concurrent  Group  Co-treatment    Time In  1206            Time Out  1232            Minutes  26              Timed Code Treatment Minutes: 26  Total Treatment Minutes: 26    Will continue per plan of care. If patient is discharged prior to next treatment, this note will serve as the discharge summary.     [place], [place] #[id number]        Cont toward above goals  Kleber Cowan, [address] [place] [address] 1210

## 2019-05-14 NOTE — PROGRESS NOTES
oz (61.2 kg)   SpO2 94%   BMI 24.68 kg/m²     Physical Exam   Constitutional: She is oriented to person, place, and time. She appears well-developed and well-nourished. No distress. HENT:   Head: Normocephalic and atraumatic. Mouth/Throat: Oropharynx is clear and moist.   Right eye swelling improved. Right fronto-temporal craniotomy staples present, c/d/i    Eyes: Conjunctivae and EOM are normal. Right eye exhibits no discharge. Left eye exhibits no discharge. No scleral icterus. Neck: Normal range of motion. Neck supple. Cardiovascular: Normal rate, regular rhythm, normal heart sounds and intact distal pulses. Exam reveals no gallop and no friction rub. No murmur heard. Pulmonary/Chest: Effort normal and breath sounds normal. No respiratory distress. She has no wheezes. She has no rales. She exhibits no tenderness. Abdominal: Soft. She exhibits no distension and no mass. There is no tenderness. There is no rebound and no guarding. Musculoskeletal: Normal range of motion. She exhibits no edema, tenderness or deformity. Neurological: She is alert and oriented to person, place, and time. No cranial nerve deficit. Coordination normal.   Skin: Skin is warm and dry. No rash noted. She is not diaphoretic. No erythema. No pallor. Psychiatric: She has a normal mood and affect. Her behavior is normal. Judgment and thought content normal.   Nursing note and vitals reviewed.           LABS:    CBC:   Recent Labs     05/12/19  0407 05/13/19  0532 05/14/19  0549   WBC 11.7* 10.1 11.2*   HGB 10.0* 9.6* 10.8*   HCT 30.5* 29.0* 33.7*   MCV 96.0 96.3 97.0    405 428                                                                BMP:    Recent Labs     05/12/19  0408 05/13/19  0532 05/14/19  0549    142 140   K 4.1 3.8 3.8    101 102   CO2 26 26 23   BUN 17 10 11   CREATININE 1.1 0.8 0.9   GLUCOSE 124* 136* 103*       LFT's: No results for input(s): AST, ALT, ALB, BILITOT, ALKPHOS in the last 72 hours.    Troponin: No results for input(s): TROPONINI in the last 72 hours. BNP: No results for input(s): BNP in the last 72 hours. Lipids: No results for input(s): CHOL, HDL in the last 72 hours. Invalid input(s): LDLCALCU    ABGs: No results found for: PHART, [id number], PO2ART    INR: No results for input(s): INR in the last 72 hours. U/A:No results for input(s): NITRITE, COLORU, PHUR, LABCAST, WBCUA, RBCUA, MUCUS, TRICHOMONAS, YEAST, BACTERIA, CLARITYU, SPECGRAV, LEUKOCYTESUR, UROBILINOGEN, BILIRUBINUR, BLOODU, GLUCOSEU, AMORPHOUS in the last 72 hours. Invalid input(s): Glorious Humera   -----------------------------------------------------------------  RAD:   FL MODIFIED BARIUM SWALLOW W VIDEO   Final Result      Aspiration of thin barium. XR CHEST PORTABLE   Final Result      No change in atelectasis or small patchy pneumonia right lower lobe. XR ABDOMEN (KUB) (SINGLE AP VIEW)   Final Result      1. Feeding tube tip consistent with placement within mid stomach. XR CHEST PORTABLE   Final Result   1. Right basilar opacities, concerning for pneumonia in the proper    clinical setting. CT HEAD WO CONTRAST   Final Result     Expected postoperative changes          CT Head wo Contrast   Final Result      1. Status post right frontotemporal craniotomy and aneurysm clipping in the right sylvian fissure. 2.  Bifrontal pneumocephalus. Small right frontal subdural fluid collection and small amount of hyperattenuating hematoma. 3.  Right frontal lobe hyperdense subarachnoid hemorrhage and small focus of subdural hematoma over the superior convexity. 4.  Diffuse right hemispheric sulcal effacement suggesting edema. 5.  No significant midline shift.       Results discussed with the ICU nurse, Susy Thomas, 5/7/19 at 7:35 PM            IR SILVIO CATH PLACE INT CAROTID INTRACRANIAL RIGHT W ANGIO    (Results Pending)              Echo: 2/2019- Summary   Normal LV size and function; LV ejection fraction 60%   Grade 1 diastolic dysfunction   Normal RV size and function   Mild to moderate left atrial enlargement   Normal right atrial size   Normal mitral valve structure with no significant regurgitation   Aortic valve trileaflet with trace AI   Mildly dilated ascending aorta measuring 3.6 cm   Negative bubble study with no evidence for intracardiac shunt         Assessment/Plan:   Patient is a 59 y. o. female PMH significant for ischemic stroke, HTN  presenting with 4 month hx of headache, speech changes and right eye blurriness.  Admitted to ICU post-op from right MCA aneurysm clipping.       HTN- BP: BP: (122-169)/(73-95)  , not well controlled  - Started losartan 100mg qd- should continue on discharge ; rx signed in chart  - start chlorthalidone 12.5mg on discharge ; rx signed in chart  -  Discontinue lopressor BID -  patient has been bradycardic  -  Pt to follow up with PCP on discharge for blood pressure check in 1 week     Tonic-clonic seizures  Patient had 2 tonic-clonic seizures following aneurysm clipping. Repeat CT showed post-op changes, but no significant bleeding. Given ativan and started on keppra 1000 BID.  Placed on eeg.  Hx of alcoholism; Possible alcohol withdrawal component vs xanax withdrawal.  - Continue keppra 1000 q12- may need to consider another antiepileptic as keppra known to worsen depression symptoms  - neurosurgery following  - neurology following  - continue CIWA assessment  - Continue xanax scheduled   - EEG revealed no certain seizure activity     Fever and leukocytosis likely 2/2 Aspiration PNA in the setting of seizures  Fever 102.1 following seizure.  CXR showed right basilar opacity.  WBC 13. Fever and leukocytosis resolved later that day, but then spiked another fever 102.  Started on unasyn for coverage of likely aspiration  - Unasyn transitioned to augmentin  - Blood and wound culture from  site sent             - gram stain from  site showed 1+ gram negative clarke; NGTD - possible contaminant  - UA negative      Right MCA aneurysm s/p clipping   R MCA aneurysm in 2/2019.  Repeat CT in 3/2019 showed size was 9 mm.  Presented to [facility] for aneurysmal clipping.  No complications during procedure, but developed seizures soon after as above. Repeat CT head showed post-op changes - no sig bleeding  - Keppra 1000 BID in the setting of seizures  - Neurosurgery following  - Holding home ASA     Acute Metabolic Encephalopathy  2/2 Seizures vs. Alcohol withdrawal vs xanax withdrawal. Got 6 mg ativan with improvement, but also started on scheduled xanax.    - resolved  - continue scheduled xanax     Chronic Ischemic Stroke  - continue home Lipitor   - Holding home ASA     Depression    - continue home lexapro and strattera          Code Status:Full Code  FEN: DIET DYSPHAGIA II MECHANICALLY ALTERED; Dysphagia II Mechanically Altered;  Talmo Thick  Dietary Nutrition Supplements: Frozen Oral Supplement  PPX:  Sub q heparin  DISPO: med/surg floor- dispo per neurosurgery team        Kwame Oliver MD  Internal Medicine, PGY-3  Pager: [phone] or via [employer]  05/14/19  4:46 PM    This patient has been staffed and discussed with attending Edgar Tovar MD.

## 2019-05-14 NOTE — CARE COORDINATION
[address] Transitions Interview     2019    Patient: Hope Mejia Patient : 1955   MRN: [id number]   Reason for Admission: brain aneurysm   RARS: Readmission Risk Score: 21         Spoke with: Hope Mejia and Jeremie Barragan, dtr    Readmission Risk  Patient Active Problem List   Diagnosis    [facility]    HTN (hypertension)    Anxiety    Cerebrovascular accident (CVA) ([facility] [address] [facility]    Vertigo    Nonintractable episodic headache    Aneurysm of middle cerebral artery    TIA (transient ischemic attack)    Brain aneurysm    Pneumonia    Seizures [facility])       Inpatient Assessment  Care Transitions Summary    Care Transitions Inpatient Review  Medication Review  Do you have all of your prescriptions and are they filled?:  Yes   Are you able to afford your medications?:  Yes  How often do you have difficulty taking your medications?:  I always take them as prescribed. Housing Review  Who do you live with?:  Alone  Are you an active caregiver in your home?:  No  Social Support  Durable Medical Equipment  Functional Review  Ability to seek help/take action for Emergent/Urgent situations i.e. fire, crime, inclement weather or health crisis. :  Independent  Ability handle personal hygiene needs (bathing/dressing/grooming): Independent  Ability to manage medications: Independent  Ability to prepare food:  Independent  Ability to maintain home (clean home, laundry): Independent  Ability to drive and/or has transportation:  Independent  Ability to do shopping:  Independent  Ability to manage finances: Independent  Is patient able to live independently?:  Yes  Hearing and Vision  Visual Impairment:  Reading glasses  Hearing Impairment:  None  Care Transitions Interventions       Summary  CTC spoke to the Pt and her daughter, Jeremie Barragan, who is here from [place]. Pt reports she lives alone in a first floor apartment with level entryway.   Pt states she has a son who lives out-of-state and a niece who lives in the area who assist as needed. Pt reports she was independent with ADLs and IADLs prior to admission to hospital on 5/7. Pt's daughter states Pt will not be able to drive for the next three months d/t recent seizure. Daughter and Pt state they are trying to get admitted to [facility] in [place]. CTC will continue to follow. Follow Up  No future appointments.     Health Maintenance  Health Maintenance Due   Topic Date Due    Breast cancer screen  07/02/2004       Samara Siddiqui RN

## 2019-05-14 NOTE — PROGRESS NOTES
decreased tongue base retraction and incomplete epiglottal closure which resulted in aspiration of thin liquids by cup and by straw with and without a cough reflex. In an attempt to protect the airway, the Pt was instructed to perform chin tuck with thin by cup, however, this did NOT aid with airway protection as the bolus spilled over the base of the tongue before pt was able to tuck chin and was aspirated during the swallow with a delayed cough reflex. There was no aspiration or penetration with puree, cracker and nectar by cup and straw. Pt had no residue remaining in valleculae or pyriform after the swallow with any consistency- recommend dysphagia II with nectar thick liquids          Pain: Headache-did into rate-  reported RN is aware        Current Diet : dysphagia II with nectar thick liquids   Treatment:  Pt seen bedside to address the following goals:  1. The patient will tolerate recommended diet without observed clinical signs of aspiration  5/10 - Pt seen bedside as RN had found the patients dentures since BSE. Patient tolerated initial trials of thins without overt s/s of aspiration. Patient given soft solids which resulted in very slow and prolonged mastication and pt utilized a liquid rinse with each trial resulting in immediate weak cough on 2/3 trials. Pt given mechanically altered solids again with immediate weak cough following liquid rinse for 1/2 trials. Patient then began having an immediate weak cough inconsistently with trials of thins. Education regarding recommendation for aggressive oral care and ice chips with re-evaluation tomorrow. Pt able to complete oral care bedside with suction toothette system. Pt more lethargic for dysphagia treatment session but the increased alertness and motor agitation as cog-eval initiated (see documentation). Modify goal to: Patient will tolerate least restrictive diet without observed clinical signs of aspiration. 5/11: Pt seen in room, upright in bed. Upon SLP arrival, pt was lethargic but awoken to verbal stimuli. Throughout session, pt required max verbal and tactile cueing to sustain adequate alertness to participate in PO trials. Pt accepted thin via cup and straw, tolerating without overt s/s of penetration/aspiration. Puree applesauce trialed x1. Pt demonstrated timely AP transit, however delayed cough noted. SLP discontinued PO trials d/t pt's lethargy. Recommend continuation of NPO with ice chips following aggressive oral care. Continue goal.  Revised goals following MBS-  1- The pt will tolerate recommended diet level without s/s aspiration/respiratory decline  5/14-  Lungs are clear per chart. Pt reported no difficulty with meals this date. Only analyzed with trials of nectar by cup this date as attempted to see pt 2x this date, and both times pt just finished eating a meal so was full. Pt demonstrated no s/s aspiration with nectar thick liquids. con't goal    2. The patient/caregiver will demonstrate understanding of compensatory strategies for improved swallowing safety. 5/10 - Pt educated on rationale for diet downgrade at this time, importance of oral care and re-check tomorrow. No family present. Continue goal.   5/11: Pt educated on rationale for continuation of NPO with ice chips and importance of oral care. No family present. RN verbalized understanding and in agreement. No evidence of learning demonstrated by pt. Continue goal.  5/12: Patient with improved alertness and presents with improving oropharyngeal dysphagia characterized by delayed swallow initiation, resulting in s/s aspiration (immediate cough) with mixed consistency (dilip cracker followed by thin liquid wash). There are no s/s aspiration with thin liquid in isolation, puree, soft solid or hard solid in isolation. Mastication is prolonged yet effective with soft solid.  With hard solid there is ineffective mastication of hard solid with majority of unchewed bolus sitting on posterior tongue after initial swallow. When given a liquid wash to soften bolus and promote oral clearance, immediate cough is observed. There is adequate oral clearance with soft solid. Based on today's treatment, recommend Dysphagia 2 with thin liquids, NO MIXED CONSISTENCIES. Given current chest imaging concerning for aspiration and s/s with mixed consistency, recommend obtain MBS to further assess pharyngeal function, trial compensatory strategies, and r/o aspiration. Cont. Goal   5/13-  The daughter was present for the MBS. The pt and daughter were educated to the results of the MBS, anatomy and physiology of the swallow, diet recommendations, rational for nectar thick liquids and plan to implement exercises to improve swallow exercises. Both stated comprehension. con't goal   5/14- goal met- The pt was educated to the purpose of the visit, rational for swallowing exercises, need to repeat MBS prior to diet upgrade (recommend 4-6 weeks) due to instances of silent aspiration. Pt stated comprehension and agreement. Cont goal      3- The pt will participate in [facility]  5/13- goal met     New goal following MBS-  4-The pt will perform exercises to improve swallowing function with min cues   5/14-  Pt very fatigued this afternoon. Pt performed effortful swallow x10 and was encouraged to con't exercises through out the day. Pt required cues to keep head at midline when performing effortful swallow. Pt provided with hand out as a reminder. con't goal             Patient/Family/Caregiver Education:  Education as above.     Compensatory Strategies:  Excellent oral care  Small bolus size  Slow rate  Upright positioning      Plan: Continued dysphagia treatment per POC.   Diet Recommendations:dysphagia II with nectar thick liquids  Treatment: 15 minutes  Discharge Plan:  pt would benefit from ongoing dysphagia tx after discharged from acute care with repeat MBS prior to diet upgrade as pt has inconsistent cough reflex when

## 2019-05-14 NOTE — PROCEDURES
PROCEDURE  Diagnostic cerebral angiogram  Ultrasound guided femoral artery access    Procedure Date:  May 14, 2019    CLINICAL DATA:   Qasim Mujica is a 58 y.o. female who is being seen for cerebral aneurysm. She is post operative day 7 for right craniotomy and clipping of an unruptured middle cerebral artery aneurysm. An angiogram is scheduled to evaluate adequacy of clipping    OPERATORS: Dr. Janina Doshi performed the study. Dr. Janina Doshi, the attending physician, supervised and was present in the angio suite during the entire procedure. Dr. Janina Doshi reviewed and interpreted the study at a later time. VESSELS CATHETERIZED AND ANGIOGRAMMED:   · Right internal carotid artery (MARIO)  · Femoral artery    ANESTHESIA: Moderate sedation and intravenous analgesia was given by the Angio-Suite nurse under the direct supervision of Dr. Janina Doshi, the attending physician. About 32 minutes of moderate sedation was provided    MEDICATIONS:  *Heparin, IV/IA  *Versed, IV  *Fentanyl, IV  *1% Lidocaine, SQ    CONTRAST:  20 ml Visipaque 270 contrast, IA    FLUORO TIME:  2.8 minutes    MATERIALS:   5 Canadian micro-puncture system  0.035\" glide wire  5 Western Frida diagnostic angled catheter  6 Canadian Angio-seal Device     DURATION:   32 minutes    CONSENT: Prior to the procedure, the technical aspects of the procedure as well as potential risks and benefits were explained to the patient. Specifically the risks of cerebral infarction, puncture site hemorrhage, retroperitoneal hemorrhage, hemorrhagic shock, infection, device failure, anaphylaxis, renal failure, limb loss, death, radiation effects (hair loss, cataracts, radiation burns, tumors, dementia), arterial dissection, arterial pseudoaneurysm, and arteriovenous fistula were discussed. The advantages and disadvantages of alternative procedures were presented. An opportunity to state any questions or concerns was given, and these were then addressed.  Following this process, it was requested that we proceed with the proposed intervention and this was expressed in the form of a written consent. TECHNIQUE:  The right groin was prepared and draped in the usual sterile fashion with a Chloraprep applicator. Local anesthesia was administered at the puncture site with 1% lidocaine. The right femoral artery was accessed using ultrasound guided technique. A guide wire was advanced into the aorta, and a sheath was advanced. The sheath was secured to the groin with a 2-0 suture. Then, a heparin drip was placed at sheath line. Double flush was performed at this time. Prior to arterial catheterization heparin was administered intravenously. Using fluoroscopic guidance, a vertebral guide catheter was advanced to the aortic arch over a guidewire. Selection of the  MARIO was done. Cervical and cranial digital subtracted angiography from the selected vessels was done. A limited angiogram of the femoral artery was performed through the vascular introducer sheath. The above listed closure device was used to close the arteriotomy. FINDINGS:  MARIO: Normal contrast enhancement and caliber of the internal carotid artery. The ophthalmic artery, posterior communicating artery, and anterior choroidal artery are present. The carotid terminus has normal opacification and flow. Both the anterior cerebral artery and middle cerebral artery are well opacified and have normal flow and branching anatomy. The capillary and venous phases are normal. Aneurysm clips are noted. There is no residual aneurysm present     Femoral artery: The vascular introducer sheath enters the femoral artery. There is no pseudoaneurysm, arteriovenous fistula or angiographic evidence of flow limiting dissection. COMPLICATIONS: None immediate. IMPRESSIONS:  Adequate occlusion of the recently clipped right middle cerebral artery aneurysm. No further conventional angiography is needed.      Signed By: Bekah Carroll     May 14, 2019

## 2019-05-14 NOTE — PROGRESS NOTES
4 Eyes Admission Assessment     I agree as the admission nurse that 2 RN's have performed a thorough Head to Toe Skin Assessment on the patient. ALL assessment sites listed below have been assessed on admission. Areas assessed by both nurses:   [x]   Head, Face, and Ears   [x]   Shoulders, Back, and Chest  [x]   Arms, Elbows, and Hands   [x]   Coccyx, Sacrum, and Ischum  [x]   Legs, Feet, and Heels        Does the Patient have Skin Breakdown?   No         Rene Prevention initiated:  Yes   Wound Care Orders initiated:  NA      [facility] nurse consulted for Pressure Injury (Stage 3,4, Unstageable, DTI, NWPT, and Complex wounds):  NA      Nurse 1 eSignature: Electronically signed by Bryce Bonilla RN on 5/14/19 at 8:59 AM    **SHARE this note so that the co-signing nurse is able to place an eSignature**    Nurse 2 eSignature: Electronically signed by Carlotta Murphy RN on 5/14/19 at 4:04 PM

## 2019-05-14 NOTE — PROGRESS NOTES
Patient alert and oriented x4. VSS. Neuro checks WNL. Surgical site is CDI. Patient stated early in the shift that her pain was not controlled on fioricet. Dr. Adonay Whalen called and order for PRN Norco placed. Patient stated that this helped. Tolerating ambulation well. Urinating adequately. Fall precautions in place, will continue to monitor.

## 2019-05-14 NOTE — PLAN OF CARE
Nutrition Problem: Increased nutrient needs  Intervention: Food and/or Nutrient Delivery: Continue current diet, Start ONS  Nutritional Goals: pt will tolerate diet adv w/ po intakes of 50% or more of all meals and supplements

## 2019-05-14 NOTE — PROGRESS NOTES
Speech Language Pathology   treatment attempt      Chart reviewed. Attempted to see pt this date to address dysphagia. Pt just returned from cath lab and must remain flat for 2 hours. MBS performed 5/13- recommended dysphagia II diet with NECTAR thick liquids- informed RN yesterday after MBS was complete, but diet not changed. Called RN this morning re: need for nectar thick liquids, but diet order still not changed. Pt transferred to 4W after procedure- again informed RN of need for diet order to be changed to NECTAR THICK liquids. Spoke with pt's daughter who reported she was thickening pt's liquids yesterday. Will attempt to see again later as time permits.     Pa November, [place], Valerie Aparicio  Speech-Language Pathologist  Pager [phone]

## 2019-05-15 LAB
ANION GAP SERPL CALCULATED.3IONS-SCNC: 14 MMOL/L (ref 3–16)
BASOPHILS ABSOLUTE: 0.1 K/UL (ref 0–0.2)
BASOPHILS RELATIVE PERCENT: 1 %
BUN BLDV-MCNC: 15 MG/DL (ref 7–20)
CALCIUM SERPL-MCNC: 9.7 MG/DL (ref 8.3–10.6)
CHLORIDE BLD-SCNC: 102 MMOL/L (ref 99–110)
CO2: 27 MMOL/L (ref 21–32)
CREAT SERPL-MCNC: 0.9 MG/DL (ref 0.6–1.2)
EOSINOPHILS ABSOLUTE: 0.4 K/UL (ref 0–0.6)
EOSINOPHILS RELATIVE PERCENT: 3.4 %
GFR AFRICAN AMERICAN: >60
GFR NON-AFRICAN AMERICAN: >60
GLUCOSE BLD-MCNC: 112 MG/DL (ref 70–99)
HCT VFR BLD CALC: 29.7 % (ref 36–48)
HEMOGLOBIN: 9.8 G/DL (ref 12–16)
LYMPHOCYTES ABSOLUTE: 2 K/UL (ref 1–5.1)
LYMPHOCYTES RELATIVE PERCENT: 18.9 %
MCH RBC QN AUTO: 32 PG (ref 26–34)
MCHC RBC AUTO-ENTMCNC: 32.9 G/DL (ref 31–36)
MCV RBC AUTO: 97.3 FL (ref 80–100)
MONOCYTES ABSOLUTE: 0.9 K/UL (ref 0–1.3)
MONOCYTES RELATIVE PERCENT: 8.1 %
NEUTROPHILS ABSOLUTE: 7.4 K/UL (ref 1.7–7.7)
NEUTROPHILS RELATIVE PERCENT: 68.6 %
PDW BLD-RTO: 13.5 % (ref 12.4–15.4)
PLATELET # BLD: 470 K/UL (ref 135–450)
PMV BLD AUTO: 8 FL (ref 5–10.5)
POTASSIUM SERPL-SCNC: 4.3 MMOL/L (ref 3.5–5.1)
RBC # BLD: 3.06 M/UL (ref 4–5.2)
SODIUM BLD-SCNC: 143 MMOL/L (ref 136–145)
WBC # BLD: 10.7 K/UL (ref 4–11)

## 2019-05-15 PROCEDURE — 6370000000 HC RX 637 (ALT 250 FOR IP): Performed by: NURSE PRACTITIONER

## 2019-05-15 PROCEDURE — 6360000002 HC RX W HCPCS: Performed by: NURSE PRACTITIONER

## 2019-05-15 PROCEDURE — 6370000000 HC RX 637 (ALT 250 FOR IP): Performed by: INTERNAL MEDICINE

## 2019-05-15 PROCEDURE — 97116 GAIT TRAINING THERAPY: CPT

## 2019-05-15 PROCEDURE — 2060000000 HC ICU INTERMEDIATE R&B

## 2019-05-15 PROCEDURE — 97530 THERAPEUTIC ACTIVITIES: CPT

## 2019-05-15 PROCEDURE — 6370000000 HC RX 637 (ALT 250 FOR IP): Performed by: NEUROLOGICAL SURGERY

## 2019-05-15 PROCEDURE — 36415 COLL VENOUS BLD VENIPUNCTURE: CPT

## 2019-05-15 PROCEDURE — 6370000000 HC RX 637 (ALT 250 FOR IP)

## 2019-05-15 PROCEDURE — 2580000003 HC RX 258: Performed by: NURSE PRACTITIONER

## 2019-05-15 PROCEDURE — 97535 SELF CARE MNGMENT TRAINING: CPT

## 2019-05-15 PROCEDURE — 85025 COMPLETE CBC W/AUTO DIFF WBC: CPT

## 2019-05-15 PROCEDURE — 80048 BASIC METABOLIC PNL TOTAL CA: CPT

## 2019-05-15 RX ORDER — BUTALBITAL, ACETAMINOPHEN AND CAFFEINE 50; 325; 40 MG/1; MG/1; MG/1
1 TABLET ORAL EVERY 4 HOURS PRN
Qty: 180 TABLET | Refills: 3 | Status: SHIPPED | OUTPATIENT
Start: 2019-05-15 | End: 2019-08-05

## 2019-05-15 RX ORDER — LEVETIRACETAM 1000 MG/1
1000 TABLET ORAL 2 TIMES DAILY
Qty: 60 TABLET | Refills: 3 | Status: SHIPPED | OUTPATIENT
Start: 2019-05-15 | End: 2019-10-31 | Stop reason: ALTCHOICE

## 2019-05-15 RX ORDER — METHOCARBAMOL 500 MG/1
1000 TABLET, FILM COATED ORAL EVERY 6 HOURS PRN
Qty: 80 TABLET | Refills: 0 | Status: SHIPPED | OUTPATIENT
Start: 2019-05-15 | End: 2019-05-25

## 2019-05-15 RX ORDER — HYDROCODONE BITARTRATE AND ACETAMINOPHEN 5; 325 MG/1; MG/1
1-2 TABLET ORAL EVERY 6 HOURS PRN
Qty: 20 TABLET | Refills: 0 | Status: SHIPPED | OUTPATIENT
Start: 2019-05-15 | End: 2019-05-18

## 2019-05-15 RX ORDER — UREA 10 %
3 LOTION (ML) TOPICAL NIGHTLY
Qty: 30 TABLET | Refills: 0 | Status: SHIPPED | OUTPATIENT
Start: 2019-05-15 | End: 2019-05-17 | Stop reason: HOSPADM

## 2019-05-15 RX ORDER — HEPARIN SODIUM 5000 [USP'U]/ML
5000 INJECTION, SOLUTION INTRAVENOUS; SUBCUTANEOUS EVERY 8 HOURS SCHEDULED
Qty: 1 ML | Refills: 0 | Status: SHIPPED | OUTPATIENT
Start: 2019-05-15 | End: 2019-05-17 | Stop reason: HOSPADM

## 2019-05-15 RX ORDER — POLYVINYL ALCOHOL 14 MG/ML
1 SOLUTION/ DROPS OPHTHALMIC
Qty: 1 BOTTLE | Refills: 0 | Status: SHIPPED | OUTPATIENT
Start: 2019-05-15 | End: 2019-06-14

## 2019-05-15 RX ORDER — CLOPIDOGREL BISULFATE 75 MG/1
75 TABLET ORAL DAILY
Qty: 30 TABLET | Refills: 3 | Status: SHIPPED | OUTPATIENT
Start: 2019-05-22 | End: 2019-05-15 | Stop reason: HOSPADM

## 2019-05-15 RX ORDER — ALPRAZOLAM 0.5 MG/1
0.5 TABLET ORAL NIGHTLY
Qty: 30 TABLET | Refills: 0 | Status: SHIPPED | OUTPATIENT
Start: 2019-05-15 | End: 2019-06-14

## 2019-05-15 RX ORDER — POLYETHYLENE GLYCOL 3350 17 G/17G
17 POWDER, FOR SOLUTION ORAL DAILY PRN
Qty: 527 G | Refills: 1 | Status: SHIPPED | OUTPATIENT
Start: 2019-05-15 | End: 2019-06-14

## 2019-05-15 RX ORDER — SENNA AND DOCUSATE SODIUM 50; 8.6 MG/1; MG/1
2 TABLET, FILM COATED ORAL 2 TIMES DAILY
Qty: 60 TABLET | Refills: 0 | Status: SHIPPED | OUTPATIENT
Start: 2019-05-15 | End: 2020-10-27

## 2019-05-15 RX ADMIN — HEPARIN SODIUM 5000 UNITS: 5000 INJECTION INTRAVENOUS; SUBCUTANEOUS at 06:37

## 2019-05-15 RX ADMIN — LEVETIRACETAM 1000 MG: 500 TABLET ORAL at 20:30

## 2019-05-15 RX ADMIN — Medication 10 ML: at 09:33

## 2019-05-15 RX ADMIN — METHOCARBAMOL TABLETS 1000 MG: 500 TABLET, COATED ORAL at 14:56

## 2019-05-15 RX ADMIN — ACETAMINOPHEN 650 MG: 325 TABLET ORAL at 04:25

## 2019-05-15 RX ADMIN — Medication 3 MG: at 20:31

## 2019-05-15 RX ADMIN — ESCITALOPRAM OXALATE 20 MG: 20 TABLET ORAL at 09:31

## 2019-05-15 RX ADMIN — PANTOPRAZOLE SODIUM 40 MG: 40 TABLET, DELAYED RELEASE ORAL at 06:37

## 2019-05-15 RX ADMIN — LOSARTAN POTASSIUM 100 MG: 100 TABLET ORAL at 09:31

## 2019-05-15 RX ADMIN — HEPARIN SODIUM 5000 UNITS: 5000 INJECTION INTRAVENOUS; SUBCUTANEOUS at 14:56

## 2019-05-15 RX ADMIN — SENNOSIDES AND DOCUSATE SODIUM 2 TABLET: 8.6; 5 TABLET ORAL at 09:31

## 2019-05-15 RX ADMIN — ATORVASTATIN CALCIUM 40 MG: 40 TABLET, FILM COATED ORAL at 20:31

## 2019-05-15 RX ADMIN — HYDROCODONE BITARTRATE AND ACETAMINOPHEN 1 TABLET: 5; 325 TABLET ORAL at 22:23

## 2019-05-15 RX ADMIN — SENNOSIDES AND DOCUSATE SODIUM 2 TABLET: 8.6; 5 TABLET ORAL at 20:31

## 2019-05-15 RX ADMIN — METOPROLOL TARTRATE 25 MG: 25 TABLET ORAL at 20:32

## 2019-05-15 RX ADMIN — METHOCARBAMOL TABLETS 1000 MG: 500 TABLET, COATED ORAL at 09:31

## 2019-05-15 RX ADMIN — LEVETIRACETAM 1000 MG: 500 TABLET ORAL at 09:31

## 2019-05-15 RX ADMIN — HEPARIN SODIUM 5000 UNITS: 5000 INJECTION INTRAVENOUS; SUBCUTANEOUS at 22:14

## 2019-05-15 RX ADMIN — HYDROCODONE BITARTRATE AND ACETAMINOPHEN 1 TABLET: 5; 325 TABLET ORAL at 14:56

## 2019-05-15 RX ADMIN — Medication 10 ML: at 20:32

## 2019-05-15 RX ADMIN — METHOCARBAMOL TABLETS 1000 MG: 500 TABLET, COATED ORAL at 20:31

## 2019-05-15 RX ADMIN — Medication 10 ML: at 20:31

## 2019-05-15 RX ADMIN — METOPROLOL TARTRATE 25 MG: 25 TABLET ORAL at 09:31

## 2019-05-15 RX ADMIN — ALPRAZOLAM 0.5 MG: 0.5 TABLET ORAL at 20:31

## 2019-05-15 ASSESSMENT — PAIN DESCRIPTION - PAIN TYPE: TYPE: ACUTE PAIN;SURGICAL PAIN

## 2019-05-15 ASSESSMENT — PAIN DESCRIPTION - ORIENTATION: ORIENTATION: RIGHT

## 2019-05-15 ASSESSMENT — PAIN SCALES - GENERAL
PAINLEVEL_OUTOF10: 7
PAINLEVEL_OUTOF10: 2
PAINLEVEL_OUTOF10: 0
PAINLEVEL_OUTOF10: 0
PAINLEVEL_OUTOF10: 4
PAINLEVEL_OUTOF10: 7
PAINLEVEL_OUTOF10: 0

## 2019-05-15 ASSESSMENT — PAIN DESCRIPTION - LOCATION: LOCATION: HEAD

## 2019-05-15 ASSESSMENT — PAIN DESCRIPTION - ONSET: ONSET: ON-GOING

## 2019-05-15 ASSESSMENT — PAIN DESCRIPTION - FREQUENCY: FREQUENCY: INTERMITTENT

## 2019-05-15 ASSESSMENT — PAIN DESCRIPTION - PROGRESSION: CLINICAL_PROGRESSION: NOT CHANGED

## 2019-05-15 ASSESSMENT — PAIN DESCRIPTION - DESCRIPTORS: DESCRIPTORS: SHARP

## 2019-05-15 ASSESSMENT — PAIN - FUNCTIONAL ASSESSMENT: PAIN_FUNCTIONAL_ASSESSMENT: ACTIVITIES ARE NOT PREVENTED

## 2019-05-15 NOTE — PROGRESS NOTES
Hemoglobin 9.8 (L) 12.0 - 16.0 g/dL    Hematocrit 29.7 (L) 36.0 - 48.0 %    MCV 97.3 80.0 - 100.0 fL    MCH 32.0 26.0 - 34.0 pg    MCHC 32.9 31.0 - 36.0 g/dL    RDW 13.5 12.4 - 15.4 %    Platelets 594 (H) 853 - 450 K/uL    MPV 8.0 5.0 - 10.5 fL    Neutrophils % 68.6 %    Lymphocytes % 18.9 %    Monocytes % 8.1 %    Eosinophils % 3.4 %    Basophils % 1.0 %    Neutrophils # 7.4 1.7 - 7.7 K/uL    Lymphocytes # 2.0 1.0 - 5.1 K/uL    Monocytes # 0.9 0.0 - 1.3 K/uL    Eosinophils # 0.4 0.0 - 0.6 K/uL    Basophils # 0.1 0.0 - 0.2 K/uL   Basic metabolic panel    Collection Time: 05/15/19  4:37 AM   Result Value Ref Range    Sodium 143 136 - 145 mmol/L    Potassium 4.3 3.5 - 5.1 mmol/L    Chloride 102 99 - 110 mmol/L    CO2 27 21 - 32 mmol/L    Anion Gap 14 3 - 16    Glucose 112 (H) 70 - 99 mg/dL    BUN 15 7 - 20 mg/dL    CREATININE 0.9 0.6 - 1.2 mg/dL    GFR Non-African American >60 >60    GFR African American >60 >60    Calcium 9.7 8.3 - 10.6 mg/dL         Scheduled Meds:   sennosides-docusate sodium  2 tablet Oral BID    bisacodyl  10 mg Rectal Once    methocarbamol  1,000 mg Oral 4x Daily    losartan  100 mg Oral Daily    lidocaine  1 patch Transdermal Daily    melatonin  3 mg Oral Nightly    pantoprazole  40 mg Oral QAM AC    levETIRAcetam  1,000 mg Oral BID    heparin (porcine)  5,000 Units Subcutaneous 3 times per day    sodium chloride flush  10 mL Intravenous 2 times per day    ALPRAZolam  0.5 mg Oral Nightly    sodium chloride flush  10 mL Intravenous 2 times per day    atomoxetine  80 mg Oral Daily    atorvastatin  40 mg Oral Nightly    escitalopram  20 mg Oral Daily    metoprolol tartrate  25 mg Oral BID    sodium chloride flush  10 mL Intravenous 2 times per day    LORazepam  0.5 mg Intravenous Once       Continuous Infusions:      PRN Meds:  polyvinyl alcohol, HYDROcodone 5 mg - acetaminophen, sodium chloride flush, haloperidol lactate, acetaminophen, sodium chloride flush, regimen  Advance diet as tolerated  Okay for dispo - awaiting precert         Signed By: Selam Malik     May 15, 2019

## 2019-05-15 NOTE — PROGRESS NOTES
Occupational Therapy  Daily Treatment Note  Patient Name: Kacie Steward  MRN: [id number]     Assessment: Pt has improved significantly. She now requires spvn to SBA and cueing for mobility. She continues to demonstrate impairments in balance, becoming more unsteady with fatigue, and in cognition. At baseline, pt lives alone and independently. Recommend continued OT at d/c to increase independence, safety, and activity tolerance. An inpatient rehab setting would still be beneficial, but if pt discharges to home recommend initial 24 hr A and home OT/PT. Discharge Recommendations: Kacie Steward scored a 21/24 on the AM-PAC ADL Inpatient form. Current research shows that an AM-PAC score of 18 or greater is typically associated with a discharge to the patient's home setting. Based on the patients AM-PAC score and their current ADL deficits, it is recommended that the patient have 2-3 sessions per week of Occupational Therapy at d/c to increase the patients independence. Equipment Needs:  No - defer to d/c facility; if home, recommend shower chair for fall prevention during bathing    Chart Reviewed: Yes     Other position/activity restrictions: ambulate   Additional Pertinent Hx: Admit 5/7 for craniotomy, clipping of MCA aneurysm; PMHx: anxiety, arthritis, cerebral aneurysm, GERD, HTN    Diagnosis: brain aneurysm  Treatment Diagnosis: Impaired ADLs, mobility, activity tolerance    Subjective: Pt in bed on entry. Two children present. Pt interested in getting a shower. \"There's so much glue in my hair. \"      Pain: soreness to touch at incision site, no pain at rest    Objective:    Cognition/Orientation: alert, oriented x4.  Requires increased time to process commands, initiate tasks, min cues for initiation and sequencing, min cues for safety     Bed mobility   Supine to sit: Spvn  Sit to Supine: Spvn  Scooting: Spvn   Sitting: independent static (SBA dynamic reaching down for fallen washcloth in shower)    Functional Mobility   Sit to Stand: SBA (unsafe technique standing from EOB, otherwise spvn)  Stand to Sit: spvn  Chair Transfer: SBA  Commode Transfer: spvn  Shower transfer: SBA into shower, CGA out of shower  Other: Functional mobility within room and bathroom for toileting, shower, dressing, grooming varying spvn to SBA. Time in stance: 1 min + 5 min + 5 min + 3 min + 1 min     ADLs   Grooming: independent (brush teeth, comb hair)  Bathing: spvn to SBA via shower alternating standing and sitting using grab bar, hand held shower; mod cues for safety while attempting to wash and dry lower legs standing on one foot  UB dressing: Mod I (gown)  LB dressing: spvn to SBA; min cues for safety (socks, underwear, pants)  Toileting: spvn    Activity Tolerance: Fair - notes fatigue after ADLs, requiring return to bed for rest break    Patient Education: Activity promotion, ADLs, d/c planning, fall prevention - verb understanding    Safety Devices in Place: left in bed, alarm activated, needs in reach, RN aware      Goals:  Short term goals  Time Frame for Short term goals: by discharge  Short term goal 1: min A bed mobility - goal met 5/11  Short term goal 2: min A functional ADL transfers using LRAD - Met 5/15; functional ADL transfers independently - Not met  Short term goal 3: cga standing balance x3 min with UE support prn - Met 5/15; independent standing balance x12 min during ADLs - Not met  Short term goal 4: mod A doff/don pants - Met 5/15; Dress lower body independently - Not met  Short term goal 5: Complete bathing routine independently - Not met         Plan:      Times per week: 5-7x   Times per day: Daily    If patient is discharged prior to next treatment, this note will serve as the discharge summary.       Therapy Time   Individual Concurrent Group Co-treatment   Time In 1000         Time Out 1100         Minutes 60             Timed Code Treatment Minutes: 60  Total Treatment Time: 60 Geovani Cm, OT

## 2019-05-15 NOTE — DISCHARGE SUMMARY
[facility]     Discharge Summary    DEMOGRAPHICS     PATIENT NAME:  Benjamin Painter :  1955 MRN:  [id number]    DATE ADMITTED:  2019 DATE OF DISCHARGE:  2019    DISCHARGING PHYSICIAN:  Alice Lepe MD      CONSULTATIONS: PT/OT/SLP / Medicine / PMR    PCP:  Juanis Cardona, APRN - [phone]     MEDICAL SYNOPSIS     REASON FOR ADMISSION/CHIEF COMPLAINT: R MCA aneurysm  DISCHARGE DIAGNOSES: SAME    HOSPITAL COURSE AND TREATMENT:  Patient is a 59 y/o F admitted after elective aneurysm clipping. Post operative course complicated by clinical seizure w/o return to baseline. Cont EEG applied. No other seizures noted. Patient had extreme agitation and required IV sedation post ictally. PT/OT evaluations after sedation weaned demonstrated some deficits w/ memory deficits and executive functioning deficits. She was tranferred out of ICU. PMR consulted and felt she was good rehab candidate. Rehab was denied by insurance delaying a safe discharge. She was then referred for SNF level of care but patient refused this and would like to go home. After ICU admission has remained hemodynamically stable. Neurology consulted for seizure management. Patient should stay on keppra and f/u w/ neurologist close to home [facility]). She is medically stable for discharge to home with 24 hour supervision and home health arranged by case management. This patient requires greater than the 30 MED and longer than a 7 day supply for adequate pain control for severe acute postoperative pain after major neurological surgery.        PROCEDURES PERFORMED:    RIGHT CRANIOTOMY FOR CLIPPING OF MIDDLE CEREBRAL ARTERY ANEURYSM (Right )        Medication List      START taking these medications    butalbital-acetaminophen-caffeine -40 MG per tablet  Commonly known as:  FIORICET, ESGIC  Take 1 tablet by mouth every 4 hours as needed for Headaches     chlorthalidone 25 MG tablet  Commonly known as:  HYGROTON  Take 0.5 tablets by mouth daily     levETIRAcetam 1000 MG tablet  Commonly known as:  KEPPRA  Take 1 tablet by mouth 2 times daily     losartan 100 MG tablet  Commonly known as:  COZAAR  Take 1 tablet by mouth daily     methocarbamol 500 MG tablet  Commonly known as:  ROBAXIN  Take 2 tablets by mouth every 6 hours as needed (muscle spasm (facial pain))     polyethylene glycol packet  Commonly known as:  GLYCOLAX  Take 17 g by mouth daily as needed for Constipation     polyvinyl alcohol 1.4 % ophthalmic solution  Commonly known as:  LIQUIFILM TEARS  Place 1 drop into the right eye every 2 hours as needed for Dry Eyes     sennosides-docusate sodium 8.6-50 MG tablet  Commonly known as:  SENOKOT-S  Take 2 tablets by mouth 2 times daily        CHANGE how you take these medications    ALPRAZolam 0.5 MG tablet  Commonly known as:  XANAX  Take 1 tablet by mouth nightly for 30 days. What changed:    · how much to take  · when to take this  · reasons to take this     HYDROcodone-acetaminophen 5-325 MG per tablet  Commonly known as:  NORCO  Take 1-2 tablets by mouth every 6 hours as needed for Pain for up to 3 days.   What changed:  how much to take        CONTINUE taking these medications    ascorbic acid 1000 MG tablet  Commonly known as:  VITAMIN C  Take 1 tablet by mouth daily     atorvastatin 80 MG tablet  Commonly known as:  LIPITOR  Take 0.5 tablets by mouth nightly     ergocalciferol 26517 units capsule  Commonly known as:  ERGOCALCIFEROL  Take 1 capsule by mouth once a week for 5 doses     LEXAPRO 20 MG tablet  Generic drug:  escitalopram     PROTONIX 20 MG tablet  Generic drug:  pantoprazole     THIAMINE HCL PO        STOP taking these medications    aspirin 81 MG tablet     atomoxetine 80 MG capsule  Commonly known as:  STRATTERA     cloNIDine 0.1 MG tablet  Commonly known as:  CATAPRES     EDARBYCLOR 40-12.5 MG Tabs  Generic drug:  Azilsartan-Chlorthalidone     metoprolol tartrate 25 MG tablet  Commonly known as: LOPRESSOR     PLAVIX 75 MG tablet  Generic drug:  clopidogrel           Where to Get Your Medications      You can get these medications from any pharmacy    Bring a paper prescription for each of these medications  · ALPRAZolam 0.5 MG tablet  · butalbital-acetaminophen-caffeine -40 MG per tablet  · chlorthalidone 25 MG tablet  · HYDROcodone-acetaminophen 5-325 MG per tablet  · levETIRAcetam 1000 MG tablet  · losartan 100 MG tablet  · methocarbamol 500 MG tablet  · polyethylene glycol packet  · polyvinyl alcohol 1.4 % ophthalmic solution  · sennosides-docusate sodium 8.6-50 MG tablet               ALLERGIES and INTOLERANCES:  No Known Allergies    DATA     EXAM: see progress note from todays date     LAST SET OF VITALS:    BP (!) 183/76   Pulse 56   Temp 99 °F (37.2 °C)   Resp 20   Ht 5' 2\" (1.575 m)   Wt 134 lb 14.7 oz (61.2 kg)   SpO2 95%   BMI 24.68 kg/m²     LABS:  Labs  Recent Labs     05/17/19  0420      K 4.5      CO2 26   BUN 17   CREATININE 1.0   GLUCOSE 103*     Recent Labs     05/17/19  0420   WBC 10.7   RBC 3.12*           DISPOSITION     FOLLOW UP APPOINTMENTS:   LIGIA Israel - CNP  [facility]  [phone]      post hospital follow up, blood pressure check    Kayla Pedroza MD  [address] [address], [facility] [address] [address]  [phone]    Go to  scheduled post-op appointment    Neurologist      Schedule an appointment with a Neurologist close to home to monitor seizure medicine       DISPOSITION: home    CONDITION AT DISCHARGE: stable     Electronically signed by: Guero Patel, 5/17/2019 4:38 PM

## 2019-05-15 NOTE — PROGRESS NOTES
Physical Therapy  Facility/Department: [place] [address] [facility]U  Daily Treatment Note  NAME: Ginger Gupta  : 1955  MRN: [id number]    Date of Service: 5/15/2019    Discharge Recommendations:  Ginger Gupta scored a 17/24 on the AM-PAC short mobility form. Current research shows that an AM-PAC score of 17 or less is typically not associated with a discharge to the patient's home setting. Based on the patients AM-PAC score and their current functional mobility deficits, it is recommended that the patient have 5-7 sessions per week of Physical Therapy at d/c to increase the patients independence. PT Equipment Recommendations  Equipment Needed: No  Other: defer to next level of care    Patient Diagnosis(es): The encounter diagnosis was Essential hypertension. has a past medical history of Anxiety, Arthritis, Cerebral aneurysm, Former smoker, GERD (gastroesophageal reflux disease), and Hypertension. has a past surgical history that includes Hysterectomy; Cholecystectomy; Hemorrhoid surgery; and craniotomy (Right, 2019). Restrictions  Position Activity Restriction  Other position/activity restrictions: ambulate  Subjective   General  Chart Reviewed: Yes  Additional Pertinent Hx: Admit  for craniotomy, clipping of MCA aneurysm; PMHx: anxiety, arthritis, cerebral aneurysm, GERD, HTN  Family / Caregiver Present: Yes(2 daughters and son )  Referring Practitioner: LIGIA Cooper CNP  Subjective  Subjective: Pt found supine in bed upon arrival, reporting 7/10 headache - RN notified, pt agreeable to therapy.           Orientation  Orientation  Overall Orientation Status: Within Functional Limits  Cognition      Objective   Bed mobility  Supine to Sit: Supervision(HOB flat, no use of rail )  Sit to Supine: Supervision(HOB flat, no use of rail )  Scooting: Supervision(to EOB )  Transfers  Sit to Stand: Stand by assistance(from EOB )  Stand to sit: Stand by assistance(to EOB )  Ambulation  Ambulation?: Yes  Ambulation 1  Surface: level tile  Device: No Device  Assistance: Contact guard assistance;Stand by assistance  Quality of Gait: Decreased B step length, occasional L foot drag (able to correct with verbal cues), impulsive movements; pt distracted often looking back over L and R shoulder during amb; Overall steady gait with no LOB or near LOB. Distance: 400'   Stairs/Curb  Stairs?: No               Strength RLE  Comment: Globally 5/5   Strength LLE  Comment: Globally 5/5                 Assessment   Body structures, Functions, Activity limitations: Decreased functional mobility   Assessment: Pt making good progress with functional mobility and endurance. Pt performing bed mobility with supervision, transfers with SBA and amb with CGA/SBA. Pt remains below her baseline and will require further skilled PT to return to PLOF. Pt planning to d/c to inpt rehab. Will continue to follow. Treatment Diagnosis: impaired gait and transfers  Prognosis: Good  Patient Education: role of PT, use of call light, d/c planning; pt verb understanding  Barriers to Learning: none  REQUIRES PT FOLLOW UP: Yes  Activity Tolerance  Activity Tolerance: Patient Tolerated treatment well       Goals  Short term goals  Time Frame for Short term goals: discharge  Short term goal 1: Pt will transfer supine <--> sit with min A x 1 -met 5/11; GOAL UPDATED: supervision  MET 5/15 Goal Updated: independent   Short term goal 2: Pt will transfer sit <--> stand with min A x 1 -met 5/11; GOAL UPDATED: SBA MET 5/15l Goal Updated: supervision   Short term goal 3: Pt will perform bed <--> chair transfer with min A x 1 - ongoing  Short term goal 4: Pt will participate in gait assessment.  - met 5/11; GOAL UPDATED: 150' with SBA  ONGOING  Patient Goals   Patient goals : eventually return home    Plan    Plan  Times per week: 5-7  Current Treatment Recommendations: Strengthening, Gait Training, Patient/Caregiver Education & Training, Equipment Evaluation,

## 2019-05-15 NOTE — CARE COORDINATION
been a struggle even with them sitting at bedside. CM will update ARU in [address] [place] [address] in regards to NO PLAN for appeal for ARU at this time (admissions- [phone]). CM will work with family to arrange for [facility], they have also been made aware that they will need to assist with finding coverage intermittently when home care is not available to be with patient. Family continues to be upset with insurance decision but verbalized understanding of plans needed to be made for upcoming discharge to home.       Thank you,  Taylor Parsons RN,   4th Floor Progressive Care Unit  [phone]

## 2019-05-15 NOTE — CONSULTS
PM&R    Chart reviewed; excellent candidate for inpatient rehab; if plans change and they would prefer to stay in house please re-consult. Hemant Kothari MD 5/15/2019, 2:49 PM

## 2019-05-16 LAB
ANION GAP SERPL CALCULATED.3IONS-SCNC: 14 MMOL/L (ref 3–16)
BASOPHILS ABSOLUTE: 0.2 K/UL (ref 0–0.2)
BASOPHILS RELATIVE PERCENT: 1.5 %
BUN BLDV-MCNC: 12 MG/DL (ref 7–20)
CALCIUM SERPL-MCNC: 9.5 MG/DL (ref 8.3–10.6)
CHLORIDE BLD-SCNC: 102 MMOL/L (ref 99–110)
CO2: 24 MMOL/L (ref 21–32)
CREAT SERPL-MCNC: 0.8 MG/DL (ref 0.6–1.2)
EOSINOPHILS ABSOLUTE: 0.4 K/UL (ref 0–0.6)
EOSINOPHILS RELATIVE PERCENT: 3.4 %
GFR AFRICAN AMERICAN: >60
GFR NON-AFRICAN AMERICAN: >60
GLUCOSE BLD-MCNC: 104 MG/DL (ref 70–99)
HCT VFR BLD CALC: 31.3 % (ref 36–48)
HEMOGLOBIN: 10.1 G/DL (ref 12–16)
LYMPHOCYTES ABSOLUTE: 2.3 K/UL (ref 1–5.1)
LYMPHOCYTES RELATIVE PERCENT: 19 %
MCH RBC QN AUTO: 31.5 PG (ref 26–34)
MCHC RBC AUTO-ENTMCNC: 32.3 G/DL (ref 31–36)
MCV RBC AUTO: 97.6 FL (ref 80–100)
MONOCYTES ABSOLUTE: 1 K/UL (ref 0–1.3)
MONOCYTES RELATIVE PERCENT: 8.3 %
NEUTROPHILS ABSOLUTE: 8.2 K/UL (ref 1.7–7.7)
NEUTROPHILS RELATIVE PERCENT: 67.8 %
PDW BLD-RTO: 13.8 % (ref 12.4–15.4)
PLATELET # BLD: 430 K/UL (ref 135–450)
PMV BLD AUTO: 7.9 FL (ref 5–10.5)
POTASSIUM SERPL-SCNC: 3.9 MMOL/L (ref 3.5–5.1)
RBC # BLD: 3.21 M/UL (ref 4–5.2)
SODIUM BLD-SCNC: 140 MMOL/L (ref 136–145)
WBC # BLD: 12 K/UL (ref 4–11)

## 2019-05-16 PROCEDURE — 97530 THERAPEUTIC ACTIVITIES: CPT

## 2019-05-16 PROCEDURE — 6360000002 HC RX W HCPCS: Performed by: NURSE PRACTITIONER

## 2019-05-16 PROCEDURE — 92526 ORAL FUNCTION THERAPY: CPT

## 2019-05-16 PROCEDURE — 6370000000 HC RX 637 (ALT 250 FOR IP): Performed by: NURSE PRACTITIONER

## 2019-05-16 PROCEDURE — 6370000000 HC RX 637 (ALT 250 FOR IP): Performed by: INTERNAL MEDICINE

## 2019-05-16 PROCEDURE — 97116 GAIT TRAINING THERAPY: CPT

## 2019-05-16 PROCEDURE — 36415 COLL VENOUS BLD VENIPUNCTURE: CPT

## 2019-05-16 PROCEDURE — 85025 COMPLETE CBC W/AUTO DIFF WBC: CPT

## 2019-05-16 PROCEDURE — 2580000003 HC RX 258: Performed by: NURSE PRACTITIONER

## 2019-05-16 PROCEDURE — 6370000000 HC RX 637 (ALT 250 FOR IP): Performed by: NEUROLOGICAL SURGERY

## 2019-05-16 PROCEDURE — 92523 SPEECH SOUND LANG COMPREHEN: CPT

## 2019-05-16 PROCEDURE — 2060000000 HC ICU INTERMEDIATE R&B

## 2019-05-16 PROCEDURE — 97535 SELF CARE MNGMENT TRAINING: CPT

## 2019-05-16 PROCEDURE — 80048 BASIC METABOLIC PNL TOTAL CA: CPT

## 2019-05-16 RX ADMIN — Medication 3 MG: at 20:50

## 2019-05-16 RX ADMIN — Medication 10 ML: at 20:50

## 2019-05-16 RX ADMIN — METHOCARBAMOL TABLETS 1000 MG: 500 TABLET, COATED ORAL at 09:05

## 2019-05-16 RX ADMIN — BUTALBITAL, ACETAMINOPHEN AND CAFFEINE 1 TABLET: 50; 325; 40 TABLET ORAL at 03:55

## 2019-05-16 RX ADMIN — CLONIDINE HYDROCHLORIDE 0.1 MG: 0.1 TABLET ORAL at 03:51

## 2019-05-16 RX ADMIN — HYDROCODONE BITARTRATE AND ACETAMINOPHEN 1 TABLET: 5; 325 TABLET ORAL at 22:52

## 2019-05-16 RX ADMIN — LEVETIRACETAM 1000 MG: 500 TABLET ORAL at 09:06

## 2019-05-16 RX ADMIN — LEVETIRACETAM 1000 MG: 500 TABLET ORAL at 20:50

## 2019-05-16 RX ADMIN — HEPARIN SODIUM 5000 UNITS: 5000 INJECTION INTRAVENOUS; SUBCUTANEOUS at 06:20

## 2019-05-16 RX ADMIN — METHOCARBAMOL TABLETS 1000 MG: 500 TABLET, COATED ORAL at 17:06

## 2019-05-16 RX ADMIN — ESCITALOPRAM OXALATE 20 MG: 20 TABLET ORAL at 09:05

## 2019-05-16 RX ADMIN — LOSARTAN POTASSIUM 100 MG: 100 TABLET ORAL at 09:05

## 2019-05-16 RX ADMIN — SENNOSIDES AND DOCUSATE SODIUM 2 TABLET: 8.6; 5 TABLET ORAL at 09:06

## 2019-05-16 RX ADMIN — ATORVASTATIN CALCIUM 40 MG: 40 TABLET, FILM COATED ORAL at 20:50

## 2019-05-16 RX ADMIN — HYDROCODONE BITARTRATE AND ACETAMINOPHEN 1 TABLET: 5; 325 TABLET ORAL at 07:41

## 2019-05-16 RX ADMIN — HEPARIN SODIUM 5000 UNITS: 5000 INJECTION INTRAVENOUS; SUBCUTANEOUS at 20:51

## 2019-05-16 RX ADMIN — METHOCARBAMOL TABLETS 1000 MG: 500 TABLET, COATED ORAL at 14:12

## 2019-05-16 RX ADMIN — ALPRAZOLAM 0.5 MG: 0.5 TABLET ORAL at 20:50

## 2019-05-16 RX ADMIN — METOPROLOL TARTRATE 25 MG: 25 TABLET ORAL at 20:50

## 2019-05-16 RX ADMIN — METHOCARBAMOL TABLETS 1000 MG: 500 TABLET, COATED ORAL at 20:50

## 2019-05-16 RX ADMIN — HYDROCODONE BITARTRATE AND ACETAMINOPHEN 1 TABLET: 5; 325 TABLET ORAL at 14:23

## 2019-05-16 RX ADMIN — METOPROLOL TARTRATE 25 MG: 25 TABLET ORAL at 09:06

## 2019-05-16 RX ADMIN — SENNOSIDES AND DOCUSATE SODIUM 2 TABLET: 8.6; 5 TABLET ORAL at 20:50

## 2019-05-16 RX ADMIN — PANTOPRAZOLE SODIUM 40 MG: 40 TABLET, DELAYED RELEASE ORAL at 06:20

## 2019-05-16 RX ADMIN — HEPARIN SODIUM 5000 UNITS: 5000 INJECTION INTRAVENOUS; SUBCUTANEOUS at 14:12

## 2019-05-16 RX ADMIN — Medication 10 ML: at 09:26

## 2019-05-16 ASSESSMENT — PAIN DESCRIPTION - LOCATION
LOCATION: HEAD

## 2019-05-16 ASSESSMENT — PAIN DESCRIPTION - DESCRIPTORS: DESCRIPTORS: HEADACHE

## 2019-05-16 ASSESSMENT — PAIN SCALES - GENERAL
PAINLEVEL_OUTOF10: 8
PAINLEVEL_OUTOF10: 2
PAINLEVEL_OUTOF10: 2
PAINLEVEL_OUTOF10: 7
PAINLEVEL_OUTOF10: 9
PAINLEVEL_OUTOF10: 9
PAINLEVEL_OUTOF10: 0
PAINLEVEL_OUTOF10: 2
PAINLEVEL_OUTOF10: 0
PAINLEVEL_OUTOF10: 2
PAINLEVEL_OUTOF10: 8
PAINLEVEL_OUTOF10: 9
PAINLEVEL_OUTOF10: 4

## 2019-05-16 ASSESSMENT — PAIN DESCRIPTION - ORIENTATION
ORIENTATION: RIGHT;MID

## 2019-05-16 ASSESSMENT — PAIN DESCRIPTION - PAIN TYPE
TYPE: ACUTE PAIN
TYPE: SURGICAL PAIN
TYPE: ACUTE PAIN

## 2019-05-16 ASSESSMENT — PAIN DESCRIPTION - PROGRESSION: CLINICAL_PROGRESSION: GRADUALLY WORSENING

## 2019-05-16 ASSESSMENT — PAIN DESCRIPTION - ONSET: ONSET: GRADUAL

## 2019-05-16 ASSESSMENT — PAIN DESCRIPTION - FREQUENCY: FREQUENCY: INTERMITTENT

## 2019-05-16 ASSESSMENT — PAIN - FUNCTIONAL ASSESSMENT
PAIN_FUNCTIONAL_ASSESSMENT: PREVENTS OR INTERFERES SOME ACTIVE ACTIVITIES AND ADLS
PAIN_FUNCTIONAL_ASSESSMENT: 0-10

## 2019-05-16 NOTE — PROGRESS NOTES
Patient's /77. Goal to keep BP <160. Patient given PRN clonidine and requested pain medication for headache- Fiorcet. Neuro checks unchanged. Will re-check BP.

## 2019-05-16 NOTE — PROGRESS NOTES
Occupational Therapy  Daily Treatment Note  Patient Name: Norris Shah  MRN: [id number]     Assessment: Pt a bit less steady today - no overt losses of balance, but demonstrated balance deficits putting her at risk of falling. Balance deficits are more pronounced when fatigued. Pt participated in various activities, including ADLs, bed making, preparation of thickened liquids in kitchen. She required significant cueing to complete her beverage preparation correctly and safely. PT/OT/SLP continue to feel pt would benefit from short period of inpt rehab at d/c given above deficits and pt's independent baseline. Pt is not currently safe without 24 hr spvn, which is not available to her at home. Family is working on arrangements for home care and equipment if pt does d/c home. Will continue acute OT during admission. Discharge Recommendations: Norris Shah scored a 21/24 on the AM-PAC ADL Inpatient form. Current research shows that an AM-PAC score of 18 or greater is typically associated with a discharge to the patient's home setting. Based on the patients AM-PAC score and their current ADL deficits, it is recommended that the patient have 2-3 sessions per week of Occupational Therapy at d/c to increase the patients independence.     Equipment Needs:  Son addressing and will obtain privately  If home: HOME HEALTH CARE: LEVEL 3 SAFETY     - Initial home health evaluation to occur within 24-48 hours, in patient home   - Therapy evaluations in home within 24-48 hours of discharge; including DME and home safety   - Frontload therapy 5 days, then 3x a week   - Therapy to evaluate if patient has [address] needs for personal care   -  evaluation within 24-48 hours, includes evaluation of resources and insurance to determine [place], [place], LTC, and Medicaid options     Chart Reviewed: Yes     Other position/activity restrictions: ambulate   Additional Pertinent Hx: Admit 5/7 for craniotomy, clipping of MCA bench (+only showering with spvn), grab bar on entry to tub, additional lighting on path to bathroom at night, alert button system, medication management strategies, spacing of home care to ensure coverage of needs. Son taking notes. Pt agreeable with all suggestions. Safety Devices in Place: left in bed, alarm activated, son in room, needs in reach, RN aware    Goals:  Short term goals  Time Frame for Short term goals: by discharge  Short term goal 1: min A bed mobility - goal met 5/11  Short term goal 2: min A functional ADL transfers using LRAD - Met 5/15; functional ADL transfers independently - Not met  Short term goal 3: cga standing balance x3 min with UE support prn - Met 5/15; independent standing balance x12 min during ADLs - Not met  Short term goal 4: mod A doff/don pants - Met 5/15; Dress lower body independently - Not met  Short term goal 5: Complete bathing routine independently - Not met         Plan:      Times per week: 5-7x   Times per day: Daily    If patient is discharged prior to next treatment, this note will serve as the discharge summary.     Therapy Time   Individual Concurrent Group Co-treatment   Time In 1500         Time Out 1555         Minutes 55           Timed Code Treatment Minutes: 55  Total Treatment Time: 120 [facility]

## 2019-05-16 NOTE — PROGRESS NOTES
Yes(son)  Referring Practitioner: Cleatis Alpers - CNP  Subjective  Subjective: Pt found supine in bed upon arrival, reporting 8/10 headache - RN notified, pt agreeable to therapy. Orientation  Orientation  Overall Orientation Status: Within Functional Limits     Objective   Bed mobility  Supine to Sit: Supervision(HOB flat, no use of rail )  Scooting: Supervision(to EOB )  Comment: Pt donning socks while sitting EOB with supervision   Transfers  Sit to Stand: Stand by assistance(from EOB, from bench )  Stand to sit: Stand by assistance(to bench, to chair)  Ambulation  Ambulation?: Yes  Ambulation 1  Surface: level tile  Device: No Device  Assistance: Contact guard assistance  Quality of Gait: Decreased B step length, occasional L foot drag (able to correct with verbal cues), impulsive movements; pt distracted often looking back over L and R shoulder during amb; Overall fairly steady gait with no LOB or near LOB. Distance: 300'+200'   Comments: Pt's gait deviations increase with fatigue  Stairs/Curb  Stairs?: No    Assessment   Body structures, Functions, Activity limitations: Decreased functional mobility   Assessment: Pt continues to require CGA-Supervision assist for functional mobility. Pt fatiguing with amb requiring seated rest break. Discussed in length with pt and son about safety precautions if d/c home including HHPT. Pt and son agreeable. Son voicing concerns with pt safety at home alone 2/2 to current cognition/memory deficits. If pt d/c home she would benefit from 24hr A and front loaded HHPT (S3). Will continue to follow.    Treatment Diagnosis: impaired gait and transfers  Prognosis: Good  Patient Education: role of PT, use of call light, d/c planning; pt verb understanding  REQUIRES PT FOLLOW UP: Yes  Activity Tolerance  Activity Tolerance: Patient Tolerated treatment well;Patient limited by fatigue  Activity Tolerance: Pt requiring 1 seated rest break during amb        AM-PAC Score  AM-PAC Inpatient Mobility Raw Score : 17  AM-PAC Inpatient T-Scale Score : 42.13  Mobility Inpatient CMS 0-100% Score: 50.57  Mobility Inpatient CMS G-Code Modifier : CK          Goals  Short term goals  Time Frame for Short term goals: discharge  Short term goal 1: Pt will transfer supine <--> sit with min A x 1 -met 5/11; GOAL UPDATED: supervision  MET 5/15 Goal Updated: independent ongoing  Short term goal 2: Pt will transfer sit <--> stand with min A x 1 -met 5/11; GOAL UPDATED: SBA MET 5/15l Goal Updated: supervision ongoing  Short term goal 3: Pt will perform bed <--> chair transfer with min A x 1 - ongoing  Short term goal 4: Pt will participate in gait assessment. - met 5/11; GOAL UPDATED: 150' with SBA  ONGOING  Patient Goals   Patient goals : eventually return home    Plan    Plan  Times per week: 5-7  Current Treatment Recommendations: Strengthening, Gait Training, Patient/Caregiver Education & Training, Equipment Evaluation, Education, & procurement, Balance Training, Functional Mobility Training, Transfer Training  Safety Devices  Type of devices: Chair alarm in place, Left in chair, Call light within reach, Gait belt, Nurse notified     Therapy Time   Individual Concurrent Group Co-treatment   Time In 1420         Time Out 1500         Minutes 40           Timed Code Treatment Minutes:  40    Total Treatment Minutes:  40    If the patient is discharged before the next treatment session, this note will serve as the discharge summary.      Anushka Mcdaniel, PT, DPT [id number]

## 2019-05-16 NOTE — PROGRESS NOTES
Resident Progress Note    Admit Date: 2019    PCP: LIGIA Conway CNP                  : 1955  MRN: [id number]     CC: post op medical management    Subjective: Interval History: no acute events overnight. Denies chest pain, shortness of breath. Diet: DIET DYSPHAGIA II MECHANICALLY ALTERED; Dysphagia II Mechanically Altered; [place] Thick  Dietary Nutrition Supplements: Frozen Oral Supplement    Data:   Scheduled Meds:   sennosides-docusate sodium  2 tablet Oral BID    bisacodyl  10 mg Rectal Once    methocarbamol  1,000 mg Oral 4x Daily    losartan  100 mg Oral Daily    lidocaine  1 patch Transdermal Daily    melatonin  3 mg Oral Nightly    pantoprazole  40 mg Oral QAM AC    levETIRAcetam  1,000 mg Oral BID    heparin (porcine)  5,000 Units Subcutaneous 3 times per day    sodium chloride flush  10 mL Intravenous 2 times per day    ALPRAZolam  0.5 mg Oral Nightly    sodium chloride flush  10 mL Intravenous 2 times per day    atomoxetine  80 mg Oral Daily    atorvastatin  40 mg Oral Nightly    escitalopram  20 mg Oral Daily    metoprolol tartrate  25 mg Oral BID    sodium chloride flush  10 mL Intravenous 2 times per day    LORazepam  0.5 mg Intravenous Once     Continuous Infusions:  PRN Meds:polyvinyl alcohol, HYDROcodone 5 mg - acetaminophen, sodium chloride flush, haloperidol lactate, acetaminophen, sodium chloride flush, butalbital-acetaminophen-caffeine, cloNIDine, sodium chloride flush, ondansetron, acetaminophen, naloxone, polyethylene glycol, bisacodyl  I/O last 3 completed shifts:   In: 730 [P.O.:730]  Out: 400 [Urine:400]  I/O this shift:  In: 120 [P.O.:120]  Out: 0     Intake/Output Summary (Last 24 hours) at 2019 1713  Last data filed at 2019 1633  Gross per 24 hour   Intake 600 ml   Output 400 ml   Net 200 ml           Objective:     Vitals: BP (!) 155/87   Pulse 60   Temp 98.2 °F (36.8 °C) (Oral)   Resp 16   Ht 5' 2\" (1.575 m)   Wt 134 lb 14.7 oz (61.2 kg)   SpO2 98%   BMI 24.68 kg/m²     Physical Exam   Constitutional: She is oriented to person, place, and time. She appears well-developed and well-nourished. No distress. HENT:   Head: Normocephalic and atraumatic. Mouth/Throat: Oropharynx is clear and moist.   Right eye swelling improved. Right fronto-temporal craniotomy staples present, c/d/i    Eyes: Conjunctivae and EOM are normal. Right eye exhibits no discharge. Left eye exhibits no discharge. No scleral icterus. Neck: Normal range of motion. Neck supple. Cardiovascular: Normal rate, regular rhythm, normal heart sounds and intact distal pulses. Exam reveals no gallop and no friction rub. No murmur heard. Pulmonary/Chest: Effort normal and breath sounds normal. No respiratory distress. She has no wheezes. She has no rales. She exhibits no tenderness. Abdominal: Soft. She exhibits no distension and no mass. There is no tenderness. There is no rebound and no guarding. Musculoskeletal: Normal range of motion. She exhibits no edema, tenderness or deformity. Neurological: She is alert and oriented to person, place, and time. No cranial nerve deficit. Coordination normal.   Skin: Skin is warm and dry. No rash noted. She is not diaphoretic. No erythema. No pallor. Psychiatric: She has a normal mood and affect. Her behavior is normal. Judgment and thought content normal.   Nursing note and vitals reviewed.           LABS:    CBC:   Recent Labs     05/14/19  0549 05/15/19  0437 05/16/19  0430   WBC 11.2* 10.7 12.0*   HGB 10.8* 9.8* 10.1*   HCT 33.7* 29.7* 31.3*   MCV 97.0 97.3 97.6    470* 430                                                                BMP:    Recent Labs     05/14/19  0549 05/15/19  0437 05/16/19  0430    143 140   K 3.8 4.3 3.9    102 102   CO2 23 27 24   BUN 11 15 12   CREATININE 0.9 0.9 0.8   GLUCOSE 103* 112* 104*       LFT's: No results for input(s): AST, ALT, ALB, BILITOT, ALKPHOS in the last 72 hours.    Troponin: No results for input(s): TROPONINI in the last 72 hours. BNP: No results for input(s): BNP in the last 72 hours. Lipids: No results for input(s): CHOL, HDL in the last 72 hours. Invalid input(s): LDLCALCU    ABGs: No results found for: PHART, YFP0UKL, PO2ART    INR: No results for input(s): INR in the last 72 hours. U/A:No results for input(s): NITRITE, COLORU, PHUR, LABCAST, WBCUA, RBCUA, MUCUS, TRICHOMONAS, YEAST, BACTERIA, CLARITYU, SPECGRAV, LEUKOCYTESUR, UROBILINOGEN, BILIRUBINUR, BLOODU, GLUCOSEU, AMORPHOUS in the last 72 hours. Invalid input(s): Kasi Jurado   -----------------------------------------------------------------  RAD:   FL MODIFIED BARIUM SWALLOW W VIDEO   Final Result      Aspiration of thin barium. XR CHEST PORTABLE   Final Result      No change in atelectasis or small patchy pneumonia right lower lobe. XR ABDOMEN (KUB) (SINGLE AP VIEW)   Final Result      1. Feeding tube tip consistent with placement within mid stomach. XR CHEST PORTABLE   Final Result   1. Right basilar opacities, concerning for pneumonia in the proper    clinical setting. CT HEAD WO CONTRAST   Final Result     Expected postoperative changes          CT Head wo Contrast   Final Result      1. Status post right frontotemporal craniotomy and aneurysm clipping in the right sylvian fissure. 2.  Bifrontal pneumocephalus. Small right frontal subdural fluid collection and small amount of hyperattenuating hematoma. 3.  Right frontal lobe hyperdense subarachnoid hemorrhage and small focus of subdural hematoma over the superior convexity. 4.  Diffuse right hemispheric sulcal effacement suggesting edema. 5.  No significant midline shift.       Results discussed with the ICU nurse, López Masterson, 5/7/19 at 7:35 PM            IR SILVIO CATH PLACE INT CAROTID INTRACRANIAL RIGHT W ANGIO    (Results Pending)              Echo: 2/2019- Summary   Normal LV size and function; LV ejection fraction 60%   Grade 1 diastolic dysfunction   Normal RV size and function   Mild to moderate left atrial enlargement   Normal right atrial size   Normal mitral valve structure with no significant regurgitation   Aortic valve trileaflet with trace AI   Mildly dilated ascending aorta measuring 3.6 cm   Negative bubble study with no evidence for intracardiac shunt         Assessment/Plan:   Patient is a 59 y. o. female PMH significant for ischemic stroke, HTN  presenting with 4 month hx of headache, speech changes and right eye blurriness.  Admitted to ICU post-op from right MCA aneurysm clipping.       HTN- BP: BP: (121-155)/(73-87)  , now better controlled  - Started losartan 100mg qd- should continue on discharge ; rx signed in chart  - start chlorthalidone 12.5mg on discharge ; rx signed in chart  -  Discontinue lopressor BID on discharge -  patient has been bradycardic while inpatient  -  Pt to follow up with PCP on discharge for blood pressure check in 1 week  - will sign off     Tonic-clonic seizures  Patient had 2 tonic-clonic seizures following aneurysm clipping. Repeat CT showed post-op changes, but no significant bleeding. Given ativan and started on keppra 1000 BID.  Placed on eeg.  Hx of alcoholism; Possible alcohol withdrawal component vs xanax withdrawal.  - Continue keppra 1000 q12- may need to consider another antiepileptic as keppra known to worsen depression symptoms  - neurosurgery following  - neurology following  - continue CIWA assessment  - Continue xanax scheduled   - EEG revealed no certain seizure activity     Fever and leukocytosis likely 2/2 Aspiration PNA in the setting of seizures  Fever 102.1 following seizure.  CXR showed right basilar opacity.  WBC 13. Fever and leukocytosis resolved later that day, but then spiked another fever 102.  Started on unasyn for coverage of likely aspiration  - Unasyn transitioned to augmentin  - Blood and wound culture from [employer] site sent             - gram stain from  site showed 1+ gram negative clarke; NGTD - possible contaminant  - UA negative      Right MCA aneurysm s/p clipping   R MCA aneurysm in 2/2019.  Repeat CT in 3/2019 showed size was 9 mm.  Presented to [facility] for aneurysmal clipping.  No complications during procedure, but developed seizures soon after as above. Repeat CT head showed post-op changes - no sig bleeding  - Keppra 1000 BID in the setting of seizures  - Neurosurgery following  - Holding home ASA     Acute Metabolic Encephalopathy  2/2 Seizures vs. Alcohol withdrawal vs xanax withdrawal. Got 6 mg ativan with improvement, but also started on scheduled xanax.    - resolved  - continue scheduled xanax     Chronic Ischemic Stroke  - continue home Lipitor   - Holding home ASA     Depression    - continue home lexapro and strattera          Code Status:Full Code  FEN: DIET DYSPHAGIA II MECHANICALLY ALTERED; Dysphagia II Mechanically Altered;  [place] Thick  Dietary Nutrition Supplements: Frozen Oral Supplement  PPX:  Sub q heparin  DISPO: med/surg floor- dispo per neurosurgery team        Jet Waldron MD  Internal Medicine, PGY-3  Pager: [phone] or via [facility]  05/16/19  5:13 PM    This patient has been staffed and discussed with attending Seb Larry MD.

## 2019-05-16 NOTE — PROGRESS NOTES
Neurosurgery Progress Note    Patient: Valery Rolle MRN: [id number]     YOB: 1955  Age: 58 y.o.   Sex: female    Unit: [place] [address] PCU Room/Bed: 7752/0979-69 Location: [address] [place] [address]     Admitting Physician: Navdeep Carver    Primary Care Physician: Matt Chowdhury, LIGIA - CNP          LOS: 9 days     POD: 9  Right craniotomy and clipping of right middle cerebral artery aneurysm      Subjective:   Headaches    Objective:     /73   Pulse (!) 16   Temp 98.4 °F (36.9 °C) (Oral)   Resp 16   Ht 5' 2\" (1.575 m)   Wt 134 lb 14.7 oz (61.2 kg)   SpO2 97%   BMI 24.68 kg/m²    Temp (24hrs), Av.8 °F (37.1 °C), Min:98.1 °F (36.7 °C), Max:99.9 °F (37.7 °C)    Patient Vitals for the past 24 hrs:   BP Temp Temp src Pulse Resp SpO2   19 1103 121/73 98.4 °F (36.9 °C) Oral (!) 16 16 97 %   19 0811 122/85 98.1 °F (36.7 °C) Oral (!) 16 16 97 %   19 0622 -- 98.3 °F (36.8 °C) Oral -- -- --   19 0430 127/78 -- -- -- -- --   19 0350 (!) 169/77 99.1 °F (37.3 °C) Oral 55 15 95 %   19 0013 (!) 150/78 99.9 °F (37.7 °C) Oral 55 16 93 %   05/15/19 2032 (!) 148/69 -- -- 64 -- --   05/15/19 1952 110/85 99.1 °F (37.3 °C) Oral 75 16 95 %   05/15/19 1724 -- 98.8 °F (37.1 °C) Oral 65 16 95 %   05/15/19 1315 -- 98.5 °F (36.9 °C) Oral 60 16 95 %        07 -  1500  In: 120 [P.O.:120]  Out: 0     Intake/Output Summary (Last 24 hours) at 2019 1157  Last data filed at 2019 1013  Gross per 24 hour   Intake 730 ml   Output 400 ml   Net 330 ml     Yesterday:  05/15 0701 -  0700  In: 860 [P.O.:860]  Out: 400 [Urine:400]    PHYSICAL EXAM:   No distress  4 - Opens eyes on own  5 - Alert and oriented - short term memory deficits / executive functioning deficits found on cognitive evaluation   6 - Follows simple motor commands  Normal facial symmetry  Motor:    D EF EE WF WE IO HF HE KF KE PF DF EHL   Left 5 5 5 5 5 5 5 5 5 5 5 5 5   Right 5 5 5 5 5 5 5 5 5 5 5 5 5     Sensory: no issues  Incision: dry and clean    Data Review  Recent Results (from the past 24 hour(s))   CBC auto differential    Collection Time: 05/16/19  4:30 AM   Result Value Ref Range    WBC 12.0 (H) 4.0 - 11.0 K/uL    RBC 3.21 (L) 4.00 - 5.20 M/uL    Hemoglobin 10.1 (L) 12.0 - 16.0 g/dL    Hematocrit 31.3 (L) 36.0 - 48.0 %    MCV 97.6 80.0 - 100.0 fL    MCH 31.5 26.0 - 34.0 pg    MCHC 32.3 31.0 - 36.0 g/dL    RDW 13.8 12.4 - 15.4 %    Platelets 121 321 - 220 K/uL    MPV 7.9 5.0 - 10.5 fL    Neutrophils % 67.8 %    Lymphocytes % 19.0 %    Monocytes % 8.3 %    Eosinophils % 3.4 %    Basophils % 1.5 %    Neutrophils # 8.2 (H) 1.7 - 7.7 K/uL    Lymphocytes # 2.3 1.0 - 5.1 K/uL    Monocytes # 1.0 0.0 - 1.3 K/uL    Eosinophils # 0.4 0.0 - 0.6 K/uL    Basophils # 0.2 0.0 - 0.2 K/uL   Basic metabolic panel    Collection Time: 05/16/19  4:30 AM   Result Value Ref Range    Sodium 140 136 - 145 mmol/L    Potassium 3.9 3.5 - 5.1 mmol/L    Chloride 102 99 - 110 mmol/L    CO2 24 21 - 32 mmol/L    Anion Gap 14 3 - 16    Glucose 104 (H) 70 - 99 mg/dL    BUN 12 7 - 20 mg/dL    CREATININE 0.8 0.6 - 1.2 mg/dL    GFR Non-African American >60 >60    GFR African American >60 >60    Calcium 9.5 8.3 - 10.6 mg/dL     Scheduled Meds:   sennosides-docusate sodium  2 tablet Oral BID    bisacodyl  10 mg Rectal Once    methocarbamol  1,000 mg Oral 4x Daily    losartan  100 mg Oral Daily    lidocaine  1 patch Transdermal Daily    melatonin  3 mg Oral Nightly    pantoprazole  40 mg Oral QAM AC    levETIRAcetam  1,000 mg Oral BID    heparin (porcine)  5,000 Units Subcutaneous 3 times per day    sodium chloride flush  10 mL Intravenous 2 times per day    ALPRAZolam  0.5 mg Oral Nightly    sodium chloride flush  10 mL Intravenous 2 times per day    atomoxetine  80 mg Oral Daily    atorvastatin  40 mg Oral Nightly    escitalopram  20 mg Oral Daily    metoprolol tartrate  25 mg Oral BID    sodium chloride flush  10 mL

## 2019-05-16 NOTE — PROGRESS NOTES
Speech Language Pathology  Facility/Department: [place] [address] [facility]  Initial Speech/Language/Cognitive Assessment    NAME: Norris Shah  : 1955   MRN: [id number]  ADMISSION DATE: 2019  ADMITTING DIAGNOSIS: has Ischemic stroke ([facility] [address] HTN (hypertension); Anxiety; Cerebrovascular accident (CVA) ([facility] [address] Vertigo; Nonintractable episodic headache; Aneurysm of middle cerebral artery; TIA (transient ischemic attack); Brain aneurysm; Pneumonia; and Seizures ([facility] [address] on their problem list.  DATE ONSET: 19    Date of Eval: 2019   Evaluating Therapist: ROLAN Treadwell    RECENT RESULTS  CT OF HEAD 19  1.  Status post right frontotemporal craniotomy and aneurysm clipping in the right sylvian fissure. 2.  Bifrontal pneumocephalus. Small right frontal subdural fluid collection and small amount of hyperattenuating hematoma. 3.  Right frontal lobe hyperdense subarachnoid hemorrhage and small focus of subdural hematoma over the superior convexity. 4.  Diffuse right hemispheric sulcal effacement suggesting edema. 5.  No significant midline shift. Primary Complaint: \"forgetfulness\"    Pain:  Pain Assessment  Pain Assessment: 0-10  Pain Level: 9  RN aware of pain (headache); pain med just administered    Assessment:  Cognitive Diagnosis: Pt presents with mild-moderate cognitive-linguistic skills characterized by deficits in memory, problem solving/mental flexibility, executive functioning, and apparent increased processing time. Diagnosis: Pt presents with mild-moderate cognitive-linguistic skills characterized by deficits in memory, problem solving/mental flexibility, executive functioning, and apparent increased processing time. Recommendations:  Requires SLP Intervention: Yes  Duration/Frequency of Treatment: 3-5x per week for length of stay  D/C Recommendations:  To be determined    Plan:   Goals:  Short-term Goals  Goal 1: Patient will complete reasoning/problem solving tasks with 80% accuracy Mild    Written Expression  Dominant Hand: Right    Motor Speech  Motor Speech: Within Functional Limits    Pragmatics/Social Functioning  Pragmatics: (pt with appropriate attention to SLP during session, good eye contact. able to maintain topic in conversation. Not as fidget-y during evaluation as previously noted.)    Cognition:      Orientation  Overall Orientation Status: Within Functional Limits  Attention  Alternating Attention: To be assessed in therapy  Divided Attention: To be assessed in therapy  Selective Attention: (good selective attention this date)  Memory  Memory: Exceptions to [facility]  Short-term Memory: Mild(able to recall 2/3 words following 10 minute delay; 3/3 with semantic cues/field of 2)  Working Memory: To be assessed in therapy  Problem Solving  Problem Solving: Exceptions to [facility]  Managing Finances: To be assessed in therapy  Managing Medications: To be assessed in therapy  Simple Functional Tasks: Mild  Verbal Reasoning Skills: Moderate(decreased mental flexibility)  Abstract Reasoning  Abstract Reasoning: Exceptions to [facility]  Divergent Thinking: Moderate(named 7 animals in 1 minute)  Safety/Judgement  Unable to Self-monitor and Self-correct Consistently: (pt identifying errors better this date; noted self-correcting)  Flexibility of Thought: Mild    Additional Assessments:  Clock drawing--numbers evenly spaced, but 12/6 and 3/9 not at exact proper places on clock; when setting time, pt michelle two \"long hands\" but was able to state which hand should have been shorter. Pt oriented to all information regarding date, time/place, and reason independently  Complex yes/no: 80%,  increased processing time. ; 100% with one repetition  2-step dx: 80%,  increased processing time. ; 100% with one rep  2-step contingent dx: 100% increased processing time. 3-step serial dx: 100%, increased processing time.   Immediate recall: 3/3 words  Delayed recall (10 min): 2/3 words indep; 3/3 given semantic cues/field of 2 choices  Problem solving/mental flexibility: 60%   Confrontational naming: 10/10  Divergent naming (animals on 1 minute): named 7    Prognosis:  Speech Therapy Prognosis  Prognosis: Good  Prognosis Considerations: Age;Participation Level;Previous Level of Function  Individuals consulted  Consulted and agree with results and recommendations: Patient    Education:  Patient Education: educated pt re: role of SLP, results of testing purpose of visit, cognitive skills, and plan of care  Patient Education Response: Verbalizes understanding  Safety Devices in place: Yes  Type of devices: Call light within reach; Left in bed;Nurse notified; Bed alarm in place; Other (comment)(camera in place for safety)           Therapy Time:   Individual Concurrent Group Co-treatment   Time In 0815         Time Out 0838         Minutes 23            Timed Code Treatment Minutes: 0 Minutes  Total Treatment Time: ASHISH/ Forest Price, M.SDg Moy Pathologist  Pager: [phone]    5/16/2019 8:57 AM

## 2019-05-16 NOTE — PLAN OF CARE
Problem: Neurological  Intervention: Speech Evaluation/treatment  Note:   SLP evaluation completed. Please refer to EMR.

## 2019-05-16 NOTE — PROGRESS NOTES
Speech Language Pathology  Facility/Department: [facility]  Dysphagia Daily Treatment Note    NAME: Tiara Holt  : 1955  MRN: [id number]    Patient Diagnosis(es):   Patient Active Problem List    Diagnosis Date Noted    Pneumonia 2019    Seizures ([facility] [address] 2019    Brain aneurysm 2019    TIA (transient ischemic attack)     Aneurysm of middle cerebral artery 2019    Nonintractable episodic headache     Ischemic stroke ([facility] [address] 2019    HTN (hypertension)     Anxiety     Cerebrovascular accident (CVA) ([facility] [address]     Vertigo        CXR 19:  No change in atelectasis or small patchy pneumonia right lower lobe since portable chest 2019. No consolidated pneumonia, pneumothorax or pulmonary venous congestion. CT head 19  FINDINGS:     Status post right frontal craniectomy.  There is mild residual    postoperative hemorrhage.  Chronic right basal ganglia infarction again    seen.  Decreased bifrontal pneumocephalus.  No new areas of edema or    hemorrhage.             Previous MBS - none     Chart reviewed.     Medical Diagnosis: Brain aneurysm   Treatment Diagnosis: Oropharyngeal Dysphagia     BSE Impression 5/10/19-  Dysphagia Impression : No mastication of mechanically altered solids was appreciated; pt wears denture at baseline and reports she does not eat anything without dentures present. No overt s/s of aspiration with any textures. Patient was unable to follow commands for 3 ounce water but instead took 3 ounces in 3 very large bolus. No overt s/s of aspiration and tolerated without fluctuations in oxygen saturation or SOB. Pt does have RLL pna per chest xray but had not been eating orally prior to this eval / since surgery per chart.      MBS results  19  The pt presents with mild oral phase, mild-moderate pharyngeal phase dysphagia  Oral- pt with only lower denture in place.  Mastication with cracker was slow but functional.   Pharyngeal- pt noted to have decreased tongue base retraction and incomplete epiglottal closure which resulted in aspiration of thin liquids by cup and by straw with and without a cough reflex. In an attempt to protect the airway, the Pt was instructed to perform chin tuck with thin by cup, however, this did NOT aid with airway protection as the bolus spilled over the base of the tongue before pt was able to tuck chin and was aspirated during the swallow with a delayed cough reflex. There was no aspiration or penetration with puree, cracker and nectar by cup and straw. Pt had no residue remaining in valleculae or pyriform after the swallow with any consistency- recommend dysphagia II with nectar thick liquids        Pain: Headache-9/10. RN aware; pt just received pain med       Current Diet : dysphagia II with nectar thick liquids   Treatment:  Pt seen bedside to address the following goals:  1. The patient will tolerate recommended diet without observed clinical signs of aspiration  5/10 - Pt seen bedside as RN had found the patients dentures since BSE. Patient tolerated initial trials of thins without overt s/s of aspiration. Patient given soft solids which resulted in very slow and prolonged mastication and pt utilized a liquid rinse with each trial resulting in immediate weak cough on 2/3 trials. Pt given mechanically altered solids again with immediate weak cough following liquid rinse for 1/2 trials. Patient then began having an immediate weak cough inconsistently with trials of thins. Education regarding recommendation for aggressive oral care and ice chips with re-evaluation tomorrow. Pt able to complete oral care bedside with suction toothette system. Pt more lethargic for dysphagia treatment session but the increased alertness and motor agitation as cog-eval initiated (see documentation). Modify goal to: Patient will tolerate least restrictive diet without observed clinical signs of aspiration.    5/11: Pt seen in room, upright in bed. Upon SLP arrival, pt was lethargic but awoken to verbal stimuli. Throughout session, pt required max verbal and tactile cueing to sustain adequate alertness to participate in PO trials. Pt accepted thin via cup and straw, tolerating without overt s/s of penetration/aspiration. Puree applesauce trialed x1. Pt demonstrated timely AP transit, however delayed cough noted. SLP discontinued PO trials d/t pt's lethargy. Recommend continuation of NPO with ice chips following aggressive oral care. Continue goal.  Revised goals following MBS-  1- The pt will tolerate recommended diet level without s/s aspiration/respiratory decline  5/14-  Lungs are clear per chart. Pt reported no difficulty with meals this date. Only analyzed with trials of nectar by cup this date as attempted to see pt 2x this date, and both times pt just finished eating a meal so was full. Pt demonstrated no s/s aspiration with nectar thick liquids. con't goal  5/16: Pt seen sitting upright in bed, alert and cooperative. RN reports tolerance of dysphagia II/nectar thick liquids. Pt has dentures here this date; put in prior to PO trials. Pt with mildly prolonged mastication of advanced texture dilip cracker. Pt reported it being dry and needing liquid rinse to soften/assist with AP transit. Pt able to swallow without overt s/s aspiration and no oral residue present. Pt indicated not liking nectar thick liquids--reviewed rationale for them at this time and results of MBS with aspiration seen. SLP demonstrated how to thicken liquids to nectar consistency. Pt tolerated without overt s/s aspiration. At this time, recommend upgrade to dysphagia III (advanced) diet. Continue goal.    2. The patient/caregiver will demonstrate understanding of compensatory strategies for improved swallowing safety. 5/10 - Pt educated on rationale for diet downgrade at this time, importance of oral care and re-check tomorrow. No family present.  Continue goal.   5/11: Pt educated on rationale for continuation of NPO with ice chips and importance of oral care. No family present. RN verbalized understanding and in agreement. No evidence of learning demonstrated by pt. Continue goal.  5/12: Patient with improved alertness and presents with improving oropharyngeal dysphagia characterized by delayed swallow initiation, resulting in s/s aspiration (immediate cough) with mixed consistency (dilip cracker followed by thin liquid wash). There are no s/s aspiration with thin liquid in isolation, puree, soft solid or hard solid in isolation. Mastication is prolonged yet effective with soft solid. With hard solid there is ineffective mastication of hard solid with majority of unchewed bolus sitting on posterior tongue after initial swallow. When given a liquid wash to soften bolus and promote oral clearance, immediate cough is observed. There is adequate oral clearance with soft solid. Based on today's treatment, recommend Dysphagia 2 with thin liquids, NO MIXED CONSISTENCIES. Given current chest imaging concerning for aspiration and s/s with mixed consistency, recommend obtain MBS to further assess pharyngeal function, trial compensatory strategies, and r/o aspiration. Cont. Goal   5/13-  The daughter was present for the MBS. The pt and daughter were educated to the results of the MBS, anatomy and physiology of the swallow, diet recommendations, rational for nectar thick liquids and plan to implement exercises to improve swallow exercises. Both stated comprehension. con't goal   5/14- goal met- The pt was educated to the purpose of the visit, rational for swallowing exercises, need to repeat MBS prior to diet upgrade (recommend 4-6 weeks) due to instances of silent aspiration. Pt stated comprehension and agreement.  Cont goal   5/16: educated pt re: role of SLP, purpose of visit, rationale for thickened liquids, review of MBS, swallow strategies/precautions, positioning for PO intake, risk of

## 2019-05-17 VITALS
SYSTOLIC BLOOD PRESSURE: 183 MMHG | WEIGHT: 134.92 LBS | RESPIRATION RATE: 20 BRPM | BODY MASS INDEX: 24.83 KG/M2 | OXYGEN SATURATION: 95 % | HEART RATE: 56 BPM | DIASTOLIC BLOOD PRESSURE: 76 MMHG | HEIGHT: 62 IN | TEMPERATURE: 99 F

## 2019-05-17 LAB
ANION GAP SERPL CALCULATED.3IONS-SCNC: 14 MMOL/L (ref 3–16)
BASOPHILS ABSOLUTE: 0 K/UL (ref 0–0.2)
BASOPHILS RELATIVE PERCENT: 0.2 %
BUN BLDV-MCNC: 17 MG/DL (ref 7–20)
CALCIUM SERPL-MCNC: 9.6 MG/DL (ref 8.3–10.6)
CHLORIDE BLD-SCNC: 103 MMOL/L (ref 99–110)
CO2: 26 MMOL/L (ref 21–32)
CREAT SERPL-MCNC: 1 MG/DL (ref 0.6–1.2)
EOSINOPHILS ABSOLUTE: 0.4 K/UL (ref 0–0.6)
EOSINOPHILS RELATIVE PERCENT: 3.6 %
GFR AFRICAN AMERICAN: >60
GFR NON-AFRICAN AMERICAN: 56
GLUCOSE BLD-MCNC: 103 MG/DL (ref 70–99)
HCT VFR BLD CALC: 30.2 % (ref 36–48)
HEMOGLOBIN: 10 G/DL (ref 12–16)
LYMPHOCYTES ABSOLUTE: 2.4 K/UL (ref 1–5.1)
LYMPHOCYTES RELATIVE PERCENT: 22.8 %
MCH RBC QN AUTO: 32.1 PG (ref 26–34)
MCHC RBC AUTO-ENTMCNC: 33.1 G/DL (ref 31–36)
MCV RBC AUTO: 97.1 FL (ref 80–100)
MONOCYTES ABSOLUTE: 1 K/UL (ref 0–1.3)
MONOCYTES RELATIVE PERCENT: 9.7 %
NEUTROPHILS ABSOLUTE: 6.8 K/UL (ref 1.7–7.7)
NEUTROPHILS RELATIVE PERCENT: 63.7 %
PDW BLD-RTO: 13.8 % (ref 12.4–15.4)
PLATELET # BLD: 478 K/UL (ref 135–450)
PMV BLD AUTO: 8.1 FL (ref 5–10.5)
POTASSIUM SERPL-SCNC: 4.5 MMOL/L (ref 3.5–5.1)
RBC # BLD: 3.12 M/UL (ref 4–5.2)
SODIUM BLD-SCNC: 143 MMOL/L (ref 136–145)
WBC # BLD: 10.7 K/UL (ref 4–11)

## 2019-05-17 PROCEDURE — 6370000000 HC RX 637 (ALT 250 FOR IP): Performed by: NURSE PRACTITIONER

## 2019-05-17 PROCEDURE — 6370000000 HC RX 637 (ALT 250 FOR IP): Performed by: INTERNAL MEDICINE

## 2019-05-17 PROCEDURE — 6370000000 HC RX 637 (ALT 250 FOR IP)

## 2019-05-17 PROCEDURE — 85025 COMPLETE CBC W/AUTO DIFF WBC: CPT

## 2019-05-17 PROCEDURE — 6360000002 HC RX W HCPCS: Performed by: NURSE PRACTITIONER

## 2019-05-17 PROCEDURE — 97116 GAIT TRAINING THERAPY: CPT

## 2019-05-17 PROCEDURE — 92526 ORAL FUNCTION THERAPY: CPT

## 2019-05-17 PROCEDURE — 6370000000 HC RX 637 (ALT 250 FOR IP): Performed by: NEUROLOGICAL SURGERY

## 2019-05-17 PROCEDURE — 80048 BASIC METABOLIC PNL TOTAL CA: CPT

## 2019-05-17 PROCEDURE — 36415 COLL VENOUS BLD VENIPUNCTURE: CPT

## 2019-05-17 PROCEDURE — 2580000003 HC RX 258: Performed by: NURSE PRACTITIONER

## 2019-05-17 RX ORDER — ALPRAZOLAM 0.5 MG/1
0.5 TABLET ORAL ONCE
Status: COMPLETED | OUTPATIENT
Start: 2019-05-17 | End: 2019-05-17

## 2019-05-17 RX ADMIN — METHOCARBAMOL TABLETS 1000 MG: 500 TABLET, COATED ORAL at 09:46

## 2019-05-17 RX ADMIN — ALPRAZOLAM 0.5 MG: 0.5 TABLET ORAL at 17:26

## 2019-05-17 RX ADMIN — HYDROCODONE BITARTRATE AND ACETAMINOPHEN 1 TABLET: 5; 325 TABLET ORAL at 06:39

## 2019-05-17 RX ADMIN — HEPARIN SODIUM 5000 UNITS: 5000 INJECTION INTRAVENOUS; SUBCUTANEOUS at 06:39

## 2019-05-17 RX ADMIN — LEVETIRACETAM 1000 MG: 500 TABLET ORAL at 09:46

## 2019-05-17 RX ADMIN — METOPROLOL TARTRATE 25 MG: 25 TABLET ORAL at 09:46

## 2019-05-17 RX ADMIN — ESCITALOPRAM OXALATE 20 MG: 20 TABLET ORAL at 09:47

## 2019-05-17 RX ADMIN — METHOCARBAMOL TABLETS 1000 MG: 500 TABLET, COATED ORAL at 17:30

## 2019-05-17 RX ADMIN — PANTOPRAZOLE SODIUM 40 MG: 40 TABLET, DELAYED RELEASE ORAL at 06:39

## 2019-05-17 RX ADMIN — LOSARTAN POTASSIUM 100 MG: 100 TABLET ORAL at 09:47

## 2019-05-17 RX ADMIN — SENNOSIDES AND DOCUSATE SODIUM 2 TABLET: 8.6; 5 TABLET ORAL at 09:47

## 2019-05-17 RX ADMIN — Medication 10 ML: at 09:47

## 2019-05-17 ASSESSMENT — PAIN SCALES - GENERAL
PAINLEVEL_OUTOF10: 0
PAINLEVEL_OUTOF10: 8
PAINLEVEL_OUTOF10: 0

## 2019-05-17 NOTE — DISCHARGE INSTR - DIET

## 2019-05-17 NOTE — CARE COORDINATION
Case Management Discharge Assessment    2019  [facility] Case Management Department    Patient: Jess Sarah  MRN: [id number] / : 1955  ACCT: [de-identified]          Admission Documentation  Attending Provider: Mark Spain MD  Admit date/time: 2019  7:00 AM  Status: Inpatient [101]  Diagnosis: Brain aneurysm     Readmission within last 30 days:  no     Living Situation  Discharge Planning  Type of Residence: Private Residence  Living Arrangements: Alone  Support Systems: Family Members  Patient expects to be discharged to[de-identified] HOME    Service Assessment:    Advance Directives (For Healthcare)  Pre-existing DNR Comfort Care/DNR Arrest/DNI Order: No  Healthcare Directive: No, patient does not have an advance directive for healthcare treatment    Pharmacy:    Potential Assistance Purchasing Medications:     Does patient want to participate in local refill/meds to beds program?: Not Assessed    Notification completed in HENS/PAS? No     IMM  No     Discharge disposition: Home with [facility] (orders faxed 2019 @[phone]);  provided rehab list and [place] [id number] list provided to son for future reference. Mode of Transport son to transport home to [place], [place] and her family were provided with choice of provider; she and her family are in agreement with the discharge plan.       Care Transitions patient: Yes    Jana Veloz RN  [facility], [facility]  Case Management Department  Ph: [phone] Fax: [phone]

## 2019-05-17 NOTE — PROGRESS NOTES
Speech Language Pathology  [facility]  Dysphagia Daily Treatment Note    NAME: Carroll Osei  : 1955  MRN: [id number]    Patient Diagnosis(es):   Patient Active Problem List    Diagnosis Date Noted    Pneumonia 2019    Seizures ([facility] [address] 2019    Brain aneurysm 2019    TIA (transient ischemic attack)     Aneurysm of middle cerebral artery 2019    Nonintractable episodic headache     Ischemic stroke ([facility] [address] 2019    HTN (hypertension)     Anxiety     Cerebrovascular accident (CVA) ([facility] [address]     Vertigo        CXR 19:  No change in atelectasis or small patchy pneumonia right lower lobe since portable chest 2019. No consolidated pneumonia, pneumothorax or pulmonary venous congestion. CT head 19  FINDINGS:     Status post right frontal craniectomy.  There is mild residual    postoperative hemorrhage.  Chronic right basal ganglia infarction again    seen.  Decreased bifrontal pneumocephalus.  No new areas of edema or    hemorrhage.           Chart reviewed.     Medical Diagnosis: Brain aneurysm   Treatment Diagnosis: Oropharyngeal Dysphagia     BSE Impression 5/10/19-  Dysphagia Impression : No mastication of mechanically altered solids was appreciated; pt wears denture at baseline and reports she does not eat anything without dentures present. No overt s/s of aspiration with any textures. Patient was unable to follow commands for 3 ounce water but instead took 3 ounces in 3 very large bolus. No overt s/s of aspiration and tolerated without fluctuations in oxygen saturation or SOB. Pt does have RLL pna per chest xray but had not been eating orally prior to this eval / since surgery per chart.      MBS results  19  The pt presents with mild oral phase, mild-moderate pharyngeal phase dysphagia  Oral- pt with only lower denture in place.  Mastication with cracker was slow but functional.   Pharyngeal- pt noted to have decreased tongue base retraction tomorrow. No family present. Continue goal.   5/11: Pt educated on rationale for continuation of NPO with ice chips and importance of oral care. No family present. RN verbalized understanding and in agreement. No evidence of learning demonstrated by pt. Continue goal.  5/12: Patient with improved alertness and presents with improving oropharyngeal dysphagia characterized by delayed swallow initiation, resulting in s/s aspiration (immediate cough) with mixed consistency (dilip cracker followed by thin liquid wash). There are no s/s aspiration with thin liquid in isolation, puree, soft solid or hard solid in isolation. Mastication is prolonged yet effective with soft solid. With hard solid there is ineffective mastication of hard solid with majority of unchewed bolus sitting on posterior tongue after initial swallow. When given a liquid wash to soften bolus and promote oral clearance, immediate cough is observed. There is adequate oral clearance with soft solid. Based on today's treatment, recommend Dysphagia 2 with thin liquids, NO MIXED CONSISTENCIES. Given current chest imaging concerning for aspiration and s/s with mixed consistency, recommend obtain MBS to further assess pharyngeal function, trial compensatory strategies, and r/o aspiration. Cont. Goal   5/13-  The daughter was present for the MBS. The pt and daughter were educated to the results of the MBS, anatomy and physiology of the swallow, diet recommendations, rational for nectar thick liquids and plan to implement exercises to improve swallow exercises. Both stated comprehension. con't goal   5/14- goal met- The pt was educated to the purpose of the visit, rational for swallowing exercises, need to repeat MBS prior to diet upgrade (recommend 4-6 weeks) due to instances of silent aspiration. Pt stated comprehension and agreement.  Cont goal   5/16: educated pt re: role of SLP, purpose of visit, rationale for thickened liquids, review of MBS, swallow strategies/precautions, positioning for PO intake, risk of aspiration/pneumonia, diet consistencies, and recommended upgrade to dysphagia III diet (and to alert SLP/RN if difficulty arises). Pt verbalized understanding. Goal met, but continue goal for consistency and educational purposes. 5/17/19:  SLP educated pt and son re: s/s aspiration, risks of silent aspiration, rationale for thickened liquid consistency, consideration of [employer] Protocol) to facilitate hydration, exercise recommendations, and goal to repeat objective re-assessment (FEES or MBSS) in 1-2 weeks. They verbalized comprehension.  Continue goal.     3- The pt will participate in MBS.  5/13- goal met     New goal following MBS-  4-The pt will perform exercises to improve swallowing function with min cues. 5/14-  Pt very fatigued this afternoon. Pt performed effortful swallow x10 and was encouraged to con't exercises through out the day. Pt required cues to keep head at midline when performing effortful swallow. Pt provided with hand out as a reminder. con't goal   5/16: goal not formally addressed this session. Continue goal.  5/17/19:  Pt was able to complete effortful swallow with >10mL bolus of pudding consistency (applesauce) 32x. SLP educated pt and son re: rationale and recommendations for effortful swallow 60x/day and discussed suggestions for daily implementation. They verbalized comprehension.  Continue goal.    Cognitive goals addressed only informally this date. Pt demonstrates some difficulty with recall of discharge plan and rationale/benefits; son provides assistance as needed.       Patient/Family/Caregiver Education:  See above.     Compensatory Strategies:  Excellent oral care  Small bolus size  Slow rate  Upright positioning    Plan: Continued dysphagia treatment per POC.   Diet Recommendations: upgrade to dysphagia III (advanced) diet; continue with nectar thick liquids  Treatment: 15 minutes  Discharge Plan:  pt would benefit from ongoing dysphagia tx after discharged from acute care with repeat MBS prior to diet upgrade as pt has inconsistent cough reflex when aspirating  Discussed with RN, Fazal Bourgeois  Needs within reach. Electronically Signed by:  Darshan Corado., [address] [id number]  Pager #[phone]        This document will serve as a discharge summary if pt discharge before next treatment session.

## 2019-05-17 NOTE — PROGRESS NOTES
Discharge instructions and prescriptions given. Patient and son verbalized understanding. Discharged home with self care.

## 2019-05-17 NOTE — PLAN OF CARE
Problem: Nutrition  Goal: Optimal nutrition therapy  5/17/2019 1454 by Chris Ladd RD, MARY  Outcome: Completed

## 2019-05-17 NOTE — PROGRESS NOTES
Physical Therapy  Facility/Department: [facility] [address] [facility]  Daily Treatment Note  NAME: Heraclio Rockwell  : 1955  MRN: [id number]    Date of Service: 2019    Discharge Recommendations:  Heraclio Rockwell scored a 17/24 on the AM-PAC short mobility form. Current research shows that an AM-PAC score of 17 or less is typically not associated with a discharge to the patient's home setting. Based on the patients AM-PAC score and their current functional mobility deficits, it is recommended that the patient have 3-5 sessions per week of Physical Therapy at d/c to increase the patients independence. PT Equipment Recommendations  Equipment Needed: No  Other: defer to next level of care    Patient Diagnosis(es): The primary encounter diagnosis was Essential hypertension. A diagnosis of Aneurysm of middle cerebral artery was also pertinent to this visit. has a past medical history of Anxiety, Arthritis, Cerebral aneurysm, Former smoker, GERD (gastroesophageal reflux disease), and Hypertension. has a past surgical history that includes Hysterectomy; Cholecystectomy; Hemorrhoid surgery; and craniotomy (Right, 2019). Restrictions  Position Activity Restriction  Other position/activity restrictions: ambulate  Subjective   General  Chart Reviewed: Yes  Additional Pertinent Hx: Admit  for craniotomy, clipping of MCA aneurysm; PMHx: anxiety, arthritis, cerebral aneurysm, GERD, HTN  Family / Caregiver Present: Yes(son)  Referring Practitioner: Ronney Dandy - CNP  General Comment  Comments: Pt found supine in bed upon PT arrival.  Reporting 7/10 HA pain, agreeable to therapy.           Orientation  Orientation  Overall Orientation Status: Within Functional Limits  Cognition      Objective   Bed mobility  Supine to Sit: Supervision(HOB raised )  Sit to Supine: Supervision  Scooting: Supervision(to EOB )  Transfers  Sit to Stand: Stand by assistance(from EOB, from bench )  Stand to sit: Stand by assistance(to bench, to EOB)  Ambulation 1  Surface: level tile  Device: No Device  Assistance: Contact guard assistance  Quality of Gait: Decreased B step length, occasional L foot drag (able to correct with verbal cues), impulsive movements; pt distracted often looking back over L and R shoulder during amb; Overall fairly steady gait with no LOB or near LOB. Distance: 300'+200'   Comments: Pt's gait deviations increase with fatigue  Stairs/Curb  Stairs?: No     Assessment   Body structures, Functions, Activity limitations: Decreased functional mobility   Assessment: Pt requiring CGA for all transfers and amb. Per pt's son, IP rehab denied again despite appeal, however SNF approved. Pt and son plan for d/c to IP facility near pt's home in [address]. Will continue to follow. Treatment Diagnosis: impaired gait and transfers  Prognosis: Good  Patient Education: role of PT, use of call light, d/c planning; pt verb understanding  REQUIRES PT FOLLOW UP: Yes  Activity Tolerance  Activity Tolerance: Patient Tolerated treatment well;Patient limited by fatigue  Activity Tolerance: Pt requiring 1 seated rest break during amb      AM-PAC Score  AM-PAC Inpatient Mobility Raw Score : 17  AM-PAC Inpatient T-Scale Score : 42.13  Mobility Inpatient CMS 0-100% Score: 50.57  Mobility Inpatient CMS G-Code Modifier : CK          Goals  Short term goals  Time Frame for Short term goals: discharge  Short term goal 1: Pt will transfer supine <--> sit with min A x 1 -met 5/11; GOAL UPDATED: supervision  MET 5/15 Goal Updated: independent ongoing  Short term goal 2: Pt will transfer sit <--> stand with min A x 1 -met 5/11; GOAL UPDATED: SBA MET 5/15l Goal Updated: supervision ongoing  Short term goal 3: Pt will perform bed <--> chair transfer with min A x 1 - ongoing  Short term goal 4: Pt will participate in gait assessment.  - met 5/11; GOAL UPDATED: 150' with SBA  ONGOING  Patient Goals   Patient goals : eventually return home    Plan    Plan  Times per week: 5-7  Current Treatment Recommendations: Strengthening, Gait Training, Patient/Caregiver Education & Training, Equipment Evaluation, Education, & procurement, Balance Training, Functional Mobility Training, Transfer Training  Safety Devices  Type of devices: Chair alarm in place, Left in chair, Call light within reach, Gait belt, Nurse notified     Therapy Time   Individual Concurrent Group Co-treatment   Time In 1505         Time Out 1535         Minutes 30           Timed Code Treatment Minutes:  30     Total Treatment Minutes:  30     If the patient is discharged before the next treatment session, this note will serve as the discharge summary.      Debbie Londono, PT, DPT [id number]

## 2019-05-17 NOTE — PLAN OF CARE
Problem: Pain:  Goal: Control of acute pain  Outcome: Ongoing  Note:   Pain controlled with Prn medications, heat and cold packs. Problem: Nutrition  Goal: Optimal nutrition therapy  Outcome: Ongoing  Note:   Patient drinking more fluids this shift and urinating better. Problem: Falls - Risk of:  Goal: Will remain free from falls  Outcome: Ongoing  Note:   Patient remains free of falls and accidental injury. Patient assessed as a low fall risk. Bed in lowest position, non-skid footwear in place, call light in reach, instructed patients to call for needs or assistance out of bed. Will continue to monitor.

## 2019-05-17 NOTE — PROGRESS NOTES
Neurosurgery Progress Note    Patient: Qasim Mujica MRN: [id number]     YOB: 1955  Age: 58 y.o. 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8 °C) Oral 60 16 98 %       No intake/output data recorded.     Intake/Output Summary (Last 24 hours) at 2019 1153  Last data filed at 2019 0419  Gross per 24 hour   Intake 420 ml   Output 0 ml   Net 420 ml     Yesterday:   0701 -  0700  In: 5 [P.O.:540]  Out: 0     PHYSICAL EXAM:   No distress  4 - Opens eyes on own  5 - Alert and oriented - short term memory deficits / executive functioning deficits found on cognitive evaluation   6 - Follows simple motor commands  Normal facial symmetry  Motor:    D EF EE WF WE IO HF HE KF KE PF DF EHL   Left 5 5 5 5 5 5 5 5 5 5 5 5 5   Right 5 5 5 5 5 5 5 5 5 5 5 5 5     Sensory: no issues  Incision: dry and clean    Data Review  Recent Results (from the past 24 hour(s))   CBC auto differential    Collection Time: 19  4:20 AM   Result Value Ref Range    WBC 10.7 4.0 - 11.0 K/uL    RBC 3.12 (L) 4.00 - 5.20 M/uL    Hemoglobin 10.0 (L) 12.0 - 16.0 g/dL    Hematocrit 30.2 (L) 36.0 - 48.0 %    MCV 97.1 80.0 - 100.0 fL    MCH 32.1 26.0 - 34.0 pg    MCHC 33.1 31.0 - 36.0 g/dL    RDW 13.8 12.4 - 15.4 %    Platelets 840 (H) 377 - 450 K/uL    MPV 8.1 5.0 - 10.5 fL    Neutrophils % 63.7 %    Lymphocytes % 22.8 %    Monocytes % 9.7 %    Eosinophils % 3.6 %    Basophils % 0.2 %    Neutrophils # 6.8 1.7 - 7.7 K/uL    Lymphocytes # 2.4 1.0 - 5.1 K/uL    Monocytes # 1.0 0.0 - 1.3 K/uL    Eosinophils # 0.4 0.0 - 0.6 K/uL    Basophils # 0.0 0.0 - 0.2 K/uL   Basic metabolic panel    Collection Time: 05/17/19  4:20 AM   Result Value Ref Range    Sodium 143 136 - 145 mmol/L    Potassium 4.5 3.5 - 5.1 mmol/L    Chloride 103 99 - 110 mmol/L    CO2 26 21 - 32 mmol/L    Anion Gap 14 3 - 16    Glucose 103 (H) 70 - 99 mg/dL    BUN 17 7 - 20 mg/dL    CREATININE 1.0 0.6 - 1.2 mg/dL    GFR Non- 56 (A) >60    GFR African American >60 >60    Calcium 9.6 8.3 - 10.6 mg/dL     Scheduled Meds:   sennosides-docusate sodium  2 tablet Oral BID    bisacodyl  10 mg Rectal Once    methocarbamol  1,000 mg Oral 4x Daily    losartan  100 mg Oral Daily    lidocaine  1 patch Transdermal Daily    melatonin  3 mg Oral Nightly    pantoprazole  40 mg Oral QAM AC    levETIRAcetam  1,000 mg Oral BID    heparin (porcine)  5,000 Units Subcutaneous 3 times per day    sodium chloride flush  10 mL Intravenous 2 times per day    ALPRAZolam  0.5 mg Oral Nightly    sodium chloride flush  10 mL Intravenous 2 times per day    atomoxetine  80 mg Oral Daily    atorvastatin  40 mg Oral Nightly    escitalopram  20 mg Oral Daily    metoprolol tartrate  25 mg Oral BID    sodium chloride flush  10 mL Intravenous 2 times per day    LORazepam  0.5 mg Intravenous Once     Continuous Infusions:    PRN Meds:  polyvinyl alcohol, HYDROcodone 5 mg - acetaminophen, sodium chloride flush, haloperidol lactate, acetaminophen, sodium chloride flush, butalbital-acetaminophen-caffeine, cloNIDine, sodium chloride flush, ondansetron, acetaminophen, naloxone, polyethylene glycol, bisacodyl    Imaging Review:  FL MODIFIED BARIUM SWALLOW W VIDEO   Final Result      Aspiration of thin barium. XR CHEST PORTABLE   Final Result      No change in atelectasis or small patchy pneumonia right lower lobe. XR ABDOMEN (KUB) (SINGLE AP VIEW)   Final Result      1. Feeding tube tip consistent with placement within mid stomach. XR CHEST PORTABLE   Final Result   1. Right basilar opacities, concerning for pneumonia in the proper    clinical setting. CT HEAD WO CONTRAST   Final Result     Expected postoperative changes          CT Head wo Contrast   Final Result      1. Status post right frontotemporal craniotomy and aneurysm clipping in the right sylvian fissure. 2.  Bifrontal pneumocephalus. Small right frontal subdural fluid collection and small amount of hyperattenuating hematoma. 3.  Right frontal lobe hyperdense subarachnoid hemorrhage and small focus of subdural hematoma over the superior convexity. 4.  Diffuse right hemispheric sulcal effacement suggesting edema. 5.  No significant midline shift. Results discussed with the ICU nurse, Eric Thompson, 5/7/19 at 7:35 PM            IR SILVIO CATH PLACE INT CAROTID INTRACRANIAL RIGHT W ANGIO    (Results Pending)       KS VERTEBROBASILLAR CIRC, SIMPLE SURG [[id number]] (RIGHT CRANIOTOMY FOR CLIPPING OF MIDDLE CEREBRAL ARTERY ANEURYSM)    Assessment/Plan:     Active Problems:    HTN (hypertension)    Brain aneurysm    Pneumonia    Seizures (HCC)  Resolved Problems:    * No resolved hospital problems.  *    Continue Q4 hours neuro checks  Fioricet for pain as needed  Norco for pain as needed  Ice packs to surgical area, as needed  Continue anti-seizure medications as per Neurology  Ambulate as tolerated  Continue chemical and mechanical DVT prophylaxis  Bowel regimen - last BM 5/15  Advance diet as tolerated  Okay for dispo to save environment   Will remove rest of stables today  Patient denied for rehab - w/ her short term memory deficits / executive functioning deficits, need for compliance w/ complex medication regimen, risk of falling, and risk of aspiration noted on SLP evaluation requiring nectar thk liquids patient requires 24 hour supervision. This is not felt to be a permanent state. Anticipate w/ appropriate therapy patient will improve likely in next 2-3 weeks and be safe to be home w/o assistance.  Appeal today at 2pm - will update team after discussion w/ insurance         Signed By: Harpreet Hayes     May 17, 2019

## 2019-05-17 NOTE — PROGRESS NOTES
Nutrition Assessment    Type and Reason for Visit: Reassess    Nutrition Recommendations:   · Encourage PO intake on dysphagia 2 diet w/ nectar thick liquids  · ONS Magic Cup     Nutrition Assessment: Pt is improving from nutritional standpoint, consuming % of most meals. On dysphagia 2 diet w/ nectar thick liquids per SLP recommendations. Continue Magic Cup [employer] No Weight since 5/7; request new wt. Constipation improving +BM 5/15; bowel regimen ordered. Continue to follow per nutrition standards of care for continued improving PO intake. Malnutrition Assessment:  · Malnutrition Status: No malnutrition  · Context: Acute illness or injury  · Findings of the 6 clinical characteristics of malnutrition (Minimum of 2 out of 6 clinical characteristics is required to make the diagnosis of moderate or severe Protein Calorie Malnutrition based on AND/ASPEN Guidelines):  1. Energy Intake-Greater than 75% of estimated energy requirement, Greater than or equal to 5 days    2. Weight Loss-No significant weight loss,    3. Fat Loss-No significant subcutaneous fat loss,    4. Muscle Loss-No significant muscle mass loss,    5. Fluid Accumulation-No significant fluid accumulation,    6.   Strength-Not measured    Nutrition Risk Level: Low    Nutrient Needs:  · Estimated Daily Total Kcal: 1396-0485 kcal (25-30)  · Estimated Daily Protein (g): 73-79 grams(1.2-1.3)  · Estimated Daily Total Fluid (ml/day): 2885-4841 mL    Nutrition Diagnosis:   · Problem: No nutrition diagnosis at this time    Objective Information:  · Nutrition-Focused Physical Findings: +BM 5/15 +bowel regimen   · Wound Type: Surgical Wound  · Current Nutrition Therapies:  · Oral Diet Orders: Dysphagia 2, Nectar Thick   · Oral Diet intake: 51-75%, %  · Oral Nutrition Supplement (ONS) Orders: Frozen Oral Supplement  · Anthropometric Measures:  · Ht: 5' 2\" (157.5 cm)   · Current Body Wt: 134 lb 14.7 oz (61.2 kg)(no new wt since 5/7)  · Admission

## 2019-05-20 ENCOUNTER — CARE COORDINATION (OUTPATIENT)
Dept: CASE MANAGEMENT | Age: 64
End: 2019-05-20

## 2019-05-20 NOTE — CARE COORDINATION
[facility] called [facility] in [place] and spoke with Dena who confirmed that Pt's [facility] were received. ROGERIO HaleN, RN  Care Transition Coordinator  Contact Number:  [phone]

## 2019-06-04 ENCOUNTER — TELEPHONE (OUTPATIENT)
Dept: NEUROLOGY | Age: 64
End: 2019-06-04

## 2019-06-04 NOTE — TELEPHONE ENCOUNTER
Had to [facility] to call back to r/s due to Dr Iniguez Riding out of the office

## 2019-06-05 ENCOUNTER — TELEPHONE (OUTPATIENT)
Dept: NEUROLOGY | Age: 64
End: 2019-06-05

## 2019-06-11 ENCOUNTER — TELEPHONE (OUTPATIENT)
Dept: NEUROSURGERY | Age: 64
End: 2019-06-11

## 2019-06-11 NOTE — TELEPHONE ENCOUNTER
Pt has called and has requested an appt to see  For approval to return back to work. Pt states she has been off work since February. States she wants to know if she can get a release from [employer] instead of having to go back to [facility] as she doesn't have gas or time to do so.

## 2019-06-14 NOTE — TELEPHONE ENCOUNTER
Called and spoke with the patient. She voiced that she was seen in the hospital by Dr Ximena Galvan around 3/20/2019. She states while there they discovered she had an aneurysm on her brain. Patient states that the surgeon was out of network and she was recommended to go to [facility] for the operation. Patient states after the surgery she returned back home and underwent outpatient rehab which she has now been released from. She states she is due to return to work on August 23rd, and is wanting to know if Dr Ximena Galvan can provide her with a return to work letter. Patient also states that she cannot afford to travel back to [facility] (5 hour drive) just to obtain a return to work letter. I voiced to the patient that Dr Ximena Galvan will return back to clinic this coming Monday the 17th. That I would get this message to him and his MA. I voiced to her that someone would give her a call back on Monday before the end of the business day. Patient voiced understanding to all.

## 2019-07-12 ENCOUNTER — NURSE TRIAGE (OUTPATIENT)
Dept: OTHER | Facility: CLINIC | Age: 64
End: 2019-07-12

## 2019-08-02 ENCOUNTER — TELEPHONE (OUTPATIENT)
Dept: NEUROLOGY | Age: 64
End: 2019-08-02

## 2019-08-05 ENCOUNTER — OFFICE VISIT (OUTPATIENT)
Dept: NEUROLOGY | Age: 64
End: 2019-08-05
Payer: COMMERCIAL

## 2019-08-05 VITALS
WEIGHT: 143 LBS | HEART RATE: 90 BPM | HEIGHT: 62 IN | SYSTOLIC BLOOD PRESSURE: 143 MMHG | DIASTOLIC BLOOD PRESSURE: 103 MMHG | BODY MASS INDEX: 26.31 KG/M2

## 2019-08-05 DIAGNOSIS — R56.9 SEIZURE (HCC): Primary | ICD-10-CM

## 2019-08-05 DIAGNOSIS — Z86.59 HISTORY OF ANXIETY: ICD-10-CM

## 2019-08-05 DIAGNOSIS — Z86.79 HISTORY OF ANEURYSM: ICD-10-CM

## 2019-08-05 PROCEDURE — 99204 OFFICE O/P NEW MOD 45 MIN: CPT | Performed by: PSYCHIATRY & NEUROLOGY

## 2019-08-05 RX ORDER — ALPRAZOLAM 0.5 MG/1
0.5 TABLET ORAL 3 TIMES DAILY PRN
COMMUNITY
End: 2019-11-21 | Stop reason: SDUPTHER

## 2019-08-05 RX ORDER — HYDROCODONE BITARTRATE AND ACETAMINOPHEN 7.5; 325 MG/1; MG/1
1 TABLET ORAL EVERY 6 HOURS PRN
COMMUNITY
End: 2019-11-21 | Stop reason: ALTCHOICE

## 2019-08-06 NOTE — PROGRESS NOTES
aneurysm     Former smoker     GERD (gastroesophageal reflux disease)     Hypertension        Past Surgical History:   Procedure Laterality Date    BRAIN SURGERY      Aneurysm partial clip     CHOLECYSTECTOMY      CRANIOTOMY Right 2019    RIGHT CRANIOTOMY FOR CLIPPING OF MIDDLE CEREBRAL ARTERY ANEURYSM performed by Jazmine Galvan MD at [address] [place] [address]         History reviewed. No pertinent family history.     No Known Allergies    Social History     Socioeconomic History    Marital status:      Spouse name: Not on file    Number of children: Not on file    Years of education: Not on file    Highest education level: Not on file   Occupational History    Not on file   Social Needs    Financial resource strain: Not on file    Food insecurity:     Worry: Not on file     Inability: Not on file    Transportation needs:     Medical: Not on file     Non-medical: Not on file   Tobacco Use    Smoking status: Former Smoker     Packs/day: 1.00     Years: 20.00     Pack years: 20.00     Types: Cigarettes     Last attempt to quit: 3/6/2019     Years since quittin.4    Smokeless tobacco: Never Used   Substance and Sexual Activity    Alcohol use: Not Currently    Drug use: No    Sexual activity: Not on file   Lifestyle    Physical activity:     Days per week: Not on file     Minutes per session: Not on file    Stress: Not on file   Relationships    Social connections:     Talks on phone: Not on file     Gets together: Not on file     Attends [employer] service: Not on file     Active member of club or organization: Not on file     Attends meetings of clubs or organizations: Not on file     Relationship status: Not on file    Intimate partner violence:     Fear of current or ex partner: Not on file     Emotionally abused: Not on file     Physically abused: Not on file     Forced sexual activity: Not on file   Other Topics Concern    Not on file   Social tablet Take 1 tablet by mouth every 6 hours as needed for Pain.  levETIRAcetam (KEPPRA) 1000 MG tablet Take 1 tablet by mouth 2 times daily 60 tablet 3    sennosides-docusate sodium (SENOKOT-S) 8.6-50 MG tablet Take 2 tablets by mouth 2 times daily 60 tablet 0    losartan (COZAAR) 100 MG tablet Take 1 tablet by mouth daily 30 tablet 3    atorvastatin (LIPITOR) 80 MG tablet Take 0.5 tablets by mouth nightly 30 tablet 3    pantoprazole (PROTONIX) 20 MG tablet Take 20 mg by mouth daily      escitalopram (LEXAPRO) 20 MG tablet Take 20 mg by mouth daily         BP (!) 143/103   Pulse 90   Ht 5' 2\" (1.575 m)   Wt 143 lb (64.9 kg)   BMI 26.16 kg/m²       Constitutional - well developed, well nourished. Eyes - conjunctiva normal.  Pupils react to light  Ear, nose, throat -hearing intact to finger rub No scars, masses, or lesions over external nose or ears, no atrophy of tongue  Neck-symmetric, no masses noted, no jugular vein distension. No bruits noted. Respiration- chest wall appears symmetric, good expansion,   normal effort without use of accessory muscles  Cardiovascular- RRR  Musculoskeletal - no significant wasting of muscles noted, no bony deformities, gait no gross ataxia  Extremities-no clubbing, cyanosis or edema  Skin - warm, dry, and intact. No rash, erythema, or pallor. Psychiatric - mood, affect, and behavior appear normal.      Neurological exam  Awake, alert, fluent oriented x 3 appropriate affect  Attention and concentration appear appropriate mood slightly anxious  Recent and remote memory appears unremarkable and short-term memory 4/4 at 5 minutes. Normal clock drawing. Follows complex commands well. Speech normal without dysarthria  No clear issues with language of fund of knowledge    Cranial Nerve Exam   CN II- Visual fields grossly unremarkable. VA adequate.  Discs sharp  CN III, IV,VI- PERRLA, EOMI, No nystagmus, conjugate eye movements, no ptosis  CN V-sensation intact to LT over face  CN VII-no facial asymmetry  CN VIII-Hearing intact   CN IX and X- Palate elevates in midline  CN XI-good shoulder shrug  CN XII-Tongue midline with no fasciculations or fibrillations    Motor Exam  V/V throughout upper and lower extremities bilaterally, no cogwheeling, normal tone    Sensory Exam  Sensation intact to light touch , PP  upper and lower extremities bilaterally; normal vibration sense in LE's    Reflexes   2+ biceps bilaterally  2+ brachioradialis  2+ triceps  2+patella  2+ ankle jerks  No clonus ankles  No Swift's sign bilateral hands. No Babinski sign. Tremors- no tremors in hands or head noted    Gait  Normal base and speed  No ataxia. No Romberg sign    Coordination  Finger to nose and LAISHA-unremarkable    Lab Results   Component Value Date    CIAGCFWK97 348 02/23/2019     Lab Results   Component Value Date    WBC 10.7 05/17/2019    HGB 10.0 (L) 05/17/2019    HCT 30.2 (L) 05/17/2019    MCV 97.1 05/17/2019     (H) 05/17/2019     Lab Results   Component Value Date     05/17/2019    K 4.5 05/17/2019     05/17/2019    CO2 26 05/17/2019    BUN 17 05/17/2019    CREATININE 1.0 05/17/2019    GLUCOSE 103 (H) 05/17/2019    CALCIUM 9.6 05/17/2019    PROT 8.5 03/19/2019    LABALBU 4.7 03/19/2019    BILITOT 0.4 03/19/2019    ALKPHOS 103 03/19/2019    AST 34 (H) 03/19/2019    ALT 22 03/19/2019    LABGLOM 56 (A) 05/17/2019    GFRAA >60 05/17/2019    GLOB 3.5 09/06/2016           Assessment    ICD-10-CM    1. Seizure (HCC) R56.9 EEG awake and asleep   2. History of aneurysm Z86.79    3. History of anxiety Z86.59        I have ordered an EEG. If relatively unremarkable I see no reason to prevent a slow wean off of her Keppra as long as she is cautious regarding driving. It is difficult to predict her future seizure risk but it will always be greater than 0. Okay with me for her to return to work 8/23.     Plan  Orders Placed This Encounter   Procedures    EEG awake and asleep Standing Status:   Future     Standing Expiration Date:   8/5/2020     Order Specific Question:   Reason for exam:     Answer:   sz         Return in about 3 months (around 11/5/2019).     (Please note that portions of this note were completed with a voice recognition program. Efforts were made to edit the dictations but occasionally words are mis-transcribed.)

## 2019-10-31 ENCOUNTER — OFFICE VISIT (OUTPATIENT)
Dept: PRIMARY CARE CLINIC | Age: 64
End: 2019-10-31
Payer: COMMERCIAL

## 2019-10-31 VITALS
SYSTOLIC BLOOD PRESSURE: 128 MMHG | BODY MASS INDEX: 27.6 KG/M2 | OXYGEN SATURATION: 97 % | DIASTOLIC BLOOD PRESSURE: 76 MMHG | HEIGHT: 62 IN | HEART RATE: 75 BPM | WEIGHT: 150 LBS | TEMPERATURE: 97.6 F

## 2019-10-31 DIAGNOSIS — Z12.11 COLON CANCER SCREENING: ICD-10-CM

## 2019-10-31 DIAGNOSIS — Z78.0 POST-MENOPAUSAL: ICD-10-CM

## 2019-10-31 DIAGNOSIS — F41.9 ANXIETY: ICD-10-CM

## 2019-10-31 DIAGNOSIS — I67.1 ANEURYSM OF MIDDLE CEREBRAL ARTERY: ICD-10-CM

## 2019-10-31 DIAGNOSIS — K21.9 GASTROESOPHAGEAL REFLUX DISEASE WITHOUT ESOPHAGITIS: ICD-10-CM

## 2019-10-31 DIAGNOSIS — Z23 NEED FOR PROPHYLACTIC VACCINATION AGAINST STREPTOCOCCUS PNEUMONIAE (PNEUMOCOCCUS): ICD-10-CM

## 2019-10-31 DIAGNOSIS — Z91.81 AT HIGH RISK FOR FALLS: ICD-10-CM

## 2019-10-31 DIAGNOSIS — Z23 NEED FOR PROPHYLACTIC VACCINATION AND INOCULATION AGAINST VARICELLA: ICD-10-CM

## 2019-10-31 DIAGNOSIS — I63.9 CEREBROVASCULAR ACCIDENT (CVA), UNSPECIFIED MECHANISM (HCC): ICD-10-CM

## 2019-10-31 DIAGNOSIS — R53.83 OTHER FATIGUE: ICD-10-CM

## 2019-10-31 DIAGNOSIS — F90.9 ATTENTION DEFICIT HYPERACTIVITY DISORDER (ADHD), UNSPECIFIED ADHD TYPE: ICD-10-CM

## 2019-10-31 DIAGNOSIS — E78.00 HYPERCHOLESTEROLEMIA: ICD-10-CM

## 2019-10-31 DIAGNOSIS — I10 ESSENTIAL HYPERTENSION: Primary | ICD-10-CM

## 2019-10-31 DIAGNOSIS — Z12.31 ENCOUNTER FOR SCREENING MAMMOGRAM FOR BREAST CANCER: ICD-10-CM

## 2019-10-31 PROCEDURE — 99204 OFFICE O/P NEW MOD 45 MIN: CPT | Performed by: NURSE PRACTITIONER

## 2019-10-31 RX ORDER — ATOMOXETINE 80 MG/1
80 CAPSULE ORAL DAILY
COMMUNITY
End: 2020-10-27

## 2019-10-31 RX ORDER — ESCITALOPRAM OXALATE 20 MG/1
20 TABLET ORAL DAILY
Qty: 30 TABLET | Refills: 2 | Status: SHIPPED | OUTPATIENT
Start: 2019-10-31 | End: 2020-06-05 | Stop reason: SDUPTHER

## 2019-10-31 RX ORDER — PANTOPRAZOLE SODIUM 20 MG/1
20 TABLET, DELAYED RELEASE ORAL DAILY
Qty: 30 TABLET | Refills: 5 | Status: ON HOLD | OUTPATIENT
Start: 2019-10-31 | End: 2020-06-01

## 2019-10-31 RX ORDER — LOSARTAN POTASSIUM 100 MG/1
100 TABLET ORAL NIGHTLY
Qty: 30 TABLET | Refills: 2 | Status: ON HOLD | OUTPATIENT
Start: 2019-10-31 | End: 2020-06-01

## 2019-10-31 ASSESSMENT — PATIENT HEALTH QUESTIONNAIRE - PHQ9
SUM OF ALL RESPONSES TO PHQ QUESTIONS 1-9: 0
2. FEELING DOWN, DEPRESSED OR HOPELESS: 0
1. LITTLE INTEREST OR PLEASURE IN DOING THINGS: 0
SUM OF ALL RESPONSES TO PHQ9 QUESTIONS 1 & 2: 0
SUM OF ALL RESPONSES TO PHQ QUESTIONS 1-9: 0

## 2019-11-05 PROBLEM — Z12.31 ENCOUNTER FOR SCREENING MAMMOGRAM FOR BREAST CANCER: Status: ACTIVE | Noted: 2019-11-05

## 2019-11-05 PROBLEM — R53.83 OTHER FATIGUE: Status: ACTIVE | Noted: 2019-11-05

## 2019-11-05 PROBLEM — Z78.0 POST-MENOPAUSAL: Status: ACTIVE | Noted: 2019-11-05

## 2019-11-05 PROBLEM — Z91.81 AT HIGH RISK FOR FALLS: Status: ACTIVE | Noted: 2019-11-05

## 2019-11-05 PROBLEM — Z12.11 COLON CANCER SCREENING: Status: ACTIVE | Noted: 2019-11-05

## 2019-11-05 PROBLEM — Z23 NEED FOR PROPHYLACTIC VACCINATION AGAINST STREPTOCOCCUS PNEUMONIAE (PNEUMOCOCCUS): Status: ACTIVE | Noted: 2019-11-05

## 2019-11-05 ASSESSMENT — ENCOUNTER SYMPTOMS
COUGH: 0
TROUBLE SWALLOWING: 0
DIARRHEA: 0
EYE REDNESS: 0
ABDOMINAL PAIN: 0
SHORTNESS OF BREATH: 0
RHINORRHEA: 0
VOICE CHANGE: 0
CHEST TIGHTNESS: 0
VOMITING: 0
SORE THROAT: 0
WHEEZING: 0
CONSTIPATION: 0
NAUSEA: 0
BLOOD IN STOOL: 0

## 2019-11-06 ENCOUNTER — TELEPHONE (OUTPATIENT)
Dept: PRIMARY CARE CLINIC | Age: 64
End: 2019-11-06

## 2019-11-11 ENCOUNTER — APPOINTMENT (OUTPATIENT)
Dept: CT IMAGING | Age: 64
End: 2019-11-11
Payer: COMMERCIAL

## 2019-11-11 ENCOUNTER — HOSPITAL ENCOUNTER (EMERGENCY)
Age: 64
Discharge: ANOTHER HOSPITAL | End: 2019-11-11
Attending: EMERGENCY MEDICINE
Payer: COMMERCIAL

## 2019-11-11 ENCOUNTER — APPOINTMENT (OUTPATIENT)
Dept: GENERAL RADIOLOGY | Age: 64
End: 2019-11-11
Payer: COMMERCIAL

## 2019-11-11 VITALS
OXYGEN SATURATION: 95 % | HEART RATE: 78 BPM | SYSTOLIC BLOOD PRESSURE: 125 MMHG | RESPIRATION RATE: 14 BRPM | TEMPERATURE: 97.8 F | DIASTOLIC BLOOD PRESSURE: 88 MMHG
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
## 2019-11-11 DIAGNOSIS — S09.90XA CLOSED HEAD INJURY, INITIAL ENCOUNTER: ICD-10-CM

## 2019-11-11 DIAGNOSIS — S39.91XA BLUNT ABDOMINAL TRAUMA, INITIAL ENCOUNTER: ICD-10-CM

## 2019-11-11 DIAGNOSIS — I77.79 DISSECTION OF LEFT SUBCLAVIAN ARTERY (HCC): ICD-10-CM

## 2019-11-11 DIAGNOSIS — S22.22XA CLOSED FRACTURE OF BODY OF STERNUM, INITIAL ENCOUNTER: ICD-10-CM

## 2019-11-11 LAB
ALBUMIN SERPL-MCNC: 4.2 G/DL (ref 3.5–5.2)
ALP BLD-CCNC: 101 U/L (ref 35–104)
ALT SERPL-CCNC: 10 U/L (ref 5–33)
ANION GAP SERPL CALCULATED.3IONS-SCNC: 16 MMOL/L (ref 7–19)
AST SERPL-CCNC: 16 U/L (ref 5–32)
BASOPHILS ABSOLUTE: 0.1 K/UL (ref 0–0.2)
BASOPHILS RELATIVE PERCENT: 0.6 % (ref 0–1)
BILIRUB SERPL-MCNC: 0.3 MG/DL (ref 0.2–1.2)
BUN BLDV-MCNC: 19 MG/DL (ref 8–23)
CALCIUM SERPL-MCNC: 9.4 MG/DL (ref 8.8–10.2)
CHLORIDE BLD-SCNC: 102 MMOL/L (ref 98–111)
CO2: 22 MMOL/L (ref 22–29)
CREAT SERPL-MCNC: 0.9 MG/DL (ref 0.5–0.9)
EOSINOPHILS ABSOLUTE: 0.1 K/UL (ref 0–0.6)
EOSINOPHILS RELATIVE PERCENT: 1.6 % (ref 0–5)
GFR NON-AFRICAN AMERICAN: >60
GLUCOSE BLD-MCNC: 86 MG/DL (ref 74–109)
HCT VFR BLD CALC: 42.2 % (ref 37–47)
HEMOGLOBIN: 13.3 G/DL (ref 12–16)
IMMATURE GRANULOCYTES #: 0.1 K/UL
LYMPHOCYTES ABSOLUTE: 2.8 K/UL (ref 1.1–4.5)
LYMPHOCYTES RELATIVE PERCENT: 34.3 % (ref 20–40)
MCH RBC QN AUTO: 32.1 PG (ref 27–31)
MCHC RBC AUTO-ENTMCNC: 31.5 G/DL (ref 33–37)
MCV RBC AUTO: 101.9 FL (ref 81–99)
MONOCYTES ABSOLUTE: 0.7 K/UL (ref 0–0.9)
MONOCYTES RELATIVE PERCENT: 8.2 % (ref 0–10)
NEUTROPHILS ABSOLUTE: 4.5 K/UL (ref 1.5–7.5)
NEUTROPHILS RELATIVE PERCENT: 54.5 % (ref 50–65)
PDW BLD-RTO: 13 % (ref 11.5–14.5)
PLATELET # BLD: 281 K/UL (ref 130–400)
PMV BLD AUTO: 10.7 FL (ref 9.4–12.3)
POTASSIUM SERPL-SCNC: 4.5 MMOL/L (ref 3.5–5)
RBC # BLD: 4.14 M/UL (ref 4.2–5.4)
SODIUM BLD-SCNC: 140 MMOL/L (ref 136–145)
TOTAL PROTEIN: 7.6 G/DL (ref 6.6–8.7)
TROPONIN: <0.01 NG/ML (ref 0–0.03)
WBC # BLD: 8.3 K/UL (ref 4.8–10.8)

## 2019-11-11 PROCEDURE — 80053 COMPREHEN METABOLIC PANEL: CPT

## 2019-11-11 PROCEDURE — 96374 THER/PROPH/DIAG INJ IV PUSH: CPT

## 2019-11-11 PROCEDURE — 70450 CT HEAD/BRAIN W/O DYE: CPT

## 2019-11-11 PROCEDURE — 96375 TX/PRO/DX INJ NEW DRUG ADDON: CPT

## 2019-11-11 PROCEDURE — 93005 ELECTROCARDIOGRAM TRACING: CPT | Performed by: EMERGENCY MEDICINE

## 2019-11-11 PROCEDURE — 96376 TX/PRO/DX INJ SAME DRUG ADON: CPT

## 2019-11-11 PROCEDURE — 6360000004 HC RX CONTRAST MEDICATION: Performed by: EMERGENCY MEDICINE

## 2019-11-11 PROCEDURE — 72125 CT NECK SPINE W/O DYE: CPT

## 2019-11-11 PROCEDURE — 71045 X-RAY EXAM CHEST 1 VIEW: CPT

## 2019-11-11 PROCEDURE — 99285 EMERGENCY DEPT VISIT HI MDM: CPT

## 2019-11-11 PROCEDURE — 84484 ASSAY OF TROPONIN QUANT: CPT

## 2019-11-11 PROCEDURE — 74177 CT ABD & PELVIS W/CONTRAST: CPT

## 2019-11-11 PROCEDURE — 6360000002 HC RX W HCPCS: Performed by: EMERGENCY MEDICINE

## 2019-11-11 PROCEDURE — 85025 COMPLETE CBC W/AUTO DIFF WBC: CPT

## 2019-11-11 PROCEDURE — 71260 CT THORAX DX C+: CPT

## 2019-11-11 PROCEDURE — 36415 COLL VENOUS BLD VENIPUNCTURE: CPT

## 2019-11-11 RX ORDER — MORPHINE SULFATE 4 MG/ML
2 INJECTION, SOLUTION INTRAMUSCULAR; INTRAVENOUS ONCE
Status: COMPLETED | OUTPATIENT
Start: 2019-11-11 | End: 2019-11-11

## 2019-11-11 RX ORDER — ONDANSETRON 2 MG/ML
4 INJECTION INTRAMUSCULAR; INTRAVENOUS ONCE
Status: COMPLETED | OUTPATIENT
Start: 2019-11-11 | End: 2019-11-11

## 2019-11-11 RX ADMIN — MORPHINE SULFATE 2 MG: 4 INJECTION, SOLUTION INTRAMUSCULAR; INTRAVENOUS at 17:47

## 2019-11-11 RX ADMIN — ONDANSETRON 4 MG: 2 INJECTION INTRAMUSCULAR; INTRAVENOUS at 17:47

## 2019-11-11 RX ADMIN — IOPAMIDOL 90 ML: 755 INJECTION, SOLUTION INTRAVENOUS at 18:28

## 2019-11-11 RX ADMIN — HYDROMORPHONE HYDROCHLORIDE 0.5 MG: 1 INJECTION, SOLUTION INTRAMUSCULAR; INTRAVENOUS; SUBCUTANEOUS at 20:18

## 2019-11-11 RX ADMIN — MORPHINE SULFATE 2 MG: 4 INJECTION, SOLUTION INTRAMUSCULAR; INTRAVENOUS at 18:49

## 2019-11-11 ASSESSMENT — ENCOUNTER SYMPTOMS
ABDOMINAL PAIN: 0
SORE THROAT: 0
SHORTNESS OF BREATH: 1
COUGH: 0
RHINORRHEA: 0
BACK PAIN: 0
NAUSEA: 0
DIARRHEA: 0
VOMITING: 0

## 2019-11-11 ASSESSMENT — PAIN DESCRIPTION - LOCATION
LOCATION: CHEST
LOCATION: CHEST

## 2019-11-11 ASSESSMENT — PAIN SCALES - GENERAL
PAINLEVEL_OUTOF10: 6
PAINLEVEL_OUTOF10: 9
PAINLEVEL_OUTOF10: 8
PAINLEVEL_OUTOF10: 7

## 2019-11-11 ASSESSMENT — PAIN DESCRIPTION - PAIN TYPE: TYPE: ACUTE PAIN

## 2019-11-12 LAB
EKG P AXIS: 39 DEGREES
EKG P-R INTERVAL: 174 MS
EKG Q-T INTERVAL: 452 MS
EKG QRS DURATION: 84 MS
EKG QTC CALCULATION (BAZETT): 444 MS
EKG T AXIS: 37 DEGREES

## 2019-11-12 PROCEDURE — 93010 ELECTROCARDIOGRAM REPORT: CPT | Performed by: INTERNAL MEDICINE

## 2019-11-21 ENCOUNTER — OFFICE VISIT (OUTPATIENT)
Dept: PRIMARY CARE CLINIC | Age: 64
End: 2019-11-21
Payer: COMMERCIAL

## 2019-11-21 VITALS
TEMPERATURE: 98 F | RESPIRATION RATE: 20 BRPM | HEART RATE: 68 BPM | BODY MASS INDEX: 27.44 KG/M2 | WEIGHT: 150 LBS | DIASTOLIC BLOOD PRESSURE: 58 MMHG | SYSTOLIC BLOOD PRESSURE: 110 MMHG | OXYGEN SATURATION: 96 %

## 2019-11-21 DIAGNOSIS — S22.20XA CLOSED FRACTURE OF STERNUM, UNSPECIFIED PORTION OF STERNUM, INITIAL ENCOUNTER: Primary | ICD-10-CM

## 2019-11-21 DIAGNOSIS — F41.9 ANXIETY: ICD-10-CM

## 2019-11-21 DIAGNOSIS — Z82.49 FAMILY HISTORY OF BRAIN ANEURYSM: ICD-10-CM

## 2019-11-21 DIAGNOSIS — Z87.891 FORMER SMOKER: ICD-10-CM

## 2019-11-21 DIAGNOSIS — I77.79 DISSECTION OF LEFT SUBCLAVIAN ARTERY (HCC): Primary | ICD-10-CM

## 2019-11-21 DIAGNOSIS — J43.8 PARASEPTAL EMPHYSEMA (HCC): ICD-10-CM

## 2019-11-21 PROCEDURE — 99213 OFFICE O/P EST LOW 20 MIN: CPT | Performed by: NURSE PRACTITIONER

## 2019-11-21 RX ORDER — ALPRAZOLAM 0.5 MG/1
0.5 TABLET ORAL 3 TIMES DAILY PRN
Qty: 30 TABLET | Refills: 0 | Status: SHIPPED | OUTPATIENT
Start: 2019-11-21 | End: 2019-12-21

## 2019-11-21 RX ORDER — HYDROCODONE BITARTRATE AND ACETAMINOPHEN 10; 325 MG/1; MG/1
1 TABLET ORAL EVERY 6 HOURS PRN
Qty: 12 TABLET | Refills: 0 | Status: SHIPPED | OUTPATIENT
Start: 2019-11-21 | End: 2019-11-24

## 2019-11-23 ASSESSMENT — ENCOUNTER SYMPTOMS
SINUS PRESSURE: 0
ALLERGIC/IMMUNOLOGIC NEGATIVE: 1
EYE PAIN: 0
GASTROINTESTINAL NEGATIVE: 1
SORE THROAT: 0
COUGH: 1
CHEST TIGHTNESS: 1
EYES NEGATIVE: 1

## 2019-12-05 PROBLEM — Z12.11 COLON CANCER SCREENING: Status: RESOLVED | Noted: 2019-11-05 | Resolved: 2019-12-05

## 2020-05-31 ENCOUNTER — APPOINTMENT (OUTPATIENT)
Dept: GENERAL RADIOLOGY | Age: 65
End: 2020-05-31

## 2020-05-31 ENCOUNTER — APPOINTMENT (OUTPATIENT)
Dept: CT IMAGING | Age: 65
End: 2020-05-31

## 2020-05-31 ENCOUNTER — HOSPITAL ENCOUNTER (OUTPATIENT)
Age: 65
Setting detail: OBSERVATION
Discharge: HOME | End: 2020-06-01
Attending: EMERGENCY MEDICINE | Admitting: FAMILY MEDICINE

## 2020-05-31 LAB
ALBUMIN SERPL-MCNC: 3.9 G/DL (ref 3.5–5.2)
ALP BLD-CCNC: 108 U/L (ref 35–104)
ALT SERPL-CCNC: 7 U/L (ref 5–33)
AMPHETAMINE SCREEN, URINE: NEGATIVE
ANION GAP SERPL CALCULATED.3IONS-SCNC: 14 MMOL/L (ref 7–19)
APTT: 32.2 SEC (ref 26–36.2)
AST SERPL-CCNC: 13 U/L (ref 5–32)
BACTERIA: NEGATIVE /HPF
BARBITURATE SCREEN URINE: NEGATIVE
BASOPHILS ABSOLUTE: 0.1 K/UL (ref 0–0.2)
BASOPHILS RELATIVE PERCENT: 0.8 % (ref 0–1)
BENZODIAZEPINE SCREEN, URINE: NEGATIVE
BILIRUB SERPL-MCNC: 0.4 MG/DL (ref 0.2–1.2)
BILIRUBIN URINE: NEGATIVE
BLOOD, URINE: ABNORMAL
BUN BLDV-MCNC: 11 MG/DL (ref 8–23)
CALCIUM SERPL-MCNC: 9.5 MG/DL (ref 8.8–10.2)
CANNABINOID SCREEN URINE: NEGATIVE
CHLORIDE BLD-SCNC: 99 MMOL/L (ref 98–111)
CLARITY: CLEAR
CO2: 23 MMOL/L (ref 22–29)
COCAINE METABOLITE SCREEN URINE: NEGATIVE
COLOR: YELLOW
CREAT SERPL-MCNC: 0.9 MG/DL (ref 0.5–0.9)
EOSINOPHILS ABSOLUTE: 0 K/UL (ref 0–0.6)
EOSINOPHILS RELATIVE PERCENT: 0.3 % (ref 0–5)
EPITHELIAL CELLS, UA: 3 /HPF (ref 0–5)
ETHANOL: <10 MG/DL (ref 0–0.08)
GFR NON-AFRICAN AMERICAN: >60
GLUCOSE BLD-MCNC: 113 MG/DL (ref 74–109)
GLUCOSE BLD-MCNC: 162 MG/DL (ref 70–99)
GLUCOSE URINE: NEGATIVE MG/DL
HCT VFR BLD CALC: 38 % (ref 37–47)
HEMOGLOBIN: 12.8 G/DL (ref 12–16)
HYALINE CASTS: 11 /HPF (ref 0–8)
IMMATURE GRANULOCYTES #: 0 K/UL
INR BLD: 1.06 (ref 0.88–1.18)
KETONES, URINE: NEGATIVE MG/DL
LEUKOCYTE ESTERASE, URINE: NEGATIVE
LYMPHOCYTES ABSOLUTE: 1.4 K/UL (ref 1.1–4.5)
LYMPHOCYTES RELATIVE PERCENT: 22.2 % (ref 20–40)
Lab: NORMAL
MAGNESIUM: 1.4 MG/DL (ref 1.6–2.4)
MCH RBC QN AUTO: 30 PG (ref 27–31)
MCHC RBC AUTO-ENTMCNC: 33.7 G/DL (ref 33–37)
MCV RBC AUTO: 89 FL (ref 81–99)
MONOCYTES ABSOLUTE: 0.5 K/UL (ref 0–0.9)
MONOCYTES RELATIVE PERCENT: 8.2 % (ref 0–10)
NEUTROPHILS ABSOLUTE: 4.2 K/UL (ref 1.5–7.5)
NEUTROPHILS RELATIVE PERCENT: 68.2 % (ref 50–65)
NITRITE, URINE: NEGATIVE
OPIATE SCREEN URINE: NEGATIVE
PDW BLD-RTO: 11.9 % (ref 11.5–14.5)
PERFORMED ON: ABNORMAL
PH UA: 6 (ref 5–8)
PHOSPHORUS: 3.3 MG/DL (ref 2.5–4.5)
PLATELET # BLD: 413 K/UL (ref 130–400)
PMV BLD AUTO: 9.3 FL (ref 9.4–12.3)
POTASSIUM REFLEX MAGNESIUM: 3.1 MMOL/L (ref 3.5–5)
PROTEIN UA: ABNORMAL MG/DL
PROTHROMBIN TIME: 13.8 SEC (ref 12–14.6)
RBC # BLD: 4.27 M/UL (ref 4.2–5.4)
RBC UA: 2 /HPF (ref 0–4)
SODIUM BLD-SCNC: 136 MMOL/L (ref 136–145)
SPECIFIC GRAVITY UA: 1.01 (ref 1–1.03)
TOTAL PROTEIN: 7.3 G/DL (ref 6.6–8.7)
TSH SERPL DL<=0.05 MIU/L-ACNC: 2.9 UIU/ML (ref 0.27–4.2)
UROBILINOGEN, URINE: 0.2 E.U./DL
WBC # BLD: 6.2 K/UL (ref 4.8–10.8)
WBC UA: 12 /HPF (ref 0–5)
YEAST: ABNORMAL /HPF

## 2020-05-31 PROCEDURE — 6370000000 HC RX 637 (ALT 250 FOR IP): Performed by: EMERGENCY MEDICINE

## 2020-05-31 PROCEDURE — 96375 TX/PRO/DX INJ NEW DRUG ADDON: CPT

## 2020-05-31 PROCEDURE — 6360000002 HC RX W HCPCS: Performed by: FAMILY MEDICINE

## 2020-05-31 PROCEDURE — 93005 ELECTROCARDIOGRAM TRACING: CPT | Performed by: EMERGENCY MEDICINE

## 2020-05-31 PROCEDURE — G0480 DRUG TEST DEF 1-7 CLASSES: HCPCS

## 2020-05-31 PROCEDURE — 80053 COMPREHEN METABOLIC PANEL: CPT

## 2020-05-31 PROCEDURE — G0378 HOSPITAL OBSERVATION PER HR: HCPCS

## 2020-05-31 PROCEDURE — 6370000000 HC RX 637 (ALT 250 FOR IP): Performed by: FAMILY MEDICINE

## 2020-05-31 PROCEDURE — 85025 COMPLETE CBC W/AUTO DIFF WBC: CPT

## 2020-05-31 PROCEDURE — 1210000000 HC MED SURG R&B

## 2020-05-31 PROCEDURE — 36415 COLL VENOUS BLD VENIPUNCTURE: CPT

## 2020-05-31 PROCEDURE — 94640 AIRWAY INHALATION TREATMENT: CPT

## 2020-05-31 PROCEDURE — 96367 TX/PROPH/DG ADDL SEQ IV INF: CPT

## 2020-05-31 PROCEDURE — 99285 EMERGENCY DEPT VISIT HI MDM: CPT

## 2020-05-31 PROCEDURE — 71045 X-RAY EXAM CHEST 1 VIEW: CPT

## 2020-05-31 PROCEDURE — 2580000003 HC RX 258: Performed by: FAMILY MEDICINE

## 2020-05-31 PROCEDURE — 70450 CT HEAD/BRAIN W/O DYE: CPT

## 2020-05-31 PROCEDURE — 2580000003 HC RX 258: Performed by: EMERGENCY MEDICINE

## 2020-05-31 PROCEDURE — 2500000003 HC RX 250 WO HCPCS: Performed by: FAMILY MEDICINE

## 2020-05-31 PROCEDURE — 82947 ASSAY GLUCOSE BLOOD QUANT: CPT

## 2020-05-31 PROCEDURE — 6360000002 HC RX W HCPCS: Performed by: EMERGENCY MEDICINE

## 2020-05-31 PROCEDURE — 85610 PROTHROMBIN TIME: CPT

## 2020-05-31 PROCEDURE — 83735 ASSAY OF MAGNESIUM: CPT

## 2020-05-31 PROCEDURE — 84443 ASSAY THYROID STIM HORMONE: CPT

## 2020-05-31 PROCEDURE — 84100 ASSAY OF PHOSPHORUS: CPT

## 2020-05-31 PROCEDURE — 85730 THROMBOPLASTIN TIME PARTIAL: CPT

## 2020-05-31 PROCEDURE — 96365 THER/PROPH/DIAG IV INF INIT: CPT

## 2020-05-31 RX ORDER — SODIUM CHLORIDE 0.9 % (FLUSH) 0.9 %
10 SYRINGE (ML) INJECTION PRN
Status: DISCONTINUED | OUTPATIENT
Start: 2020-05-31 | End: 2020-06-01 | Stop reason: HOSPADM

## 2020-05-31 RX ORDER — POTASSIUM CHLORIDE 20 MEQ/1
40 TABLET, EXTENDED RELEASE ORAL PRN
Status: DISCONTINUED | OUTPATIENT
Start: 2020-05-31 | End: 2020-06-01 | Stop reason: HOSPADM

## 2020-05-31 RX ORDER — ONDANSETRON 2 MG/ML
4 INJECTION INTRAMUSCULAR; INTRAVENOUS EVERY 6 HOURS PRN
Status: DISCONTINUED | OUTPATIENT
Start: 2020-05-31 | End: 2020-06-01 | Stop reason: HOSPADM

## 2020-05-31 RX ORDER — LOSARTAN POTASSIUM 100 MG/1
100 TABLET ORAL NIGHTLY
Status: DISCONTINUED | OUTPATIENT
Start: 2020-05-31 | End: 2020-06-01 | Stop reason: HOSPADM

## 2020-05-31 RX ORDER — ATORVASTATIN CALCIUM 40 MG/1
40 TABLET, FILM COATED ORAL NIGHTLY
Status: DISCONTINUED | OUTPATIENT
Start: 2020-05-31 | End: 2020-06-01 | Stop reason: HOSPADM

## 2020-05-31 RX ORDER — HYDROMORPHONE HYDROCHLORIDE 1 MG/ML
1 INJECTION, SOLUTION INTRAMUSCULAR; INTRAVENOUS; SUBCUTANEOUS ONCE
Status: COMPLETED | OUTPATIENT
Start: 2020-05-31 | End: 2020-05-31

## 2020-05-31 RX ORDER — BUDESONIDE AND FORMOTEROL FUMARATE DIHYDRATE 80; 4.5 UG/1; UG/1
2 AEROSOL RESPIRATORY (INHALATION) 2 TIMES DAILY
Status: DISCONTINUED | OUTPATIENT
Start: 2020-05-31 | End: 2020-05-31 | Stop reason: CLARIF

## 2020-05-31 RX ORDER — ONDANSETRON 2 MG/ML
4 INJECTION INTRAMUSCULAR; INTRAVENOUS ONCE
Status: COMPLETED | OUTPATIENT
Start: 2020-05-31 | End: 2020-05-31

## 2020-05-31 RX ORDER — LABETALOL HYDROCHLORIDE 5 MG/ML
10 INJECTION, SOLUTION INTRAVENOUS ONCE
Status: DISCONTINUED | OUTPATIENT
Start: 2020-05-31 | End: 2020-06-01 | Stop reason: HOSPADM

## 2020-05-31 RX ORDER — METOCLOPRAMIDE HYDROCHLORIDE 5 MG/ML
10 INJECTION INTRAMUSCULAR; INTRAVENOUS ONCE
Status: COMPLETED | OUTPATIENT
Start: 2020-05-31 | End: 2020-05-31

## 2020-05-31 RX ORDER — SODIUM CHLORIDE 0.9 % (FLUSH) 0.9 %
10 SYRINGE (ML) INJECTION EVERY 12 HOURS SCHEDULED
Status: DISCONTINUED | OUTPATIENT
Start: 2020-05-31 | End: 2020-06-01 | Stop reason: HOSPADM

## 2020-05-31 RX ORDER — PROMETHAZINE HYDROCHLORIDE 12.5 MG/1
12.5 TABLET ORAL EVERY 6 HOURS PRN
Status: DISCONTINUED | OUTPATIENT
Start: 2020-05-31 | End: 2020-06-01 | Stop reason: HOSPADM

## 2020-05-31 RX ORDER — LORAZEPAM 1 MG/1
4 TABLET ORAL
Status: DISCONTINUED | OUTPATIENT
Start: 2020-05-31 | End: 2020-06-01 | Stop reason: HOSPADM

## 2020-05-31 RX ORDER — LORAZEPAM 2 MG/ML
4 INJECTION INTRAMUSCULAR
Status: DISCONTINUED | OUTPATIENT
Start: 2020-05-31 | End: 2020-06-01 | Stop reason: HOSPADM

## 2020-05-31 RX ORDER — LORAZEPAM 1 MG/1
3 TABLET ORAL
Status: DISCONTINUED | OUTPATIENT
Start: 2020-05-31 | End: 2020-06-01 | Stop reason: HOSPADM

## 2020-05-31 RX ORDER — LABETALOL HYDROCHLORIDE 5 MG/ML
10 INJECTION, SOLUTION INTRAVENOUS EVERY 10 MIN PRN
Status: DISCONTINUED | OUTPATIENT
Start: 2020-05-31 | End: 2020-06-01 | Stop reason: HOSPADM

## 2020-05-31 RX ORDER — BUDESONIDE 0.25 MG/2ML
0.25 INHALANT ORAL 2 TIMES DAILY
Status: DISCONTINUED | OUTPATIENT
Start: 2020-05-31 | End: 2020-06-01 | Stop reason: HOSPADM

## 2020-05-31 RX ORDER — LORAZEPAM 1 MG/1
2 TABLET ORAL
Status: DISCONTINUED | OUTPATIENT
Start: 2020-05-31 | End: 2020-06-01 | Stop reason: HOSPADM

## 2020-05-31 RX ORDER — ESCITALOPRAM OXALATE 10 MG/1
20 TABLET ORAL DAILY
Status: DISCONTINUED | OUTPATIENT
Start: 2020-05-31 | End: 2020-06-01 | Stop reason: HOSPADM

## 2020-05-31 RX ORDER — LORAZEPAM 2 MG/ML
3 INJECTION INTRAMUSCULAR
Status: DISCONTINUED | OUTPATIENT
Start: 2020-05-31 | End: 2020-06-01 | Stop reason: HOSPADM

## 2020-05-31 RX ORDER — LORAZEPAM 2 MG/ML
1 INJECTION INTRAMUSCULAR
Status: DISCONTINUED | OUTPATIENT
Start: 2020-05-31 | End: 2020-06-01 | Stop reason: HOSPADM

## 2020-05-31 RX ORDER — MAGNESIUM SULFATE 1 G/100ML
1 INJECTION INTRAVENOUS ONCE
Status: COMPLETED | OUTPATIENT
Start: 2020-05-31 | End: 2020-05-31

## 2020-05-31 RX ORDER — LORAZEPAM 2 MG/ML
2 INJECTION INTRAMUSCULAR
Status: DISCONTINUED | OUTPATIENT
Start: 2020-05-31 | End: 2020-06-01 | Stop reason: HOSPADM

## 2020-05-31 RX ORDER — LORAZEPAM 1 MG/1
1 TABLET ORAL
Status: DISCONTINUED | OUTPATIENT
Start: 2020-05-31 | End: 2020-06-01 | Stop reason: HOSPADM

## 2020-05-31 RX ORDER — ALBUTEROL SULFATE 2.5 MG/3ML
2.5 SOLUTION RESPIRATORY (INHALATION) 4 TIMES DAILY
Status: DISCONTINUED | OUTPATIENT
Start: 2020-05-31 | End: 2020-06-01 | Stop reason: HOSPADM

## 2020-05-31 RX ORDER — POTASSIUM CHLORIDE 7.45 MG/ML
10 INJECTION INTRAVENOUS PRN
Status: DISCONTINUED | OUTPATIENT
Start: 2020-05-31 | End: 2020-06-01 | Stop reason: HOSPADM

## 2020-05-31 RX ADMIN — ESCITALOPRAM OXALATE 20 MG: 10 TABLET ORAL at 19:56

## 2020-05-31 RX ADMIN — FOLIC ACID: 5 INJECTION, SOLUTION INTRAMUSCULAR; INTRAVENOUS; SUBCUTANEOUS at 19:55

## 2020-05-31 RX ADMIN — HYDROMORPHONE HYDROCHLORIDE 1 MG: 1 INJECTION, SOLUTION INTRAMUSCULAR; INTRAVENOUS; SUBCUTANEOUS at 15:20

## 2020-05-31 RX ADMIN — METOPROLOL TARTRATE 25 MG: 25 TABLET, FILM COATED ORAL at 19:56

## 2020-05-31 RX ADMIN — LOSARTAN POTASSIUM 100 MG: 100 TABLET, FILM COATED ORAL at 19:56

## 2020-05-31 RX ADMIN — METOCLOPRAMIDE 10 MG: 5 INJECTION, SOLUTION INTRAMUSCULAR; INTRAVENOUS at 17:41

## 2020-05-31 RX ADMIN — ONDANSETRON 4 MG: 2 INJECTION INTRAMUSCULAR; INTRAVENOUS at 15:20

## 2020-05-31 RX ADMIN — ATORVASTATIN CALCIUM 40 MG: 40 TABLET, FILM COATED ORAL at 19:56

## 2020-05-31 RX ADMIN — ALBUTEROL SULFATE 2.5 MG: 2.5 SOLUTION RESPIRATORY (INHALATION) at 19:17

## 2020-05-31 RX ADMIN — POTASSIUM CHLORIDE 40 MEQ: 20 TABLET, EXTENDED RELEASE ORAL at 19:56

## 2020-05-31 RX ADMIN — THIAMINE HYDROCHLORIDE 100 MG: 100 INJECTION, SOLUTION INTRAMUSCULAR; INTRAVENOUS at 17:36

## 2020-05-31 RX ADMIN — BUDESONIDE 250 MCG: 0.25 INHALANT RESPIRATORY (INHALATION) at 19:17

## 2020-05-31 RX ADMIN — MAGNESIUM SULFATE 1 G: 1 INJECTION INTRAVENOUS at 15:20

## 2020-05-31 RX ADMIN — SODIUM CHLORIDE, PRESERVATIVE FREE 10 ML: 5 INJECTION INTRAVENOUS at 19:55

## 2020-05-31 ASSESSMENT — ENCOUNTER SYMPTOMS
ABDOMINAL PAIN: 0
APNEA: 0
CHOKING: 0
FACIAL SWELLING: 0
WHEEZING: 0
DIARRHEA: 0
VOMITING: 0
BLOOD IN STOOL: 0
EYE REDNESS: 0
NAUSEA: 0
COUGH: 0
SINUS PRESSURE: 0
VOICE CHANGE: 0
SHORTNESS OF BREATH: 0
EYE DISCHARGE: 0
TROUBLE SWALLOWING: 0
SORE THROAT: 0
COLOR CHANGE: 0
CONSTIPATION: 0

## 2020-05-31 ASSESSMENT — PAIN SCALES - GENERAL
PAINLEVEL_OUTOF10: 7
PAINLEVEL_OUTOF10: 0
PAINLEVEL_OUTOF10: 0

## 2020-05-31 ASSESSMENT — PAIN DESCRIPTION - LOCATION: LOCATION: HEAD

## 2020-05-31 ASSESSMENT — PAIN DESCRIPTION - PAIN TYPE: TYPE: ACUTE PAIN

## 2020-05-31 NOTE — ED NOTES
Bed: 18  Expected date:   Expected time:   Means of arrival:   Comments:  Lit Currie RN  05/31/20 [id number]

## 2020-05-31 NOTE — H&P
Lynne Hays MD        Allergies   Patient has no known allergies. Social History     Social History     Tobacco History     Smoking Status  Former Smoker Quit date  3/6/2019 Smoking Frequency  1 pack/day for 20 years (20 pk yrs) Smoking Tobacco Type  Cigarettes    Smokeless Tobacco Use  Never Used          Alcohol History     Alcohol Use Status  Not Currently          Drug Use     Drug Use Status  No          Sexual Activity     Sexually Active  Not Asked                Family History     Family History   Problem Relation Age of Onset    Heart Disease Mother     High Blood Pressure Father     Arthritis Father        Review of Systems   Review of Systems   Constitutional: Positive for activity change and fatigue. Negative for fever. HENT: Negative for sore throat and trouble swallowing. Eyes: Negative for redness and visual disturbance. Respiratory: Negative for cough, shortness of breath and wheezing. Cardiovascular: Negative for palpitations and leg swelling. Gastrointestinal: Negative for abdominal pain, nausea and vomiting. Musculoskeletal: Negative for arthralgias. Skin: Negative for color change and pallor. Neurological: Positive for weakness and headaches. Negative for light-headedness. Psychiatric/Behavioral: Positive for confusion (slight ). The patient is nervous/anxious. Physical Exam   /86   Pulse 53   Temp 98.7 °F (37.1 °C)   Resp 27   SpO2 92%     Physical Exam  Vitals signs reviewed. Constitutional:       Appearance: She is not ill-appearing. HENT:      Head: Normocephalic. Nose: Nose normal.   Eyes:      General: No scleral icterus. Right eye: No discharge. Left eye: No discharge. Conjunctiva/sclera: Conjunctivae normal.   Neck:      Musculoskeletal: Normal range of motion. Cardiovascular:      Rate and Rhythm: Normal rate. Pulses: Normal pulses. Heart sounds: Normal heart sounds.    Pulmonary:      Effort: CREATININE 0.9 0.5 - 0.9 mg/dL    GFR Non-African American >60 >60    Calcium 9.5 8.8 - 10.2 mg/dL    Total Protein 7.3 6.6 - 8.7 g/dL    Alb 3.9 3.5 - 5.2 g/dL    Total Bilirubin 0.4 0.2 - 1.2 mg/dL    Alkaline Phosphatase 108 (H) 35 - 104 U/L    ALT 7 5 - 33 U/L    AST 13 5 - 32 U/L   Magnesium    Collection Time: 05/31/20  2:20 PM   Result Value Ref Range    Magnesium 1.4 (L) 1.6 - 2.4 mg/dL   Ethanol    Collection Time: 05/31/20  2:20 PM   Result Value Ref Range    Ethanol Lvl <10 mg/dL   APTT    Collection Time: 05/31/20  2:20 PM   Result Value Ref Range    aPTT 32.2 26.0 - 36.2 sec   Protime-INR    Collection Time: 05/31/20  2:20 PM   Result Value Ref Range    Protime 13.8 12.0 - 14.6 sec    INR 1.06 0.88 - 1.18   POCT Glucose    Collection Time: 05/31/20  4:17 PM   Result Value Ref Range    POC Glucose 162 (H) 70 - 99 mg/dl    Performed on [employer] Last 24 Hours   Ct Head Wo Contrast    Result Date: 5/31/2020  EXAMINATION:  CT HEAD WO CONTRAST  5/31/2020 2:33 PM HISTORY: Dizziness and headache. Prior history of aneurysm repair. TECHNIQUE: Multiple axial images were obtained through the brain without contrast infusion. Multiplanar images were reconstructed. DLP: 091 mGy-cm. Automated exposure control was utilized. COMPARISON: 11/11/2019. FINDINGS: There are no hemorrhage, edema or mass effect. There is a chronic area of low density lateral to the right caudate nucleus. The ventricular system is nondilated. There are 3 adjacent aneurysm clips in the right MCA bifurcation region. There are postcraniotomy changes of the right calvarium. The ventricular system is nondilated. The visualized paranasal sinuses and mastoid air cells are clear. 1. No acute findings. No hemorrhage, edema or mass effect. 2. Status post right MCA bifurcation aneurysm repair. 3. 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  The full report of this exam

## 2020-06-01 VITALS
WEIGHT: 139 LBS | DIASTOLIC BLOOD PRESSURE: 86 MMHG | BODY MASS INDEX: 25.58 KG/M2 | RESPIRATION RATE: 20 BRPM | OXYGEN SATURATION: 91 % | HEIGHT: 62 IN | SYSTOLIC BLOOD PRESSURE: 140 MMHG | TEMPERATURE: 98.2 F | HEART RATE: 58 BPM

## 2020-06-01 LAB
ALBUMIN SERPL-MCNC: 3.4 G/DL (ref 3.5–5.2)
ALP BLD-CCNC: 93 U/L (ref 35–104)
ALT SERPL-CCNC: 7 U/L (ref 5–33)
ANION GAP SERPL CALCULATED.3IONS-SCNC: 10 MMOL/L (ref 7–19)
AST SERPL-CCNC: 11 U/L (ref 5–32)
BILIRUB SERPL-MCNC: <0.2 MG/DL (ref 0.2–1.2)
BUN BLDV-MCNC: 14 MG/DL (ref 8–23)
CALCIUM SERPL-MCNC: 9.2 MG/DL (ref 8.8–10.2)
CHLORIDE BLD-SCNC: 101 MMOL/L (ref 98–111)
CHOLESTEROL, TOTAL: 166 MG/DL (ref 160–199)
CO2: 27 MMOL/L (ref 22–29)
CREAT SERPL-MCNC: 1.1 MG/DL (ref 0.5–0.9)
EKG P AXIS: 56 DEGREES
EKG P-R INTERVAL: 160 MS
EKG Q-T INTERVAL: 392 MS
EKG QRS DURATION: 74 MS
EKG QTC CALCULATION (BAZETT): 430 MS
EKG T AXIS: 34 DEGREES
GFR NON-AFRICAN AMERICAN: 50
GLUCOSE BLD-MCNC: 96 MG/DL (ref 74–109)
HBA1C MFR BLD: 5.8 % (ref 4–6)
HCT VFR BLD CALC: 35.2 % (ref 37–47)
HDLC SERPL-MCNC: 29 MG/DL (ref 65–121)
HEMOGLOBIN: 11.5 G/DL (ref 12–16)
LDL CHOLESTEROL CALCULATED: 116 MG/DL
MCH RBC QN AUTO: 30.5 PG (ref 27–31)
MCHC RBC AUTO-ENTMCNC: 32.7 G/DL (ref 33–37)
MCV RBC AUTO: 93.4 FL (ref 81–99)
PDW BLD-RTO: 11.9 % (ref 11.5–14.5)
PLATELET # BLD: 386 K/UL (ref 130–400)
PMV BLD AUTO: 9.5 FL (ref 9.4–12.3)
POTASSIUM SERPL-SCNC: 4.1 MMOL/L (ref 3.5–5)
RBC # BLD: 3.77 M/UL (ref 4.2–5.4)
SODIUM BLD-SCNC: 138 MMOL/L (ref 136–145)
TOTAL PROTEIN: 6.4 G/DL (ref 6.6–8.7)
TRIGL SERPL-MCNC: 106 MG/DL (ref 0–149)
WBC # BLD: 8.1 K/UL (ref 4.8–10.8)

## 2020-06-01 PROCEDURE — 97750 PHYSICAL PERFORMANCE TEST: CPT

## 2020-06-01 PROCEDURE — 80307 DRUG TEST PRSMV CHEM ANLYZR: CPT

## 2020-06-01 PROCEDURE — 85027 COMPLETE CBC AUTOMATED: CPT

## 2020-06-01 PROCEDURE — 92523 SPEECH SOUND LANG COMPREHEN: CPT

## 2020-06-01 PROCEDURE — 95816 EEG AWAKE AND DROWSY: CPT | Performed by: PSYCHIATRY & NEUROLOGY

## 2020-06-01 PROCEDURE — 97165 OT EVAL LOW COMPLEX 30 MIN: CPT

## 2020-06-01 PROCEDURE — 92610 EVALUATE SWALLOWING FUNCTION: CPT

## 2020-06-01 PROCEDURE — 95816 EEG AWAKE AND DROWSY: CPT

## 2020-06-01 PROCEDURE — 97162 PT EVAL MOD COMPLEX 30 MIN: CPT

## 2020-06-01 PROCEDURE — 6370000000 HC RX 637 (ALT 250 FOR IP): Performed by: FAMILY MEDICINE

## 2020-06-01 PROCEDURE — G0378 HOSPITAL OBSERVATION PER HR: HCPCS

## 2020-06-01 PROCEDURE — 99220 PR INITIAL OBSERVATION CARE/DAY 70 MINUTES: CPT | Performed by: PSYCHIATRY & NEUROLOGY

## 2020-06-01 PROCEDURE — 6360000002 HC RX W HCPCS: Performed by: FAMILY MEDICINE

## 2020-06-01 PROCEDURE — 81001 URINALYSIS AUTO W/SCOPE: CPT

## 2020-06-01 PROCEDURE — 87086 URINE CULTURE/COLONY COUNT: CPT

## 2020-06-01 PROCEDURE — 93010 ELECTROCARDIOGRAM REPORT: CPT | Performed by: INTERNAL MEDICINE

## 2020-06-01 PROCEDURE — 36415 COLL VENOUS BLD VENIPUNCTURE: CPT

## 2020-06-01 PROCEDURE — 6370000000 HC RX 637 (ALT 250 FOR IP): Performed by: PSYCHIATRY & NEUROLOGY

## 2020-06-01 PROCEDURE — 2580000003 HC RX 258: Performed by: FAMILY MEDICINE

## 2020-06-01 PROCEDURE — 93880 EXTRACRANIAL BILAT STUDY: CPT

## 2020-06-01 PROCEDURE — 2500000003 HC RX 250 WO HCPCS: Performed by: FAMILY MEDICINE

## 2020-06-01 PROCEDURE — 94640 AIRWAY INHALATION TREATMENT: CPT

## 2020-06-01 PROCEDURE — 83036 HEMOGLOBIN GLYCOSYLATED A1C: CPT

## 2020-06-01 PROCEDURE — 80053 COMPREHEN METABOLIC PANEL: CPT

## 2020-06-01 PROCEDURE — 80061 LIPID PANEL: CPT

## 2020-06-01 RX ORDER — ASPIRIN 81 MG/1
81 TABLET ORAL DAILY
Status: DISCONTINUED | OUTPATIENT
Start: 2020-06-01 | End: 2020-06-01 | Stop reason: HOSPADM

## 2020-06-01 RX ORDER — PANTOPRAZOLE SODIUM 20 MG/1
20 TABLET, DELAYED RELEASE ORAL DAILY
Qty: 30 TABLET | Refills: 5 | Status: SHIPPED | OUTPATIENT
Start: 2020-06-01 | End: 2020-06-05 | Stop reason: SDUPTHER

## 2020-06-01 RX ORDER — LOSARTAN POTASSIUM 100 MG/1
100 TABLET ORAL NIGHTLY
Qty: 30 TABLET | Refills: 2 | Status: SHIPPED | OUTPATIENT
Start: 2020-06-01 | End: 2020-06-05 | Stop reason: SDUPTHER

## 2020-06-01 RX ORDER — ASPIRIN 81 MG/1
81 TABLET ORAL DAILY
Qty: 30 TABLET | Refills: 3 | Status: SHIPPED | OUTPATIENT
Start: 2020-06-02 | End: 2020-10-27 | Stop reason: SDUPTHER

## 2020-06-01 RX ADMIN — ESCITALOPRAM OXALATE 20 MG: 10 TABLET ORAL at 09:34

## 2020-06-01 RX ADMIN — FOLIC ACID: 5 INJECTION, SOLUTION INTRAMUSCULAR; INTRAVENOUS; SUBCUTANEOUS at 10:51

## 2020-06-01 RX ADMIN — BUDESONIDE 250 MCG: 0.25 INHALANT RESPIRATORY (INHALATION) at 07:10

## 2020-06-01 RX ADMIN — ALBUTEROL SULFATE 2.5 MG: 2.5 SOLUTION RESPIRATORY (INHALATION) at 07:10

## 2020-06-01 RX ADMIN — METOPROLOL TARTRATE 25 MG: 25 TABLET, FILM COATED ORAL at 09:34

## 2020-06-01 RX ADMIN — SODIUM CHLORIDE, PRESERVATIVE FREE 10 ML: 5 INJECTION INTRAVENOUS at 09:35

## 2020-06-01 RX ADMIN — ASPIRIN 81 MG: 81 TABLET, COATED ORAL at 13:49

## 2020-06-01 ASSESSMENT — ENCOUNTER SYMPTOMS
NAUSEA: 0
PHOTOPHOBIA: 0
COUGH: 0
VOMITING: 0
SHORTNESS OF BREATH: 0
BACK PAIN: 0
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
## 2020-06-01 NOTE — PROGRESS NOTES
Speech Language Pathology  Facility/Department: [facility] SURG SERVICES  Initial Speech/Language/Cognitive Assessment  Bedside Swallow Evaluation     NAME: Jose Bejarano  : 1955   MRN: [id number]  ADMISSION DATE: 2020  ADMITTING DIAGNOSIS: has Ischemic stroke ([facility] [address] HTN (hypertension); Anxiety; Cerebrovascular accident (CVA) ([facility] [address] Vertigo; Nonintractable episodic headache; Aneurysm of middle cerebral artery; TIA (transient ischemic attack); Brain aneurysm; Pneumonia; Seizures ([facility] [address] Post-menopausal; Need for prophylactic vaccination and inoculation against varicella; At high risk for falls; and Other fatigue on their problem list.    Date of Eval: 2020   Evaluating Therapist: ROLAN Romero    Assessment:  Completed assessment. Patient exhibited clear speech production with functional intelligibility for unfamiliar listeners measured at 100% in conversation. Patient exhibited slow processing with mildly delayed auditory comprehension and mildly delayed responsive speech observed. Also evaluated patient's swallowing function. Patient exhibits sluggish laryngeal elevation for swallow airway protection. It is noted that patient exhibited mild delayed coughs following regular solid consistency trials and thin H2O trials. However, do not feel that coughs were related to penetration/aspiration secondary to timing and strength of swallows and patient exhibited mild coughs at rest; patient verbalized allergies. At this time, recommend continuation regular solid consistency and thin liquids. Self-feed. Will continue to follow. Thank you for this consult. Goals:  Short-term Goals  Timeframe for Short-term Goals: 1x/day for 2 days   Goal 1: Patient will tolerate regular solid consistency and thin liquids with min S/S penetration/aspiration during PO intake. Goal 2: Monitor cognitive-linguistic functioning.       Subjective:  General  Chart Reviewed: Yes  Patient assessed for rehabilitation services?: Yes  Family / Caregiver Present: No    Objective:  Oral Motor:  (Structures and related functions were considered to be [facility]    Auditory Comprehension  Comprehension:  (Patient demonstrated ability to answer simple yes/no questions regarding immediate environment and current state of being at independent level and with adequate processing speed. Patient demonstrated ability to follow simple 1 step commands independently and with adequate processing speed. While mild delays were noted, patient demonstrated ability to answer yes/no questions of increased complexity and to follow complex 1 and simple 2 step commands at independent level.)    Expression  Primary Mode of Expression:  (Confrontation naming of items in room was considered to be [facility]. Structured responsive speech was considered to be functional. Responses in natural conversation were considered to be mildly delayed but appropriate.)    Motor Speech:  (SLP ranked functional intelligibility of speech for unfamiliar listeners at 100% in verbalizations.)    Overall Orientation Status:  (Patient demonstrated ability to verbalize name, birthday, age, address, phone number, city, state, hospital, floor, month, BORIS, date, year, and situation at independent level.)    Attention:  (Patient demonstrated appropriate select and sustained attention during assessment.)    Memory:  (Patient demonstrated appropriate immediate memory with sequences of unrelated numbers/words set up to 5 items without repetitions provided. Patient demonstrated appropriate short-term/working memory with 10 minute delay+distractions present during assessment.)    Problem Solving:  (Patient verbalized appropriate solutions to situations that could occur during activities of daily living at independent level. )    Additional Assessments:  Assessed patient's swallowing function.  Patient exhibited functional oral prep and transit of regular solid consistency trials, presented independently, with timing of oral transit primarily measuring 1-2 seconds in length and with min oral cavity residue observed post swallows; residue cleared from the mouth with additional dry swallows. Oral transit of thin H2O trials, presented independently via cup and straw, primarily measured 1-2 seconds in length. Laryngeal elevation during swallow initiation was considered to be sluggish for swallow airway protection. It is noted that patient exhibited mild delayed coughs following regular solid consistency trials and thin H2O trials. However, do not feel that coughs were related to penetration/aspiration secondary to timing and strength of swallows and patient exhibited mild coughs at rest; patient verbalized allergies. At this time, recommend continuation regular solid consistency and thin liquids. Self-feed. Will continue to follow.     Electronically signed by ROLAN Yoon on 6/1/2020 at 12:43 PM

## 2020-06-01 NOTE — PROGRESS NOTES
Vascular lab Preliminary. Bilateral Carotid duplex scan completed. Right ICA: <50%  Left ICA: <50%  Bilateral vertebrals antegrade flow. Final report pending.

## 2020-06-01 NOTE — DISCHARGE INSTR - DIET

## 2020-06-01 NOTE — DISCHARGE SUMMARY
Mood and Affect: Mood normal.       Discharge Plan   Disposition: Home    Provider Follow-Up:   LIGIA Sweet  [address] [place] [address] [facility] [phone]                 Patient Instructions   Diet: cardiac diet    Activity: activity as tolerated        Discharge Medications         Medication List      START taking these medications    aspirin 81 MG EC tablet  Take 1 tablet by mouth daily  Start taking on:  June 2, 2020        CHANGE how you take these medications    sennosides-docusate sodium 8.6-50 MG tablet  Commonly known as:  SENOKOT-S  Take 2 tablets by mouth 2 times daily  What changed:    · when to take this  · reasons to take this        CONTINUE taking these medications    atomoxetine 80 MG capsule  Commonly known as:  STRATTERA     atorvastatin 80 MG tablet  Commonly known as:  LIPITOR  Take 0.5 tablets by mouth nightly     escitalopram 20 MG tablet  Commonly known as:  Lexapro  Take 1 tablet by mouth daily     losartan 100 MG tablet  Commonly known as:  COZAAR  Take 1 tablet by mouth nightly     metoprolol tartrate 25 MG tablet  Commonly known as:  LOPRESSOR  Take 1 tablet by mouth 2 times daily     mometasone-formoterol 100-5 MCG/ACT inhaler  Commonly known as:  Dulera  Inhale 2 puffs into the lungs 2 times daily     pantoprazole 20 MG tablet  Commonly known as:  Protonix  Take 1 tablet by mouth daily     THIAMINE HCL PO           Where to Get Your Medications      These medications were sent to [facility], [address] [place] [address]    Phone:  [phone]   · aspirin 81 MG EC tablet  · losartan 100 MG tablet  · metoprolol tartrate 25 MG tablet  · pantoprazole 20 MG tablet         Electronically signed by   Rima John   Internal Medicine Hospitalist  On 6/1/2020  At 4:41 PM    EMR Dragon/Transcription disclaimer:   Much of this encounter note is an electronic transcription/translation of spoken language to printed text.  The electronic translation of spoken language may permit erroneous, or at times, nonsensical words or phrases to be inadvertently transcribed; although attempts have made to review the note for such errors, some may still exist.

## 2020-06-03 ENCOUNTER — TELEPHONE (OUTPATIENT)
Dept: INTERNAL MEDICINE | Age: 65
End: 2020-06-03

## 2020-06-03 LAB — URINE CULTURE, ROUTINE: NORMAL

## 2020-06-03 NOTE — TELEPHONE ENCOUNTER
[facility] Trans[facility] Initial Follow Up Call    Outreach made within 2 business days of discharge: Yes    Patient: Natacha Chester Patient : 1955   MRN: [id number]   Reason for Admission:   Active Problems:    HTN (hypertension)    Anxiety    TIA (transient ischemic attack)  Resolved Problems:    * No resolved hospital problems. *     Admit Date:2020   Discharge Date: 20       Spoke with: patient    Discharge department/facility: [facility]    [facility] Interactive Patient Contact:  Was patient able to fill all prescriptions: No: She reports she hasn't filled them yet  Was patient instructed to bring all medications to the follow-up visit: Yes  Is patient taking all medications as directed in the discharge summary? No  Does patient understand their discharge instructions: No: hasn't gotten them all filled yet  Does patient have questions or concerns that need addressed prior to 7-14 day follow up office visit: yes - nausea    Patient c/o dizziness. She said this isn't new she had while in hospital also. She also c/o nausea. She reports a decreased appetite d/t nausea. She has been resting well. She said she had a \"spell in the ER\" that she has had 2-3 times before. She said she broke out in a cold sweat and felt faint. They thought she was having a CVA, but that was ruled out. She said neuro thought it was a seizure. She said she really doesn't know what's going on. She reports she hasn't gotten her medications filled yet. In fact, she hasn't been taking any medications since her FPC in December. She doesn't know what pharmacy she is going to use. She said they gave her a supply of the BP med when she left the hospital.  She only wants an appt with Nasra Vazquez. She is agreeable to a virtual visit. Scheduled appointment with PCP within 7-14 days    Follow Up  No future appointments.      Kaykay Ulloa LPN

## 2020-06-05 ENCOUNTER — VIRTUAL VISIT (OUTPATIENT)
Dept: PRIMARY CARE CLINIC | Age: 65
End: 2020-06-05

## 2020-06-05 PROBLEM — R11.0 NAUSEA: Status: ACTIVE | Noted: 2020-06-05

## 2020-06-05 PROBLEM — R55 SYNCOPE: Status: ACTIVE | Noted: 2020-06-05

## 2020-06-05 PROBLEM — K21.9 GASTROESOPHAGEAL REFLUX DISEASE WITHOUT ESOPHAGITIS: Status: ACTIVE | Noted: 2020-06-05

## 2020-06-05 PROBLEM — R68.89 COLD SWEAT: Status: ACTIVE | Noted: 2020-06-05

## 2020-06-05 PROBLEM — F32.A DEPRESSION: Status: ACTIVE | Noted: 2020-06-05

## 2020-06-05 PROBLEM — Z65.8 SUPPORT SYSTEM DEFICIT: Status: ACTIVE | Noted: 2020-06-05

## 2020-06-05 PROBLEM — R00.2 PALPITATIONS: Status: ACTIVE | Noted: 2020-06-05

## 2020-06-05 PROCEDURE — 99443 PR PHYS/QHP TELEPHONE EVALUATION 21-30 MIN: CPT | Performed by: NURSE PRACTITIONER

## 2020-06-05 RX ORDER — ESCITALOPRAM OXALATE 20 MG/1
20 TABLET ORAL DAILY
Qty: 30 TABLET | Refills: 1 | Status: SHIPPED | OUTPATIENT
Start: 2020-06-05 | End: 2020-08-11

## 2020-06-05 RX ORDER — AMLODIPINE BESYLATE 5 MG/1
5 TABLET ORAL NIGHTLY
Qty: 30 TABLET | Refills: 1 | Status: SHIPPED | OUTPATIENT
Start: 2020-06-05 | End: 2020-08-11

## 2020-06-05 RX ORDER — LOSARTAN POTASSIUM 100 MG/1
100 TABLET ORAL NIGHTLY
Qty: 30 TABLET | Refills: 1 | Status: SHIPPED | OUTPATIENT
Start: 2020-06-05 | End: 2020-10-27 | Stop reason: ALTCHOICE

## 2020-06-05 RX ORDER — PANTOPRAZOLE SODIUM 20 MG/1
20 TABLET, DELAYED RELEASE ORAL DAILY
Qty: 30 TABLET | Refills: 5 | Status: SHIPPED | OUTPATIENT
Start: 2020-06-05 | End: 2020-10-27

## 2020-06-05 ASSESSMENT — ENCOUNTER SYMPTOMS
CHEST TIGHTNESS: 0
COUGH: 0
SORE THROAT: 0
TROUBLE SWALLOWING: 0
CONSTIPATION: 0
VOMITING: 0
VOICE CHANGE: 0
NAUSEA: 0
ABDOMINAL PAIN: 0
DIARRHEA: 0
RHINORRHEA: 0
WHEEZING: 0
SHORTNESS OF BREATH: 0
BLOOD IN STOOL: 0
EYE REDNESS: 0

## 2020-06-05 NOTE — PATIENT INSTRUCTIONS
Follow up in two weeks. Restart lexapro 20mg 1/2 tablet at bedtime for 7 days then increase to one full tablet. Begin norvasc 5mg at bedtime - 1/2 tablet at bedtime for 7 days then increase to full tablet. I will reach out to [facility]. Call [facility] about counseling. Contact [facility] about setting up home delivery. I will order zio patch to have placed to monitor heart rate and rythmn to identify cause of near syncopal episodes, nausea, and cold sweats. Monitor your blood pressure three times daily and record. Take in the morning before breakfast, in the afternoon and at bedtime. We will discuss these readings in two weeks.

## 2020-06-11 ENCOUNTER — CARE COORDINATION (OUTPATIENT)
Dept: CARE COORDINATION | Age: 65
End: 2020-06-11

## 2020-06-11 NOTE — CARE COORDINATION
MRN [id number]   Received: 6 days ago   Rodolfo Hernandez [address], APRN  [address],     This is the pt I text you about. If at all possible I think this pt would benefit from living close to her son. He lives in [facility] I don't know if she would be able to do that. She needs assistance in so many ways because she cannot drive. She has issues with compliance due to financial issues/ insurance. I don't know if we could reach out to her son and discuss this with him or not depending on hippa. Tamika Rodriguez is very depressed and feels alone.       Thank you for your help,     Lynsey Mariee. Is there anything set up for employees that have retired from [employer]? [address] assistance? Submitted by Cesia/ANGIE    Telephoned BUDDY Alcaraz at the [address]. She is not in. Did not leave a message. Telephoned Elle on her cell phone. No answer. Left a message asking what I need to do to get patient enrolled in Medicare Parts B & D since she failed to enroll when she turned 64 in 2019. Left my cell number for call back. Submitted by Cesia/ANGIE    Telephoned the patient. Collected the following:    - Gross monthly Social Security payment is $1,250.00.  - She rents. - Patient stated she is in the process of enrolling in Medicare Parts B and D. She stated she started the paperwork \"a couple three weeks ago. \"  - She is unable to walk comfortably to a PATS route. Discussed enrolling in the [address]. $2 fare for each direction. Need to give PATS 24 hour notice. Will email instructions and application packet to [address], for [place], APRN. - Talked about Grocery Delivery services. Told patient I would send her information about local program(s). - Discussed using a \"[facility] that delivers. She stated [facility] delivered her medication to her recently.   She will use receive income from [address] benefits? No   Do you receive income from [place] benefits? No   Do you receive income from other pensions and annuities? No   Do you receive other income not listed above, including alimony, net rental income, workers' compensation, unemployment, private or State disability payments, etc.? No   Has any of the income from these sources decreased in the last two years? No   Wages And Earnings   Do you expect to earn wages this calendar year? I do not expect to earn wages this calendar year   What do you expect your net earnings from self-employment to be this calendar year? None   Have these wages or self-employment earnings decreased in the last two years? No   Have you stopped working in 2019 or 2020, or plan to stop working in 2020 or 2021? Yes, stopped/plan to stop December, 2019     Submitted by Cesia/ANGIE    Spoke with BUDDY Vincent. She stated the Government has a special enrollment open during the pandemic. Enrollment closes 06.17.2020. Patient needs to contact [address] [place] [address]) to enroll in Part B. She needs to call the [address] at the [place] [address]) office for Part D enrollment. She can call Antonella Villar at the [facility] or use me for enrollment assistance, as well. Submitted by Cesia/ANGIE    EMAIL sent to Radha Monroe:    Miguel Gamino is the local grocery delivery service. The number is [phone]. FYI:   Part B Enrollment is through the [address] [place] [address], [phone]            Part D (Drug) Enrollment is through Medicare, 1.800. MEDICARE, or you can contact the [address] at the [address] [place] [address], [phone], for enrollment assistance. Please call me if you need further assistance. My cell is [phone].     ROGERIO Lal, [address] Management Team  [address] [place] [address], [address],

## 2020-08-11 RX ORDER — ESCITALOPRAM OXALATE 20 MG/1
TABLET ORAL
Qty: 30 TABLET | Refills: 3 | Status: SHIPPED | OUTPATIENT
Start: 2020-08-11 | End: 2020-11-24

## 2020-08-11 RX ORDER — AMLODIPINE BESYLATE 5 MG/1
TABLET ORAL
Qty: 30 TABLET | Refills: 3 | Status: SHIPPED | OUTPATIENT
Start: 2020-08-11 | End: 2020-10-05 | Stop reason: SDUPTHER

## 2020-08-11 NOTE — TELEPHONE ENCOUNTER
Danish Dunbar called to request a refill on her medication. Last office visit : 6/16/2020   Next office visit : Visit date not found     Requested Prescriptions     Pending Prescriptions Disp Refills    amLODIPine (NORVASC) 5 MG tablet [Pharmacy Med Name: AMLODIPINE 5 MG TAB] 30 tablet 0     Sig: TAKE 1/2 TABLET AT BEDTIME FOR 7 DAYS, THEN INCREASE TO 1 TABLET AT BEDTIME.  escitalopram (LEXAPRO) 20 MG tablet [Pharmacy Med Name: ESCITALOPRAM 20MG TAB *DD] 30 tablet 0     Sig: TAKE 1/2 TABLET BY MOUTH AT BEDTIME x 7 DAYS, THEN INCREASE TO 1 TABLET AT BEDTIME.             Ambrose Eid, [place]

## 2020-09-04 RX ORDER — LOSARTAN POTASSIUM 50 MG/1
100 TABLET ORAL NIGHTLY
Qty: 60 TABLET | Refills: 0 | Status: SHIPPED | OUTPATIENT
Start: 2020-09-04 | End: 2020-10-05 | Stop reason: SDUPTHER

## 2020-09-04 NOTE — TELEPHONE ENCOUNTER
Sanagoyo Saleemcristian called to request a refill on her medication. Last office visit : 6/16/2020   Next office visit : Visit date not found     Requested Prescriptions     Pending Prescriptions Disp Refills    metoprolol tartrate (LOPRESSOR) 25 MG tablet [Pharmacy Med Name: METOPROLOL TAR. 25MG TABLET] 60 tablet 0     Sig: TAKE 1 TABLET BY MOUTH TWICE DAILY.  losartan (COZAAR) 50 MG tablet [Pharmacy Med Name: Fawad Duane. 50 MG TAB] 60 tablet 0     Sig: TAKE 2 TABLETS BY MOUTH NIGHTLY.             Ramu Gilman

## 2020-10-05 RX ORDER — AMLODIPINE BESYLATE 5 MG/1
5 TABLET ORAL DAILY
Qty: 30 TABLET | Refills: 0 | Status: SHIPPED | OUTPATIENT
Start: 2020-10-05 | End: 2020-10-27 | Stop reason: SDUPTHER

## 2020-10-05 RX ORDER — LOSARTAN POTASSIUM 50 MG/1
100 TABLET ORAL NIGHTLY
Qty: 60 TABLET | Refills: 0 | Status: SHIPPED | OUTPATIENT
Start: 2020-10-05 | End: 2020-10-06

## 2020-10-05 NOTE — TELEPHONE ENCOUNTER
Breanne Rhett called to request a refill on her medication.       Last office visit : 6/16/2020   Next office visit : 10/27/2020     Requested Prescriptions     Pending Prescriptions Disp Refills    metoprolol tartrate (LOPRESSOR) 25 MG tablet 60 tablet 0     Sig: Take 1 tablet by mouth 2 times daily APPOINTMENT NEEDED TO CONTINUE RECEIVING REFILLS    losartan (COZAAR) 50 MG tablet 60 tablet 0     Sig: Take 2 tablets by mouth nightly APPOINTMENT NEEDED TO CONTINUE RECEIVING REFILLS    amLODIPine (NORVASC) 5 MG tablet 30 tablet 0     Sig: Take 1 tablet by mouth daily            [address] [place] [address]

## 2020-10-06 RX ORDER — LOSARTAN POTASSIUM 50 MG/1
TABLET ORAL
Qty: 60 TABLET | Refills: 0 | Status: SHIPPED | OUTPATIENT
Start: 2020-10-06 | End: 2020-11-24

## 2020-10-27 ENCOUNTER — VIRTUAL VISIT (OUTPATIENT)
Dept: PRIMARY CARE CLINIC | Age: 65
End: 2020-10-27

## 2020-10-27 PROBLEM — E78.00 HYPERCHOLESTEROLEMIA: Status: ACTIVE | Noted: 2020-10-27

## 2020-10-27 PROBLEM — R79.9 ABNORMAL FINDING OF BLOOD CHEMISTRY, UNSPECIFIED: Status: ACTIVE | Noted: 2020-10-27

## 2020-10-27 PROCEDURE — 99443 PR PHYS/QHP TELEPHONE EVALUATION 21-30 MIN: CPT | Performed by: NURSE PRACTITIONER

## 2020-10-27 RX ORDER — ATORVASTATIN CALCIUM 40 MG/1
40 TABLET, FILM COATED ORAL NIGHTLY
Qty: 30 TABLET | Refills: 2 | Status: SHIPPED | OUTPATIENT
Start: 2020-10-27 | End: 2020-11-06 | Stop reason: SDUPTHER

## 2020-10-27 RX ORDER — ASPIRIN 81 MG/1
81 TABLET ORAL NIGHTLY
Qty: 30 TABLET | Refills: 2 | Status: SHIPPED | OUTPATIENT
Start: 2020-10-27 | End: 2020-11-06 | Stop reason: SDUPTHER

## 2020-10-27 RX ORDER — MECLIZINE HYDROCHLORIDE 25 MG/1
25 TABLET ORAL 3 TIMES DAILY PRN
Qty: 30 TABLET | Refills: 0 | Status: SHIPPED | OUTPATIENT
Start: 2020-10-27 | End: 2020-11-06 | Stop reason: SDUPTHER

## 2020-10-27 RX ORDER — AMLODIPINE BESYLATE 5 MG/1
5 TABLET ORAL 2 TIMES DAILY
Qty: 60 TABLET | Refills: 2 | Status: SHIPPED | OUTPATIENT
Start: 2020-10-27 | End: 2020-11-06 | Stop reason: SDUPTHER

## 2020-10-27 NOTE — PATIENT INSTRUCTIONS
zio patch    Increase norvasc to 5mg twice daily. Have fasting labs on the 2nd floor of my building/ medical [facility]. Take meclizine 25mg three times daily as needed for dizziness. We will schedule Zio patch    After labs come to the office and I will look at your ears and ?  EKG

## 2020-10-27 NOTE — PROGRESS NOTES
Ana Oneal is a 76 y.o. female evaluated via telephone on 10/27/2020. Consent:  She and/or health care decision maker is aware that that she may receive a bill for this telephone service, depending on her insurance coverage, and has provided verbal consent to proceed: Yes      Documentation:    Syncopal episodes: Patient reports having 2-3 more spells in which she has a sensation that goes up the back of her neck and then she passes out. Patient states that it feels as though a rush of blood goes to her head. She states the last episode occurred while she was in the "[employer] store. She states at that time she did have an episode of diarrhea. Patient was seen in June, Berta Ohms ordered at that time, but this has yet to be completed. She denies any cold sweats, palpitations, headaches. She states that during the episode that at the [facility] evaluated her and found to have normal b/p, and b/s. HTN (hypertension): b/p taken with provider on phone 144/82. Reports that she is having headaches but they are not as bad. She notes that the headaches occur every other day. Notes that the pain is on the top of her head, throbbing feeling, notes worse in the evening. Anxiety - Still taking lexparo 20mg       Review of Systems   Constitutional: Positive for fatigue. Negative for activity change, appetite change, fever and unexpected weight change. HENT: Negative for congestion, ear pain, nosebleeds, rhinorrhea, sore throat, trouble swallowing and voice change. Eyes: Negative for redness and visual disturbance. Respiratory: Negative for cough, chest tightness, shortness of breath and wheezing. Cardiovascular: Negative for chest pain, palpitations and leg swelling. Gastrointestinal: Negative for abdominal pain, blood in stool, constipation, diarrhea, nausea and vomiting. Endocrine: Negative for polydipsia, polyphagia and polyuria.    Genitourinary: Negative for dysuria, frequency and urgency. Musculoskeletal: Negative for myalgias. Skin: Negative for rash and wound. Neurological: Positive for headaches. Negative for dizziness, speech difficulty and light-headedness. Psychiatric/Behavioral: Negative for agitation, confusion, self-injury and suicidal ideas. The patient is nervous/anxious. Depression        I communicated with the patient and/or health care decision maker about we will schedule ZIO Patch, increase Norvasc to 5 mg twice daily, have fasting labs on the second [facility], take meclizine 25 mg 3 times daily as needed for dizziness. Details of this discussion including any medical advice provided:       I affirm this is a Patient Initiated Episode with an Established Patient who has not had a related appointment within my department in the past 7 days or scheduled within the next 24 hours. Total Time: minutes: 21-30 minutes    Note: not billable if this call serves to triage the patient into an appointment for the relevant concern      [address] to the emergency declaration under the [facility], [facility] waiver authority and the [employer] Act, this Virtual  Visit was conducted, with patient's consent, to reduce the patient's risk of exposure to COVID-19 and provide continuity of care for an established patient. Services were provided through a video synchronous discussion virtually to substitute for in-person clinic visit.

## 2020-11-06 RX ORDER — ASPIRIN 81 MG/1
81 TABLET ORAL NIGHTLY
Qty: 30 TABLET | Refills: 2 | Status: SHIPPED | OUTPATIENT
Start: 2020-11-06 | End: 2021-04-22 | Stop reason: SDUPTHER

## 2020-11-06 RX ORDER — MECLIZINE HYDROCHLORIDE 25 MG/1
25 TABLET ORAL 3 TIMES DAILY PRN
Qty: 30 TABLET | Refills: 0 | Status: SHIPPED | OUTPATIENT
Start: 2020-11-06 | End: 2020-11-16

## 2020-11-06 RX ORDER — AMLODIPINE BESYLATE 5 MG/1
5 TABLET ORAL 2 TIMES DAILY
Qty: 60 TABLET | Refills: 2 | Status: SHIPPED | OUTPATIENT
Start: 2020-11-06 | End: 2021-01-22 | Stop reason: SDUPTHER

## 2020-11-06 RX ORDER — ATORVASTATIN CALCIUM 40 MG/1
40 TABLET, FILM COATED ORAL NIGHTLY
Qty: 30 TABLET | Refills: 2 | Status: SHIPPED | OUTPATIENT
Start: 2020-11-06 | End: 2021-01-07

## 2020-11-06 NOTE — TELEPHONE ENCOUNTER
Medications was sent on 10/27/2020 to [facility] pharmacy.       Isa Parker called requesting a refill of the below medication which has been pended for you:     Requested Prescriptions     Pending Prescriptions Disp Refills    amLODIPine (NORVASC) 5 MG tablet 60 tablet 2     Sig: Take 1 tablet by mouth 2 times daily    atorvastatin (LIPITOR) 40 MG tablet 30 tablet 2     Sig: Take 1 tablet by mouth nightly    aspirin 81 MG EC tablet 30 tablet 2     Sig: Take 1 tablet by mouth nightly    meclizine (ANTIVERT) 25 MG tablet 30 tablet 0     Sig: Take 1 tablet by mouth 3 times daily as needed for Dizziness       Last Appointment Date: 10/27/2020  Next Appointment Date: 12/1/2020    No Known Allergies

## 2020-11-24 RX ORDER — ESCITALOPRAM OXALATE 20 MG/1
TABLET ORAL
Qty: 30 TABLET | Refills: 0 | Status: SHIPPED | OUTPATIENT
Start: 2020-11-24 | End: 2020-12-15

## 2020-11-24 RX ORDER — LOSARTAN POTASSIUM 50 MG/1
TABLET ORAL
Qty: 60 TABLET | Refills: 0 | Status: SHIPPED | OUTPATIENT
Start: 2020-11-24 | End: 2021-01-22 | Stop reason: SDUPTHER

## 2020-12-15 RX ORDER — PANTOPRAZOLE SODIUM 20 MG/1
TABLET, DELAYED RELEASE ORAL
Qty: 30 TABLET | Refills: 0 | Status: SHIPPED | OUTPATIENT
Start: 2020-12-15 | End: 2021-01-11

## 2020-12-15 RX ORDER — ESCITALOPRAM OXALATE 20 MG/1
TABLET ORAL
Qty: 30 TABLET | Refills: 0 | Status: SHIPPED | OUTPATIENT
Start: 2020-12-15 | End: 2021-01-18

## 2020-12-22 ENCOUNTER — VIRTUAL VISIT (OUTPATIENT)
Dept: PRIMARY CARE CLINIC | Age: 65
End: 2020-12-22

## 2020-12-22 PROCEDURE — 99443 PR PHYS/QHP TELEPHONE EVALUATION 21-30 MIN: CPT | Performed by: NURSE PRACTITIONER

## 2020-12-22 RX ORDER — BUSPIRONE HYDROCHLORIDE 10 MG/1
10 TABLET ORAL 3 TIMES DAILY PRN
Qty: 90 TABLET | Refills: 0 | Status: SHIPPED | OUTPATIENT
Start: 2020-12-22 | End: 2021-01-22 | Stop reason: ALTCHOICE

## 2020-12-22 NOTE — PROGRESS NOTES
Bartolo Cobian is a 76 y.o. female evaluated via telephone on 12/22/2020. Consent:  She and/or health care decision maker is aware that that she may receive a bill for this telephone service, depending on her insurance coverage, and has provided verbal consent to proceed: Yes      Documentation:    Syncopal episodes:   12/22/2020: only 2 more episodes since last visit. Has not had halter monitor placed due to not having transportation. 10/27/2020: Patient reports having 2-3 more spells in which she has a sensation that goes up the back of her neck and then she passes out. Patient states that it feels as though a rush of blood goes to her head. She states the last episode occurred while she was in the [employer] store. She states at that time she did have an episode of diarrhea. Patient was seen in June, Jojo Hollingsworth ordered at that time, but this has yet to be completed. She denies any cold sweats, palpitations, headaches. She states that during the episode that at the [employer] evaluated her and found to have normal b/p, and b/s. HTN (hypertension):   12/22/2020 - 118/74 stable on current treament and wishes to continue same. 10/27/2020: b/p taken with provider on phone 144/82. Reports that she is having headaches but they are not as bad. She notes that the headaches occur every other day. Notes that the pain is on the top of her head, throbbing feeling, notes worse in the evening. Anxiety - Still taking lexparo 20mg, notes that she is still having issues with anxiety due to holidays and financial issues. Review of Systems   Constitutional: Positive for fatigue. Negative for activity change, appetite change, fever and unexpected weight change. HENT: Negative for congestion, ear pain, nosebleeds, rhinorrhea, sore throat, trouble swallowing and voice change. Eyes: Negative for redness and visual disturbance.

## 2021-01-02 DIAGNOSIS — I10 ESSENTIAL HYPERTENSION: ICD-10-CM

## 2021-01-07 DIAGNOSIS — I63.9 CEREBROVASCULAR ACCIDENT (CVA), UNSPECIFIED MECHANISM (HCC): ICD-10-CM

## 2021-01-07 DIAGNOSIS — E78.00 HYPERCHOLESTEROLEMIA: ICD-10-CM

## 2021-01-07 RX ORDER — ATORVASTATIN CALCIUM 40 MG/1
TABLET, FILM COATED ORAL
Qty: 30 TABLET | Refills: 0 | Status: SHIPPED | OUTPATIENT
Start: 2021-01-07 | End: 2021-01-22 | Stop reason: SDUPTHER

## 2021-01-11 DIAGNOSIS — K21.9 GASTROESOPHAGEAL REFLUX DISEASE WITHOUT ESOPHAGITIS: ICD-10-CM

## 2021-01-11 RX ORDER — PANTOPRAZOLE SODIUM 20 MG/1
TABLET, DELAYED RELEASE ORAL
Qty: 30 TABLET | Refills: 0 | Status: SHIPPED | OUTPATIENT
Start: 2021-01-11 | End: 2021-01-22 | Stop reason: SDUPTHER

## 2021-01-22 ENCOUNTER — VIRTUAL VISIT (OUTPATIENT)
Dept: PRIMARY CARE CLINIC | Age: 66
End: 2021-01-22

## 2021-01-22 DIAGNOSIS — I10 ESSENTIAL HYPERTENSION: ICD-10-CM

## 2021-01-22 DIAGNOSIS — F32.A DEPRESSION, UNSPECIFIED DEPRESSION TYPE: ICD-10-CM

## 2021-01-22 DIAGNOSIS — F41.0 ANXIETY ATTACK: Primary | ICD-10-CM

## 2021-01-22 DIAGNOSIS — E78.00 HYPERCHOLESTEROLEMIA: ICD-10-CM

## 2021-01-22 DIAGNOSIS — F41.9 ANXIETY: ICD-10-CM

## 2021-01-22 DIAGNOSIS — I63.9 CEREBROVASCULAR ACCIDENT (CVA), UNSPECIFIED MECHANISM (HCC): ICD-10-CM

## 2021-01-22 DIAGNOSIS — K21.9 GASTROESOPHAGEAL REFLUX DISEASE WITHOUT ESOPHAGITIS: ICD-10-CM

## 2021-01-22 PROCEDURE — 99214 OFFICE O/P EST MOD 30 MIN: CPT | Performed by: NURSE PRACTITIONER

## 2021-01-22 RX ORDER — ATORVASTATIN CALCIUM 40 MG/1
TABLET, FILM COATED ORAL
Qty: 30 TABLET | Refills: 2 | Status: SHIPPED | OUTPATIENT
Start: 2021-01-22 | End: 2021-04-22 | Stop reason: SDUPTHER

## 2021-01-22 RX ORDER — PANTOPRAZOLE SODIUM 20 MG/1
TABLET, DELAYED RELEASE ORAL
Qty: 30 TABLET | Refills: 2 | Status: SHIPPED | OUTPATIENT
Start: 2021-01-22 | End: 2021-04-22 | Stop reason: SDUPTHER

## 2021-01-22 RX ORDER — LOSARTAN POTASSIUM 50 MG/1
TABLET ORAL
Qty: 60 TABLET | Refills: 2 | Status: SHIPPED | OUTPATIENT
Start: 2021-01-22 | End: 2021-04-22 | Stop reason: SDUPTHER

## 2021-01-22 RX ORDER — ESCITALOPRAM OXALATE 20 MG/1
TABLET ORAL
Qty: 30 TABLET | Refills: 2 | Status: SHIPPED | OUTPATIENT
Start: 2021-01-22 | End: 2021-04-22 | Stop reason: SDUPTHER

## 2021-01-22 RX ORDER — AMLODIPINE BESYLATE 5 MG/1
5 TABLET ORAL 2 TIMES DAILY
Qty: 60 TABLET | Refills: 2 | Status: SHIPPED | OUTPATIENT
Start: 2021-01-22 | End: 2021-04-22 | Stop reason: SDUPTHER

## 2021-01-22 RX ORDER — CLONAZEPAM 0.5 MG/1
TABLET ORAL
Qty: 45 TABLET | Refills: 0 | Status: SHIPPED | OUTPATIENT
Start: 2021-01-22 | End: 2021-04-22 | Stop reason: SDUPTHER

## 2021-01-22 ASSESSMENT — ENCOUNTER SYMPTOMS
EYE REDNESS: 0
CHEST TIGHTNESS: 0
DIARRHEA: 0
COUGH: 0
BLOOD IN STOOL: 0
VOMITING: 0
RHINORRHEA: 0
TROUBLE SWALLOWING: 0
WHEEZING: 0
SORE THROAT: 0
NAUSEA: 0
ABDOMINAL PAIN: 0
VOICE CHANGE: 0
CONSTIPATION: 0
SHORTNESS OF BREATH: 0

## 2021-01-22 NOTE — PROGRESS NOTES
Lizabeth Law (:  1955) is a 76 y.o. female,Established patient, here for evaluation of the following chief complaint(s): Follow-up      ASSESSMENT/PLAN:  1. Anxiety attack  -     clonazePAM (KLONOPIN) 0.5 MG tablet; Take 1/2 - 1 tablet bid prn anxiety, Disp-45 tablet, R-0Normal  2. Essential hypertension  -     amLODIPine (NORVASC) 5 MG tablet; Take 1 tablet by mouth 2 times daily, Disp-60 tablet, R-2Normal  -     metoprolol tartrate (LOPRESSOR) 25 MG tablet; TAKE 1 TABLET BY MOUTH TWICE DAILY. , Disp-60 tablet, R-2Normal  3. Cerebrovascular accident (CVA), unspecified mechanism ([address]  -     atorvastatin (LIPITOR) 40 MG tablet; TAKE 1 TABLET BY MOUTH AT NIGHT, Disp-30 tablet, R-2Normal  4. Hypercholesterolemia  -     atorvastatin (LIPITOR) 40 MG tablet; TAKE 1 TABLET BY MOUTH AT NIGHT, Disp-30 tablet, R-2Normal  5. Gastroesophageal reflux disease without esophagitis  -     pantoprazole (PROTONIX) 20 MG tablet; TAKE 1 TABLET BY MOUTH ONCE DAILY, Disp-30 tablet, R-2Normal  6. Anxiety  -     escitalopram (LEXAPRO) 20 MG tablet; TAKE 1 TABLET BY MOUTH DAILY AT BEDTIME., Disp-30 tablet, R-2Normal  7. Depression, unspecified depression type  -     escitalopram (LEXAPRO) 20 MG tablet; TAKE 1 TABLET BY MOUTH DAILY AT BEDTIME., Disp-30 tablet, R-2Normal      Return in about 3 months (around 2021). SUBJECTIVE/OBJECTIVE:  HPI  Syncopal episodes:   2021: pt states that she hasn't had anymore episodes since previous appointment. Never had halter monitor / zio patch placed. 2020: only 2 more episodes since last visit. Has not had halter monitor placed due to not having transportation. 10/27/2020: Patient reports having 2-3 more spells in which she has a sensation that goes up the back of her neck and then she passes out. Patient states that it feels as though a rush of blood goes to her head. She states the last episode occurred while she was in the [employer] store. She states at that time she did have an episode of diarrhea. Patient was seen in June, Merlinda Good ordered at that time, but this has yet to be completed. She denies any cold sweats, palpitations, headaches. She states that during the episode that at the [employer] store EMS evaluated her and found to have normal b/p, and b/s.      HTN (hypertension):   1/22/2021: 124/82,   12/22/2020 - 118/74 stable on current treament and wishes to continue same. 10/27/2020: b/p taken with provider on phone 144/82. Reports that she is having headaches but they are not as bad. She notes that the headaches occur every other day. Notes that the pain is on the top of her head, throbbing feeling, notes worse in the evening.        Anxiety -  1/22/2021- notes that buspar is not helping. Notes that lexapro does help but she is still having panic attacks at times. Pt denies SI or HI. Still taking lexparo 20mg, notes that she is still having issues with anxiety due to holidays and financial issues.      Review of Systems   Constitutional: Positive for fatigue. Negative for activity change, appetite change, fever and unexpected weight change. HENT: Negative for congestion, ear pain, nosebleeds, rhinorrhea, sore throat, trouble swallowing and voice change. Eyes: Negative for redness and visual disturbance. Respiratory: Negative for cough, chest tightness, shortness of breath and wheezing. Cardiovascular: Negative for chest pain, palpitations and leg swelling. Gastrointestinal: Negative for abdominal pain, blood in stool, constipation, diarrhea, nausea and vomiting. Endocrine: Negative for polydipsia, polyphagia and polyuria. Genitourinary: Negative for dysuria, frequency and urgency. Musculoskeletal: Negative for myalgias. Skin: Negative for rash and wound. Neurological: Positive for headaches. Negative for dizziness, speech difficulty and light-headedness. Psychiatric/Behavioral: Negative for agitation, confusion, self-injury and suicidal ideas. The patient is nervous/anxious. Depression       No flowsheet data found. Physical Exam    [INSTRUCTIONS:  \"[x]\" Indicates a positive item  \"[]\" Indicates a negative item  -- DELETE ALL ITEMS NOT EXAMINED]    Constitutional: [x] Appears well-developed and well-nourished [x] No apparent distress      [] Abnormal -     Mental status: [x] Alert and awake  [x] Oriented to person/place/time [x] Able to follow commands    [] Abnormal -     Eyes:   EOM    [x]  Normal    [] Abnormal -   Sclera  [x]  Normal    [] Abnormal -          Discharge [x]  None visible   [] Abnormal -     HENT: [x] Normocephalic, atraumatic  [] Abnormal -   [x] Mouth/Throat: Mucous membranes are moist    External Ears [x] Normal  [] Abnormal -    Neck: [x] No visualized mass [] Abnormal -     Pulmonary/Chest: [x] Respiratory effort normal   [x] No visualized signs of difficulty breathing or respiratory distress        [] Abnormal -      Musculoskeletal:   [x] Normal gait with no signs of ataxia         [x] Normal range of motion of neck        [] Abnormal -     Neurological:        [x] No Facial Asymmetry (Cranial nerve 7 motor function) (limited exam due to video visit)          [x] No gaze palsy        [] Abnormal -          Skin:        [x] No significant exanthematous lesions or discoloration noted on facial skin         [] Abnormal -            Psychiatric:       [x] Normal Affect [] Abnormal -        [x] No Hallucinations    Other pertinent observable physical exam findings:-    Pt Instructions:     Drop by office for UDS    Begin klonipin 0.5mg 1/2 - 1 tablet twice daily. Follow up in 3 months. On this date 01/22/21 I have spent 30 minutes reviewing previous notes, test results and face to face (virtual) with the patient discussing the diagnosis and importance of compliance with the treatment plan as well as documenting on the day of the visit. Inés Miller is a 76 y.o. female being evaluated by a Virtual Visit (video visit) encounter to address concerns as mentioned above. A caregiver was present when appropriate. Due to this being a TeleHealth encounter (During [place] [address] public health emergency), evaluation of the following organ systems was limited: Vitals/Constitutional/EENT/Resp/CV/GI//MS/Neuro/Skin/Heme-Lymph-Imm. Pursuant to the emergency declaration under the [address] [place] [address] authority and the [employer] Act, this Virtual Visit was conducted with patient's (and/or legal guardian's) consent, to reduce the patient's risk of exposure to COVID-19 and provide necessary medical care. The patient (and/or legal guardian) has also been advised to contact this office for worsening conditions or problems, and seek emergency medical treatment and/or call 911 if deemed necessary. Patient identification was verified at the start of the visit: Yes    Services were provided through a video synchronous discussion virtually to substitute for in-person clinic visit. Patient was located at home and provider was located in office or at home. An electronic signature was used to authenticate this note.     --LIGIA Rowan

## 2021-02-06 NOTE — CONSULTS
Cardiology Progress Note      Patient Name: Miguelina Lopez  Patient : 1945        Date of Service:21  Provider of Service: ZOILA Quinonez  Place of Service: [facility]  Referral Provider: No ref. provider found          Follow Up: Valvular heart disease; HFpEF; complete heart block status post PPM implant      Interval Hx: PPM placed .  Now V paced on telemetry.  Right and left cardiac catheterization yesterday.        OBJECTIVE  Temp:  [97.9 °F (36.6 °C)-98.3 °F (36.8 °C)] 97.9 °F (36.6 °C)  Heart Rate:  [79-83] 79  Resp:  [16-18] 16  BP: (100-135)/(40-79) 132/67     Intake/Output Summary (Last 24 hours) at 2021 0813  Last data filed at 2021 0727  Gross per 24 hour   Intake 1420 ml   Output 2900 ml   Net -1480 ml     Body mass index is 52.66 kg/m².      21  0759 21  0645 21  0400   Weight: 122 kg (270 lb) 123 kg (270 lb 4.5 oz) 122 kg (269 lb 10 oz)         Physical Exam:   Vitals signs reviewed.   Constitutional:       Appearance: Well-developed and not in distress.   Eyes:      Conjunctiva/sclera: Conjunctivae normal.      Pupils: Pupils are equal, round, and reactive to light.   HENT:      Head: Normocephalic.   Neck:      Musculoskeletal: Normal range of motion.      Thyroid: No thyromegaly.      Vascular: No carotid bruit or JVD.   Pulmonary:      Effort: Pulmonary effort is normal.      Breath sounds: Normal breath sounds.   Cardiovascular:      Normal rate. Regular rhythm.      Murmurs: There is a harsh midsystolic murmur at the URSB, radiating to the neck.      No gallop. No click. No rub.   Pulses:     Intact distal pulses.   Edema:     Peripheral edema present.     Pretibial: edema of the left pretibial area and 1+ pitting edema of the right pretibial area.     Ankle: bilateral 1+ pitting edema of the ankle.     Feet: bilateral 1+ pitting edema of the feet.  Abdominal:      General: Bowel sounds are normal. There is no distension.       Internal Medicine Consult                                                             Code:Full Code  Admit Date:2019                                 PCP: Errol Godfrey, APRN - CNP                                  CC: post op medical management    HISTORY OF PRESENT ILLNESS:   Henry Martin is a 58 y.o. female with past medical history of ischemic stroke, HTN who presented this admission with 4 months of headache, speech changes and right eye blurriness per chart review. Pt admitted for aneurysmal clipping with right fronto-temporal. Pt is POD #4 of Right craniotomy and clipping of aneurysm. Pt course was complicated by seizures due to xanax vs. Etoh withdrawal which resolved with precedex gtt and resuming of xanax. Currently pt with no complaints. No acute events overnight. PAST HISTORY:     Past Medical History:   Diagnosis Date    Anxiety     Arthritis     Cerebral aneurysm     Former smoker     GERD (gastroesophageal reflux disease)     Hypertension        Past Surgical History:   Procedure Laterality Date    CHOLECYSTECTOMY      CRANIOTOMY Right 2019    RIGHT CRANIOTOMY FOR CLIPPING OF MIDDLE CEREBRAL ARTERY ANEURYSM performed by Awilda Candelaria MD at [address] [place] [address]       Social History     Tobacco Use    Smoking status: Former Smoker     Packs/day: 1.00     Years: 20.00     Pack years: 20.00     Types: Cigarettes     Last attempt to quit: 3/6/2019     Years since quittin.1    Smokeless tobacco: Never Used   Substance Use Topics    Alcohol use: Daily per family    Drug use: No        Family History:  History reviewed. No pertinent family history. MEDICATIONS:     No current facility-administered medications on file prior to encounter.       Current Outpatient Medications on File Prior to Encounter   Medication Sig Dispense Refill    aspirin 81 MG tablet Take 81 mg by mouth daily      THIAMINE HCL PO Take 1 tablet by mouth daily      Palpations: Abdomen is soft.   Skin:     General: Skin is warm and dry.      Findings: No erythema.   Neurological:      Mental Status: Alert, oriented to person, place, and time and oriented to person, place and time.           CURRENT MEDS    Scheduled Meds:amLODIPine, 5 mg, Oral, Q24H  apixaban, 5 mg, Oral, Q12H  atorvastatin, 20 mg, Oral, Daily  budesonide-formoterol, 2 puff, Inhalation, BID - RT  carvedilol, 6.25 mg, Oral, BID With Meals  clotrimazole-betamethasone, , Topical, BID  fluticasone, 2 spray, Each Nare, Daily  gabapentin, 100 mg, Oral, Q12H  insulin lispro, 0-7 Units, Subcutaneous, TID AC  lactobacillus acidophilus, 1 capsule, Oral, Daily  levothyroxine, 25 mcg, Oral, Daily  nystatin, , Topical, BID  pantoprazole, 40 mg, Oral, QAM  sennosides-docusate, 1 tablet, Oral, Daily  sodium chloride, 10 mL, Intravenous, Q12H  sodium chloride, 3 mL, Intravenous, Q12H  vilazodone, 20 mg, Oral, Nightly      Continuous Infusions:bumetanide, 2 mg/hr, Last Rate: 2 mg/hr (02/06/21 0614)          Lab Review:   Results from last 7 days   Lab Units 02/06/21  0502 02/05/21  1100 02/05/21  0628   SODIUM mmol/L 144 145 146*   POTASSIUM mmol/L 3.8 3.5 3.5   CHLORIDE mmol/L 97* 98 97*   CO2 mmol/L 34.5* 37.7* 38.5*   BUN mg/dL 32* 25* 26*   CREATININE mg/dL 1.69* 1.59* 1.51*   GLUCOSE mg/dL 101* 119* 99   CALCIUM mg/dL 8.2* 8.5* 8.6   AST (SGOT) U/L  --  25 24   ALT (SGPT) U/L  --  10 12     Results from last 7 days   Lab Units 02/05/21  0628 02/04/21  0434 02/02/21  0739 02/01/21  1200 02/01/21  0551 02/01/21  0339   TROPONIN T ng/mL 0.121* 0.101* 0.046* 0.026 0.016 0.011     Results from last 7 days   Lab Units 02/06/21  0502 02/05/21  0958  02/04/21  1124   WBC 10*3/mm3 9.34  --   --  11.44*   HEMOGLOBIN g/dL 10.8*  --   --  11.1*   HEMOGLOBIN, POC g/dL  --  11.6*   < >  --    HEMATOCRIT % 34.5  --   --  35.5   HEMATOCRIT POC %  --  34*   < >  --    PLATELETS 10*3/mm3 221  --   --  226    < > = values in this interval  vitamin D (ERGOCALCIFEROL) 11361 units capsule Take 1 capsule by mouth once a week for 5 doses 5 capsule 0    atorvastatin (LIPITOR) 80 MG tablet Take 0.5 tablets by mouth nightly 30 tablet 3    vitamin C (VITAMIN C) 1000 MG tablet Take 1 tablet by mouth daily 30 tablet 3    pantoprazole (PROTONIX) 20 MG tablet Take 20 mg by mouth daily      escitalopram (LEXAPRO) 20 MG tablet Take 20 mg by mouth daily      metoprolol tartrate (LOPRESSOR) 25 MG tablet Take 25 mg by mouth 2 times daily      ALPRAZolam (XANAX) 0.5 MG tablet Take 1 mg by mouth nightly as needed for Sleep.  HYDROcodone-acetaminophen (NORCO) 5-325 MG per tablet Take 2 tablets by mouth every 6 hours as needed for Pain 20 tablet 0    atomoxetine (STRATTERA) 80 MG capsule Take 1 capsule by mouth daily      Azilsartan-Chlorthalidone (EDARBYCLOR) 40-12.5 MG TABS Take 1 tablet by mouth daily      clopidogrel (PLAVIX) 75 MG tablet Take 75 mg by mouth daily      cloNIDine (CATAPRES) 0.1 MG tablet Take 1 tablet by mouth every 4 hours as needed for High Blood Pressure (sbp above 155) 30 tablet 0           Allergies: No Known Allergies    REVIEW OF SYSTEMS:       History obtained from the patient    Review of Systems   Constitutional: Negative. Negative for activity change, appetite change, chills, diaphoresis, fatigue and fever. HENT: Negative for congestion, postnasal drip, rhinorrhea, sneezing and sore throat. Eyes:        Right eye swelling   Respiratory: Negative. Negative for cough, chest tightness, shortness of breath and wheezing. Cardiovascular: Negative. Negative for chest pain, palpitations and leg swelling. Gastrointestinal: Negative. Negative for abdominal pain, blood in stool, constipation, diarrhea, nausea and vomiting. Endocrine: Negative. Genitourinary: Negative for difficulty urinating, dysuria and hematuria. Musculoskeletal: Negative. Skin: Negative. Neurological: Negative.   Negative for dizziness and light-headedness. Psychiatric/Behavioral: Negative. Negative for behavioral problems, decreased concentration, dysphoric mood and hallucinations. The patient is not nervous/anxious and is not hyperactive. All other systems reviewed and are negative. PHYSICAL EXAM:       Vitals: /84   Pulse 72   Temp 98 °F (36.7 °C) (Oral)   Resp 18   Ht 5' 2\" (1.575 m)   Wt 134 lb 14.7 oz (61.2 kg)   SpO2 92%   BMI 24.68 kg/m²     I/O:      Intake/Output Summary (Last 24 hours) at 5/11/2019 1722  Last data filed at 5/11/2019 0645  Gross per 24 hour   Intake --   Output 2675 ml   Net -2675 ml     No intake/output data recorded. I/O last 3 completed shifts:  In: -   Out: 3175 [Urine:3175]    Physical Examination:     Physical Exam   Constitutional: She is oriented to person, place, and time. She appears well-developed and well-nourished. No distress. HENT:   Head: Normocephalic and atraumatic. Mouth/Throat: Oropharynx is clear and moist.   Right eye swelling. Right fronto-temporal craniotomy staples present, c/d/i   Eyes: Conjunctivae and EOM are normal. Right eye exhibits no discharge. Left eye exhibits no discharge. No scleral icterus. Neck: Normal range of motion. Neck supple. Cardiovascular: Normal rate, regular rhythm, normal heart sounds and intact distal pulses. Exam reveals no gallop and no friction rub. No murmur heard. Pulmonary/Chest: Effort normal and breath sounds normal. No respiratory distress. She has no wheezes. She has no rales. She exhibits no tenderness. Abdominal: Soft. She exhibits no distension and no mass. There is no tenderness. There is no rebound and no guarding. Musculoskeletal: Normal range of motion. She exhibits no edema, tenderness or deformity. Neurological: She is alert and oriented to person, place, and time. No cranial nerve deficit. Coordination normal.   Skin: Skin is warm and dry. No rash noted. She is not diaphoretic. No erythema. No pallor. not displayed.         Results from last 7 days   Lab Units 02/06/21  0502 02/03/21  0419   MAGNESIUM mg/dL 2.0 2.1         Results from last 7 days   Lab Units 02/01/21  0339   PROBNP pg/mL 12,821.0*     I reviewed the patient's new clinical results, and personally reviewed and interpreted the patient's ECG and telemetry data from the last 24 hours.      ASSESSMENT & PLAN    Chest pain    CKD (chronic kidney disease) stage 3, GFR 30-59 ml/min (CMS/HCC)    Diabetic peripheral neuropathy (CMS/HCC)    Hyperlipidemia    OCHOA (obstructive sleep apnea)    DM type 2 (diabetes mellitus, type 2) (CMS/[facility])    Essential hypertension    Pulmonary hypertension due to sleep-disordered breathing (CMS/HCC)    s/p MVR, TV-repair, CABG x2 6/13/16    Supplemental oxygen dependent    Chronic diastolic heart failure (CMS/HCC)    Chronic respiratory failure with hypoxia and hypercapnia (CMS/HCC)    COPD (chronic obstructive pulmonary disease) (CMS/HCC)    Complete heart block (CMS/HCC)    UTI (urinary tract infection), bacterial    1.  Acute on chronic diastolic heart failure: Left ventricular ejection fraction greater than 50%.    Bumex drip held prior to cardiac catheterization, and resumed this morning per nephrology.  Weight largely unchanged this admission.  Net output over the last 24 hours office Bumex drip was approximately 2 L.  2.  Complete heart block: Status post pacemaker implant.    V paced with rate of 80 on telemetry.   3.    Severe aortic stenosis: Noted to be severe per [facility].  Primary cardiology team evaluating for possible valve replacement versus TAVR   4.  Hypertension: Controlled.   5.  Coronary artery disease: Status post CABG.   grafts patent per cardiac cath 2/5   6.  Mitral regurgitation: Status post mitral valve replacement with tissue prosthesis.    7.  Tricuspid regurgitation: Status post repair  8.  Obstructive sleep apnea: Treated, pulmonology following  9.  Obesity  10.  Pulmonary hypertension: Severe per  [facility]     Primary cardiology team to further evaluate regarding severe aortic stenosis in terms of possible valve replacement versus TAVR.  Agree with current diuretic plan per nephrology.  We will continue to follow.      Salima Bearden, ZOILA  02/06/21   Psychiatric: She has a normal mood and affect. Her behavior is normal. Judgment and thought content normal.   Nursing note and vitals reviewed. DATA:       Labs:  CBC:   Recent Labs     05/09/19  0447 05/10/19  0356 05/11/19  0416   WBC 9.3 8.7 9.7   HGB 9.2* 8.9* 10.0*   HCT 27.6* 26.8* 30.2*    272 351       BMP:   Recent Labs     05/09/19  0447 05/10/19  0356 05/11/19  0416    142 142   K 3.7 3.3* 3.6    105 102   CO2 25 22 23   BUN 10 11 10   CREATININE 0.8 0.8 0.7   GLUCOSE 122* 96 88     LFT's:No results for input(s): AST, ALT, ALB, BILITOT, ALKPHOS in the last 72 hours. Troponin: No results for input(s): TROPONINI in the last 72 hours. BNP: No results for input(s): BNP in the last 72 hours. ABGs: No results for input(s): PHART, [id number], PO2ART in the last 72 hours. INR: No results for input(s): INR in the last 72 hours. XR CHEST PORTABLE   Final Result      No change in atelectasis or small patchy pneumonia right lower lobe. XR ABDOMEN (KUB) (SINGLE AP VIEW)   Final Result      1. Feeding tube tip consistent with placement within mid stomach. XR CHEST PORTABLE   Final Result   1. Right basilar opacities, concerning for pneumonia in the proper    clinical setting. CT HEAD WO CONTRAST   Final Result     Expected postoperative changes          CT Head wo Contrast   Final Result      1. Status post right frontotemporal craniotomy and aneurysm clipping in the right sylvian fissure. 2.  Bifrontal pneumocephalus. Small right frontal subdural fluid collection and small amount of hyperattenuating hematoma. 3.  Right frontal lobe hyperdense subarachnoid hemorrhage and small focus of subdural hematoma over the superior convexity. 4.  Diffuse right hemispheric sulcal effacement suggesting edema. 5.  No significant midline shift.       Results discussed with the ICU nurse, Naina, 5/7/19 at 7:35 PM                  Echo: 2/2019- Summary   Normal LV size and function; LV ejection fraction 60%   Grade 1 diastolic dysfunction   Normal RV size and function   Mild to moderate left atrial enlargement   Normal right atrial size   Normal mitral valve structure with no significant regurgitation   Aortic valve trileaflet with trace AI   Mildly dilated ascending aorta measuring 3.6 cm   Negative bubble study with no evidence for intracardiac shunt      ASSESSMENT AND PLAN:   Patient is a 58 y.o. female PMH significant for ischemic stroke, HTN  presenting with 4 month hx of headache, speech changes and right eye blurriness. Admitted to ICU post-op from right MCA aneurysm clipping. Tonic-clonic seizures  Patient had 2 tonic-clonic seizures following aneurysm clipping. Repeat CT showed post-op changes, but no significant bleeding. Given ativan and started on keppra 1000 BID. Placed on eeg. Hx of alcoholism; Possible alcohol withdrawal component vs xanax withdrawal.  - Continue keppra 1000 q12- may need to consider another antiepileptic as keppra known to worsen depression symptoms  - neurosurgery following  - neurology following  - continue CIWA assessment  - Continue xanax scheduled   - EEG revealed no certain seizure activity     Fever and leukocytosis likely 2/2 Aspiration PNA in the setting of seizures  Fever 102.1 following seizure. CXR showed right basilar opacity. WBC 13. Fever and leukocytosis resolved later that day, but then spiked another fever 102. Started on unasyn for coverage of likely aspiration  - Unasyn  - Blood and wound culture from  site sent             - gram stain from  site showed 1+ gram negative clarke; NGTD - possible contaminant  - UA negative      Right MCA aneurysm s/p clipping   R MCA aneurysm in 2/2019. Repeat CT in 3/2019 showed size was 9 mm. Presented to [facility] for aneurysmal clipping. No complications during procedure, but developed seizures soon after as above.  Repeat CT head showed post-op changes - no sig bleeding  - Keppra 1000 BID in the setting of seizures  - Neurosurgery following  - Holding home ASA     Acute Metabolic Encephalopathy  2/2 Seizures vs. Alcohol withdrawal vs xanax withdrawal. Got 6 mg ativan with improvement, but also started on scheduled xanax. - resolved  - continue scheduled xanax     HTN  - Started losartan 50  - lopressor BID - holding parameters in place; patient has been bradycardic     Chronic Ischemic Stroke  - continue home Lipitor   - Holding home ASA     Depression    - continue home lexapro and strattera      Code Status:Full Code  FEN: DIET DYSPHAGIA III ADVANCED;  Dysphagia III Advanced  PPX:  Sub q heparin  DISPO: ICU to med/surg floor    Jet Waldron MD  Internal Medicine, PGY-3  Pager: [phone]   05/11/19  5:22 PM    This patient has been staffed and discussed with attending Shanae Jaramillo MD.

## 2021-02-09 ENCOUNTER — VIRTUAL VISIT (OUTPATIENT)
Dept: PRIMARY CARE CLINIC | Age: 66
End: 2021-02-09

## 2021-02-09 DIAGNOSIS — G89.29 CHRONIC BILATERAL LOW BACK PAIN WITH BILATERAL SCIATICA: Primary | ICD-10-CM

## 2021-02-09 DIAGNOSIS — M54.42 CHRONIC BILATERAL LOW BACK PAIN WITH BILATERAL SCIATICA: Primary | ICD-10-CM

## 2021-02-09 DIAGNOSIS — M54.41 CHRONIC BILATERAL LOW BACK PAIN WITH BILATERAL SCIATICA: Primary | ICD-10-CM

## 2021-02-09 PROCEDURE — 99443 PR PHYS/QHP TELEPHONE EVALUATION 21-30 MIN: CPT | Performed by: NURSE PRACTITIONER

## 2021-02-09 RX ORDER — TIZANIDINE 4 MG/1
TABLET ORAL
Qty: 30 TABLET | Refills: 0 | Status: SHIPPED | OUTPATIENT
Start: 2021-02-09 | End: 2021-04-22 | Stop reason: SDUPTHER

## 2021-02-09 RX ORDER — METHYLPREDNISOLONE 4 MG/1
TABLET ORAL
Qty: 1 TABLET | Refills: 0 | Status: SHIPPED | OUTPATIENT
Start: 2021-02-09 | End: 2021-02-15

## 2021-02-09 ASSESSMENT — ENCOUNTER SYMPTOMS
VOICE CHANGE: 0
BACK PAIN: 1
SHORTNESS OF BREATH: 0
BLOOD IN STOOL: 0
COUGH: 0
RHINORRHEA: 0
DIARRHEA: 0
EYE REDNESS: 0
ABDOMINAL PAIN: 0
CONSTIPATION: 0
VOMITING: 0
TROUBLE SWALLOWING: 0
CHEST TIGHTNESS: 0
NAUSEA: 0
WHEEZING: 0
SORE THROAT: 0

## 2021-02-09 NOTE — PROGRESS NOTES
Christel Reveles is a 76 y.o. female evaluated via telephone on 2/9/2021. Consent:  She and/or health care decision maker is aware that that she may receive a bill for this telephone service, depending on her insurance coverage, and has provided verbal consent to proceed: Yes      Documentation:    Back pain: low back that seems to be worsening. Reports that it has been a while since she had an xray of her lumbar spine. Notes that she is having pain that is shooting down both legs. Denies loss of bowel or bladder function. Review of Systems   Constitutional: Positive for fatigue. Negative for activity change, appetite change, fever and unexpected weight change. HENT: Negative for congestion, ear pain, nosebleeds, rhinorrhea, sore throat, trouble swallowing and voice change. Eyes: Negative for redness and visual disturbance. Respiratory: Negative for cough, chest tightness, shortness of breath and wheezing. Cardiovascular: Negative for chest pain, palpitations and leg swelling. Gastrointestinal: Negative for abdominal pain, blood in stool, constipation, diarrhea, nausea and vomiting. Endocrine: Negative for polydipsia, polyphagia and polyuria. Genitourinary: Negative for dysuria, frequency and urgency. Musculoskeletal: Positive for back pain. Negative for myalgias. Skin: Negative for rash and wound. Neurological: Positive for headaches. Negative for dizziness, speech difficulty and light-headedness. Psychiatric/Behavioral: Negative for agitation, confusion, self-injury and suicidal ideas. The patient is nervous/anxious. Depression          I communicated with the patient and/or health care decision maker about have xray of lumbar spine and begin medrol and muscle relaxers. Call office Thursday morning if no improvement.     Details of this discussion including any medical advice provided:       I affirm this is a Patient Initiated Episode with an Established Patient who has not had a related appointment within my department in the past 7 days or scheduled within the next 24 hours. Total Time: minutes: 21-30 minutes    Note: not billable if this call serves to triage the patient into an appointment for the relevant concern      [address]  to the emergency declaration under the [facility], [facility] waiver authority and the [employer] Act, this Virtual  Visit was conducted, with patient's consent, to reduce the patient's risk of exposure to COVID-19 and provide continuity of care for an established patient. Services were provided through a video synchronous discussion virtually to substitute for in-person clinic visit.

## 2021-04-22 ENCOUNTER — VIRTUAL VISIT (OUTPATIENT)
Dept: PRIMARY CARE CLINIC | Age: 66
End: 2021-04-22

## 2021-04-22 DIAGNOSIS — F32.A DEPRESSION, UNSPECIFIED DEPRESSION TYPE: ICD-10-CM

## 2021-04-22 DIAGNOSIS — E78.00 HYPERCHOLESTEROLEMIA: ICD-10-CM

## 2021-04-22 DIAGNOSIS — M54.41 CHRONIC BILATERAL LOW BACK PAIN WITH BILATERAL SCIATICA: ICD-10-CM

## 2021-04-22 DIAGNOSIS — Z72.89 OTHER PROBLEMS RELATED TO LIFESTYLE: ICD-10-CM

## 2021-04-22 DIAGNOSIS — K21.9 GASTROESOPHAGEAL REFLUX DISEASE WITHOUT ESOPHAGITIS: ICD-10-CM

## 2021-04-22 DIAGNOSIS — Z78.0 POST-MENOPAUSAL: ICD-10-CM

## 2021-04-22 DIAGNOSIS — I10 ESSENTIAL HYPERTENSION: Primary | ICD-10-CM

## 2021-04-22 DIAGNOSIS — Z12.11 SCREENING FOR COLON CANCER: ICD-10-CM

## 2021-04-22 DIAGNOSIS — F41.9 ANXIETY: ICD-10-CM

## 2021-04-22 DIAGNOSIS — G89.29 CHRONIC BILATERAL LOW BACK PAIN WITH BILATERAL SCIATICA: ICD-10-CM

## 2021-04-22 DIAGNOSIS — Z12.31 ENCOUNTER FOR SCREENING MAMMOGRAM FOR BREAST CANCER: ICD-10-CM

## 2021-04-22 DIAGNOSIS — M54.42 CHRONIC BILATERAL LOW BACK PAIN WITH BILATERAL SCIATICA: ICD-10-CM

## 2021-04-22 DIAGNOSIS — F41.0 ANXIETY ATTACK: ICD-10-CM

## 2021-04-22 DIAGNOSIS — I63.9 CEREBROVASCULAR ACCIDENT (CVA), UNSPECIFIED MECHANISM (HCC): ICD-10-CM

## 2021-04-22 PROCEDURE — 99214 OFFICE O/P EST MOD 30 MIN: CPT | Performed by: NURSE PRACTITIONER

## 2021-04-22 RX ORDER — BLOOD PRESSURE TEST KIT
1 KIT MISCELLANEOUS DAILY
Qty: 1 KIT | Refills: 0 | Status: SHIPPED | OUTPATIENT
Start: 2021-04-22 | End: 2022-04-05

## 2021-04-22 RX ORDER — CLONAZEPAM 0.5 MG/1
TABLET ORAL
Qty: 45 TABLET | Refills: 0 | Status: ON HOLD | OUTPATIENT
Start: 2021-04-22 | End: 2021-10-03

## 2021-04-22 RX ORDER — PANTOPRAZOLE SODIUM 20 MG/1
TABLET, DELAYED RELEASE ORAL
Qty: 30 TABLET | Refills: 2 | Status: SHIPPED | OUTPATIENT
Start: 2021-04-22 | End: 2021-07-29

## 2021-04-22 RX ORDER — LOSARTAN POTASSIUM 50 MG/1
TABLET ORAL
Qty: 60 TABLET | Refills: 2 | Status: ON HOLD
Start: 2021-04-22 | End: 2021-10-15 | Stop reason: HOSPADM

## 2021-04-22 RX ORDER — ATORVASTATIN CALCIUM 40 MG/1
TABLET, FILM COATED ORAL
Qty: 30 TABLET | Refills: 2 | Status: SHIPPED | OUTPATIENT
Start: 2021-04-22 | End: 2021-09-15

## 2021-04-22 RX ORDER — ESCITALOPRAM OXALATE 20 MG/1
TABLET ORAL
Qty: 30 TABLET | Refills: 2 | Status: SHIPPED | OUTPATIENT
Start: 2021-04-22 | End: 2021-08-13

## 2021-04-22 RX ORDER — AMLODIPINE BESYLATE 5 MG/1
5 TABLET ORAL 2 TIMES DAILY
Qty: 60 TABLET | Refills: 2 | Status: ON HOLD
Start: 2021-04-22 | End: 2021-10-06 | Stop reason: HOSPADM

## 2021-04-22 RX ORDER — TIZANIDINE 4 MG/1
TABLET ORAL
Qty: 30 TABLET | Refills: 2 | Status: ON HOLD | OUTPATIENT
Start: 2021-04-22 | End: 2021-10-03

## 2021-04-22 RX ORDER — ASPIRIN 81 MG/1
81 TABLET ORAL NIGHTLY
Qty: 30 TABLET | Refills: 2 | Status: SHIPPED | OUTPATIENT
Start: 2021-04-22 | End: 2022-04-05

## 2021-04-22 NOTE — PROGRESS NOTES
Radha Goel (:  1955) is a 76 y.o. female,Established patient, here for evaluation of the following chief complaint(s): Follow-up      ASSESSMENT/PLAN:  1. Essential hypertension  -     Blood Pressure KIT; DAILY Starting Thu 2021, Until Sat 2021, For 30 days, Disp-1 kit, R-0, Normal  -     amLODIPine (NORVASC) 5 MG tablet; Take 1 tablet by mouth 2 times daily, Disp-60 tablet, R-2Normal  -     metoprolol tartrate (LOPRESSOR) 25 MG tablet; TAKE 1 TABLET BY MOUTH TWICE DAILY. , Disp-60 tablet, R-2Normal  2. Chronic bilateral low back pain with bilateral sciatica  -     tiZANidine (ZANAFLEX) 4 MG tablet; 1/2 - 1 at bedtime for low back pain, Disp-30 tablet, R-2Normal  3. Cerebrovascular accident (CVA), unspecified mechanism ([facility] [address]  -     Blood Pressure KIT; DAILY Starting Thu 2021, Until Sat 2021, For 30 days, Disp-1 kit, R-0, Normal  -     aspirin 81 MG EC tablet; Take 1 tablet by mouth nightly, Disp-30 tablet, R-2Normal  -     atorvastatin (LIPITOR) 40 MG tablet; TAKE 1 TABLET BY MOUTH AT NIGHT, Disp-30 tablet, R-2Normal  4. Hypercholesterolemia  -     aspirin 81 MG EC tablet; Take 1 tablet by mouth nightly, Disp-30 tablet, R-2Normal  -     atorvastatin (LIPITOR) 40 MG tablet; TAKE 1 TABLET BY MOUTH AT NIGHT, Disp-30 tablet, R-2Normal  5. Anxiety attack  -     clonazePAM (KLONOPIN) 0.5 MG tablet; Take 1/2 - 1 tablet bid prn anxiety, Disp-45 tablet, R-0Normal  6. Gastroesophageal reflux disease without esophagitis  -     pantoprazole (PROTONIX) 20 MG tablet; TAKE 1 TABLET BY MOUTH ONCE DAILY, Disp-30 tablet, R-2Normal  7. Anxiety  -     escitalopram (LEXAPRO) 20 MG tablet; TAKE 1 TABLET BY MOUTH DAILY AT BEDTIME., Disp-30 tablet, R-2Normal  8. Depression, unspecified depression type  -     escitalopram (LEXAPRO) 20 MG tablet; TAKE 1 TABLET BY MOUTH DAILY AT BEDTIME., Disp-30 tablet, R-2Normal  9. Other problems related to lifestyle  10.  Encounter for screening mammogram for breast cancer  - taken with provider on phone [phone]. Reports that she is having headaches but they are not as bad. She notes that the headaches occur every other day. Notes that the pain is on the top of her head, throbbing feeling, notes worse in the evening.        Anxiety -  4/22/2021 stable. Denies SI or HI.   1/22/2021- notes that buspar is not helping. Notes that lexapro does help but she is still having panic attacks at times. Pt denies SI or HI. Still taking lexparo 20mg, notes that she is still having issues with anxiety due to holidays and financial issues. Review of Systems   Constitutional: Positive for fatigue. Negative for activity change, appetite change, fever and unexpected weight change. HENT: Negative for congestion, ear pain, nosebleeds, rhinorrhea, sore throat, trouble swallowing and voice change. Eyes: Negative for redness and visual disturbance. Respiratory: Negative for cough, chest tightness, shortness of breath and wheezing. Cardiovascular: Negative for chest pain, palpitations and leg swelling. Gastrointestinal: Negative for abdominal pain, blood in stool, constipation, diarrhea, nausea and vomiting. Endocrine: Negative for polydipsia, polyphagia and polyuria. Genitourinary: Negative for dysuria, frequency and urgency. Musculoskeletal: Positive for back pain (much improved after steroids). Negative for myalgias. Skin: Negative for rash and wound. Neurological: Negative for dizziness, speech difficulty, light-headedness and headaches. Psychiatric/Behavioral: Negative for agitation, confusion, self-injury and suicidal ideas. The patient is not nervous/anxious. Depression       No flowsheet data found.      Physical Exam    [INSTRUCTIONS:  \"[x]\" Indicates a positive item  \"[]\" Indicates a negative item  -- DELETE ALL ITEMS NOT EXAMINED]    Constitutional: [x] Appears well-developed and well-nourished [x] No apparent distress      [] Abnormal -     Mental status: [x] Alert and awake  [x] Oriented to person/place/time [x] Able to follow commands    [] Abnormal -     Eyes:   EOM    [x]  Normal    [] Abnormal -   Sclera  [x]  Normal    [] Abnormal -          Discharge [x]  None visible   [] Abnormal -     HENT: [x] Normocephalic, atraumatic  [] Abnormal -   [x] Mouth/Throat: Mucous membranes are moist    External Ears [x] Normal  [] Abnormal -    Neck: [x] No visualized mass [] Abnormal -     Pulmonary/Chest: [x] Respiratory effort normal   [x] No visualized signs of difficulty breathing or respiratory distress        [] Abnormal -      Musculoskeletal:   [x] Normal gait with no signs of ataxia         [x] Normal range of motion of neck        [] Abnormal -     Neurological:        [x] No Facial Asymmetry (Cranial nerve 7 motor function) (limited exam due to video visit)          [x] No gaze palsy        [] Abnormal -          Skin:        [x] No significant exanthematous lesions or discoloration noted on facial skin         [] Abnormal -            Psychiatric:       [x] Normal Affect [] Abnormal -        [x] No Hallucinations    Other pertinent observable physical exam findings:-          On this date 4/22/2021 I have spent 30 minutes reviewing previous notes, test results and face to face (virtual) with the patient discussing the diagnosis and importance of compliance with the treatment plan as well as documenting on the day of the visitDg Braden, was evaluated through a synchronous (real-time) audio-video encounter. The patient (or guardian if applicable) is aware that this is a billable service. Verbal consent to proceed has been obtained within the past 12 months. The visit was conducted pursuant to the emergency declaration under the [address] [place] [address], [address] [place] [address] and the [employer] General Act. Patient identification was verified, and a caregiver was present when appropriate.  The patient was located in a state where the provider was credentialed to provide care. An electronic signature was used to authenticate this note.     --Enrico Lopez, APRN

## 2021-04-26 ASSESSMENT — ENCOUNTER SYMPTOMS
SORE THROAT: 0
COUGH: 0
BLOOD IN STOOL: 0
RHINORRHEA: 0
CHEST TIGHTNESS: 0
WHEEZING: 0
VOICE CHANGE: 0
ABDOMINAL PAIN: 0
CONSTIPATION: 0
TROUBLE SWALLOWING: 0
NAUSEA: 0
VOMITING: 0
BACK PAIN: 1
EYE REDNESS: 0
SHORTNESS OF BREATH: 0
DIARRHEA: 0

## 2021-07-29 DIAGNOSIS — K21.9 GASTROESOPHAGEAL REFLUX DISEASE WITHOUT ESOPHAGITIS: ICD-10-CM

## 2021-07-29 RX ORDER — PANTOPRAZOLE SODIUM 20 MG/1
TABLET, DELAYED RELEASE ORAL
Qty: 30 TABLET | Refills: 0 | Status: SHIPPED | OUTPATIENT
Start: 2021-07-29 | End: 2021-08-31

## 2021-07-29 NOTE — TELEPHONE ENCOUNTER
Carlottaquintin Vale called to request a refill on her medication. Last office visit : 4/22/2021   Next office visit : Visit date not found     Requested Prescriptions     Pending Prescriptions Disp Refills    pantoprazole (PROTONIX) 20 MG tablet [Pharmacy Med Name: PANTOPRAZOLE DR 20 MG TAB *DD] 30 tablet 0     Sig: TAKE 1 TABLET BY MOUTH ONCE DAILY.             Soni Esteves

## 2021-08-13 DIAGNOSIS — F32.A DEPRESSION, UNSPECIFIED DEPRESSION TYPE: ICD-10-CM

## 2021-08-13 DIAGNOSIS — F41.9 ANXIETY: ICD-10-CM

## 2021-08-13 RX ORDER — ESCITALOPRAM OXALATE 20 MG/1
TABLET ORAL
Qty: 30 TABLET | Refills: 0 | Status: SHIPPED | OUTPATIENT
Start: 2021-08-13 | End: 2021-09-15

## 2021-08-31 DIAGNOSIS — K21.9 GASTROESOPHAGEAL REFLUX DISEASE WITHOUT ESOPHAGITIS: ICD-10-CM

## 2021-08-31 RX ORDER — PANTOPRAZOLE SODIUM 20 MG/1
TABLET, DELAYED RELEASE ORAL
Qty: 30 TABLET | Refills: 0 | Status: SHIPPED | OUTPATIENT
Start: 2021-08-31 | End: 2021-10-19

## 2021-09-14 DIAGNOSIS — I10 ESSENTIAL HYPERTENSION: ICD-10-CM

## 2021-09-14 DIAGNOSIS — F32.A DEPRESSION, UNSPECIFIED DEPRESSION TYPE: ICD-10-CM

## 2021-09-14 DIAGNOSIS — F41.9 ANXIETY: ICD-10-CM

## 2021-09-14 DIAGNOSIS — I63.9 CEREBROVASCULAR ACCIDENT (CVA), UNSPECIFIED MECHANISM (HCC): ICD-10-CM

## 2021-09-14 DIAGNOSIS — E78.00 HYPERCHOLESTEROLEMIA: ICD-10-CM

## 2021-09-15 RX ORDER — ESCITALOPRAM OXALATE 20 MG/1
TABLET ORAL
Qty: 30 TABLET | Refills: 0 | Status: SHIPPED | OUTPATIENT
Start: 2021-09-15 | End: 2021-10-26

## 2021-09-15 RX ORDER — ATORVASTATIN CALCIUM 40 MG/1
TABLET, FILM COATED ORAL
Qty: 30 TABLET | Refills: 2 | Status: SHIPPED | OUTPATIENT
Start: 2021-09-15

## 2021-10-03 ENCOUNTER — APPOINTMENT (OUTPATIENT)
Dept: CT IMAGING | Age: 66
DRG: 312 | End: 2021-10-03
Payer: MEDICARE

## 2021-10-03 ENCOUNTER — APPOINTMENT (OUTPATIENT)
Dept: GENERAL RADIOLOGY | Age: 66
DRG: 312 | End: 2021-10-03
Payer: MEDICARE

## 2021-10-03 ENCOUNTER — HOSPITAL ENCOUNTER (INPATIENT)
Age: 66
LOS: 3 days | Discharge: HOME | DRG: 312 | End: 2021-10-06
Attending: EMERGENCY MEDICINE | Admitting: STUDENT IN AN ORGANIZED HEALTH CARE EDUCATION/TRAINING PROGRAM
Payer: MEDICARE

## 2021-10-03 DIAGNOSIS — R55 NEAR SYNCOPE: Primary | ICD-10-CM

## 2021-10-03 DIAGNOSIS — N17.9 AKI (ACUTE KIDNEY INJURY) (HCC): ICD-10-CM

## 2021-10-03 DIAGNOSIS — R09.02 HYPOXIA: ICD-10-CM

## 2021-10-03 PROBLEM — E86.0 DEHYDRATION: Status: ACTIVE | Noted: 2021-10-03

## 2021-10-03 PROBLEM — I95.9 HYPOTENSION: Status: ACTIVE | Noted: 2021-10-03

## 2021-10-03 LAB
ALBUMIN SERPL-MCNC: 3.8 G/DL (ref 3.5–5.2)
ALP BLD-CCNC: 104 U/L (ref 35–104)
ALT SERPL-CCNC: 14 U/L (ref 5–33)
AMPHETAMINE SCREEN, URINE: NEGATIVE
ANION GAP SERPL CALCULATED.3IONS-SCNC: 20 MMOL/L (ref 7–19)
APTT: 28.4 SEC (ref 26–36.2)
AST SERPL-CCNC: 18 U/L (ref 5–32)
BACTERIA: NEGATIVE /HPF
BARBITURATE SCREEN URINE: NEGATIVE
BASOPHILS ABSOLUTE: 0.1 K/UL (ref 0–0.2)
BASOPHILS RELATIVE PERCENT: 0.6 % (ref 0–1)
BENZODIAZEPINE SCREEN, URINE: NEGATIVE
BILIRUB SERPL-MCNC: 0.5 MG/DL (ref 0.2–1.2)
BILIRUBIN URINE: NEGATIVE
BLOOD, URINE: ABNORMAL
BUN BLDV-MCNC: 30 MG/DL (ref 8–23)
CALCIUM SERPL-MCNC: 9 MG/DL (ref 8.8–10.2)
CANNABINOID SCREEN URINE: NEGATIVE
CHLORIDE BLD-SCNC: 102 MMOL/L (ref 98–111)
CLARITY: CLEAR
CO2: 17 MMOL/L (ref 22–29)
COCAINE METABOLITE SCREEN URINE: NEGATIVE
COLOR: YELLOW
CREAT SERPL-MCNC: 1.4 MG/DL (ref 0.5–0.9)
CRYSTALS, UA: ABNORMAL /HPF
D DIMER: 0.65 UG/ML FEU (ref 0–0.48)
EOSINOPHILS ABSOLUTE: 0.1 K/UL (ref 0–0.6)
EOSINOPHILS RELATIVE PERCENT: 0.8 % (ref 0–5)
EPITHELIAL CELLS, UA: 1 /HPF (ref 0–5)
ETHANOL: <10 MG/DL (ref 0–0.08)
GFR AFRICAN AMERICAN: 45
GFR NON-AFRICAN AMERICAN: 37
GLUCOSE BLD-MCNC: 112 MG/DL (ref 74–109)
GLUCOSE URINE: NEGATIVE MG/DL
HCT VFR BLD CALC: 38 % (ref 37–47)
HEMOGLOBIN: 12.1 G/DL (ref 12–16)
HYALINE CASTS: 0 /HPF (ref 0–8)
IMMATURE GRANULOCYTES #: 0 K/UL
INR BLD: 1.11 (ref 0.88–1.18)
KETONES, URINE: 40 MG/DL
LEUKOCYTE ESTERASE, URINE: ABNORMAL
LYMPHOCYTES ABSOLUTE: 4.9 K/UL (ref 1.1–4.5)
LYMPHOCYTES RELATIVE PERCENT: 42.1 % (ref 20–40)
Lab: NORMAL
MAGNESIUM: 1.6 MG/DL (ref 1.6–2.4)
MAGNESIUM: 1.7 MG/DL (ref 1.6–2.4)
MCH RBC QN AUTO: 31.6 PG (ref 27–31)
MCHC RBC AUTO-ENTMCNC: 31.8 G/DL (ref 33–37)
MCV RBC AUTO: 99.2 FL (ref 81–99)
MONOCYTES ABSOLUTE: 1 K/UL (ref 0–0.9)
MONOCYTES RELATIVE PERCENT: 8.5 % (ref 0–10)
NEUTROPHILS ABSOLUTE: 5.5 K/UL (ref 1.5–7.5)
NEUTROPHILS RELATIVE PERCENT: 47.7 % (ref 50–65)
NITRITE, URINE: NEGATIVE
OPIATE SCREEN URINE: NEGATIVE
PDW BLD-RTO: 12.8 % (ref 11.5–14.5)
PH UA: 6 (ref 5–8)
PLATELET # BLD: 304 K/UL (ref 130–400)
PMV BLD AUTO: 10.4 FL (ref 9.4–12.3)
POTASSIUM SERPL-SCNC: 3.5 MMOL/L (ref 3.5–5)
PROTEIN UA: NEGATIVE MG/DL
PROTHROMBIN TIME: 14.5 SEC (ref 12–14.6)
RBC # BLD: 3.83 M/UL (ref 4.2–5.4)
RBC UA: 1 /HPF (ref 0–4)
SARS-COV-2, NAAT: NOT DETECTED
SODIUM BLD-SCNC: 139 MMOL/L (ref 136–145)
SPECIFIC GRAVITY UA: 1.03 (ref 1–1.03)
TOTAL PROTEIN: 6.8 G/DL (ref 6.6–8.7)
TROPONIN: <0.01 NG/ML (ref 0–0.03)
UROBILINOGEN, URINE: 0.2 E.U./DL
WBC # BLD: 11.6 K/UL (ref 4.8–10.8)
WBC UA: 7 /HPF (ref 0–5)

## 2021-10-03 PROCEDURE — 6370000000 HC RX 637 (ALT 250 FOR IP): Performed by: NURSE PRACTITIONER

## 2021-10-03 PROCEDURE — 85730 THROMBOPLASTIN TIME PARTIAL: CPT

## 2021-10-03 PROCEDURE — 83735 ASSAY OF MAGNESIUM: CPT

## 2021-10-03 PROCEDURE — 71275 CT ANGIOGRAPHY CHEST: CPT

## 2021-10-03 PROCEDURE — 71045 X-RAY EXAM CHEST 1 VIEW: CPT

## 2021-10-03 PROCEDURE — 6360000002 HC RX W HCPCS

## 2021-10-03 PROCEDURE — 2580000003 HC RX 258: Performed by: NURSE PRACTITIONER

## 2021-10-03 PROCEDURE — 36415 COLL VENOUS BLD VENIPUNCTURE: CPT

## 2021-10-03 PROCEDURE — 87635 SARS-COV-2 COVID-19 AMP PRB: CPT

## 2021-10-03 PROCEDURE — 84484 ASSAY OF TROPONIN QUANT: CPT

## 2021-10-03 PROCEDURE — 93005 ELECTROCARDIOGRAM TRACING: CPT | Performed by: EMERGENCY MEDICINE

## 2021-10-03 PROCEDURE — 99282 EMERGENCY DEPT VISIT SF MDM: CPT

## 2021-10-03 PROCEDURE — 96374 THER/PROPH/DIAG INJ IV PUSH: CPT

## 2021-10-03 PROCEDURE — 81001 URINALYSIS AUTO W/SCOPE: CPT

## 2021-10-03 PROCEDURE — 85025 COMPLETE CBC W/AUTO DIFF WBC: CPT

## 2021-10-03 PROCEDURE — 1210000000 HC MED SURG R&B

## 2021-10-03 PROCEDURE — 82077 ASSAY SPEC XCP UR&BREATH IA: CPT

## 2021-10-03 PROCEDURE — 80053 COMPREHEN METABOLIC PANEL: CPT

## 2021-10-03 PROCEDURE — 70450 CT HEAD/BRAIN W/O DYE: CPT

## 2021-10-03 PROCEDURE — 85379 FIBRIN DEGRADATION QUANT: CPT

## 2021-10-03 PROCEDURE — 2580000003 HC RX 258: Performed by: EMERGENCY MEDICINE

## 2021-10-03 PROCEDURE — 85610 PROTHROMBIN TIME: CPT

## 2021-10-03 PROCEDURE — 6360000004 HC RX CONTRAST MEDICATION: Performed by: EMERGENCY MEDICINE

## 2021-10-03 PROCEDURE — 80307 DRUG TEST PRSMV CHEM ANLYZR: CPT

## 2021-10-03 RX ORDER — SODIUM CHLORIDE, SODIUM LACTATE, POTASSIUM CHLORIDE, AND CALCIUM CHLORIDE .6; .31; .03; .02 G/100ML; G/100ML; G/100ML; G/100ML
1000 INJECTION, SOLUTION INTRAVENOUS ONCE
Status: COMPLETED | OUTPATIENT
Start: 2021-10-03 | End: 2021-10-03

## 2021-10-03 RX ORDER — PANTOPRAZOLE SODIUM 20 MG/1
20 TABLET, DELAYED RELEASE ORAL
Status: DISCONTINUED | OUTPATIENT
Start: 2021-10-04 | End: 2021-10-03

## 2021-10-03 RX ORDER — ACETAMINOPHEN 325 MG/1
650 TABLET ORAL EVERY 6 HOURS PRN
Status: DISCONTINUED | OUTPATIENT
Start: 2021-10-03 | End: 2021-10-06 | Stop reason: HOSPADM

## 2021-10-03 RX ORDER — POTASSIUM CHLORIDE 7.45 MG/ML
10 INJECTION INTRAVENOUS PRN
Status: DISCONTINUED | OUTPATIENT
Start: 2021-10-03 | End: 2021-10-06 | Stop reason: HOSPADM

## 2021-10-03 RX ORDER — ATORVASTATIN CALCIUM 40 MG/1
40 TABLET, FILM COATED ORAL NIGHTLY
Status: DISCONTINUED | OUTPATIENT
Start: 2021-10-03 | End: 2021-10-06 | Stop reason: HOSPADM

## 2021-10-03 RX ORDER — ONDANSETRON 2 MG/ML
4 INJECTION INTRAMUSCULAR; INTRAVENOUS ONCE
Status: COMPLETED | OUTPATIENT
Start: 2021-10-03 | End: 2021-10-03

## 2021-10-03 RX ORDER — SODIUM CHLORIDE 0.9 % (FLUSH) 0.9 %
5-40 SYRINGE (ML) INJECTION EVERY 12 HOURS SCHEDULED
Status: DISCONTINUED | OUTPATIENT
Start: 2021-10-03 | End: 2021-10-06 | Stop reason: HOSPADM

## 2021-10-03 RX ORDER — ACETAMINOPHEN 650 MG/1
650 SUPPOSITORY RECTAL EVERY 6 HOURS PRN
Status: DISCONTINUED | OUTPATIENT
Start: 2021-10-03 | End: 2021-10-06 | Stop reason: HOSPADM

## 2021-10-03 RX ORDER — MAGNESIUM SULFATE 1 G/100ML
1000 INJECTION INTRAVENOUS PRN
Status: DISCONTINUED | OUTPATIENT
Start: 2021-10-03 | End: 2021-10-06 | Stop reason: HOSPADM

## 2021-10-03 RX ORDER — AMLODIPINE BESYLATE 5 MG/1
5 TABLET ORAL 2 TIMES DAILY
Status: DISCONTINUED | OUTPATIENT
Start: 2021-10-03 | End: 2021-10-06 | Stop reason: HOSPADM

## 2021-10-03 RX ORDER — 0.9 % SODIUM CHLORIDE 0.9 %
1000 INTRAVENOUS SOLUTION INTRAVENOUS ONCE
Status: DISCONTINUED | OUTPATIENT
Start: 2021-10-03 | End: 2021-10-03

## 2021-10-03 RX ORDER — ONDANSETRON 2 MG/ML
INJECTION INTRAMUSCULAR; INTRAVENOUS
Status: COMPLETED
Start: 2021-10-03 | End: 2021-10-03

## 2021-10-03 RX ORDER — SODIUM CHLORIDE 0.9 % (FLUSH) 0.9 %
5-40 SYRINGE (ML) INJECTION PRN
Status: DISCONTINUED | OUTPATIENT
Start: 2021-10-03 | End: 2021-10-06 | Stop reason: HOSPADM

## 2021-10-03 RX ORDER — CALCIUM CARBONATE 200(500)MG
500 TABLET,CHEWABLE ORAL 3 TIMES DAILY PRN
Status: DISCONTINUED | OUTPATIENT
Start: 2021-10-03 | End: 2021-10-06 | Stop reason: HOSPADM

## 2021-10-03 RX ORDER — PANTOPRAZOLE SODIUM 20 MG/1
20 TABLET, DELAYED RELEASE ORAL
Status: DISCONTINUED | OUTPATIENT
Start: 2021-10-03 | End: 2021-10-06 | Stop reason: HOSPADM

## 2021-10-03 RX ORDER — ONDANSETRON 4 MG/1
4 TABLET, ORALLY DISINTEGRATING ORAL EVERY 8 HOURS PRN
Status: DISCONTINUED | OUTPATIENT
Start: 2021-10-03 | End: 2021-10-06 | Stop reason: HOSPADM

## 2021-10-03 RX ORDER — ONDANSETRON 2 MG/ML
4 INJECTION INTRAMUSCULAR; INTRAVENOUS EVERY 6 HOURS PRN
Status: DISCONTINUED | OUTPATIENT
Start: 2021-10-03 | End: 2021-10-06 | Stop reason: HOSPADM

## 2021-10-03 RX ORDER — POTASSIUM CHLORIDE 20 MEQ/1
40 TABLET, EXTENDED RELEASE ORAL PRN
Status: DISCONTINUED | OUTPATIENT
Start: 2021-10-03 | End: 2021-10-06 | Stop reason: HOSPADM

## 2021-10-03 RX ORDER — ASPIRIN 81 MG/1
81 TABLET ORAL NIGHTLY
Status: DISCONTINUED | OUTPATIENT
Start: 2021-10-03 | End: 2021-10-06 | Stop reason: HOSPADM

## 2021-10-03 RX ORDER — SODIUM CHLORIDE 9 MG/ML
25 INJECTION, SOLUTION INTRAVENOUS PRN
Status: DISCONTINUED | OUTPATIENT
Start: 2021-10-03 | End: 2021-10-06 | Stop reason: HOSPADM

## 2021-10-03 RX ORDER — LOSARTAN POTASSIUM 100 MG/1
100 TABLET ORAL NIGHTLY
Status: DISCONTINUED | OUTPATIENT
Start: 2021-10-03 | End: 2021-10-05

## 2021-10-03 RX ORDER — ESCITALOPRAM OXALATE 10 MG/1
20 TABLET ORAL NIGHTLY
Status: DISCONTINUED | OUTPATIENT
Start: 2021-10-03 | End: 2021-10-06 | Stop reason: HOSPADM

## 2021-10-03 RX ADMIN — ATORVASTATIN CALCIUM 40 MG: 40 TABLET, FILM COATED ORAL at 21:07

## 2021-10-03 RX ADMIN — IOPAMIDOL 90 ML: 755 INJECTION, SOLUTION INTRAVENOUS at 11:09

## 2021-10-03 RX ADMIN — SODIUM CHLORIDE, PRESERVATIVE FREE 10 ML: 5 INJECTION INTRAVENOUS at 21:07

## 2021-10-03 RX ADMIN — ONDANSETRON 4 MG: 2 INJECTION INTRAMUSCULAR; INTRAVENOUS at 10:59

## 2021-10-03 RX ADMIN — ASPIRIN 81 MG: 81 TABLET, COATED ORAL at 21:07

## 2021-10-03 RX ADMIN — ONDANSETRON HYDROCHLORIDE 4 MG: 2 INJECTION, SOLUTION INTRAMUSCULAR; INTRAVENOUS at 10:59

## 2021-10-03 RX ADMIN — ESCITALOPRAM OXALATE 20 MG: 10 TABLET ORAL at 21:07

## 2021-10-03 RX ADMIN — SODIUM CHLORIDE, POTASSIUM CHLORIDE, SODIUM LACTATE AND CALCIUM CHLORIDE 1000 ML: 600; 310; 30; 20 INJECTION, SOLUTION INTRAVENOUS at 09:39

## 2021-10-03 RX ADMIN — SODIUM CHLORIDE, POTASSIUM CHLORIDE, SODIUM LACTATE AND CALCIUM CHLORIDE 1000 ML: 600; 310; 30; 20 INJECTION, SOLUTION INTRAVENOUS at 12:06

## 2021-10-03 RX ADMIN — PANTOPRAZOLE SODIUM 20 MG: 20 TABLET, DELAYED RELEASE ORAL at 13:41

## 2021-10-03 ASSESSMENT — ENCOUNTER SYMPTOMS
SHORTNESS OF BREATH: 0
DIARRHEA: 0
NAUSEA: 1
PHOTOPHOBIA: 0
WHEEZING: 0
SORE THROAT: 0
COUGH: 0
COLOR CHANGE: 0
RHINORRHEA: 0
CHEST TIGHTNESS: 1
ABDOMINAL PAIN: 0
NAUSEA: 0
SHORTNESS OF BREATH: 1
VOMITING: 0
BACK PAIN: 0

## 2021-10-03 ASSESSMENT — PAIN SCALES - GENERAL: PAINLEVEL_OUTOF10: 0

## 2021-10-03 NOTE — ED PROVIDER NOTES
[facility] 3 JUS/VAS/MED  eMERGENCY dEPARTMENT eNCOUnter      Pt Name: Jaye Ng  MRN: [id number]  [place] 1955  Date of evaluation: 10/3/2021  Provider: Marlene Mercado MD    [address] [place] [address]       Chief Complaint   Patient presents with    Loss of Consciousness    Hypotension         HISTORY OF PRESENT ILLNESS   (Location/Symptom, Timing/Onset,Context/Setting, Quality, Duration, Modifying Factors, Severity)  Note limiting factors. Jaye Ng is a 78 y.o. female who presents to the emergency department after a near syncopal event. The patient was at [address] [place] [address] whenever per her daughter who witnessed this event she was leaning forward and was difficult to arouse. When EMS arrived she was diaphoretic and BP in the 80s. Blood sugar was 112. She never fell or lost consciousness fully. Admits she has been feeling dizzy occasionally and having HA's however headaches are somewhat chronic for her due to hx of prior aneurysm s/p repair. She denies any neurologic complaints. No cp palpiations or sob however incidentally hypoxic here with sat of 87 on RA. She is former smoker. No home 02. No seizure activity. HPI    NursingNotes were reviewed. REVIEW OF SYSTEMS    (2-9 systems for level 4, 10 or more for level 5)     Review of Systems   Constitutional: Positive for diaphoresis. Negative for chills and fever. HENT: Negative for rhinorrhea and sore throat. Eyes: Negative for visual disturbance. Respiratory: Negative for cough and shortness of breath. Cardiovascular: Negative for chest pain, palpitations and leg swelling. Gastrointestinal: Negative for abdominal pain, diarrhea, nausea and vomiting. Genitourinary: Negative for dysuria and frequency. Musculoskeletal: Negative for back pain and neck pain. Neurological: Positive for dizziness, light-headedness and headaches. Negative for seizures, syncope, weakness and numbness. All other systems reviewed and are negative.            PAST MEDICALHISTORY     Past Medical History:   Diagnosis Date    Anxiety     Arthritis     Cerebral aneurysm     Former smoker     GERD (gastroesophageal reflux disease)     Hypertension          SURGICAL HISTORY       Past Surgical History:   Procedure Laterality Date    BRAIN SURGERY      Aneurysm partial clip     CHOLECYSTECTOMY      CRANIOTOMY Right 5/7/2019    RIGHT CRANIOTOMY FOR CLIPPING OF MIDDLE CEREBRAL ARTERY ANEURYSM performed by Kyleigh De Anda MD at [address] [facility]     Current Discharge Medication List      CONTINUE these medications which have NOT CHANGED    Details   escitalopram (LEXAPRO) 20 MG tablet TAKE 1 TABLET BY MOUTH DAILY AT BEDTIME. Qty: 30 tablet, Refills: 0    Associated Diagnoses: Anxiety; Depression, unspecified depression type      metoprolol tartrate (LOPRESSOR) 25 MG tablet TAKE 1 TABLET BY MOUTH TWICE DAILY. Qty: 60 tablet, Refills: 2    Associated Diagnoses: Essential hypertension      atorvastatin (LIPITOR) 40 MG tablet TAKE 1 TABLET BY MOUTH AT NIGHT. Qty: 30 tablet, Refills: 2    Associated Diagnoses: Cerebrovascular accident (CVA), unspecified mechanism ([address] Hypercholesterolemia      pantoprazole (PROTONIX) 20 MG tablet TAKE 1 TABLET BY MOUTH ONCE DAILY. Qty: 30 tablet, Refills: 0    Associated Diagnoses: Gastroesophageal reflux disease without esophagitis      Blood Pressure KIT 1 Units by Does not apply route daily  Qty: 1 kit, Refills: 0    Associated Diagnoses: Essential hypertension; Cerebrovascular accident (CVA), unspecified mechanism ([address]      aspirin 81 MG EC tablet Take 1 tablet by mouth nightly  Qty: 30 tablet, Refills: 2    Associated Diagnoses: Cerebrovascular accident (CVA), unspecified mechanism ([address]  Hypercholesterolemia      amLODIPine (NORVASC) 5 MG tablet Take 1 tablet by mouth 2 times daily  Qty: 60 tablet, Refills: 2    Associated Diagnoses: Essential hypertension losartan (COZAAR) 50 MG tablet TAKE 2 TABLETS BY MOUTH NIGHTLY. Qty: 60 tablet, Refills: 2             ALLERGIES     Patient has no known allergies. FAMILY HISTORY       Family History   Problem Relation Age of Onset    Heart Disease Mother     High Blood Pressure Father     Arthritis Father           SOCIAL HISTORY       Social History     Socioeconomic History    Marital status:      Spouse name: None    Number of children: None    Years of education: None    Highest education level: None   Occupational History    None   Tobacco Use    Smoking status: Former Smoker     Packs/day: 1.00     Years: 20.00     Pack years: 20.00     Types: Cigarettes     Quit date: 3/6/2019     Years since quittin.5    Smokeless tobacco: Never Used   Vaping Use    Vaping Use: Never used   Substance and Sexual Activity    Alcohol use: Not Currently    Drug use: No    Sexual activity: None   Other Topics Concern    None   Social History Narrative    None     Social Determinants of Health     Financial Resource Strain:     Difficulty of Paying Living Expenses:    Food Insecurity:     Worried About Running Out of Food in the Last Year:     Ran Out of Food in the Last Year:    Transportation Needs:     Lack of Transportation (Medical):      Lack of Transportation (Non-Medical):    Physical Activity:     Days of Exercise per Week:     Minutes of Exercise per Session:    Stress:     Feeling of Stress :    Social Connections:     Frequency of Communication with Friends and Family:     Frequency of Social Gatherings with Friends and Family:     Attends Jainism Services:     Active Member of Clubs or Organizations:     Attends Club or Organization Meetings:     Marital Status:    Intimate Partner Violence:     Fear of Current or Ex-Partner:     Emotionally Abused:     Physically Abused:     Sexually Abused:        SCREENINGS    [place] Coma Scale  Eye Opening: Spontaneous  Best Verbal Response: asymmetry. Sensory: No sensory deficit. Motor: No weakness or abnormal muscle tone. Coordination: Coordination normal. Finger-Nose-Finger Test normal.         DIAGNOSTIC RESULTS     EKG: All EKG's areinterpreted by the Emergency Department Physician who either signs or Co-signs this chart in the absence of a cardiologist.    55 normal sinus rhythm T wave inversions in leads V1 through V 4 with T wave flattening in V5 V6, nondiagnostic EKG    RADIOLOGY:  Non-plain film images such as CT, Ultrasound and MRI are read by the radiologist. Plain radiographic images are visualized and preliminarily interpreted bythe emergency physician with the below findings:        CTA PULMONARY W CONTRAST   Final Result   1. No evidence of pulmonary embolus. 2.  Bilateral posterior lower lobe dependent consolidations, favored   to represent atelectasis. 3.  Mild to moderate emphysema. 4.  Unchanged borderline dilatation of the main pulmonary artery and   ascending aorta. 5.  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 6.  Small hiatal hernia. Signed by Dr Lucy Zavaleta   Final Result   No acute findings. Signed by Dr Arun Sotomayor   Final Result   1. No acute intracranial finding. 2.  Postoperative change of RIGHT MCA aneurysm repair. 3.  Old RIGHT caudate infarct.    Signed by Dr Bela Colvin:  Labs Reviewed   CBC WITH AUTO DIFFERENTIAL - Abnormal; Notable for the following components:       Result Value    WBC 11.6 (*)     RBC 3.83 (*)     MCV 99.2 (*)     MCH 31.6 (*)     MCHC 31.8 (*)     Neutrophils % 47.7 (*)     Lymphocytes % 42.1 (*)     Lymphocytes Absolute 4.9 (*)     Monocytes Absolute 1.00 (*)     All other components within normal limits   COMPREHENSIVE METABOLIC PANEL - Abnormal; Notable for the following components:    CO2 17 (*)     Anion Gap 20 (*)     Glucose 112 (*)     BUN 30 (*) CREATININE 1.4 (*)     GFR Non- 37 (*)     GFR  45 (*)     All other components within normal limits   D-DIMER, QUANTITATIVE - Abnormal; Notable for the following components:    D-Dimer, Quant 0.65 (*)     All other components within normal limits   COVID-19, RAPID   PROTIME-INR   APTT   TROPONIN   ETHANOL   MAGNESIUM   URINE RT REFLEX TO CULTURE   URINE DRUG SCREEN   MAGNESIUM       All other labs were within normal range or not returned as of this dictation. EMERGENCY DEPARTMENT COURSE and DIFFERENTIAL DIAGNOSIS/MDM:   Vitals:    Vitals:    10/03/21 4688 10/03/21 0846 10/03/21 0848 10/03/21 1317   BP: 91/69   106/64   Pulse: 61   53   Resp:   20 20   Temp:   97.7 °F (36.5 °C) 97.3 °F (36.3 °C)   TempSrc:    Temporal   SpO2: (!) 88% 95%  98%   Weight: 135 lb (61.2 kg)      Height: 5' 2\" (1.575 m)          MDM  Number of Diagnoses or Management Options     Amount and/or Complexity of Data Reviewed  Clinical lab tests: ordered and reviewed  Tests in the radiology section of CPT®: ordered and reviewed  Discuss the patient with other providers: yes  Independent visualization of images, tracings, or specimens: yes      Pt hypotensive on arrival and lowest BP in 70s but now improving with IV fluids, pt dehydrated on labs with BILL and acidosis, likely near syncope due to hypovolemia and vagal event, no cp sob etc however incidentally hypoxic here but chronic smoker, cxr clear so checked cta given near syncope and hypoxia however neg for PE, have d/w Gifty for admission      CONSULTS:  None    PROCEDURES:  Unless otherwise noted below, none     Procedures    FINAL IMPRESSION      1. Near syncope    2. BILL (acute kidney injury) ([facility] [address]    3. Hypoxia          DISPOSITION/PLAN   DISPOSITION        PATIENT REFERRED TO:  No follow-up provider specified.     DISCHARGE MEDICATIONS:  Current Discharge Medication List             (Please note that portions of this note were completed with a voice recognition program.  Efforts were made to edit thedictations but occasionally words are mis-transcribed.)    Renetta Leonardo MD (electronically signed)  Attending Emergency Physician       Caridad Khoury MD  10/03/21 3250

## 2021-10-03 NOTE — PLAN OF CARE
Problem: Falls - Risk of:  Goal: Will remain free from falls  Description: Will remain free from falls  Outcome: Ongoing  Goal: Absence of physical injury  Description: Absence of physical injury  Outcome: Ongoing     Problem: Discharge Planning:  Goal: Discharged to appropriate level of care  Description: Discharged to appropriate level of care  Outcome: Ongoing

## 2021-10-03 NOTE — PROGRESS NOTES
4 Eyes Skin Assessment    Carlotta Guevara is being assessed upon: Admission    I agree that I, Karthik Forrest RN, along with Bob Mancilla RN (either 2 RN's or 1 LPN and 1 RN) have performed a thorough Head to Toe Skin Assessment on the patient. ALL assessment sites listed below have been assessed. Areas assessed by both nurses:     [x]   Head, Face, and Ears   [x]   Shoulders, Back, and Chest  [x]   Arms, Elbows, and Hands   [x]   Coccyx, Sacrum, and Ischium  [x]   Legs, Feet, and Heels    Does the Patient have Skin Breakdown?  No    Rene Prevention initiated: No  Wound Care Orders initiated: No    [facility] nurse consulted for Pressure Injury (Stage 3,4, Unstageable, DTI, NWPT, and Complex wounds) and New or Established Ostomies: No        Primary Nurse eSignature: Karthik Forrest RN on 10/3/2021 at 5:15 PM      Co-Signer eSignature: Electronically signed by Bob Mancilla RN on 10/3/21 at 6:09 PM CDT

## 2021-10-03 NOTE — H&P
[facility]      Hospitalist - History & Physical      PCP: LIGIA Alvarado    Date of Admission: 10/3/2021    Date of Service: 10/3/2021    Chief Complaint:  Near syncope    History Of Present Illness: The patient is a 78 y.o. female with a history of cerebral aneurysm, HTN, and former smoker who presented to [address] [place] [address] ED complaining of near syncope. The patient states she was at the [employer] today with her daughter when she become very dizzy and felt that she was going to pass out. She states she has been having similar episodes recently (3 in the past week). She reports following with her PCP on these issues. She and her daughter deny LOC. She did not fall. She endorses nausea but denies V/D. She denies chest pain and leg swelling. She endorses palpitations and reports a warm sensation across her neck during the episodes. The patient does have a history of previous cerebral anuresym. Head ct negative for acute findings. ED work up revealed BILL, hypotension, and hypoxia. Patient admitted to hospitalist service. Past Medical History:        Diagnosis Date    Anxiety     Arthritis     Cerebral aneurysm     Former smoker     GERD (gastroesophageal reflux disease)     Hypertension        Past Surgical History:        Procedure Laterality Date    BRAIN SURGERY      Aneurysm partial clip     CHOLECYSTECTOMY      CRANIOTOMY Right 5/7/2019    RIGHT CRANIOTOMY FOR CLIPPING OF MIDDLE CEREBRAL ARTERY ANEURYSM performed by Ralf Tavares MD at [facility] 8 Medications:  Prior to Admission medications    Medication Sig Start Date End Date Taking? Authorizing Provider   escitalopram (LEXAPRO) 20 MG tablet TAKE 1 TABLET BY MOUTH DAILY AT BEDTIME.  9/15/21   LIGIA Alvarado   metoprolol tartrate (LOPRESSOR) 25 MG tablet TAKE 1 TABLET BY MOUTH TWICE DAILY.   9/15/21   LIGIA Alvarado   atorvastatin (LIPITOR) 40 MG tablet TAKE 1 TABLET BY MOUTH AT NIGHT. 9/15/21   Levell Cely, APRN   pantoprazole (PROTONIX) 20 MG tablet TAKE 1 TABLET BY MOUTH ONCE DAILY. 8/31/21   Levell Cely, APRN   tiZANidine (ZANAFLEX) 4 MG tablet 1/2 - 1 at bedtime for low back pain 4/22/21   Levell Cely, APRN   Blood Pressure KIT 1 Units by Does not apply route daily 4/22/21 5/22/21  Levell Cely, APRN   aspirin 81 MG EC tablet Take 1 tablet by mouth nightly 4/22/21 5/22/21  Levell Cely, APRN   clonazePAM (KLONOPIN) 0.5 MG tablet Take 1/2 - 1 tablet bid prn anxiety 4/22/21 7/21/21  Levell Cely, APRN   amLODIPine (NORVASC) 5 MG tablet Take 1 tablet by mouth 2 times daily 4/22/21 5/22/21  Levell Cely, APRN   losartan (COZAAR) 50 MG tablet TAKE 2 TABLETS BY MOUTH NIGHTLY. 4/22/21   Levell Cely, APRN       Allergies:    Patient has no known allergies. Social History:    The patient currently lives home alone  Tobacco:   reports that she quit smoking about 2 years ago. Her smoking use included cigarettes. She has a 20.00 pack-year smoking history. She has never used smokeless tobacco.  Alcohol:   reports previous alcohol use. Illicit Drugs: denies    Family History:      Problem Relation Age of Onset    Heart Disease Mother     High Blood Pressure Father     Arthritis Father        Review of Systems:   Review of Systems   Constitutional: Positive for diaphoresis and fatigue. Negative for chills and fever. Eyes: Negative for photophobia and visual disturbance. Respiratory: Positive for chest tightness and shortness of breath. Negative for wheezing. Cardiovascular: Positive for palpitations. Negative for chest pain and leg swelling. Gastrointestinal: Positive for nausea. Negative for vomiting. Genitourinary: Negative for difficulty urinating and dysuria. Musculoskeletal: Negative for arthralgias and myalgias. Skin: Negative for color change and wound.    Neurological: Positive for dizziness and syncope (near). Negative for facial asymmetry and weakness. Psychiatric/Behavioral: Negative for agitation and confusion. 14 point review of systems is negative except as specifically addressed above. Physical Examination:  BP 91/69   Pulse 61   Temp 97.7 °F (36.5 °C)   Resp 20   Ht 5' 2\" (1.575 m)   Wt 135 lb (61.2 kg)   SpO2 95%   BMI 24.69 kg/m²   Physical Exam  Vitals reviewed. Constitutional:       Appearance: Normal appearance. HENT:      Head: Normocephalic and atraumatic. Mouth/Throat:      Mouth: Mucous membranes are moist.      Pharynx: Oropharynx is clear. Eyes:      Extraocular Movements: Extraocular movements intact. Conjunctiva/sclera: Conjunctivae normal.      Pupils: Pupils are equal, round, and reactive to light. Cardiovascular:      Rate and Rhythm: Normal rate and regular rhythm. Pulses: Normal pulses. Heart sounds: Normal heart sounds. Pulmonary:      Effort: Pulmonary effort is normal.      Breath sounds: Normal breath sounds. No wheezing or rhonchi. Abdominal:      General: Abdomen is flat. Bowel sounds are normal. There is no distension. Palpations: Abdomen is soft. Tenderness: There is no abdominal tenderness. There is no guarding. Musculoskeletal:         General: Normal range of motion. Cervical back: Normal range of motion and neck supple. Right lower leg: No edema. Left lower leg: No edema. Skin:     General: Skin is warm and dry. Capillary Refill: Capillary refill takes less than 2 seconds. Neurological:      General: No focal deficit present. Mental Status: She is alert and oriented to person, place, and time.    Psychiatric:         Mood and Affect: Mood normal.         Behavior: Behavior normal.          Diagnostic Data:  CBC:  Recent Labs     10/03/21  0850   WBC 11.6*   HGB 12.1   HCT 38.0        BMP:  Recent Labs     10/03/21  0850      K 3.5    CO2 17*   BUN 30*   CREATININE 1.4*   CALCIUM 9.0     Recent Labs     10/03/21  0850   AST 18   ALT 14   BILITOT 0.5   ALKPHOS 104     Coag Panel:   Recent Labs     10/03/21  0850   INR 1.11   PROTIME 14.5   APTT 28.4     Cardiac Enzymes:   Recent Labs     10/03/21  0850   TROPONINI <0.01     ABGs:No results found for: PHART, PO2ART, KBY0MTP  Urinalysis:  Lab Results   Component Value Date    NITRU Negative 05/31/2020    WBCUA 12 05/31/2020    BACTERIA NEGATIVE 05/31/2020    RBCUA 2 05/31/2020    BLOODU TRACE 05/31/2020    SPECGRAV 1.014 05/31/2020    GLUCOSEU Negative 05/31/2020     A1C: No results for input(s): LABA1C in the last 72 hours. ABG:No results for input(s): PHART, ZNR9JUD, PO2ART, TAH5HUK, BEART, HGBAE, P9GKDHGB, CARBOXHGBART in the last 72 hours. CT HEAD WO CONTRAST    Result Date: 10/3/2021  EXAM: CT HEAD WO CONTRAST - 10/3/2021 8:50 AM INDICATION: Dizziness, near syncope, headache, history of aneurysm COMPARISON: 5/31/2020 DLP: 672 mGy cm. In order to have a CT radiation dose as low as reasonably achievable, Automated Exposure Control was utilized for adjustment of the mA and/or KV according to patient size. FINDINGS: No acute intracranial hemorrhage. No acute loss of gray-white differentiation. Postoperative change of RIGHT MCA aneurysm clipping with overlying surgical calvarial defect. Unchanged chronic infarct in the RIGHT caudate head. No midline shift or mass effect. Lateral ventricles are nondilated. Basilar cisterns are patent. No acute orbital finding. Mastoid air cells are clear. Paranasal sinuses are clear. No acute osseous finding. 1.  No acute intracranial finding. 2.  Postoperative change of RIGHT MCA aneurysm repair. 3.  Old RIGHT caudate infarct. Signed by Dr Monster Tineo    Result Date: 10/3/2021  EXAM: XR CHEST PORTABLE - 10/3/2021 8:58 AM INDICATION: Near syncope COMPARISON: 5/31/2020 FINDINGS: Cardiac silhouette is normal in size.  Chronic atelectasis or scarring in the peripheral LEFT lower lobe is again noted. Lungs are otherwise clear without evidence of pleural effusion, pneumothorax, or focal consolidation. Old healed RIGHT rib fracture. No acute osseous finding. No acute findings. Signed by Dr Cyrus Joshua      Assessment/Plan:  Principal Problem:    BILL (acute kidney injury) ([facility] [address] / Dehydration   -CT abd   -IV hydration   -monitor I&O   -daily weight   -daily CMP   -avoid nephrotoxins and hypotension (home BP meds on  hold)    Active Problems:    Near syncope   -echo   -IV hydration   -orthostatic BP   -head CT nothing acute      Hypotension   -hold BP meds   -orthostatic BP Q shift   -IV hydration   -monitor closely    Hypoxia   -CTA chest   -oxygen therapy as indicated   -Home o2 eval before DC     Resolved Problems:    * No resolved hospital problems.  *       Signed:  LIGIA Knapp CNP, 10/3/2021 11:21 AM

## 2021-10-03 NOTE — ED NOTES
Bed: 05  Expected date:   Expected time:   Means of arrival:   Comments:  EMS     Lisandra Pollard RN  10/03/21 1242

## 2021-10-04 ENCOUNTER — APPOINTMENT (OUTPATIENT)
Dept: CT IMAGING | Age: 66
DRG: 312 | End: 2021-10-04
Payer: MEDICARE

## 2021-10-04 LAB
ALBUMIN SERPL-MCNC: 3.8 G/DL (ref 3.5–5.2)
ALP BLD-CCNC: 94 U/L (ref 35–104)
ALT SERPL-CCNC: 11 U/L (ref 5–33)
ANION GAP SERPL CALCULATED.3IONS-SCNC: 11 MMOL/L (ref 7–19)
AST SERPL-CCNC: 14 U/L (ref 5–32)
BASOPHILS ABSOLUTE: 0.1 K/UL (ref 0–0.2)
BASOPHILS RELATIVE PERCENT: 0.7 % (ref 0–1)
BILIRUB SERPL-MCNC: 0.4 MG/DL (ref 0.2–1.2)
BUN BLDV-MCNC: 19 MG/DL (ref 8–23)
CALCIUM SERPL-MCNC: 9 MG/DL (ref 8.8–10.2)
CHLORIDE BLD-SCNC: 105 MMOL/L (ref 98–111)
CO2: 24 MMOL/L (ref 22–29)
CREAT SERPL-MCNC: 0.8 MG/DL (ref 0.5–0.9)
EKG P AXIS: 45 DEGREES
EKG P-R INTERVAL: 140 MS
EKG Q-T INTERVAL: 516 MS
EKG QRS DURATION: 96 MS
EKG QTC CALCULATION (BAZETT): 506 MS
EKG T AXIS: 18 DEGREES
EOSINOPHILS ABSOLUTE: 0.1 K/UL (ref 0–0.6)
EOSINOPHILS RELATIVE PERCENT: 1.6 % (ref 0–5)
GFR AFRICAN AMERICAN: >59
GFR NON-AFRICAN AMERICAN: >60
GLUCOSE BLD-MCNC: 82 MG/DL (ref 74–109)
HCT VFR BLD CALC: 36 % (ref 37–47)
HEMOGLOBIN: 11.3 G/DL (ref 12–16)
IMMATURE GRANULOCYTES #: 0 K/UL
LV EF: 65 %
LVEF MODALITY: NORMAL
LYMPHOCYTES ABSOLUTE: 2.4 K/UL (ref 1.1–4.5)
LYMPHOCYTES RELATIVE PERCENT: 31.1 % (ref 20–40)
MCH RBC QN AUTO: 31 PG (ref 27–31)
MCHC RBC AUTO-ENTMCNC: 31.4 G/DL (ref 33–37)
MCV RBC AUTO: 98.9 FL (ref 81–99)
MONOCYTES ABSOLUTE: 0.6 K/UL (ref 0–0.9)
MONOCYTES RELATIVE PERCENT: 8.4 % (ref 0–10)
NEUTROPHILS ABSOLUTE: 4.4 K/UL (ref 1.5–7.5)
NEUTROPHILS RELATIVE PERCENT: 57.8 % (ref 50–65)
PDW BLD-RTO: 12.6 % (ref 11.5–14.5)
PLATELET # BLD: 280 K/UL (ref 130–400)
PMV BLD AUTO: 10.5 FL (ref 9.4–12.3)
POTASSIUM REFLEX MAGNESIUM: 4.1 MMOL/L (ref 3.5–5)
RBC # BLD: 3.64 M/UL (ref 4.2–5.4)
SODIUM BLD-SCNC: 140 MMOL/L (ref 136–145)
TOTAL PROTEIN: 6.2 G/DL (ref 6.6–8.7)
WBC # BLD: 7.7 K/UL (ref 4.8–10.8)

## 2021-10-04 PROCEDURE — 70498 CT ANGIOGRAPHY NECK: CPT

## 2021-10-04 PROCEDURE — 6360000004 HC RX CONTRAST MEDICATION: Performed by: STUDENT IN AN ORGANIZED HEALTH CARE EDUCATION/TRAINING PROGRAM

## 2021-10-04 PROCEDURE — 85025 COMPLETE CBC W/AUTO DIFF WBC: CPT

## 2021-10-04 PROCEDURE — 80053 COMPREHEN METABOLIC PANEL: CPT

## 2021-10-04 PROCEDURE — 2580000003 HC RX 258: Performed by: NURSE PRACTITIONER

## 2021-10-04 PROCEDURE — 6360000002 HC RX W HCPCS: Performed by: NURSE PRACTITIONER

## 2021-10-04 PROCEDURE — 93356 MYOCRD STRAIN IMG SPCKL TRCK: CPT

## 2021-10-04 PROCEDURE — 93010 ELECTROCARDIOGRAM REPORT: CPT | Performed by: INTERNAL MEDICINE

## 2021-10-04 PROCEDURE — 6370000000 HC RX 637 (ALT 250 FOR IP): Performed by: NURSE PRACTITIONER

## 2021-10-04 PROCEDURE — 93306 TTE W/DOPPLER COMPLETE: CPT

## 2021-10-04 PROCEDURE — 36415 COLL VENOUS BLD VENIPUNCTURE: CPT

## 2021-10-04 PROCEDURE — 70496 CT ANGIOGRAPHY HEAD: CPT

## 2021-10-04 PROCEDURE — 1210000000 HC MED SURG R&B

## 2021-10-04 RX ADMIN — SODIUM CHLORIDE, PRESERVATIVE FREE 10 ML: 5 INJECTION INTRAVENOUS at 20:08

## 2021-10-04 RX ADMIN — ATORVASTATIN CALCIUM 40 MG: 40 TABLET, FILM COATED ORAL at 20:08

## 2021-10-04 RX ADMIN — SODIUM CHLORIDE, PRESERVATIVE FREE 10 ML: 5 INJECTION INTRAVENOUS at 08:55

## 2021-10-04 RX ADMIN — ONDANSETRON 4 MG: 2 INJECTION INTRAMUSCULAR; INTRAVENOUS at 10:44

## 2021-10-04 RX ADMIN — ESCITALOPRAM OXALATE 20 MG: 10 TABLET ORAL at 20:08

## 2021-10-04 RX ADMIN — PANTOPRAZOLE SODIUM 20 MG: 20 TABLET, DELAYED RELEASE ORAL at 06:22

## 2021-10-04 RX ADMIN — IOPAMIDOL 90 ML: 755 INJECTION, SOLUTION INTRAVENOUS at 10:51

## 2021-10-04 RX ADMIN — ASPIRIN 81 MG: 81 TABLET, COATED ORAL at 20:08

## 2021-10-04 ASSESSMENT — ENCOUNTER SYMPTOMS
NAUSEA: 0
BLOOD IN STOOL: 0
ABDOMINAL PAIN: 0
COUGH: 0
WHEEZING: 0
SHORTNESS OF BREATH: 0
DIARRHEA: 0
SINUS PAIN: 0
TROUBLE SWALLOWING: 0
ABDOMINAL DISTENTION: 0
VOMITING: 0
CONSTIPATION: 0
SORE THROAT: 0

## 2021-10-04 ASSESSMENT — PAIN SCALES - GENERAL
PAINLEVEL_OUTOF10: 0
PAINLEVEL_OUTOF10: 0

## 2021-10-04 NOTE — PROGRESS NOTES
[facility]      Progress Note    Patient:  Concepcion Augustin  YOB: 1955  Date of Service: 10/4/2021  MRN: [id number]   Acct: [de-identified]   Primary Care Physician: LIGIA Sommer  Advance Directive: Full Code  Admit Date: 10/3/2021       Hospital Day: 1    Portions of this note have been copied forward, however, updated to reflect the most current clinical status of this patient. CHIEF COMPLAINT near syncope    SUBJECTIVE:  Ms. Liliana Flores was resting comfortably in bed this morning. Denies lightheadedness or dizziness today. Denies shortness of breath or chest pain at this time. Denies nausea vomiting. CUMULATIVE HOSPITAL COURSE:   The patient is a 78 y.o. female with a history of cerebral aneurysm, HTN, and former smoker who presented to [facility] ED complaining of near syncope. Reported she was at [facility] with her daughter when she had episode of near syncope. Stated she has been having similar episodes since past 3 weeks. Denied LOC or fall. Endorsed nausea. Denied vomiting or diarrhea. Endorsed palpitation at times. States she has history of previous cerebral aneurysm. Work-up in ED revealed BILL, hypotension, and hypoxia. Denies home oxygen use. Patient was admitted to hospital medicine with BILL and near syncope. BILL resolved with IVFs. Echo revealed normal LV size and function with EF of 65%, mild concentric LVH. Review of Systems   Constitutional: Negative for chills, diaphoresis, fatigue and fever. HENT: Negative for congestion, ear pain, sinus pain, sore throat and trouble swallowing. Eyes: Negative for visual disturbance. Respiratory: Negative for cough, shortness of breath and wheezing. Denies shortness of breath   Cardiovascular: Negative for chest pain, palpitations and leg swelling.         Denies chest pain   Gastrointestinal: Negative for abdominal distention, abdominal pain, blood in stool, constipation, diarrhea, nausea and vomiting. Endocrine: Negative for cold intolerance and heat intolerance. Genitourinary: Negative for difficulty urinating, flank pain, frequency and urgency. Musculoskeletal: Negative for arthralgias and myalgias. Neurological: Negative for dizziness, syncope, weakness, light-headedness, numbness and headaches. Hematological: Does not bruise/bleed easily. Psychiatric/Behavioral: Negative for agitation, confusion and dysphoric mood. Objective:   VITALS:  /62   Pulse 64   Temp 97.1 °F (36.2 °C) (Temporal)   Resp 18   Ht 5' 2\" (1.575 m)   Wt 148 lb 6 oz (67.3 kg)   SpO2 95%   BMI 27.14 kg/m²   24HR INTAKE/OUTPUT:    Intake/Output Summary (Last 24 hours) at 10/4/2021 1042  Last data filed at 10/4/2021 1011  Gross per 24 hour   Intake 570 ml   Output 1750 ml   Net -1180 ml         Physical Exam  Constitutional:       General: She is not in acute distress. Appearance: Normal appearance. She is not toxic-appearing or diaphoretic. HENT:      Head: Normocephalic and atraumatic. Right Ear: External ear normal.      Left Ear: External ear normal.      Nose: Nose normal. No congestion or rhinorrhea. Mouth/Throat:      Mouth: Mucous membranes are moist.      Pharynx: Oropharynx is clear. Eyes:      General: No scleral icterus. Extraocular Movements: Extraocular movements intact. Conjunctiva/sclera: Conjunctivae normal.   Cardiovascular:      Rate and Rhythm: Normal rate and regular rhythm. Pulses: Normal pulses. Heart sounds: Normal heart sounds. No murmur heard. No friction rub. No gallop. Pulmonary:      Effort: Pulmonary effort is normal. No respiratory distress. Breath sounds: Normal breath sounds. No wheezing, rhonchi or rales. Abdominal:      General: Abdomen is flat. Bowel sounds are normal. There is no distension. Palpations: Abdomen is soft. Tenderness: There is no abdominal tenderness.    Musculoskeletal:         General: No swelling. Normal range of motion. Cervical back: Normal range of motion and neck supple. Right lower leg: No edema. Left lower leg: No edema. Skin:     General: Skin is warm and dry. Coloration: Skin is not jaundiced. Findings: No erythema, lesion or rash. Neurological:      General: No focal deficit present. Mental Status: She is alert and oriented to person, place, and time. Mental status is at baseline. Cranial Nerves: No cranial nerve deficit. Sensory: No sensory deficit. Motor: No weakness. Psychiatric:         Mood and Affect: Mood normal.         Behavior: Behavior normal.         Thought Content: Thought content normal.         Judgment: Judgment normal.            Medications:      sodium chloride        [Held by provider] amLODIPine  5 mg Oral BID    aspirin  81 mg Oral Nightly    atorvastatin  40 mg Oral Nightly    escitalopram  20 mg Oral Nightly    [Held by provider] losartan  100 mg Oral Nightly    [Held by provider] metoprolol tartrate  25 mg Oral BID    sodium chloride flush  5-40 mL IntraVENous 2 times per day    enoxaparin  40 mg SubCUTAneous Q24H    pantoprazole  20 mg Oral QAM AC     sodium chloride flush, sodium chloride, ondansetron **OR** ondansetron, acetaminophen **OR** acetaminophen, calcium carbonate, potassium chloride **OR** potassium alternative oral replacement **OR** potassium chloride, magnesium sulfate  ADULT DIET; Regular;  Low Sodium (2 gm)     Lab and other Data:     Recent Labs     10/03/21  0850 10/04/21  0247   WBC 11.6* 7.7   HGB 12.1 11.3*    280     Recent Labs     10/03/21  0850 10/04/21  0247    140   K 3.5 4.1    105   CO2 17* 24   BUN 30* 19   CREATININE 1.4* 0.8   GLUCOSE 112* 82     Recent Labs     10/03/21  0850 10/04/21  0247   AST 18 14   ALT 14 11   BILITOT 0.5 0.4   ALKPHOS 104 94     Troponin T:   Recent Labs     10/03/21  0850   TROPONINI <0.01       INR:   Recent Labs 10/03/21  0850   INR 1.11     UA:  Recent Labs     10/03/21  1538   COLORU YELLOW   PHUR 6.0   WBCUA 7*   RBCUA 1   BACTERIA NEGATIVE*   CLARITYU Clear   SPECGRAV 1.034   LEUKOCYTESUR SMALL*   UROBILINOGEN 0.2   BILIRUBINUR Negative   BLOODU TRACE*   GLUCOSEU Negative       RAD:     CT HEAD WO CONTRAST  Result Date: 10/3/2021    1. No acute intracranial finding. 2.  Postoperative change of RIGHT MCA aneurysm repair. 3.  Old RIGHT caudate infarct. Signed by Dr Carlos Weaver  Result Date: 10/3/2021    No acute findings. Signed by Dr Kalpesh Penny  Result Date: 10/3/2021    1. No evidence of pulmonary embolus. 2.  Bilateral posterior lower lobe dependent consolidations, favored to represent atelectasis. 3.  Mild to moderate emphysema. 4.  Unchanged borderline dilatation of the main pulmonary artery and ascending aorta. 5.  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 6.  Small hiatal hernia. Signed by Dr Lois Mejía      ECHO 2D [place]  Result Date: 10/4/2021     Summary  Normal left ventricular chamber size and systolic function. Mild concentric left ventricular hypertrophy. Left ventricular ejection fraction is visually estimated at 65%. Signature   ----------------------------------------------------------------  Electronically signed by Criselda Mcbride DO(Interpreting  physician) on 10/04/2021 09:15 AM         Micro:    Rapid Covid- negative    Assessment/Plan   Principal Problem:    BILL (acute kidney injury) ([facility] [address]  Active Problems:    Near syncope    Hypotension    Dehydration  Resolved Problems:    * No resolved hospital problems.  *    Principal Problem:    BILL (acute kidney injury) ([facility])/ Dehydration- resolved               - Creatinine 0.8 today               - Monitor I's and O's closely              - Monitor labs closely              - Avoid hypotension              - Avoid nephrotoxic agents Active Problems:    Near syncope-    - ECHO WNL    - IV hydration    - Orthostatic BP, negative   - CT Head negative    - CTA head and neck pending      Hypotension- stable at this time, continue to hold home antihypertensive, monitor BP closely         Hypoxia-    - CTA chest    - Supplement Oxygen therapy as needed    - Home O2 eval         DVT Prophylaxis: Lovenox    GI prophylaxis: Protonix    Further Orders per Clinical course/attending. Electronically signed by LIGIA Domingo CNP on 10/4/2021 at 10:42 AM       EMR Dragon/Transcription disclaimer:   Much of this encounter note is an electronic transcription/translation of spoken language to printed text.  The electronic translation of spoken language may permit erroneous, or at times, nonsensical words or phrases to be inadvertently transcribed; although attempts have made to review the note for such errors, some may still exist.

## 2021-10-04 NOTE — CARE COORDINATION
MEDICARE / Plan: Earline Dy  / Product Type: *No Product type* /     Pre-Cert required for SNF:     []   Yes  [x]   No    Have Long Term Care Insurance:      []   Yes  [x]   No      Pharmacy:    [facility], [id number], [address]  [address]  Phone: [phone] Fax: [phone]    Potential assistance purchasing medications? [x]   Yes  []   No      ADLS:       Support System:   Children, Family Members      Current Home Environment:       Steps:       []   Yes  []   No    If yes, how many? Plans to RETURN to current housing:     []   Yes  []   No    Barriers to RETURNING to current housing:        Currently ACTIVE with Home Health CARE:      []   Yes  [x]   No    Home Health Care Agency:        DME Provider:   No dme     Had HOME OXYGEN prior to admission:      []   Yes  [x]   No    Oxygen Company:       Has a pulse oximetry unit at home:     []   Yes  []   No    Informed of need to bring portable home O2 tank to hospital on day of DISCHARGE:      []   Yes  []   No    Name of person committed to bringing portable tank at discharge: Active with HD/PD prior to admission:             []   Yes  [x]   No    Nephrologist:     HD Center:         Transition Plan:       Transportation PLAN for Discharge:      Factors facilitating achievement of predicted outcomes:     Barriers to discharge:        Patient Deficits:    []   Yes   [x]   No    If yes:    []   Confusion/Memory  []   Visual  []   Motor/Sensory         []   Right arm         []   Right leg         []   Left arm         []   Left leg  []   Language/Speech         []   Aphasia         []   Dysarthria         []   Swallow         S[place]umar Coma Scale  Eye Opening: Spontaneous  Best Verbal Response: Oriented  Best Motor Response: Obeys commands  [place] Coma Scale Score: 15    Patient Deficit Notes:            Additional CM/SW Notes: spoke with pt re: dc plans, pt lives at home alone, does not drive. Has a sister and niece that live in town that takes her to appts or the store as needed. Pt denies any medical needs, but did verbalize that she was on a very fixed income and that getting food was at times an issue. Son- Jenniffer Canseco" had also called and left writer a vm that indicated he was concerned about her getting enough food at home. PT stated she has been trying to get help with food stamps. Katlyn Jaswinder- is going to give pt information re: meals on wheels ans other local food pantries. [facility]. Will cont to follow for any needs . Electronically signed by Fer Louis RN on 10/4/2021 at 3:36 PM          Thelma Goldman and/or her family were provided with choice of provider:    []   Yes   []   No        []   Stroke education booklet reviewed and given to patient/family/caregiver/guardian. All questions answered all questions answered appropriately and efficiently per family.       Fer Louis RN  [facility]  Care Management  Phone:   [phone]          Fax:

## 2021-10-04 NOTE — PLAN OF CARE
Problem: Falls - Risk of:  Goal: Will remain free from falls  Description: Will remain free from falls  10/4/2021 0111 by Jasmyn Rodriguez LPN  Outcome: Ongoing  10/3/2021 1736 by Yesika Flores RN  Outcome: Ongoing  Goal: Absence of physical injury  Description: Absence of physical injury  10/4/2021 0111 by Jasmyn Rodriguez LPN  Outcome: Ongoing  10/3/2021 1736 by Yesika Flores RN  Outcome: Ongoing     Problem: Discharge Planning:  Goal: Discharged to appropriate level of care  Description: Discharged to appropriate level of care  10/4/2021 0111 by Jasmyn Rodriguez LPN  Outcome: Ongoing  10/3/2021 1736 by Yesika Flores RN  Outcome: Ongoing     Problem: Tissue Perfusion - Renal, Altered:  Goal: Electrolytes within specified parameters  Description: Electrolytes within specified parameters  Outcome: Ongoing  Goal: Urine creatinine clearance will be within specified parameters  Description: Urine creatinine clearance will be within specified parameters  Outcome: Ongoing  Goal: Serum creatinine will be within specified parameters  Description: Serum creatinine will be within specified parameters  Outcome: Ongoing  Goal: Ability to achieve a balanced intake and output will improve  Description: Ability to achieve a balanced intake and output will improve  Outcome: Ongoing     Problem: Cardiac Output - Decreased:  Goal: Avoidance of environmental tobacco smoke  Description: Absence of orthostatic hypotension  Outcome: Ongoing  Goal: Cardiac output within specified parameters  Description: Cardiac output within specified parameters  Outcome: Ongoing  Goal: Hemodynamic stability will improve  Description: Hemodynamic stability will improve  Outcome: Ongoing     Problem: Fluid Volume - Imbalance:  Goal: Absence of imbalanced fluid volume signs and symptoms  Description: Absence of imbalanced fluid volume signs and symptoms  Outcome: Ongoing     Problem: Tissue Perfusion, Altered:  Goal: Circulatory function within specified parameters  Description: Circulatory function within specified parameters  Outcome: Ongoing     Problem: Tissue Perfusion - Cardiopulmonary, Altered:  Goal: Absence of angina  Description: Absence of angina  Outcome: Ongoing  Goal: Hemodynamic stability will improve  Description: Hemodynamic stability will improve  Outcome: Ongoing

## 2021-10-05 PROCEDURE — 6370000000 HC RX 637 (ALT 250 FOR IP): Performed by: NURSE PRACTITIONER

## 2021-10-05 PROCEDURE — 99222 1ST HOSP IP/OBS MODERATE 55: CPT | Performed by: NEUROLOGICAL SURGERY

## 2021-10-05 PROCEDURE — 6370000000 HC RX 637 (ALT 250 FOR IP): Performed by: STUDENT IN AN ORGANIZED HEALTH CARE EDUCATION/TRAINING PROGRAM

## 2021-10-05 PROCEDURE — 1210000000 HC MED SURG R&B

## 2021-10-05 PROCEDURE — 99223 1ST HOSP IP/OBS HIGH 75: CPT | Performed by: PSYCHIATRY & NEUROLOGY

## 2021-10-05 PROCEDURE — 2580000003 HC RX 258: Performed by: NURSE PRACTITIONER

## 2021-10-05 RX ORDER — LOSARTAN POTASSIUM 25 MG/1
25 TABLET ORAL NIGHTLY
Status: DISCONTINUED | OUTPATIENT
Start: 2021-10-05 | End: 2021-10-06 | Stop reason: HOSPADM

## 2021-10-05 RX ADMIN — METOPROLOL TARTRATE 25 MG: 25 TABLET, FILM COATED ORAL at 10:00

## 2021-10-05 RX ADMIN — ASPIRIN 81 MG: 81 TABLET, COATED ORAL at 21:47

## 2021-10-05 RX ADMIN — SODIUM CHLORIDE, PRESERVATIVE FREE 10 ML: 5 INJECTION INTRAVENOUS at 10:00

## 2021-10-05 RX ADMIN — PANTOPRAZOLE SODIUM 20 MG: 20 TABLET, DELAYED RELEASE ORAL at 05:48

## 2021-10-05 RX ADMIN — LOSARTAN POTASSIUM 25 MG: 25 TABLET, FILM COATED ORAL at 21:48

## 2021-10-05 RX ADMIN — ESCITALOPRAM OXALATE 20 MG: 10 TABLET ORAL at 21:48

## 2021-10-05 RX ADMIN — ATORVASTATIN CALCIUM 40 MG: 40 TABLET, FILM COATED ORAL at 21:48

## 2021-10-05 RX ADMIN — ACETAMINOPHEN 650 MG: 325 TABLET ORAL at 05:49

## 2021-10-05 ASSESSMENT — PAIN SCALES - GENERAL
PAINLEVEL_OUTOF10: 3
PAINLEVEL_OUTOF10: 5
PAINLEVEL_OUTOF10: 1
PAINLEVEL_OUTOF10: 0

## 2021-10-05 ASSESSMENT — PAIN DESCRIPTION - PROGRESSION
CLINICAL_PROGRESSION: NOT CHANGED
CLINICAL_PROGRESSION: RAPIDLY IMPROVING
CLINICAL_PROGRESSION: RAPIDLY IMPROVING

## 2021-10-05 ASSESSMENT — PAIN DESCRIPTION - DESCRIPTORS: DESCRIPTORS: ACHING;DULL;HEADACHE

## 2021-10-05 ASSESSMENT — PAIN - FUNCTIONAL ASSESSMENT: PAIN_FUNCTIONAL_ASSESSMENT: ACTIVITIES ARE NOT PREVENTED

## 2021-10-05 ASSESSMENT — ENCOUNTER SYMPTOMS
SORE THROAT: 0
TROUBLE SWALLOWING: 0
SHORTNESS OF BREATH: 0
COUGH: 0
ABDOMINAL PAIN: 0
DIARRHEA: 0
BLOOD IN STOOL: 0
WHEEZING: 0
BACK PAIN: 0
CONSTIPATION: 0
PHOTOPHOBIA: 0
NAUSEA: 0
ABDOMINAL DISTENTION: 0
SINUS PAIN: 0
VOMITING: 0

## 2021-10-05 ASSESSMENT — PAIN DESCRIPTION - ONSET: ONSET: GRADUAL

## 2021-10-05 ASSESSMENT — PAIN DESCRIPTION - LOCATION: LOCATION: HEAD

## 2021-10-05 ASSESSMENT — PAIN DESCRIPTION - PAIN TYPE: TYPE: ACUTE PAIN

## 2021-10-05 NOTE — PROGRESS NOTES
EEG tech went to the patients's room to do the EEG that was ordered by Dr. Taylor Case. The patient did not want to have the EEG done due to she doesn't believe that she is having seizures. Tech explained that the test shows other things besides seizures. Patient refused to have it done until she talks to Dr. Juany Saavedra. Charge nurse, Ewa and Dr. Juany Saavedra was informed.

## 2021-10-05 NOTE — PROGRESS NOTES
[facility]      Progress Note    Patient:  La Nena Reynaga  YOB: 1955  Date of Service: 10/5/2021  MRN: [id number]   Acct: [de-identified]   Primary Care Physician: LIGIA Gan  Advance Directive: Full Code  Admit Date: 10/3/2021       Hospital Day: 2    Portions of this note have been copied forward, however, updated to reflect the most current clinical status of this patient. CHIEF COMPLAINT near syncope    SUBJECTIVE:  Ms. Candleario Elliott was resting comfortably in bed this morning. Reports headache. Denies shortness of breath or chest pain at this time. Discussed findings of CTA of head and neck and neurosurgery, neurology and CT surgery consultation. CUMULATIVE HOSPITAL COURSE:   The patient is a 78 y.o. female with a history of cerebral aneurysm, HTN, and former smoker who presented to [address] [place] [address] ED complaining of near syncope. Reported she was at [facility] with her daughter when she had episode of near syncope. Stated she has been having similar episodes since past 3 weeks. Denied LOC or fall. Endorsed nausea. Denied vomiting or diarrhea. Endorsed palpitation at times. States she has history of previous cerebral aneurysm. Work-up in ED revealed BILL, hypotension, and hypoxia. Denies home oxygen use. Patient was admitted to hospital medicine with BILL and near syncope. BILL resolved with IVFs. Echo revealed normal LV size and function with EF of 65%, mild concentric LVH. 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 CTA head showed no acute findings, chronic lacunar infarcts in the right caudate nucleus and right basal ganglia region, aneurysm clip in the right MCA bifurcation/trifurcation region.   Neurosurgery recommends no emergent neurosurgical intervention at this time, recommends follow-up with vascular neurosurgeon as outpatient. Review of Systems   Constitutional: Negative for chills, diaphoresis, fatigue and fever. HENT: Negative for congestion, ear pain, sinus pain, sore throat and trouble swallowing. Eyes: Negative for visual disturbance. Respiratory: Negative for cough, shortness of breath and wheezing. Denies shortness of breath   Cardiovascular: Negative for chest pain, palpitations and leg swelling. Denies chest pain   Gastrointestinal: Negative for abdominal distention, abdominal pain, blood in stool, constipation, diarrhea, nausea and vomiting. Endocrine: Negative for cold intolerance and heat intolerance. Genitourinary: Negative for difficulty urinating, flank pain, frequency and urgency. Musculoskeletal: Negative for arthralgias and myalgias. Neurological: Positive for headaches. Negative for dizziness, syncope, weakness, light-headedness and numbness. Hematological: Does not bruise/bleed easily. Psychiatric/Behavioral: Negative for agitation, confusion and dysphoric mood. Objective:   VITALS:  /75   Pulse 63   Temp 98 °F (36.7 °C) (Temporal)   Resp 16   Ht 5' 2\" (1.575 m)   Wt 148 lb 6 oz (67.3 kg)   SpO2 93%   BMI 27.14 kg/m²   24HR INTAKE/OUTPUT:      Intake/Output Summary (Last 24 hours) at 10/5/2021 0959  Last data filed at 10/5/2021 0946  Gross per 24 hour   Intake 720 ml   Output 1550 ml   Net -830 ml         Physical Exam  Constitutional:       General: She is not in acute distress. Appearance: Normal appearance. She is not toxic-appearing or diaphoretic. HENT:      Head: Normocephalic and atraumatic. Right Ear: External ear normal.      Left Ear: External ear normal.      Nose: Nose normal. No congestion or rhinorrhea. Mouth/Throat:      Mouth: Mucous membranes are moist.      Pharynx: Oropharynx is clear. Eyes:      General: No scleral icterus. Extraocular Movements: Extraocular movements intact.       Conjunctiva/sclera: Conjunctivae normal.   Cardiovascular:      Rate and Rhythm: Normal rate and regular rhythm. Pulses: Normal pulses. Heart sounds: Normal heart sounds. No murmur heard. No friction rub. No gallop. Pulmonary:      Effort: Pulmonary effort is normal. No respiratory distress. Breath sounds: Normal breath sounds. No wheezing, rhonchi or rales. Abdominal:      General: Abdomen is flat. Bowel sounds are normal. There is no distension. Palpations: Abdomen is soft. Tenderness: There is no abdominal tenderness. Musculoskeletal:         General: No swelling. Normal range of motion. Cervical back: Normal range of motion and neck supple. Right lower leg: No edema. Left lower leg: No edema. Skin:     General: Skin is warm and dry. Coloration: Skin is not jaundiced. Findings: No erythema, lesion or rash. Neurological:      General: No focal deficit present. Mental Status: She is alert and oriented to person, place, and time. Mental status is at baseline. Cranial Nerves: No cranial nerve deficit. Sensory: No sensory deficit. Motor: No weakness. Psychiatric:         Mood and Affect: Mood normal.         Behavior: Behavior normal.         Thought Content:  Thought content normal.         Judgment: Judgment normal.            Medications:      sodium chloride        losartan  25 mg Oral Nightly    [Held by provider] amLODIPine  5 mg Oral BID    aspirin  81 mg Oral Nightly    atorvastatin  40 mg Oral Nightly    escitalopram  20 mg Oral Nightly    metoprolol tartrate  25 mg Oral BID    sodium chloride flush  5-40 mL IntraVENous 2 times per day    enoxaparin  40 mg SubCUTAneous Q24H    pantoprazole  20 mg Oral QAM AC     sodium chloride flush, sodium chloride, ondansetron **OR** ondansetron, acetaminophen **OR** acetaminophen, calcium carbonate, potassium chloride **OR** potassium alternative oral replacement **OR** potassium chloride, magnesium sulfate  ADULT DIET; Regular; Low Sodium (2 gm)     Lab and other Data:     Recent Labs     10/03/21  0850 10/04/21  0247   WBC 11.6* 7.7   HGB 12.1 11.3*    280     Recent Labs     10/03/21  0850 10/04/21  0247    140   K 3.5 4.1    105   CO2 17* 24   BUN 30* 19   CREATININE 1.4* 0.8   GLUCOSE 112* 82     Recent Labs     10/03/21  0850 10/04/21  0247   AST 18 14   ALT 14 11   BILITOT 0.5 0.4   ALKPHOS 104 94     Troponin T:   Recent Labs     10/03/21  0850   TROPONINI <0.01       INR:   Recent Labs     10/03/21  0850   INR 1.11     UA:  Recent Labs     10/03/21  1538   COLORU YELLOW   PHUR 6.0   WBCUA 7*   RBCUA 1   BACTERIA NEGATIVE*   CLARITYU Clear   SPECGRAV 1.034   LEUKOCYTESUR SMALL*   UROBILINOGEN 0.2   BILIRUBINUR Negative   BLOODU TRACE*   GLUCOSEU Negative       RAD:     CT HEAD WO CONTRAST  Result Date: 10/3/2021    1. No acute intracranial finding. 2.  Postoperative change of RIGHT MCA aneurysm repair. 3.  Old RIGHT caudate infarct. Signed by Dr Josue Gupta  Result Date: 10/3/2021    No acute findings. Signed by Dr Daphne Zambrano  Result Date: 10/3/2021    1. No evidence of pulmonary embolus. 2.  Bilateral posterior lower lobe dependent consolidations, favored to represent atelectasis. 3.  Mild to moderate emphysema. 4.  Unchanged borderline dilatation of the main pulmonary artery and ascending aorta. 5.  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 6.  Small hiatal hernia. Signed by Dr Edvin Carvalho      ECHO 2D Carlos Ivy  Result Date: 10/4/2021     Summary  Normal left ventricular chamber size and systolic function. Mild concentric left ventricular hypertrophy. Left ventricular ejection fraction is visually estimated at 65%.    Signature   ----------------------------------------------------------------  Electronically signed by Connor Habermann, DO(Interpreting  physician) on 10/04/2021 09:15 AM         Micro:    Rapid Covid- negative    Assessment/Plan   Principal Problem:    BILL (acute kidney injury) ([address]  Active Problems:    Near syncope    Hypotension    Dehydration  Resolved Problems:    * No resolved hospital problems. 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 Bilateral posterior lower lobe dependent consolidations, favored to represent atelectasis    - Supplement Oxygen therapy as needed    - Home O2 eval          DVT Prophylaxis: Lovenox    GI prophylaxis: Protonix    Further Orders per Clinical course/attending. Electronically signed by LIGIA Rock CNP on 10/5/2021 at 9:59 AM       EMR Dragon/Transcription disclaimer:   Much of this encounter note is an electronic transcription/translation of spoken language to printed text.  The electronic translation of spoken language may permit erroneous, or at times, nonsensical words or phrases to be inadvertently transcribed; although attempts have made to review the note for such errors, some may still exist.

## 2021-10-05 NOTE — PLAN OF CARE
Problem: Falls - Risk of:  Goal: Will remain free from falls  Description: Will remain free from falls  Outcome: Ongoing  Goal: Absence of physical injury  Description: Absence of physical injury  Outcome: Ongoing     Problem: Discharge Planning:  Goal: Discharged to appropriate level of care  Description: Discharged to appropriate level of care  Outcome: Ongoing     Problem: Tissue Perfusion - Renal, Altered:  Goal: Electrolytes within specified parameters  Description: Electrolytes within specified parameters  Outcome: Ongoing  Goal: Urine creatinine clearance will be within specified parameters  Description: Urine creatinine clearance will be within specified parameters  Outcome: Ongoing  Goal: Serum creatinine will be within specified parameters  Description: Serum creatinine will be within specified parameters  Outcome: Ongoing  Goal: Ability to achieve a balanced intake and output will improve  Description: Ability to achieve a balanced intake and output will improve  Outcome: Ongoing     Problem: Cardiac Output - Decreased:  Goal: Avoidance of environmental tobacco smoke  Description: Absence of orthostatic hypotension  Outcome: Ongoing  Goal: Cardiac output within specified parameters  Description: Cardiac output within specified parameters  Outcome: Ongoing  Goal: Hemodynamic stability will improve  Description: Hemodynamic stability will improve  Outcome: Ongoing     Problem: Fluid Volume - Imbalance:  Goal: Absence of imbalanced fluid volume signs and symptoms  Description: Absence of imbalanced fluid volume signs and symptoms  Outcome: Ongoing     Problem: Tissue Perfusion, Altered:  Goal: Circulatory function within specified parameters  Description: Circulatory function within specified parameters  Outcome: Ongoing     Problem: Tissue Perfusion - Cardiopulmonary, Altered:  Goal: Absence of angina  Description: Absence of angina  Outcome: Ongoing  Goal: Hemodynamic stability will improve  Description:
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
## 2021-10-05 NOTE — CONSULTS
[facility]  Consult Note    CHIEF COMPLAINT:  Near syncope    HISTORY OF PRESENT ILLNESS:      The patient is a 78 y.o. female who presents with near syncope. The patient was in a department store when she felt as if she was going to lose consciousness. She states she has had episodes like these for the last 6 months. Of note, she has a history of a right craniotomy for clipping of a right MCA bifurcation aneurysm on 5/1/2019 per Dr. Berta Rodriguez in [place], [place].  CT/CTA head did not reveal any hemorrhage or evidence of residual or other intracranial aneurysm. Currently, she denies any HA or N/V. The patient does take anticoagulation medication. ASA 81 mg.         Past Medical History:   Diagnosis Date    Anxiety     Arthritis     Cerebral aneurysm     Former smoker     GERD (gastroesophageal reflux disease)     Hypertension        Past Surgical History:   Procedure Laterality Date    BRAIN SURGERY      Aneurysm partial clip     CHOLECYSTECTOMY      CRANIOTOMY Right 5/7/2019    RIGHT CRANIOTOMY FOR CLIPPING OF MIDDLE CEREBRAL ARTERY ANEURYSM performed by Ruby Vasquez MD at [address] [place] [address]          Medications    Current Facility-Administered Medications:     losartan (COZAAR) tablet 25 mg, 25 mg, Oral, Nightly, Vijay Downs MD    [Held by provider] amLODIPine (NORVASC) tablet 5 mg, 5 mg, Oral, BID, LIGIA Becerril - CNP    aspirin EC tablet 81 mg, 81 mg, Oral, Nightly, LIGIA Becerril - CNP, 81 mg at 10/04/21 2008    atorvastatin (LIPITOR) tablet 40 mg, 40 mg, Oral, Nightly, LIGIA Becerril - CNP, 40 mg at 10/04/21 2008    escitalopram (LEXAPRO) tablet 20 mg, 20 mg, Oral, Nightly, LIGIA Moreira CNP, 20 mg at 10/04/21 2008    metoprolol tartrate (LOPRESSOR) tablet 25 mg, 25 mg, Oral, BID, LIGIA Moreira - CNP, 25 mg at 10/05/21 1000    sodium chloride flush 0.9 % injection 5-40 mL, 5-40 mL, IntraVENous, 2 times per day, LIGIA Mao - CNP, 10 mL at 10/05/21 1000    sodium chloride flush 0.9 % injection 5-40 mL, 5-40 mL, IntraVENous, PRN, LIGIA Becerril - CNP    0.9 % sodium chloride infusion, 25 mL, IntraVENous, PRN, LIGIA Mao - CNP    enoxaparin (LOVENOX) injection 40 mg, 40 mg, SubCUTAneous, Q24H, LIGIA Becerril - CNP    ondansetron (ZOFRAN-ODT) disintegrating tablet 4 mg, 4 mg, Oral, Q8H PRN **OR** ondansetron (ZOFRAN) injection 4 mg, 4 mg, IntraVENous, Q6H PRN, LIGIA Mao - CNP, 4 mg at 10/04/21 1044    acetaminophen (TYLENOL) tablet 650 mg, 650 mg, Oral, Q6H PRN, 650 mg at 10/05/21 0549 **OR** acetaminophen (TYLENOL) suppository 650 mg, 650 mg, Rectal, Q6H PRN, LIGIA Mao - CNP    pantoprazole (PROTONIX) tablet 20 mg, 20 mg, Oral, QAM AC, LIGIA Becerril - CNP, 20 mg at 10/05/21 0548    calcium carbonate (TUMS) chewable tablet 500 mg, 500 mg, Oral, TID PRN, LIGIA Mao - CNP    potassium chloride (KLOR-CON M) extended release tablet 40 mEq, 40 mEq, Oral, PRN **OR** potassium bicarb-citric acid (EFFER-K) effervescent tablet 40 mEq, 40 mEq, Oral, PRN **OR** potassium chloride 10 mEq/100 mL IVPB (Peripheral Line), 10 mEq, IntraVENous, PRN, LIGIA Mao - CNP    magnesium sulfate 1000 mg in dextrose 5% 100 mL IVPB, 1,000 mg, IntraVENous, PRN, Ye Gamble APRN - CNP  Patient has no known allergies.     Social History  Social History     Tobacco Use   Smoking Status Former Smoker    Packs/day: 1.00    Years: 20.00    Pack years: 20.00    Types: Cigarettes    Quit date: 3/6/2019    Years since quittin.5   Smokeless Tobacco Never Used     Social History     Substance and Sexual Activity   Alcohol Use Not Currently         Family History   Problem Relation Age of Onset    Heart Disease Mother     High Blood Pressure Father     Arthritis Father LABGLOM >60 10/04/2021    GFRAA >59 10/04/2021    GLOB 3.5 09/06/2016     Lab Results   Component Value Date    INR 1.11 10/03/2021    INR 1.06 05/31/2020    INR 0.91 05/07/2019    PROTIME 14.5 10/03/2021    PROTIME 13.8 05/31/2020    PROTIME 10.4 05/07/2019     EXAMINATION:  CTA HEAD W CONTRAST  10/4/2021 1:08 PM   HISTORY: Syncope. Prior aneurysm repair. TECHNIQUE: Noncontrast standard brain CT was followed by spiral CT   angiography of the brain with contrast. Sagittal, coronal and 3-D   images were reconstructed. DLP: 769 mGycm. Automated dosage control was utilized. BRAIN CT WITHOUT CONTRAST: There are no hemorrhage, edema or mass   effect. There are chronic lacunar infarcts in the right caudate   nucleus and right basal ganglia region. There has been prior aneurysm   clip placement in the right MCA distribution. There is streak artifact   in this area. There has been prior craniotomy on the right. CTA BRAIN: The vertebral and basilar arteries are patent. There are   patent PICA vessels bilaterally. There are patent bilateral anterior   inferior cerebellar and superior cerebellar arteries. The internal   carotid arteries are patent. The anterior, middle and posterior   cerebral arteries are patent. There are aneurysm clips in the right   MCA bifurcation/trifurcation region. The visualized venous sinuses are   patent.       Impression   1. No hemorrhage, edema or mass effect. No acute findings. 2. Chronic lacunar infarcts in the right caudate nucleus and right   basal ganglia region. 3. Aneurysm clips in the right MCA bifurcation/trifurcation region. .   No residual opacified aneurysm is seen. No other aneurysms are   appreciated. Prior right craniotomy     I have personally reviewed the images and my interpretation is: There are 2-3 clips located at the bifurcation of the right MCA. No evidence of residual or other aneurysm. No evidence of hemorrhage, HCP or other acute intracranial process. IMPRESSION  78year old female s/p clipping of right MCA aneurysm in 2019 who presents with syncope. No new focal deficits. RECOMMENDATIONS:    CTA/CT head did not reveal any hemorrhage, residual aneurysm or other acute intracranial process. No urgent neurosurgical intervention warranted. Recommend follow up with her vascular neurosurgereon, Dr. Bibi Bhakta as outpatient.     Further work up for syncope and treatment as per medical and neurology team        Luz Meyer DO

## 2021-10-06 ENCOUNTER — TELEPHONE (OUTPATIENT)
Dept: NEUROLOGY | Age: 66
End: 2021-10-06

## 2021-10-06 VITALS
HEART RATE: 72 BPM | DIASTOLIC BLOOD PRESSURE: 76 MMHG | TEMPERATURE: 96.6 F | WEIGHT: 151.06 LBS | RESPIRATION RATE: 20 BRPM | SYSTOLIC BLOOD PRESSURE: 123 MMHG | HEIGHT: 62 IN | OXYGEN SATURATION: 94 % | BODY MASS INDEX: 27.8 KG/M2

## 2021-10-06 DIAGNOSIS — R56.9 SEIZURES (HCC): Primary | ICD-10-CM

## 2021-10-06 LAB
ANION GAP SERPL CALCULATED.3IONS-SCNC: 10 MMOL/L (ref 7–19)
BASOPHILS ABSOLUTE: 0.1 K/UL (ref 0–0.2)
BASOPHILS RELATIVE PERCENT: 0.9 % (ref 0–1)
BUN BLDV-MCNC: 15 MG/DL (ref 8–23)
CALCIUM SERPL-MCNC: 9.2 MG/DL (ref 8.8–10.2)
CHLORIDE BLD-SCNC: 102 MMOL/L (ref 98–111)
CO2: 28 MMOL/L (ref 22–29)
CREAT SERPL-MCNC: 0.8 MG/DL (ref 0.5–0.9)
EOSINOPHILS ABSOLUTE: 0.2 K/UL (ref 0–0.6)
EOSINOPHILS RELATIVE PERCENT: 2 % (ref 0–5)
GFR AFRICAN AMERICAN: >59
GFR NON-AFRICAN AMERICAN: >60
GLUCOSE BLD-MCNC: 82 MG/DL (ref 74–109)
HCT VFR BLD CALC: 38.5 % (ref 37–47)
HEMOGLOBIN: 12.3 G/DL (ref 12–16)
IMMATURE GRANULOCYTES #: 0 K/UL
LYMPHOCYTES ABSOLUTE: 2.6 K/UL (ref 1.1–4.5)
LYMPHOCYTES RELATIVE PERCENT: 34.5 % (ref 20–40)
MCH RBC QN AUTO: 31.9 PG (ref 27–31)
MCHC RBC AUTO-ENTMCNC: 31.9 G/DL (ref 33–37)
MCV RBC AUTO: 99.7 FL (ref 81–99)
MONOCYTES ABSOLUTE: 0.6 K/UL (ref 0–0.9)
MONOCYTES RELATIVE PERCENT: 8 % (ref 0–10)
NEUTROPHILS ABSOLUTE: 4.1 K/UL (ref 1.5–7.5)
NEUTROPHILS RELATIVE PERCENT: 54.2 % (ref 50–65)
PDW BLD-RTO: 12.8 % (ref 11.5–14.5)
PLATELET # BLD: 278 K/UL (ref 130–400)
PMV BLD AUTO: 10.4 FL (ref 9.4–12.3)
POTASSIUM REFLEX MAGNESIUM: 4.1 MMOL/L (ref 3.5–5)
RBC # BLD: 3.86 M/UL (ref 4.2–5.4)
SODIUM BLD-SCNC: 140 MMOL/L (ref 136–145)
WBC # BLD: 7.6 K/UL (ref 4.8–10.8)

## 2021-10-06 PROCEDURE — 93246 EXT ECG>7D<15D RECORDING: CPT

## 2021-10-06 PROCEDURE — 80048 BASIC METABOLIC PNL TOTAL CA: CPT

## 2021-10-06 PROCEDURE — 6370000000 HC RX 637 (ALT 250 FOR IP): Performed by: NURSE PRACTITIONER

## 2021-10-06 PROCEDURE — 85025 COMPLETE CBC W/AUTO DIFF WBC: CPT

## 2021-10-06 PROCEDURE — 2580000003 HC RX 258: Performed by: NURSE PRACTITIONER

## 2021-10-06 PROCEDURE — 97165 OT EVAL LOW COMPLEX 30 MIN: CPT

## 2021-10-06 PROCEDURE — 36415 COLL VENOUS BLD VENIPUNCTURE: CPT

## 2021-10-06 PROCEDURE — 99232 SBSQ HOSP IP/OBS MODERATE 35: CPT | Performed by: PSYCHIATRY & NEUROLOGY

## 2021-10-06 RX ADMIN — SODIUM CHLORIDE, PRESERVATIVE FREE 10 ML: 5 INJECTION INTRAVENOUS at 10:21

## 2021-10-06 RX ADMIN — PANTOPRAZOLE SODIUM 20 MG: 20 TABLET, DELAYED RELEASE ORAL at 06:12

## 2021-10-06 ASSESSMENT — PAIN SCALES - GENERAL: PAINLEVEL_OUTOF10: 0

## 2021-10-06 NOTE — DISCHARGE SUMMARY
[facility] Hospitalists    Discharge Summary      Gilford Alexandria  :  1955  MRN:  [id number]    Admit date:  10/3/2021  Discharge date:    10/6/2021    Discharging Physician:  Dr. Rosa Maria Mcbride Directive: Full Code    Consults: neurology and neurosurgery    Primary Care Physician:  LIGIA Walker    Discharge Diagnoses:  Principal Problem:    BILL (acute kidney injury) [facility])  Active Problems:    Near syncope    Hypotension    Dehydration  Resolved Problems:    * No resolved hospital problems. *      Portions of this note have been copied forward, however, changed to reflect the most current clinical status of this patient. Hospital Course: The patient is A 73 y.o. female with a history of cerebral aneurysm, HTN, and former smoker who presented to [facility] ED complaining of near syncope. Reported she was at [facility] with her daughter when she had episode of near syncope. Stated she has been having similar episodes since past 3 weeks. Denied LOC or fall. Endorsed nausea. Denied vomiting or diarrhea. Endorsed palpitation at times. States she has history of previous cerebral aneurysm. Work-up in ED revealed BILL, hypotension, and hypoxia. Denies home oxygen use. Patient was admitted to hospital medicine with BILL and near syncope. BILL resolved with IVFs. Echo revealed normal LV size and function with EF of 65%, mild concentric LVH. 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 CTA head showed no acute findings, chronic lacunar infarcts in the right caudate nucleus and right basal ganglia region, aneurysm clip in the right MCA bifurcation/trifurcation region. Neurosurgery recommends no emergent neurosurgical intervention at this time, recommends follow-up with vascular neurosurgeon as outpatient.   Neurology recommended outpatient EEG, Zio patch on evidence of pulmonary embolus. 2.  Bilateral posterior lower lobe dependent consolidations, favored to represent atelectasis. 3.  Mild to moderate emphysema. 4.  Unchanged borderline dilatation of the main pulmonary artery and ascending aorta. 5.  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 6.  Small hiatal hernia. Signed by Dr Encarnacion Paget  Result Date: 10/4/2021    1. No hemorrhage, edema or mass effect. No acute findings. 2. Chronic lacunar infarcts in the right caudate nucleus and right basal ganglia region. 3. Aneurysm clips in the right MCA bifurcation/trifurcation region. . No residual opacified aneurysm is seen. No other aneurysms are appreciated. Prior right craniotomy. Signed by Dr Valdemar Villanueva      ECHO 2D [address]  Result Date: 10/4/2021    Summary  Normal left ventricular chamber size and systolic function. Mild concentric left ventricular hypertrophy. Left ventricular ejection fraction is visually estimated at 65%. Signature   ----------------------------------------------------------------  Electronically signed by Silvino Grubbs DO(Interpreting  physician) on 10/04/2021 09:15 AM        Pertinent Labs:   CBC:   Recent Labs     10/04/21  0247 10/06/21  0732   WBC 7.7 7.6   HGB 11.3* 12.3    278     BMP:    Recent Labs     10/04/21  0247 10/06/21  0732    140   K 4.1 4.1    102   CO2 24 28   BUN 19 15   CREATININE 0.8 0.8   GLUCOSE 82 82       Physical Exam:   Vital Signs: /76   Pulse 72   Temp 96.6 °F (35.9 °C) (Temporal)   Resp 20   Ht 5' 2\" (1.575 m)   Wt 151 lb 1 oz (68.5 kg)   SpO2 94%   BMI 27.63 kg/m²   General appearance:. Alert and Cooperative   HEENT: Normocephalic. Chest: Lung sounds clear bilaterally without wheezes or rhonchi. Cardiac: RRR, S1, S2 normal. No murmurs, gallops, or rubs auscultated.    Abdomen: soft, non-tender; non-distended normal bowel sounds no masses, no organomegaly. Extremities: No clubbing or cyanosis. No peripheral edema. Peripheral pulses palpable. Neurologic: Grossly intact. Discharge Medications:          Medication List      CONTINUE taking these medications    aspirin 81 MG EC tablet  Take 1 tablet by mouth nightly     atorvastatin 40 MG tablet  Commonly known as: LIPITOR  TAKE 1 TABLET BY MOUTH AT NIGHT. Blood Pressure Kit  1 Units by Does not apply route daily     escitalopram 20 MG tablet  Commonly known as: LEXAPRO  TAKE 1 TABLET BY MOUTH DAILY AT BEDTIME. losartan 50 MG tablet  Commonly known as: COZAAR  TAKE 2 TABLETS BY MOUTH NIGHTLY.     metoprolol tartrate 25 MG tablet  Commonly known as: LOPRESSOR  TAKE 1 TABLET BY MOUTH TWICE DAILY. pantoprazole 20 MG tablet  Commonly known as: PROTONIX  TAKE 1 TABLET BY MOUTH ONCE DAILY. STOP taking these medications    amLODIPine 5 MG tablet  Commonly known as: NORVASC               Discharge Instructions: Follow up with LIGIA Queen in 7 days. Follow-up with neurology in 2 weeks as recommended. Take medications as directed. Resume activity as tolerated. Diet: ADULT DIET; Regular; Low Sodium (2 gm)     Disposition: Patient is medically stable and will be discharged home. Time spent on discharge 35 minutes spent in assessing patient, reviewing medications, discussion with nursing, confirming safe discharge plan and preparation of discharge summary. Signed:  Electronically signed by LIGIA Yeh CNP on 10/6/21 at 1:57 PM CDT         EMR Dragon/Transcription disclaimer:   Much of this encounter note is an electronic transcription/translation of spoken language to printed text.  The electronic translation of spoken language may permit erroneous, or at times, nonsensical words or phrases to be inadvertently transcribed; although attempts have made to review the note for such errors, some may still exist.

## 2021-10-06 NOTE — PROGRESS NOTES
Occupational Therapy   Occupational Therapy Initial Assessment  Date: 10/6/2021   Patient Name: Sathish Childers  MRN: [id number]     : 1955    Date of Service: 10/6/2021    Discharge Recommendations:          Assessment   Assessment: Pt Independent with self care and ADLs . no further OT needed  Treatment Diagnosis: Hypoxia, BILL  Prognosis: Good  Decision Making: Low Complexity  REQUIRES OT FOLLOW UP: No  Activity Tolerance  Activity Tolerance: Patient Tolerated treatment well           Patient Diagnosis(es): The primary encounter diagnosis was Near syncope. Diagnoses of BILL (acute kidney injury) ([facility] [address] and Hypoxia were also pertinent to this visit. has a past medical history of Anxiety, Arthritis, Cerebral aneurysm, Former smoker, GERD (gastroesophageal reflux disease), and Hypertension. has a past surgical history that includes Hysterectomy; Cholecystectomy; Hemorrhoid surgery; craniotomy (Right, 2019); and brain surgery.     Treatment Diagnosis: Hypoxia, BILL      Restrictions       Subjective   General  Chart Reviewed: Yes  Patient assessed for rehabilitation services?: Yes  Family / Caregiver Present: No  Diagnosis: Hypoxia, BILL  Patient Currently in Pain: Denies  Vital Signs  Patient Currently in Pain: Denies  Social/Functional History  Social/Functional History  Lives With: Alone  Type of Home: House  Home Layout: One level  Bathroom Shower/Tub: Tub/Shower unit  Bathroom Toilet: Standard  ADL Assistance: Independent  Ambulation Assistance: Independent  Transfer Assistance: Independent       Objective   Vision: Within Functional Limits  Hearing: Within functional limits    Orientation  Overall Orientation Status: Within Normal Limits        ADL  Feeding: Independent  Grooming: Independent  UE Bathing: Independent  LE Bathing: Independent  UE Dressing: Independent  LE Dressing: Independent           Transfers  Stand Step Transfers: Independent  Sit to stand: Independent     Cognition  Overall Cognitive Status: WNL                 LUE AROM (degrees)  LUE AROM : WNL  RUE AROM (degrees)  RUE AROM : WNL  LUE Strength  Gross LUE Strength: WFL  RUE Strength  Gross RUE Strength: WFL                   Plan   Plan  Times per week: OT eval only    G-Code     OutComes Score                                                  AM-PAC Score             Goals  Short term goals  Time Frame for Short term goals: OT eval only  Long term goals  Time Frame for Long term goals : OT eval only       Therapy Time   Individual Concurrent Group Co-treatment   Time In           Time Out           Minutes                   Abel Leyden, OT

## 2021-10-06 NOTE — TELEPHONE ENCOUNTER
Rome Trotter called to schedule a 2 week [facility]. Pt was admitted for BILL and hypoxia. Pt was discharged 10/06. [facility] is not able to schedule with in the timeframe. .     Please be advised that the best time to call her to accommodate their needs is Anytime. Thank you.

## 2021-10-06 NOTE — PROGRESS NOTES
Physical Therapy  Pt states she has been up ad wanda in room without difficulty and has no concerns about d/c home when medically stable. OT will eval for safety with adl's.   Electronically signed by Brianda Dasilva PT on 10/6/2021 at 11:02 AM

## 2021-10-06 NOTE — CARE COORDINATION
Pt's son has expressed concern for pt's needs at home. He has inquired about Meals on Wheels and other assistance programs. Pt qualifies for CHW program through the [place] [address] office. SW sent referral. They can assist with Meals on Wheels, medication affordability, transportation for MD appts, and other community resource programs.    Electronically signed by Topher Ansari on 10/6/2021 at 12:16 PM
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
## 2021-10-06 NOTE — PLAN OF CARE
Problem: Falls - Risk of:  Goal: Will remain free from falls  Description: Will remain free from falls  Outcome: Ongoing  Goal: Absence of physical injury  Description: Absence of physical injury  Outcome: Ongoing     Problem: Discharge Planning:  Goal: Discharged to appropriate level of care  Description: Discharged to appropriate level of care  Outcome: Ongoing     Problem: Tissue Perfusion - Renal, Altered:  Goal: Electrolytes within specified parameters  Description: Electrolytes within specified parameters  Outcome: Ongoing  Goal: Urine creatinine clearance will be within specified parameters  Description: Urine creatinine clearance will be within specified parameters  Outcome: Ongoing  Goal: Serum creatinine will be within specified parameters  Description: Serum creatinine will be within specified parameters  Outcome: Ongoing  Goal: Ability to achieve a balanced intake and output will improve  Description: Ability to achieve a balanced intake and output will improve  Outcome: Ongoing

## 2021-10-06 NOTE — PROGRESS NOTES
Patient attempted to leave while this nurse was at lunch. She was directed back to her room by Francisco Mccracken. This nurse returned early from lunch and printed papers to find that patient had left without signing anything. IV had already been removed prior. Patient did have zio patch placed before leaving.    Electronically signed by Live Alcazar RN on 10/6/2021 at 4:06 PM

## 2021-10-07 ENCOUNTER — CARE COORDINATION (OUTPATIENT)
Dept: CASE MANAGEMENT | Age: 66
End: 2021-10-07

## 2021-10-07 NOTE — CARE COORDINATION
[facility] Transitions Initial Follow Up Call    Call within 2 business days of discharge: Yes    Patient: Braxton Luu Patient : 1955   MRN: <[id number]>  Reason for Admission:   Discharge Date: 10/6/21 RARS: Readmission Risk Score: 11      Last Discharge [facility]       Complaint Diagnosis Description Type Department Provider    10/3/21 Loss of Consciousness; Hypotension Near syncope . .. ED to Hosp-Admission (Discharged) (ADMITTED) Guillermina Noland MD; David Edward MD               Attempted to contact patient for initial follow up  transition call. Unable to leave a voicemail message to return call. Mailbox is full. Will try again.         Care Transitions 24 Hour Call    Do you have all of your prescriptions and are they filled?: Yes  Post Acute Services: [address] [place] [address] you have support at home?: Alone  Are you an active caregiver in your home?: No  Care Transitions Interventions         Follow Up  Future Appointments   Date Time Provider Mila Urrutia   10/21/2021 11:30 AM LIGIA Christopher [address]       Yonathan Mckeon LPN

## 2021-10-07 NOTE — TELEPHONE ENCOUNTER
Spoke with Baldemar Jeronimo, she is aware of new appointment date, time and location. She was ok with seeing Dr Terri Martin.

## 2021-10-08 ENCOUNTER — CARE COORDINATION (OUTPATIENT)
Dept: CASE MANAGEMENT | Age: 66
End: 2021-10-08

## 2021-10-08 NOTE — CARE COORDINATION
[facility] Transitions Initial Follow Up Call    Call within 2 business days of discharge: Yes    Patient: Greer Aguero Patient : 1955   MRN: <[id number]>  Reason for Admission:   Discharge Date: 10/6/21 RARS: Readmission Risk Score: 11      Last Discharge [facility]       Complaint Diagnosis Description Type Department Provider    10/3/21 Loss of Consciousness; Hypotension Near syncope . .. ED to Hosp-Admission (Discharged) (ADMITTED) Neno Kam MD; Red Goodson MD           Attempted to reach pt for follow up call. Unable to leave message.      Care Transitions 24 Hour Call    Do you have all of your prescriptions and are they filled?: Yes  Do you have support at home?: Alone  Are you an active caregiver in your home?: No  Care Transitions Interventions         Follow Up  Future Appointments   Date Time Provider Mila Urrutia   10/21/2021 11:30 AM LIGIA [facility] [place]   2021 11:00 AM MD CHRIS Russell NEUR[facility]       Rudi Arroyo

## 2021-10-13 ENCOUNTER — APPOINTMENT (OUTPATIENT)
Dept: CT IMAGING | Age: 66
DRG: 872 | End: 2021-10-13
Payer: MEDICARE

## 2021-10-13 ENCOUNTER — APPOINTMENT (OUTPATIENT)
Dept: GENERAL RADIOLOGY | Age: 66
DRG: 872 | End: 2021-10-13
Payer: MEDICARE

## 2021-10-13 ENCOUNTER — HOSPITAL ENCOUNTER (INPATIENT)
Age: 66
LOS: 2 days | Discharge: HOME | DRG: 872 | End: 2021-10-15
Attending: EMERGENCY MEDICINE
Payer: MEDICARE

## 2021-10-13 DIAGNOSIS — R55 NEAR SYNCOPE: Primary | ICD-10-CM

## 2021-10-13 DIAGNOSIS — A41.9 SEPTICEMIA (HCC): ICD-10-CM

## 2021-10-13 DIAGNOSIS — E87.20 LACTIC ACID ACIDOSIS: ICD-10-CM

## 2021-10-13 DIAGNOSIS — N17.9 AKI (ACUTE KIDNEY INJURY) (HCC): ICD-10-CM

## 2021-10-13 PROBLEM — E86.1 HYPOTENSION DUE TO HYPOVOLEMIA: Status: ACTIVE | Noted: 2021-10-03

## 2021-10-13 PROBLEM — I95.89 HYPOTENSION DUE TO HYPOVOLEMIA: Status: ACTIVE | Noted: 2021-10-03

## 2021-10-13 PROBLEM — N39.0 SEPSIS SECONDARY TO UTI (HCC): Status: ACTIVE | Noted: 2021-10-13

## 2021-10-13 LAB
ALBUMIN SERPL-MCNC: 4.4 G/DL (ref 3.5–5.2)
ALP BLD-CCNC: 111 U/L (ref 35–104)
ALT SERPL-CCNC: 18 U/L (ref 5–33)
ANION GAP SERPL CALCULATED.3IONS-SCNC: 14 MMOL/L (ref 7–19)
AST SERPL-CCNC: 22 U/L (ref 5–32)
BACTERIA: ABNORMAL /HPF
BASOPHILS ABSOLUTE: 0.1 K/UL (ref 0–0.2)
BASOPHILS RELATIVE PERCENT: 0.6 % (ref 0–1)
BILIRUB SERPL-MCNC: 0.9 MG/DL (ref 0.2–1.2)
BILIRUBIN URINE: NEGATIVE
BLOOD, URINE: ABNORMAL
BUN BLDV-MCNC: 25 MG/DL (ref 8–23)
CALCIUM SERPL-MCNC: 9.8 MG/DL (ref 8.8–10.2)
CHLORIDE BLD-SCNC: 106 MMOL/L (ref 98–111)
CLARITY: ABNORMAL
CO2: 26 MMOL/L (ref 22–29)
COLOR: YELLOW
CREAT SERPL-MCNC: 1.6 MG/DL (ref 0.5–0.9)
EOSINOPHILS ABSOLUTE: 0.1 K/UL (ref 0–0.6)
EOSINOPHILS RELATIVE PERCENT: 0.4 % (ref 0–5)
EPITHELIAL CELLS, UA: ABNORMAL /HPF
GFR AFRICAN AMERICAN: 39
GFR NON-AFRICAN AMERICAN: 32
GLUCOSE BLD-MCNC: 127 MG/DL (ref 74–109)
GLUCOSE URINE: NEGATIVE MG/DL
HCT VFR BLD CALC: 43.4 % (ref 37–47)
HEMOGLOBIN: 13.5 G/DL (ref 12–16)
HYALINE CASTS: ABNORMAL /LPF (ref 0–5)
IMMATURE GRANULOCYTES #: 0.1 K/UL
KETONES, URINE: 15 MG/DL
LACTIC ACID: 0.7 MMOL/L (ref 0.5–1.9)
LACTIC ACID: 1.1 MMOL/L (ref 0.5–1.9)
LACTIC ACID: 4.6 MMOL/L (ref 0.5–1.9)
LEUKOCYTE ESTERASE, URINE: ABNORMAL
LYMPHOCYTES ABSOLUTE: 1.4 K/UL (ref 1.1–4.5)
LYMPHOCYTES RELATIVE PERCENT: 10.6 % (ref 20–40)
MCH RBC QN AUTO: 31.5 PG (ref 27–31)
MCHC RBC AUTO-ENTMCNC: 31.1 G/DL (ref 33–37)
MCV RBC AUTO: 101.4 FL (ref 81–99)
MONOCYTES ABSOLUTE: 1.1 K/UL (ref 0–0.9)
MONOCYTES RELATIVE PERCENT: 8.2 % (ref 0–10)
NEUTROPHILS ABSOLUTE: 10.8 K/UL (ref 1.5–7.5)
NEUTROPHILS RELATIVE PERCENT: 79.5 % (ref 50–65)
NITRITE, URINE: NEGATIVE
PDW BLD-RTO: 12.9 % (ref 11.5–14.5)
PH UA: 5 (ref 5–8)
PLATELET # BLD: 282 K/UL (ref 130–400)
PMV BLD AUTO: 10.7 FL (ref 9.4–12.3)
POTASSIUM SERPL-SCNC: 3.8 MMOL/L (ref 3.5–5)
PRO-BNP: 158 PG/ML (ref 0–900)
PROTEIN UA: 100 MG/DL
RBC # BLD: 4.28 M/UL (ref 4.2–5.4)
RBC UA: ABNORMAL /HPF (ref 0–2)
SARS-COV-2, NAAT: NOT DETECTED
SODIUM BLD-SCNC: 146 MMOL/L (ref 136–145)
SPECIFIC GRAVITY UA: 1.02 (ref 1–1.03)
TOTAL PROTEIN: 8 G/DL (ref 6.6–8.7)
TROPONIN: <0.01 NG/ML (ref 0–0.03)
UROBILINOGEN, URINE: 1 E.U./DL
WBC # BLD: 13.5 K/UL (ref 4.8–10.8)
WBC UA: ABNORMAL /HPF (ref 0–5)

## 2021-10-13 PROCEDURE — 96365 THER/PROPH/DIAG IV INF INIT: CPT

## 2021-10-13 PROCEDURE — 99284 EMERGENCY DEPT VISIT MOD MDM: CPT

## 2021-10-13 PROCEDURE — 2580000003 HC RX 258: Performed by: EMERGENCY MEDICINE

## 2021-10-13 PROCEDURE — 99223 1ST HOSP IP/OBS HIGH 75: CPT | Performed by: PSYCHIATRY & NEUROLOGY

## 2021-10-13 PROCEDURE — 87040 BLOOD CULTURE FOR BACTERIA: CPT

## 2021-10-13 PROCEDURE — 6360000002 HC RX W HCPCS: Performed by: NURSE PRACTITIONER

## 2021-10-13 PROCEDURE — 96374 THER/PROPH/DIAG INJ IV PUSH: CPT

## 2021-10-13 PROCEDURE — 85025 COMPLETE CBC W/AUTO DIFF WBC: CPT

## 2021-10-13 PROCEDURE — 6370000000 HC RX 637 (ALT 250 FOR IP): Performed by: STUDENT IN AN ORGANIZED HEALTH CARE EDUCATION/TRAINING PROGRAM

## 2021-10-13 PROCEDURE — 96372 THER/PROPH/DIAG INJ SC/IM: CPT

## 2021-10-13 PROCEDURE — 6370000000 HC RX 637 (ALT 250 FOR IP): Performed by: NURSE PRACTITIONER

## 2021-10-13 PROCEDURE — 71045 X-RAY EXAM CHEST 1 VIEW: CPT

## 2021-10-13 PROCEDURE — 84484 ASSAY OF TROPONIN QUANT: CPT

## 2021-10-13 PROCEDURE — 87635 SARS-COV-2 COVID-19 AMP PRB: CPT

## 2021-10-13 PROCEDURE — 95816 EEG AWAKE AND DROWSY: CPT

## 2021-10-13 PROCEDURE — 81001 URINALYSIS AUTO W/SCOPE: CPT

## 2021-10-13 PROCEDURE — 80053 COMPREHEN METABOLIC PANEL: CPT

## 2021-10-13 PROCEDURE — 83880 ASSAY OF NATRIURETIC PEPTIDE: CPT

## 2021-10-13 PROCEDURE — 36415 COLL VENOUS BLD VENIPUNCTURE: CPT

## 2021-10-13 PROCEDURE — 93005 ELECTROCARDIOGRAM TRACING: CPT | Performed by: EMERGENCY MEDICINE

## 2021-10-13 PROCEDURE — 70450 CT HEAD/BRAIN W/O DYE: CPT

## 2021-10-13 PROCEDURE — 2580000003 HC RX 258: Performed by: NURSE PRACTITIONER

## 2021-10-13 PROCEDURE — 1210000000 HC MED SURG R&B

## 2021-10-13 PROCEDURE — 87086 URINE CULTURE/COLONY COUNT: CPT

## 2021-10-13 PROCEDURE — 6370000000 HC RX 637 (ALT 250 FOR IP): Performed by: EMERGENCY MEDICINE

## 2021-10-13 PROCEDURE — 83605 ASSAY OF LACTIC ACID: CPT

## 2021-10-13 PROCEDURE — 6360000002 HC RX W HCPCS: Performed by: EMERGENCY MEDICINE

## 2021-10-13 RX ORDER — PANTOPRAZOLE SODIUM 20 MG/1
20 TABLET, DELAYED RELEASE ORAL
Status: DISCONTINUED | OUTPATIENT
Start: 2021-10-14 | End: 2021-10-13

## 2021-10-13 RX ORDER — ACETAMINOPHEN 650 MG/1
650 SUPPOSITORY RECTAL EVERY 6 HOURS PRN
Status: DISCONTINUED | OUTPATIENT
Start: 2021-10-13 | End: 2021-10-15 | Stop reason: HOSPADM

## 2021-10-13 RX ORDER — SODIUM CHLORIDE 9 MG/ML
25 INJECTION, SOLUTION INTRAVENOUS PRN
Status: DISCONTINUED | OUTPATIENT
Start: 2021-10-13 | End: 2021-10-15 | Stop reason: HOSPADM

## 2021-10-13 RX ORDER — ATORVASTATIN CALCIUM 40 MG/1
40 TABLET, FILM COATED ORAL DAILY
Status: DISCONTINUED | OUTPATIENT
Start: 2021-10-13 | End: 2021-10-15 | Stop reason: HOSPADM

## 2021-10-13 RX ORDER — SODIUM CHLORIDE 0.9 % (FLUSH) 0.9 %
5-40 SYRINGE (ML) INJECTION PRN
Status: DISCONTINUED | OUTPATIENT
Start: 2021-10-13 | End: 2021-10-15 | Stop reason: HOSPADM

## 2021-10-13 RX ORDER — LOSARTAN POTASSIUM 50 MG/1
50 TABLET ORAL DAILY
Status: DISCONTINUED | OUTPATIENT
Start: 2021-10-13 | End: 2021-10-15

## 2021-10-13 RX ORDER — ACETAMINOPHEN 325 MG/1
650 TABLET ORAL EVERY 6 HOURS PRN
Status: DISCONTINUED | OUTPATIENT
Start: 2021-10-13 | End: 2021-10-15 | Stop reason: HOSPADM

## 2021-10-13 RX ORDER — ONDANSETRON 2 MG/ML
4 INJECTION INTRAMUSCULAR; INTRAVENOUS EVERY 6 HOURS PRN
Status: DISCONTINUED | OUTPATIENT
Start: 2021-10-13 | End: 2021-10-15 | Stop reason: HOSPADM

## 2021-10-13 RX ORDER — SODIUM CHLORIDE, SODIUM LACTATE, POTASSIUM CHLORIDE, CALCIUM CHLORIDE 600; 310; 30; 20 MG/100ML; MG/100ML; MG/100ML; MG/100ML
INJECTION, SOLUTION INTRAVENOUS CONTINUOUS
Status: DISCONTINUED | OUTPATIENT
Start: 2021-10-13 | End: 2021-10-15 | Stop reason: HOSPADM

## 2021-10-13 RX ORDER — ONDANSETRON 4 MG/1
4 TABLET, ORALLY DISINTEGRATING ORAL EVERY 8 HOURS PRN
Status: DISCONTINUED | OUTPATIENT
Start: 2021-10-13 | End: 2021-10-15 | Stop reason: HOSPADM

## 2021-10-13 RX ORDER — ASPIRIN 81 MG/1
81 TABLET ORAL NIGHTLY
Status: DISCONTINUED | OUTPATIENT
Start: 2021-10-13 | End: 2021-10-15 | Stop reason: HOSPADM

## 2021-10-13 RX ORDER — ACETAMINOPHEN 325 MG/1
650 TABLET ORAL ONCE
Status: COMPLETED | OUTPATIENT
Start: 2021-10-13 | End: 2021-10-13

## 2021-10-13 RX ORDER — SODIUM CHLORIDE, SODIUM LACTATE, POTASSIUM CHLORIDE, AND CALCIUM CHLORIDE .6; .31; .03; .02 G/100ML; G/100ML; G/100ML; G/100ML
1000 INJECTION, SOLUTION INTRAVENOUS ONCE
Status: COMPLETED | OUTPATIENT
Start: 2021-10-13 | End: 2021-10-13

## 2021-10-13 RX ORDER — 0.9 % SODIUM CHLORIDE 0.9 %
1000 INTRAVENOUS SOLUTION INTRAVENOUS ONCE
Status: DISCONTINUED | OUTPATIENT
Start: 2021-10-13 | End: 2021-10-15 | Stop reason: HOSPADM

## 2021-10-13 RX ORDER — ESCITALOPRAM OXALATE 10 MG/1
20 TABLET ORAL DAILY
Status: DISCONTINUED | OUTPATIENT
Start: 2021-10-13 | End: 2021-10-15 | Stop reason: HOSPADM

## 2021-10-13 RX ORDER — 0.9 % SODIUM CHLORIDE 0.9 %
1000 INTRAVENOUS SOLUTION INTRAVENOUS ONCE
Status: COMPLETED | OUTPATIENT
Start: 2021-10-13 | End: 2021-10-13

## 2021-10-13 RX ORDER — SODIUM CHLORIDE 0.9 % (FLUSH) 0.9 %
5-40 SYRINGE (ML) INJECTION EVERY 12 HOURS SCHEDULED
Status: DISCONTINUED | OUTPATIENT
Start: 2021-10-13 | End: 2021-10-15 | Stop reason: HOSPADM

## 2021-10-13 RX ORDER — PANTOPRAZOLE SODIUM 20 MG/1
20 TABLET, DELAYED RELEASE ORAL
Status: DISCONTINUED | OUTPATIENT
Start: 2021-10-13 | End: 2021-10-15 | Stop reason: HOSPADM

## 2021-10-13 RX ADMIN — SODIUM CHLORIDE, POTASSIUM CHLORIDE, SODIUM LACTATE AND CALCIUM CHLORIDE 1000 ML: 600; 310; 30; 20 INJECTION, SOLUTION INTRAVENOUS at 16:13

## 2021-10-13 RX ADMIN — ENOXAPARIN SODIUM 40 MG: 40 INJECTION SUBCUTANEOUS at 16:12

## 2021-10-13 RX ADMIN — ASPIRIN 81 MG: 81 TABLET, COATED ORAL at 19:46

## 2021-10-13 RX ADMIN — SODIUM CHLORIDE, PRESERVATIVE FREE 10 ML: 5 INJECTION INTRAVENOUS at 19:46

## 2021-10-13 RX ADMIN — ACETAMINOPHEN 650 MG: 325 TABLET ORAL at 13:46

## 2021-10-13 RX ADMIN — SODIUM CHLORIDE, POTASSIUM CHLORIDE, SODIUM LACTATE AND CALCIUM CHLORIDE: 600; 310; 30; 20 INJECTION, SOLUTION INTRAVENOUS at 19:46

## 2021-10-13 RX ADMIN — PANTOPRAZOLE SODIUM 20 MG: 20 TABLET, DELAYED RELEASE ORAL at 16:12

## 2021-10-13 RX ADMIN — ESCITALOPRAM OXALATE 20 MG: 10 TABLET ORAL at 16:12

## 2021-10-13 RX ADMIN — ATORVASTATIN CALCIUM 40 MG: 40 TABLET, FILM COATED ORAL at 16:12

## 2021-10-13 RX ADMIN — CEFTRIAXONE 1000 MG: 1 INJECTION, POWDER, FOR SOLUTION INTRAMUSCULAR; INTRAVENOUS at 13:47

## 2021-10-13 RX ADMIN — SODIUM CHLORIDE 1000 ML: 9 INJECTION, SOLUTION INTRAVENOUS at 11:22

## 2021-10-13 ASSESSMENT — ENCOUNTER SYMPTOMS
VOMITING: 1
ABDOMINAL PAIN: 0
NAUSEA: 0
CONSTIPATION: 0
ABDOMINAL DISTENTION: 0
VOMITING: 0
TROUBLE SWALLOWING: 0
BACK PAIN: 0
SINUS PAIN: 0
COUGH: 0
WHEEZING: 0
DIARRHEA: 1
SORE THROAT: 0
SHORTNESS OF BREATH: 0
DIARRHEA: 0
RHINORRHEA: 0
BLOOD IN STOOL: 0

## 2021-10-13 ASSESSMENT — PAIN SCALES - GENERAL: PAINLEVEL_OUTOF10: 6

## 2021-10-13 NOTE — PROGRESS NOTES
Although patient is here with near syncope, she doesn't wish for the bed alarm to be on. I told her that it would be in her best interest if she is \"almost passing out\" at home that there be a staff member present if she is OOB. She says she believes that she is safe to be OOB on her own.     Electronically signed by Donita Pierce RN on 10/13/2021 at 3:57 PM

## 2021-10-13 NOTE — PROGRESS NOTES
4 Eyes Skin Assessment    Daniel Ayanna is being assessed upon: Admission    I agree that I, Estella Ragland RN, along with Esme Mathur (either 2 RN's or 1 LPN and 1 RN) have performed a thorough Head to Toe Skin Assessment on the patient. ALL assessment sites listed below have been assessed. Areas assessed by both nurses:     [x]   Head, Face, and Ears   [x]   Shoulders, Back, and Chest  [x]   Arms, Elbows, and Hands   [x]   Coccyx, Sacrum, and Ischium  [x]   Legs, Feet, and Heels    Does the Patient have Skin Breakdown?  No    Rene Prevention initiated: No  Wound Care Orders initiated: No    WOC nurse consulted for Pressure Injury (Stage 3,4, Unstageable, DTI, NWPT, and Complex wounds) and New or Established Ostomies: No        Primary Nurse eSignature: Estella Ragland RN on 10/13/2021 at 4:46 PM      Co-Signer eSignature: Electronically signed by Esme Mathur RN on 10/13/21 at 4:54 PM CDT

## 2021-10-13 NOTE — CONSULTS
Past Surgical History:      Procedure Laterality Date    BRAIN SURGERY      Aneurysm partial clip     CHOLECYSTECTOMY      CRANIOTOMY Right 5/7/2019    RIGHT CRANIOTOMY FOR CLIPPING OF MIDDLE CEREBRAL ARTERY ANEURYSM performed by Kyleigh De Anda MD at [address] [place] [address]       Medications in Hospital:     Current Facility-Administered Medications:     0.9 % sodium chloride bolus, 1,000 mL, IntraVENous, Once, [place] MD    aspirin EC tablet 81 mg, 81 mg, Oral, Nightly, Liliya Batters, APRN    atorvastatin (LIPITOR) tablet 40 mg, 40 mg, Oral, Daily, Liliya Batters, APRN, 40 mg at 10/13/21 1612    escitalopram (LEXAPRO) tablet 20 mg, 20 mg, Oral, Daily, Liliya Batters, APRN, 20 mg at 10/13/21 1612    [Held by provider] losartan (COZAAR) tablet 50 mg, 50 mg, Oral, Daily, Liliya Batters, APRN    [Held by provider] metoprolol tartrate (LOPRESSOR) tablet 25 mg, 25 mg, Oral, Daily, Liliya Batters, APRN    sodium chloride flush 0.9 % injection 5-40 mL, 5-40 mL, IntraVENous, 2 times per day, Liliya Batters, APRN    sodium chloride flush 0.9 % injection 5-40 mL, 5-40 mL, IntraVENous, PRN, Liliya Batters, APRN    0.9 % sodium chloride infusion, 25 mL, IntraVENous, PRN, Liliya Batters, APRN    enoxaparin (LOVENOX) injection 40 mg, 40 mg, SubCUTAneous, Q24H, Liliya Batters, APRN, 40 mg at 10/13/21 1612    ondansetron (ZOFRAN-ODT) disintegrating tablet 4 mg, 4 mg, Oral, Q8H PRN **OR** ondansetron (ZOFRAN) injection 4 mg, 4 mg, IntraVENous, Q6H PRN, Liliya Batters, APRN    acetaminophen (TYLENOL) tablet 650 mg, 650 mg, Oral, Q6H PRN **OR** acetaminophen (TYLENOL) suppository 650 mg, 650 mg, Rectal, Q6H PRN, Liliya Batters, APRN    lactated ringers infusion, , IntraVENous, Continuous, Liliya Batters, APRN    lactated ringers bolus, 1,000 mL, IntraVENous, Once, Liliya Batters, APRN, Last Rate: 1,000 mL/hr at 10/13/21 1613, 1,000 mL at 10/13/21 1613    pantoprazole (PROTONIX) tablet 20 mg, 20 mg, Oral, QAM SADIE, Hitesh Gonzalez MD, 20 mg at 10/13/21 1612    [START ON 10/14/2021] cefTRIAXone (ROCEPHIN) 1,000 mg in sterile water 10 mL IV syringe, 1,000 mg, IntraVENous, Q12H, LIGIA Izquierdo  Allergies: Patient has no known allergies. Social History:   TOBACCO:  reports that she quit smoking about 2 years ago. Her smoking use included cigarettes. She has a 20.00 pack-year smoking history. She has never used smokeless tobacco.  ETOH:  reports previous alcohol use. Family History:       Problem Relation Age of Onset    Heart Disease Mother     High Blood Pressure Father     Arthritis Father      ? ? Physical Exam:    Vitals: /83   Pulse 65   Temp 97.1 °F (36.2 °C) (Oral)   Resp 16   Ht 5' 2\" (1.575 m)   Wt 151 lb 0.2 oz (68.5 kg)   SpO2 95%   BMI 27.62 kg/m²   Constitutional - well developed, well nourished. Eyes - conjunctiva normal. Pupils react to light  Ear, nose, throat -hearing intact to finger rub No scars, masses, or lesions over external nose or ears, no atrophy of tongue  Neck-symmetric, no masses noted, no jugular vein distension  Respiration- chest wall appears symmetric, good expansion,   normal effort without use of accessory muscles  Musculoskeletal - no significant wasting of muscles noted, no bony deformities  Extremities-no clubbing, cyanosis or edema  Skin - warm, dry, and intact. No rash, erythema, or pallor.   Psychiatric - mood, affect, and behavior appear normal.   Neurological exam  Awake, alert, fluent oriented x 3 appropriate affect  Attention and concentration appear appropriate  Recent and remote memory appears unremarkable  Speech normal without dysarthria  No clear issues with language of fund of knowledge  Cranial Nerve Exam   CN II- Visual fields grossly unremarkable  CN III, IV,VI-EOMI, No nystagmus, conjugate eye movements, no ptosis  CN V-sensation intact to LT over face  CN VII-no facial assymetry  CN VIII-Hearing intact to voice  CN IX and X- Palate elevates in midline  CN XI-good shoulder shrug  CN XII-Tongue midline with no fasciculations or fibrillations  Motor Exam  V/V throughout upper and lower extremities bilaterally, no cogwheeling, normal tone  Sensory Exam  Sensation intact to light touch and temperature upper and lower extremities bilaterally  Reflexes   2+ biceps bilaterally  2+ brachioradialis  2+patella  2+ ankle jerks  No clonus ankles  No Swift's sign bilateral hands  Tremors- no tremors in hands or head noted  Gait  Not tested  Coordination  Finger to nose-unremarkable  ? ?  CBC:   Recent Labs     10/13/21  1006   WBC 13.5*   HGB 13.5        BMP:   Recent Labs     10/13/21  1006   *   K 3.8      CO2 26   BUN 25*   CREATININE 1.6*   GLUCOSE 127*     Hepatic:   Recent Labs     10/13/21  1006   AST 22   ALT 18   BILITOT 0.9   ALKPHOS 111*     Lipids: No results for input(s): CHOL, HDL in the last 72 hours. Invalid input(s): LDLCALCU  INR: No results for input(s): INR in the last 72 hours. ?  ?  Assessment and Plan   1. Syncope/near syncope. Currently her symptoms are suspicious as being related to hypotension. Would recommend adjusting her blood pressure medications to avoid this and continuing IV fluid resuscitation. Treating urinary tract infection as you are doing. We will check EEG given her history of aneurysm clipping. ?  ?       Sukhdev Camacho MD  Board Certified in Neurology

## 2021-10-13 NOTE — H&P
[facility]      Hospitalist - History & Physical      PCP: Duane Lei, APRN    Date of Admission: 10/13/2021    Date of Service: 10/13/2021    Chief Complaint:  Near syncope    History Of Present Illness: The patient is a 78 y.o. female with a past medical hx of HTN, GERD, craniotimy, cerebral aneurysm, and GERD who presented to [address] [place] [address] ED complaining of near syncope. Further ED work-up revealed an NA-146, K3.8, BUN 25, creatinine 1.6, and a blood glucose level of 127. She was also found to have a lactic acid level of 4.6. She did have a moderate amount of leukocytes in her urine. She was mildly hypotensive upon arrival with a blood pressure of 93/78. CT of the head did show a previous craniotomy with aneurysmal repair. And also noted some additional chronic changes however no acute findings were found on the CT of the head. Chest x-ray was within normal limits. Hospitalist services are being requested for admission for sepsis secondary to UTI, BILL, hypotension due to hypovolemia and near syncope for ongoing medical management. 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 She was sent home with Zio patch for ongoing monitoring however she said it fell off. She states overall she has been doing well until today when she stood up she almost passed out however, did not fully lose consciousness. She states that immediately after this near syncopal episode she had an episode of vomiting and diarrhea. She denies previously being ill or any worsening of her symptoms since discharge on 10/6/2021. She denies any dysuria, urinary frequency, hematuria, fever or chills. She reports that she has been pushing fluids at home.   She states that she feels well overall, however, would like to know why she is having these episodes of syncope. Past Medical History:        Diagnosis Date    Anxiety     Arthritis     Cerebral aneurysm     Former smoker     GERD (gastroesophageal reflux disease)     Hypertension        Past Surgical History:        Procedure Laterality Date    BRAIN SURGERY      Aneurysm partial clip     CHOLECYSTECTOMY      CRANIOTOMY Right 5/7/2019    RIGHT CRANIOTOMY FOR CLIPPING OF MIDDLE CEREBRAL ARTERY ANEURYSM performed by Margoth Peralta MD at [place] [address] Medications:  Prior to Admission medications    Medication Sig Start Date End Date Taking? Authorizing Provider   escitalopram (LEXAPRO) 20 MG tablet TAKE 1 TABLET BY MOUTH DAILY AT BEDTIME.  9/15/21   LIGIA Garrison   metoprolol tartrate (LOPRESSOR) 25 MG tablet TAKE 1 TABLET BY MOUTH TWICE DAILY. 9/15/21   LIGIA Garrison   atorvastatin (LIPITOR) 40 MG tablet TAKE 1 TABLET BY MOUTH AT NIGHT. 9/15/21   LIGIA Garrison   pantoprazole (PROTONIX) 20 MG tablet TAKE 1 TABLET BY MOUTH ONCE DAILY. 8/31/21   LIGIA Garrison   Blood Pressure KIT 1 Units by Does not apply route daily 4/22/21 5/22/21  LIGIA Garrison   aspirin 81 MG EC tablet Take 1 tablet by mouth nightly 4/22/21 5/22/21  LIGIA Garrison   losartan (COZAAR) 50 MG tablet TAKE 2 TABLETS BY MOUTH NIGHTLY. 4/22/21   LIGIA Garrison       Allergies:    Patient has no known allergies. Social History:    The patient currently lives at home  Tobacco:   reports that she quit smoking about 2 years ago. Her smoking use included cigarettes. She has a 20.00 pack-year smoking history. She has never used smokeless tobacco.  Alcohol:   reports previous alcohol use.   Illicit Drugs: Denies    Family History:      Problem Relation Age of Onset    Heart Disease Mother     High Blood Pressure Father     Arthritis Father        Review of Systems:   Review of Systems   Constitutional: Negative for chills, diaphoresis, fatigue and fever. HENT: Negative for congestion, ear pain, sinus pain, sore throat and trouble swallowing. Eyes: Negative for visual disturbance. Respiratory: Negative for cough, shortness of breath and wheezing. Cardiovascular: Negative for chest pain, palpitations and leg swelling. Gastrointestinal: Positive for diarrhea and vomiting. Negative for abdominal distention, abdominal pain, blood in stool, constipation and nausea. Endocrine: Negative for cold intolerance and heat intolerance. Genitourinary: Negative for difficulty urinating, flank pain, frequency and urgency. Musculoskeletal: Negative for arthralgias and myalgias. Neurological: Positive for syncope. Negative for dizziness, weakness, light-headedness, numbness and headaches. Hematological: Does not bruise/bleed easily. Psychiatric/Behavioral: Negative for agitation, confusion and dysphoric mood. 14 point review of systems is negative except as specifically addressed above. Physical Examination:  BP (!) 116/91   Pulse 73   Temp 98.4 °F (36.9 °C) (Oral)   Resp 18   SpO2 95%   Physical Exam  Constitutional:       General: She is not in acute distress. Appearance: Normal appearance. She is ill-appearing (Chronically). She is not toxic-appearing or diaphoretic. HENT:      Head: Normocephalic and atraumatic. Right Ear: External ear normal.      Left Ear: External ear normal.      Nose: Nose normal. No congestion or rhinorrhea. Mouth/Throat:      Mouth: Mucous membranes are moist.      Pharynx: Oropharynx is clear. Eyes:      General: No scleral icterus. Extraocular Movements: Extraocular movements intact. Conjunctiva/sclera: Conjunctivae normal.   Cardiovascular:      Rate and Rhythm: Normal rate and regular rhythm. Pulses: Normal pulses. Heart sounds: Normal heart sounds. No murmur heard. No friction rub. No gallop. Pulmonary:      Effort: Pulmonary effort is normal. No respiratory distress. Breath sounds: Normal breath sounds. No wheezing, rhonchi or rales. Abdominal:      General: Abdomen is flat. Bowel sounds are normal. There is no distension. Palpations: Abdomen is soft. Tenderness: There is no abdominal tenderness. Musculoskeletal:         General: No swelling. Normal range of motion. Cervical back: Normal range of motion and neck supple. Right lower leg: No edema. Left lower leg: No edema. Skin:     General: Skin is warm and dry. Coloration: Skin is pale. Skin is not jaundiced. Findings: No erythema, lesion or rash. Neurological:      General: No focal deficit present. Mental Status: She is alert and oriented to person, place, and time. Mental status is at baseline. Cranial Nerves: No cranial nerve deficit. Sensory: No sensory deficit. Motor: No weakness. Psychiatric:         Mood and Affect: Mood normal.         Behavior: Behavior normal.         Thought Content: Thought content normal.         Judgment: Judgment normal.          Diagnostic Data:  CBC:  Recent Labs     10/13/21  1006   WBC 13.5*   HGB 13.5   HCT 43.4        BMP:  Recent Labs     10/13/21  1006   *   K 3.8      CO2 26   BUN 25*   CREATININE 1.6*   CALCIUM 9.8     Recent Labs     10/13/21  1006   AST 22   ALT 18   BILITOT 0.9   ALKPHOS 111*     Cardiac Enzymes:   Recent Labs     10/13/21  1006   TROPONINI <0.01     Urinalysis:  Lab Results   Component Value Date    NITRU Negative 10/13/2021    WBCUA 21-30 10/13/2021    BACTERIA Rare 10/13/2021    RBCUA 2-4 10/13/2021    BLOODU SMALL 10/13/2021    SPECGRAV 1.018 10/13/2021    GLUCOSEU Negative 10/13/2021       CT HEAD WO CONTRAST    Result Date: 10/13/2021  EXAMINATION: CT HEAD WO CONTRAST 10/13/2021 11:33 AM HISTORY: Dizziness, near syncope. History of intracranial aneurysm. DOSE: 768 mGycm.  Automatic exposure -Continue to monitor for s/s or clinical status change and with supportive care    Active Problems:    Near syncope   -Orthostatic BP's Q shift   -Fall precautions   -Up with assist   -Consult Neurology (contacted in the ED)      Hypotension due to hypovolemia   -Vitals Q4 hours   -Hold home BP meds   -Aggressive fluid resuscitation      BILL (acute kidney injury) ([facility] [address]   -Avoid nephrotoxic agents   -IVF's    -AM labs   -May consider Nephrology consult if renal functions do not improve with hydration    VTE prophylaxis-Lovenox  GI prophylaxis-Pantoprazole       Signed:  LIGIA Mcclendon, 10/13/2021 2:25 PM

## 2021-10-13 NOTE — ED PROVIDER NOTES
[facility] EMERGENCY DEPT  eMERGENCY dEPARTMENT eNCOUnter      Pt Name: Erendira Decker  MRN: [id number]  [place] 1955  Date of evaluation: 10/13/2021  Provider: Donnella Favre, MD    [address] [place] [address]       Chief Complaint   Patient presents with    Loss of Consciousness         HISTORY OF PRESENT ILLNESS   (Location/Symptom, Timing/Onset,Context/Setting, Quality, Duration, Modifying Factors, Severity)  Note limiting factors. Erendira Decker is a 78 y.o. female who presents to the emergency department after a near syncopal event. Patient states she was standing and became very dizzy lightheaded and felt she was going to pass out. She slowly slid forward. She does not believe she completely lost consciousness. She had no injury today. She was just discharged from the hospital a week ago for similar symptoms. 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 It was felt that it was not related to her multiple syncopal episodes. There was a plan for Zio patch and an outpatient EEG. Patient has been doing well since discharge until this morning. She denies any chest pain shortness of breath nausea or diaphoresis. Her dizziness is improved at this time. She said her blood pressure has been running low since discharge. She denies any fever. No dysuria. She is been drinking plenty of fluids. She has no new numbness tingling or focal weakness. HPI    NursingNotes were reviewed. REVIEW OF SYSTEMS    (2-9 systems for level 4, 10 or more for level 5)     Review of Systems   Constitutional: Negative for chills and fever. HENT: Negative for rhinorrhea and sore throat. Respiratory: Negative for shortness of breath. Cardiovascular: Negative for chest pain and leg swelling. Gastrointestinal: Negative for abdominal pain, diarrhea, nausea and vomiting.    Genitourinary: Negative for difficulty urinating. Musculoskeletal: Negative for back pain and neck pain. Skin: Negative for rash. Neurological: Positive for dizziness, syncope and light-headedness. Negative for weakness and headaches. Psychiatric/Behavioral: Negative for confusion. A complete review of systems was performed and is negative except as noted above in the HPI. PAST MEDICAL HISTORY     Past Medical History:   Diagnosis Date    Anxiety     Arthritis     Cerebral aneurysm     Former smoker     GERD (gastroesophageal reflux disease)     Hypertension          SURGICAL HISTORY       Past Surgical History:   Procedure Laterality Date    BRAIN SURGERY      Aneurysm partial clip     CHOLECYSTECTOMY      CRANIOTOMY Right 5/7/2019    RIGHT CRANIOTOMY FOR CLIPPING OF MIDDLE CEREBRAL ARTERY ANEURYSM performed by Marcelina Walker MD at [facility]       Previous Medications    ASPIRIN 81 MG EC TABLET    Take 1 tablet by mouth nightly    ATORVASTATIN (LIPITOR) 40 MG TABLET    TAKE 1 TABLET BY MOUTH AT NIGHT. BLOOD PRESSURE KIT    1 Units by Does not apply route daily    ESCITALOPRAM (LEXAPRO) 20 MG TABLET    TAKE 1 TABLET BY MOUTH DAILY AT BEDTIME. LOSARTAN (COZAAR) 50 MG TABLET    TAKE 2 TABLETS BY MOUTH NIGHTLY. METOPROLOL TARTRATE (LOPRESSOR) 25 MG TABLET    TAKE 1 TABLET BY MOUTH TWICE DAILY. PANTOPRAZOLE (PROTONIX) 20 MG TABLET    TAKE 1 TABLET BY MOUTH ONCE DAILY. ALLERGIES     Patient has no known allergies.     FAMILY HISTORY       Family History   Problem Relation Age of Onset    Heart Disease Mother     High Blood Pressure Father     Arthritis Father           SOCIAL HISTORY       Social History     Socioeconomic History    Marital status:      Spouse name: None    Number of children: None    Years of education: None    Highest education level: None   Occupational History    None   Tobacco Use    Smoking status: Pulmonary effort is normal. No respiratory distress. Breath sounds: Normal breath sounds. Abdominal:      General: Bowel sounds are normal. There is no distension. Palpations: Abdomen is soft. Tenderness: There is no abdominal tenderness. Musculoskeletal:         General: Normal range of motion. Cervical back: Normal range of motion and neck supple. Skin:     General: Skin is warm and dry. Findings: No rash. Neurological:      Mental Status: She is alert and oriented to person, place, and time. Cranial Nerves: No cranial nerve deficit. Motor: No abnormal muscle tone. Coordination: Coordination normal.   Psychiatric:         Behavior: Behavior normal.         DIAGNOSTIC RESULTS     EKG: All EKG's are interpreted by the Emergency Department Physician who either signs or Co-signs this chart in the absence of a cardiologist.    Sinus rhythm rate 78 nonspecific ST waves    RADIOLOGY:   Non-plain film images such as CT, Ultrasound and MRI are read by the radiologist. Plainradiographic images are visualized and preliminarily interpreted by the emergency physician with the below findings:        Interpretation per the Radiologist below, if available at the time of this note:    CT HEAD WO CONTRAST   Final Result   1. No acute intracranial abnormality is identified. There is evidence   previous craniotomy with aneurysm repair at the right MCA bifurcation. Small chronic lacunar infarct in the right caudate nucleus appears   stable. Chronic small vessel white matter ischemic changes are also   present. There is diffuse cerebral atrophy. Signed by Dr Ana Maria Thomas. Grazyna      XR CHEST PORTABLE   Final Result   Impression: Shallow inspiration, no acute findings. Signed by Dr Ana Maria Thomas.  Grazyna            ED BEDSIDE ULTRASOUND:   Performed by ED Physician - none    LABS:  Labs Reviewed   CBC WITH AUTO DIFFERENTIAL - Abnormal; Notable for the following components:       Result Value    WBC 13.5 (*)     .4 (*)     MCH 31.5 (*)     MCHC 31.1 (*)     Neutrophils % 79.5 (*)     Lymphocytes % 10.6 (*)     Neutrophils Absolute 10.8 (*)     Monocytes Absolute 1.10 (*)     All other components within normal limits   COMPREHENSIVE METABOLIC PANEL - Abnormal; Notable for the following components:    Sodium 146 (*)     Glucose 127 (*)     BUN 25 (*)     CREATININE 1.6 (*)     GFR Non- 32 (*)     GFR African American 39 (*)     Alkaline Phosphatase 111 (*)     All other components within normal limits   URINE RT REFLEX TO CULTURE - Abnormal; Notable for the following components:    Clarity, UA CLOUDY (*)     Ketones, Urine 15 (*)     Blood, Urine SMALL (*)     Protein,  (*)     Leukocyte Esterase, Urine MODERATE (*)     All other components within normal limits   LACTIC ACID, PLASMA - Abnormal; Notable for the following components:    Lactic Acid 4.6 (*)     All other components within normal limits    Narrative:     CALL  [place]  [facility] tel. ,  Chemistry results called to and read back by Johnson Callas RN AT [address], 10/13/2021  13:36, by SHARA   MICROSCOPIC URINALYSIS - Abnormal; Notable for the following components:    WBC, UA 21-30 (*)     Bacteria, UA Rare (*)     All other components within normal limits   COVID-19, RAPID   CULTURE, BLOOD 1   CULTURE, BLOOD 2   CULTURE, URINE   TROPONIN   BRAIN NATRIURETIC PEPTIDE   POTASSIUM, WHOLE BLOOD   LACTIC ACID, PLASMA   LACTIC ACID, PLASMA   LACTIC ACID, PLASMA   POCT GLUCOSE       All other labs were within normal range or not returned as of this dictation. EMERGENCY DEPARTMENT COURSE and DIFFERENTIALDIAGNOSIS/MDM:   Vitals:    Vitals:    10/13/21 0908 10/13/21 1315 10/13/21 1330 10/13/21 1345   BP: 93/78 110/86 (!) 115/103 (!) 116/91   Pulse: 79 76 67 73   Resp: 18      Temp: 98.4 °F (36.9 °C)      TempSrc: Oral      SpO2: 92% 98% 91% 95%       MDM  D/w Dr Bartley Moritz as an EEG was suggested before. He can consult if needed.   D/w

## 2021-10-14 ENCOUNTER — TELEPHONE (OUTPATIENT)
Dept: NEUROLOGY | Age: 66
End: 2021-10-14

## 2021-10-14 LAB
ALBUMIN SERPL-MCNC: 3.7 G/DL (ref 3.5–5.2)
ALP BLD-CCNC: 82 U/L (ref 35–104)
ALT SERPL-CCNC: 12 U/L (ref 5–33)
ANION GAP SERPL CALCULATED.3IONS-SCNC: 13 MMOL/L (ref 7–19)
AST SERPL-CCNC: 14 U/L (ref 5–32)
BASOPHILS ABSOLUTE: 0.1 K/UL (ref 0–0.2)
BASOPHILS RELATIVE PERCENT: 0.8 % (ref 0–1)
BILIRUB SERPL-MCNC: 0.4 MG/DL (ref 0.2–1.2)
BUN BLDV-MCNC: 21 MG/DL (ref 8–23)
CALCIUM SERPL-MCNC: 8.9 MG/DL (ref 8.8–10.2)
CHLORIDE BLD-SCNC: 105 MMOL/L (ref 98–111)
CO2: 23 MMOL/L (ref 22–29)
CREAT SERPL-MCNC: 1 MG/DL (ref 0.5–0.9)
EKG P AXIS: 56 DEGREES
EKG P-R INTERVAL: 152 MS
EKG Q-T INTERVAL: 404 MS
EKG QRS DURATION: 90 MS
EKG QTC CALCULATION (BAZETT): 433 MS
EKG T AXIS: 51 DEGREES
EOSINOPHILS ABSOLUTE: 0.1 K/UL (ref 0–0.6)
EOSINOPHILS RELATIVE PERCENT: 1.7 % (ref 0–5)
GFR AFRICAN AMERICAN: >59
GFR NON-AFRICAN AMERICAN: 55
GLUCOSE BLD-MCNC: 91 MG/DL (ref 74–109)
HCT VFR BLD CALC: 33.7 % (ref 37–47)
HEMOGLOBIN: 10.6 G/DL (ref 12–16)
IMMATURE GRANULOCYTES #: 0 K/UL
LACTIC ACID: 0.8 MMOL/L (ref 0.5–1.9)
LYMPHOCYTES ABSOLUTE: 2.3 K/UL (ref 1.1–4.5)
LYMPHOCYTES RELATIVE PERCENT: 29.3 % (ref 20–40)
MCH RBC QN AUTO: 31.3 PG (ref 27–31)
MCHC RBC AUTO-ENTMCNC: 31.5 G/DL (ref 33–37)
MCV RBC AUTO: 99.4 FL (ref 81–99)
MONOCYTES ABSOLUTE: 0.8 K/UL (ref 0–0.9)
MONOCYTES RELATIVE PERCENT: 9.7 % (ref 0–10)
NEUTROPHILS ABSOLUTE: 4.6 K/UL (ref 1.5–7.5)
NEUTROPHILS RELATIVE PERCENT: 58.2 % (ref 50–65)
PDW BLD-RTO: 12.7 % (ref 11.5–14.5)
PLATELET # BLD: 235 K/UL (ref 130–400)
PMV BLD AUTO: 11.4 FL (ref 9.4–12.3)
POTASSIUM REFLEX MAGNESIUM: 4 MMOL/L (ref 3.5–5)
RBC # BLD: 3.39 M/UL (ref 4.2–5.4)
SODIUM BLD-SCNC: 141 MMOL/L (ref 136–145)
TOTAL PROTEIN: 5.9 G/DL (ref 6.6–8.7)
WBC # BLD: 7.9 K/UL (ref 4.8–10.8)

## 2021-10-14 PROCEDURE — 6370000000 HC RX 637 (ALT 250 FOR IP): Performed by: NURSE PRACTITIONER

## 2021-10-14 PROCEDURE — 2580000003 HC RX 258: Performed by: NURSE PRACTITIONER

## 2021-10-14 PROCEDURE — 96375 TX/PRO/DX INJ NEW DRUG ADDON: CPT

## 2021-10-14 PROCEDURE — 6360000002 HC RX W HCPCS: Performed by: NURSE PRACTITIONER

## 2021-10-14 PROCEDURE — 1210000000 HC MED SURG R&B

## 2021-10-14 PROCEDURE — 83605 ASSAY OF LACTIC ACID: CPT

## 2021-10-14 PROCEDURE — 99232 SBSQ HOSP IP/OBS MODERATE 35: CPT | Performed by: PSYCHIATRY & NEUROLOGY

## 2021-10-14 PROCEDURE — 93010 ELECTROCARDIOGRAM REPORT: CPT | Performed by: INTERNAL MEDICINE

## 2021-10-14 PROCEDURE — 95816 EEG AWAKE AND DROWSY: CPT | Performed by: PSYCHIATRY & NEUROLOGY

## 2021-10-14 PROCEDURE — 85025 COMPLETE CBC W/AUTO DIFF WBC: CPT

## 2021-10-14 PROCEDURE — 80053 COMPREHEN METABOLIC PANEL: CPT

## 2021-10-14 PROCEDURE — 96376 TX/PRO/DX INJ SAME DRUG ADON: CPT

## 2021-10-14 PROCEDURE — 6370000000 HC RX 637 (ALT 250 FOR IP): Performed by: PSYCHIATRY & NEUROLOGY

## 2021-10-14 PROCEDURE — 96372 THER/PROPH/DIAG INJ SC/IM: CPT

## 2021-10-14 PROCEDURE — 36415 COLL VENOUS BLD VENIPUNCTURE: CPT

## 2021-10-14 PROCEDURE — 6370000000 HC RX 637 (ALT 250 FOR IP): Performed by: STUDENT IN AN ORGANIZED HEALTH CARE EDUCATION/TRAINING PROGRAM

## 2021-10-14 RX ORDER — ZONISAMIDE 100 MG/1
100 CAPSULE ORAL NIGHTLY
Status: DISCONTINUED | OUTPATIENT
Start: 2021-10-14 | End: 2021-10-15 | Stop reason: HOSPADM

## 2021-10-14 RX ADMIN — SODIUM CHLORIDE, POTASSIUM CHLORIDE, SODIUM LACTATE AND CALCIUM CHLORIDE: 600; 310; 30; 20 INJECTION, SOLUTION INTRAVENOUS at 05:39

## 2021-10-14 RX ADMIN — ENOXAPARIN SODIUM 40 MG: 40 INJECTION SUBCUTANEOUS at 18:21

## 2021-10-14 RX ADMIN — ZONISAMIDE 100 MG: 100 CAPSULE ORAL at 20:24

## 2021-10-14 RX ADMIN — SODIUM CHLORIDE, POTASSIUM CHLORIDE, SODIUM LACTATE AND CALCIUM CHLORIDE: 600; 310; 30; 20 INJECTION, SOLUTION INTRAVENOUS at 15:28

## 2021-10-14 RX ADMIN — PANTOPRAZOLE SODIUM 20 MG: 20 TABLET, DELAYED RELEASE ORAL at 06:03

## 2021-10-14 RX ADMIN — CEFTRIAXONE 1000 MG: 1 INJECTION, POWDER, FOR SOLUTION INTRAMUSCULAR; INTRAVENOUS at 15:28

## 2021-10-14 RX ADMIN — ESCITALOPRAM OXALATE 20 MG: 10 TABLET ORAL at 11:15

## 2021-10-14 RX ADMIN — CEFTRIAXONE 1000 MG: 1 INJECTION, POWDER, FOR SOLUTION INTRAMUSCULAR; INTRAVENOUS at 02:36

## 2021-10-14 RX ADMIN — ATORVASTATIN CALCIUM 40 MG: 40 TABLET, FILM COATED ORAL at 11:15

## 2021-10-14 RX ADMIN — ASPIRIN 81 MG: 81 TABLET, COATED ORAL at 20:24

## 2021-10-14 ASSESSMENT — PAIN SCALES - GENERAL: PAINLEVEL_OUTOF10: 0

## 2021-10-14 NOTE — PROGRESS NOTES
Daily Progress Note    Date:10/14/2021  Patient: Milton Laughlin  : 1955  DZT:[id number]  CODE:Full Code No additional code details  PCP:LIGIA Tamez    Admit Date: 10/13/2021  8:56 AM   LOS: 1 day       Subjective:     No acute events overnight. Patient feels much better today. No recurrent presyncope, lightheadedness or dizziness. Patient has been up ambulating without symptoms. No fevers or chills. Summary: 69-year-old female with issues with recurrent syncope over several months, recently hospitalized, history of labile blood pressure, cerebral aneurysm, presented with another episode of presyncope but noted to be hypotensive on presentation with lactate of 4.6, leukocytosis, active urine sediment consistent with UTI with sepsis. Blood pressure improved with IV fluid resuscitation and treated with Rocephin. Neurology evaluated given her history, EEG obtained did show some abnormalities that could be conducive to seizures and started on zonisamide antiepileptic.         Review of Systems      Comprehensive ROS completed and is negative except as otherwise noted        Objective:      Vital signs in last 24 hours:  Patient Vitals for the past 24 hrs:   BP Temp Temp src Pulse Resp SpO2 Weight   10/14/21 1218 120/69 97 °F (36.1 °C) Temporal 54 16 95 % --   10/14/21 0518 126/86 97.3 °F (36.3 °C) Temporal 53 16 95 % --   10/14/21 0103 111/74 96.6 °F (35.9 °C) Temporal 56 18 93 % 154 lb 6 oz (70 kg)         Physical exam    General: Resting in no acute distress  HEENT: NC/AT, no scleral icterus, no deformities otherwise  Cardiovascular: Normal rate, regular rhythm, pulses 2+ and equal  Respiratory: CTA B, no wheezes  Abdomen: Soft, nontender, bowel sounds present  Neurologic: Alert, oriented, follows commands, no focal lateralizing weakness,  normal speech  Extremities: No clubbing, cyanosis or edema  Skin: Warm and dry    Lab Review   Recent Results (from the past 24 hour(s))   Lactic Acid, Plasma    Collection Time: 10/13/21 10:31 PM   Result Value Ref Range    Lactic Acid 0.7 0.5 - 1.9 mmol/L   Comprehensive Metabolic Panel w/ Reflex to MG    Collection Time: 10/14/21  2:39 AM   Result Value Ref Range    Sodium 141 136 - 145 mmol/L    Potassium reflex Magnesium 4.0 3.5 - 5.0 mmol/L    Chloride 105 98 - 111 mmol/L    CO2 23 22 - 29 mmol/L    Anion Gap 13 7 - 19 mmol/L    Glucose 91 74 - 109 mg/dL    BUN 21 8 - 23 mg/dL    CREATININE 1.0 (H) 0.5 - 0.9 mg/dL    GFR Non-African American 55 (A) >60    GFR African American >59 >59    Calcium 8.9 8.8 - 10.2 mg/dL    Total Protein 5.9 (L) 6.6 - 8.7 g/dL    Albumin 3.7 3.5 - 5.2 g/dL    Total Bilirubin 0.4 0.2 - 1.2 mg/dL    Alkaline Phosphatase 82 35 - 104 U/L    ALT 12 5 - 33 U/L    AST 14 5 - 32 U/L   Lactic acid, plasma    Collection Time: 10/14/21  2:39 AM   Result Value Ref Range    Lactic Acid 0.8 0.5 - 1.9 mmol/L   CBC auto differential    Collection Time: 10/14/21  2:39 AM   Result Value Ref Range    WBC 7.9 4.8 - 10.8 K/uL    RBC 3.39 (L) 4.20 - 5.40 M/uL    Hemoglobin 10.6 (L) 12.0 - 16.0 g/dL    Hematocrit 33.7 (L) 37.0 - 47.0 %    MCV 99.4 (H) 81.0 - 99.0 fL    MCH 31.3 (H) 27.0 - 31.0 pg    MCHC 31.5 (L) 33.0 - 37.0 g/dL    RDW 12.7 11.5 - 14.5 %    Platelets 987 091 - 579 K/uL    MPV 11.4 9.4 - 12.3 fL    Neutrophils % 58.2 50.0 - 65.0 %    Lymphocytes % 29.3 20.0 - 40.0 %    Monocytes % 9.7 0.0 - 10.0 %    Eosinophils % 1.7 0.0 - 5.0 %    Basophils % 0.8 0.0 - 1.0 %    Neutrophils Absolute 4.6 1.5 - 7.5 K/uL    Immature Granulocytes # 0.0 K/uL    Lymphocytes Absolute 2.3 1.1 - 4.5 K/uL    Monocytes Absolute 0.80 0.00 - 0.90 K/uL    Eosinophils Absolute 0.10 0.00 - 0.60 K/uL    Basophils Absolute 0.10 0.00 - 0.20 K/uL       I/O last 3 completed shifts: In: 240 [P.O.:240]  Out: 2500 [Urine:2500]  No intake/output data recorded.       Current Facility-Administered Medications:     zonisamide (ZONEGRAN) capsule 100 mg, 100 mg, Oral, Nightly, Vini Irvin Maribel Kimball MD    0.9 % sodium chloride bolus, 1,000 mL, IntraVENous, Once, Chad Crandall MD    aspirin EC tablet 81 mg, 81 mg, Oral, Nightly, Charlotte Ades, APRN, 81 mg at 10/13/21 1946    atorvastatin (LIPITOR) tablet 40 mg, 40 mg, Oral, Daily, Charlotte Ades, APRN, 40 mg at 10/14/21 1115    escitalopram (LEXAPRO) tablet 20 mg, 20 mg, Oral, Daily, Charlotte Ades, APRN, 20 mg at 10/14/21 1115    [Held by provider] losartan (COZAAR) tablet 50 mg, 50 mg, Oral, Daily, Charlotte Ades, APRN    [Held by provider] metoprolol tartrate (LOPRESSOR) tablet 25 mg, 25 mg, Oral, Daily, Charlotte Ades, APRN    sodium chloride flush 0.9 % injection 5-40 mL, 5-40 mL, IntraVENous, 2 times per day, Charlotte Ades, APRN, 10 mL at 10/13/21 1946    sodium chloride flush 0.9 % injection 5-40 mL, 5-40 mL, IntraVENous, PRN, Charlotte Ades, APRN    0.9 % sodium chloride infusion, 25 mL, IntraVENous, PRN, Charlotte Ades, APRN    enoxaparin (LOVENOX) injection 40 mg, 40 mg, SubCUTAneous, Q24H, Charlotte Ades, APRN, 40 mg at 10/13/21 1612    ondansetron (ZOFRAN-ODT) disintegrating tablet 4 mg, 4 mg, Oral, Q8H PRN **OR** ondansetron (ZOFRAN) injection 4 mg, 4 mg, IntraVENous, Q6H PRN, Charlotte Ades, APRN    acetaminophen (TYLENOL) tablet 650 mg, 650 mg, Oral, Q6H PRN **OR** acetaminophen (TYLENOL) suppository 650 mg, 650 mg, Rectal, Q6H PRN, Charlotte Ades, APRN    lactated ringers infusion, , IntraVENous, Continuous, Charlotte Ades, APRN, Last Rate: 100 mL/hr at 10/14/21 1528, New Bag at 10/14/21 1528    pantoprazole (PROTONIX) tablet 20 mg, 20 mg, Oral, QA AC, Tammie Sanabria MD, 20 mg at 10/14/21 0603    cefTRIAXone (ROCEPHIN) 1,000 mg in sterile water 10 mL IV syringe, 1,000 mg, IntraVENous, Q12H, LIGIA Mcclendon, 1,000 mg at 10/14/21 1528          Assessment/plan  Principal Problem:    Sepsis secondary to UTI [facility])  Active Problems:    Near syncope    Hypotension due to hypovolemia    BILL (acute kidney injury) ([facility] [address]  Resolved Problems:    * No resolved hospital problems. *      Sepsis secondary to UTI  Hemodynamic status improved with IV fluid  Continue Rocephin  Follow urine and blood cultures    Presyncope, likely related to hypotension in setting of sepsis, although previous presyncopal episodes may be unrelated  Monitor telemetry  Previously on Zio patch but she had difficulty wearing this  Can consider loop recorder outpatient  EEG with some abnormalities conducive to seizures  Started on zonisamide per neurology    BILL  Improved significantly with IV fluids  Follow urine output and renal function  Holding losartan  Avoid hypotension    Continue to hold home antihypertensives    DVT prophylaxis with Lovenox    Disposition: TBD pending improvement/following cultures. Potential discharge in 1-3 days if improved, and would reduce antihypertensive medication. Outpatient follow-up with neurology.         Alem Cantrell MD 10/14/2021 5:25 PM

## 2021-10-14 NOTE — TELEPHONE ENCOUNTER
Patient called wanting Dr Raisa Dempsey to call her son that lived in [place]. To explain what was going on with her, I tried to explain to her that our providers didn't typically call family members since she has not been seen in our office yet just in the hospital , she was very insistent.  I stated multiple times that our providers do not call family members her son name is Marden Scheuermann and # is

## 2021-10-14 NOTE — PROCEDURES
ROBERTAASHLEY BOLAÑOS [facility] [address] [facility]                          ELECTROENCEPHALOGRAM REPORT    PATIENT NAME: Nate Ragland                    :        1955  MED REC NO:   [id number]                              ROOM:       [facility]  ACCOUNT NO:   [de-identified]                           ADMIT DATE: 10/13/2021  PROVIDER:     Nilay Tay MD    DATE OF EEG:  10/13/2021  Indication for testaltered level of consciousness  SUMMARY:  The waking background consists of a rhythmic and symmetric  well-formed and well-regulated 8 Hz activity. Intermittent right  frontotemporal slowing and poorly formed sharp activity is noted. Stage  II sleep is not seen. Hyperventilation is not performed. Photic  stimulation produced no abnormalities. IMPRESSION:  Abnormal EEG due to intermittent right frontal central  slowing and poorly formed sharp waves. This is potentially  epileptogenic, although it could be consistent with her right MCA  aneurysm clipping. Correlate clinically.         Shira Gaytan MD    D: 10/14/2021 6:45:28      T: 10/14/2021 8:05:04     [email or web address]  Job#: [id number]     Doc#: [id number]    CC:

## 2021-10-14 NOTE — PROGRESS NOTES
Patient:   Mayra Chavez  MR#:    [id number]   Room:    /[facility]   YOB: 1955  Date of Progress Note: 10/14/2021  Time of Note                           8:10 AM  Consulting Physician:   Rishabh Blackburn M.D. Attending Physician:  Alem Cantrell MD     CHIEF COMPLAINT: near syncope, recurrent  ? Subjective  This 78 y.o. female who presents with near syncope. She was admitted a couple of weeks ago with a similar thing but actual syncope. Both times she complained of blurry vision, nausea and lightheadedness. The last time her blood pressure was in the 80s and this time in the ED it was in the 90s. She has had visits in the past for dizziness and elevated blood pressure. She had a right MCA aneurysm clipping in 2019 and has had trouble intermittently of one type of another since that time. She had an EEG about a year or so ago which was unremarkable. This admission she also appears to have a urinary tract infection along with an elevated lactic acid. No problems since admission  REVIEW OF SYSTEMS:  Constitutional: No fevers No chills  Neck:No stiffness  Respiratory: No shortness of breath  Cardiovascular: No chest pain No palpitations  Gastrointestinal: No abdominal pain    Genitourinary: No Dysuria  Neurological: No headache, no confusion      PHYSICAL EXAM:  /86   Pulse 53   Temp 97.3 °F (36.3 °C) (Temporal)   Resp 16   Ht 5' 2\" (1.575 m)   Wt 154 lb 6 oz (70 kg)   SpO2 95%   BMI 28.24 kg/m²     Constitutional: she appears well-developed and well-nourished. Eyes - conjunctiva normal.  Pupils react to light  Ear, nose, throat -hearing intact to voice.  No scars, masses, or lesions over external nose or ears, no atrophy of tongue  Neck-symmetric, no masses noted, no jugular vein distension  Respiration- chest wall appears symmetric, good expansion,   normal effort without use of accessory muscles  Cardiovascular- RRR  Musculoskeletal - no significant wasting of muscles noted, no bony deformities, gait no gross ataxia  Extremities-no clubbing, cyanosis or edema  Skin - warm, dry, and intact. No rash, erythema, or pallor. Psychiatric - mood, affect, and behavior appear normal.      Neurology  NEUROLOGICAL EXAM:      Mental status   Awake, alert, fluent oriented x 3 appropriate affect  Attention and concentration appear appropriate  Recent and remote memory appears unremarkable  Speech normal without dysarthria  No clear issues with language       Cranial Nerves   CN II- Visual fields grossly unremarkable  CN III, IV,VI-EOMI, No nystagmus, conjugate eye movements, no ptosis  CN VII-no facial asymmetry  CN VIII-Hearing intact   CN IX and X- Palate elevates in midline  CN XI-good shoulder shrug  CN XII-Tongue midline with no fasciculations or fibrillations          Motor function  Antigravity x 4     Sensory function Intact to light touch     Cerebellar F-N intact     Tremor None present     Gait                  Not tested           Nursing/pcp notes, imaging,labs and vitals reviewed.      PT,OT and/or speech notes reviewed    Lab Results   Component Value Date    WBC 7.9 10/14/2021    HGB 10.6 (L) 10/14/2021    HCT 33.7 (L) 10/14/2021    MCV 99.4 (H) 10/14/2021     10/14/2021     Lab Results   Component Value Date     10/14/2021    K 4.0 10/14/2021     10/14/2021    CO2 23 10/14/2021    BUN 21 10/14/2021    CREATININE 1.0 (H) 10/14/2021    GLUCOSE 91 10/14/2021    CALCIUM 8.9 10/14/2021    PROT 5.9 (L) 10/14/2021    LABALBU 3.7 10/14/2021    BILITOT 0.4 10/14/2021    ALKPHOS 82 10/14/2021    AST 14 10/14/2021    ALT 12 10/14/2021    LABGLOM 55 (A) 10/14/2021    GFRAA >59 10/14/2021    GLOB 3.5 09/06/2016     Lab Results   Component Value Date    INR 1.11 10/03/2021    INR 1.06 05/31/2020    INR 0.91 05/07/2019     Lab Results   Component Value Date    CRP 1.95 (H) 02/24/2019     DATE OF EEG:  10/13/2021  Indication for test-altered level of consciousness  SUMMARY:  The waking background consists of a rhythmic and symmetric  well-formed and well-regulated 8 Hz activity. Intermittent right  frontotemporal slowing and poorly formed sharp activity is noted. Stage  II sleep is not seen. Hyperventilation is not performed. Photic  stimulation produced no abnormalities.     IMPRESSION:  Abnormal EEG due to intermittent right frontal central  slowing and poorly formed sharp waves. This is potentially  epileptogenic, although it could be consistent with her right MCA  aneurysm clipping. Correlate clinically.           RECORD REVIEW: Previous medical records, medications were reviewed at today's visit    IMPRESSION:  Syncope/near syncope. Currently her symptoms are suspicious as being related to hypotension. Would recommend adjusting her blood pressure medications to avoid this and continuing IV fluid resuscitation. Treating urinary tract infection as you are doing. All of her episodes of near syncope may not have the same cause. EEG show some abnormalities which could be conducive to seizures. Risk, benefits and alternatives of AED treatment were discussed at length. She wishes to try a low-dose of zonisamide at this time and it will be started. We will keep a closer eye on her blood pressure. Consider loop recorder. Follow-up as an outpatient.

## 2021-10-15 VITALS
OXYGEN SATURATION: 98 % | HEIGHT: 62 IN | SYSTOLIC BLOOD PRESSURE: 132 MMHG | TEMPERATURE: 96.8 F | RESPIRATION RATE: 14 BRPM | WEIGHT: 154.38 LBS | DIASTOLIC BLOOD PRESSURE: 81 MMHG | HEART RATE: 59 BPM | BODY MASS INDEX: 28.41 KG/M2

## 2021-10-15 LAB
ALBUMIN SERPL-MCNC: 3.8 G/DL (ref 3.5–5.2)
ALP BLD-CCNC: 82 U/L (ref 35–104)
ALT SERPL-CCNC: 10 U/L (ref 5–33)
ANION GAP SERPL CALCULATED.3IONS-SCNC: 12 MMOL/L (ref 7–19)
AST SERPL-CCNC: 12 U/L (ref 5–32)
BASOPHILS ABSOLUTE: 0.1 K/UL (ref 0–0.2)
BASOPHILS RELATIVE PERCENT: 0.8 % (ref 0–1)
BILIRUB SERPL-MCNC: <0.2 MG/DL (ref 0.2–1.2)
BUN BLDV-MCNC: 14 MG/DL (ref 8–23)
CALCIUM SERPL-MCNC: 8.9 MG/DL (ref 8.8–10.2)
CHLORIDE BLD-SCNC: 107 MMOL/L (ref 98–111)
CO2: 23 MMOL/L (ref 22–29)
CREAT SERPL-MCNC: 1 MG/DL (ref 0.5–0.9)
EOSINOPHILS ABSOLUTE: 0.2 K/UL (ref 0–0.6)
EOSINOPHILS RELATIVE PERCENT: 2.3 % (ref 0–5)
GFR AFRICAN AMERICAN: >59
GFR NON-AFRICAN AMERICAN: 55
GLUCOSE BLD-MCNC: 95 MG/DL (ref 74–109)
HCT VFR BLD CALC: 36.4 % (ref 37–47)
HEMOGLOBIN: 11.1 G/DL (ref 12–16)
IMMATURE GRANULOCYTES #: 0 K/UL
LACTIC ACID: 0.7 MMOL/L (ref 0.5–1.9)
LYMPHOCYTES ABSOLUTE: 2.3 K/UL (ref 1.1–4.5)
LYMPHOCYTES RELATIVE PERCENT: 30.1 % (ref 20–40)
MCH RBC QN AUTO: 31.1 PG (ref 27–31)
MCHC RBC AUTO-ENTMCNC: 30.5 G/DL (ref 33–37)
MCV RBC AUTO: 102 FL (ref 81–99)
MONOCYTES ABSOLUTE: 0.7 K/UL (ref 0–0.9)
MONOCYTES RELATIVE PERCENT: 9.6 % (ref 0–10)
NEUTROPHILS ABSOLUTE: 4.4 K/UL (ref 1.5–7.5)
NEUTROPHILS RELATIVE PERCENT: 56.8 % (ref 50–65)
ORGANISM: ABNORMAL
PDW BLD-RTO: 12.6 % (ref 11.5–14.5)
PLATELET # BLD: 234 K/UL (ref 130–400)
PMV BLD AUTO: 10.8 FL (ref 9.4–12.3)
POTASSIUM REFLEX MAGNESIUM: 4.7 MMOL/L (ref 3.5–5)
RBC # BLD: 3.57 M/UL (ref 4.2–5.4)
SODIUM BLD-SCNC: 142 MMOL/L (ref 136–145)
TOTAL PROTEIN: 6.1 G/DL (ref 6.6–8.7)
URINE CULTURE, ROUTINE: ABNORMAL
URINE CULTURE, ROUTINE: ABNORMAL
WBC # BLD: 7.7 K/UL (ref 4.8–10.8)

## 2021-10-15 PROCEDURE — 97165 OT EVAL LOW COMPLEX 30 MIN: CPT

## 2021-10-15 PROCEDURE — 2580000003 HC RX 258: Performed by: NURSE PRACTITIONER

## 2021-10-15 PROCEDURE — 6370000000 HC RX 637 (ALT 250 FOR IP): Performed by: NURSE PRACTITIONER

## 2021-10-15 PROCEDURE — 36415 COLL VENOUS BLD VENIPUNCTURE: CPT

## 2021-10-15 PROCEDURE — 6360000002 HC RX W HCPCS: Performed by: NURSE PRACTITIONER

## 2021-10-15 PROCEDURE — 97161 PT EVAL LOW COMPLEX 20 MIN: CPT

## 2021-10-15 PROCEDURE — 6370000000 HC RX 637 (ALT 250 FOR IP): Performed by: STUDENT IN AN ORGANIZED HEALTH CARE EDUCATION/TRAINING PROGRAM

## 2021-10-15 PROCEDURE — 96376 TX/PRO/DX INJ SAME DRUG ADON: CPT

## 2021-10-15 PROCEDURE — 80053 COMPREHEN METABOLIC PANEL: CPT

## 2021-10-15 PROCEDURE — 99232 SBSQ HOSP IP/OBS MODERATE 35: CPT | Performed by: PSYCHIATRY & NEUROLOGY

## 2021-10-15 PROCEDURE — 83605 ASSAY OF LACTIC ACID: CPT

## 2021-10-15 PROCEDURE — 85025 COMPLETE CBC W/AUTO DIFF WBC: CPT

## 2021-10-15 RX ORDER — LOSARTAN POTASSIUM 25 MG/1
25 TABLET ORAL DAILY
Qty: 30 TABLET | Refills: 3 | Status: ON HOLD
Start: 2021-10-15 | End: 2022-04-11 | Stop reason: HOSPADM

## 2021-10-15 RX ORDER — ZONISAMIDE 100 MG/1
100 CAPSULE ORAL NIGHTLY
Qty: 30 CAPSULE | Refills: 3 | Status: SHIPPED | OUTPATIENT
Start: 2021-10-15 | End: 2022-04-15

## 2021-10-15 RX ORDER — LOSARTAN POTASSIUM 25 MG/1
25 TABLET ORAL DAILY
Status: DISCONTINUED | OUTPATIENT
Start: 2021-10-15 | End: 2021-10-15 | Stop reason: HOSPADM

## 2021-10-15 RX ORDER — METRONIDAZOLE 500 MG/1
500 TABLET ORAL 3 TIMES DAILY
Qty: 15 TABLET | Refills: 0 | Status: SHIPPED | OUTPATIENT
Start: 2021-10-15 | End: 2021-10-20

## 2021-10-15 RX ADMIN — PANTOPRAZOLE SODIUM 20 MG: 20 TABLET, DELAYED RELEASE ORAL at 06:09

## 2021-10-15 RX ADMIN — CEFTRIAXONE 1000 MG: 1 INJECTION, POWDER, FOR SOLUTION INTRAMUSCULAR; INTRAVENOUS at 02:09

## 2021-10-15 RX ADMIN — ESCITALOPRAM OXALATE 20 MG: 10 TABLET ORAL at 09:13

## 2021-10-15 RX ADMIN — ATORVASTATIN CALCIUM 40 MG: 40 TABLET, FILM COATED ORAL at 09:13

## 2021-10-15 RX ADMIN — SODIUM CHLORIDE, PRESERVATIVE FREE 10 ML: 5 INJECTION INTRAVENOUS at 09:14

## 2021-10-15 ASSESSMENT — PAIN SCALES - GENERAL: PAINLEVEL_OUTOF10: 0

## 2021-10-15 NOTE — DISCHARGE SUMMARY
Discharge Summary  Date:10/15/2021        Patient Kenneth Willis     YOB: 1955     Age:66 y.o. Admit Date:10/13/2021   Admission Condition:poor   Discharged Condition:fair  Discharge Date: 10/15/21     Discharge Diagnoses   Principal Problem:    Sepsis secondary to UTI [facility])  Active Problems:    Near syncope    Hypotension due to hypovolemia    BILL (acute kidney injury) ([facility] [address]  Resolved Problems:    * No resolved hospital problems. [facility] Stay   Narrative of Hospital Course: Patient admitted 813,66-year-old female with issues with recurrent syncope over several months, recently hospitalized, history of labile blood pressure, cerebral aneurysm, presented with another episode of presyncope but noted to be hypotensive on presentation with lactate of 4.6, leukocytosis, active urine sediment consistent with UTI with sepsis. Blood pressure improved with IV fluid resuscitation and treated with Rocephin. Neurology evaluated given her history, EEG obtained did show some abnormalities that could be conducive to seizures and started on zonisamide antiepileptic. Culture resulted with growth of Gardnerella vaginalis greater than 100,000 colony-forming units. Patient provided a course of Flagyl x5 days. Decrease blood pressure modalities as detailed below. Patient is to follow-up outpatient with neurology. Education modalities attached at visit summary. Follow-up primary care provider in next 1 to 2 weeks for hospital discharge was ongoing coordination of care. Consultants:   IP CONSULT TO NEUROLOGY    Time Spent on Discharge:  45  minutes were spent in patient examination, evaluation, counseling as well as medication reconciliation, prescriptions for required medications, discharge plan and follow up.       Surgeries/Procedures Performed:       Treatments:   IV hydration, antibiotics: ceftriaxone, analgesia: acetaminophen, cardiac meds: Lipitor, losartan, Lopressor, anticoagulation: ASA, respiratory therapy: O2, therapies: PT and OT and electrolyte monitoring and stabilization    Significant Diagnostic Studies:   Recent Labs:  CBC:   Lab Results   Component Value Date    WBC 7.7 10/15/2021    RBC 3.57 10/15/2021    HGB 11.1 10/15/2021    HCT 36.4 10/15/2021    .0 10/15/2021    MCH 31.1 10/15/2021    MCHC 30.5 10/15/2021    RDW 12.6 10/15/2021     10/15/2021     BMP:    Lab Results   Component Value Date    GLUCOSE 95 10/15/2021     10/15/2021    K 4.7 10/15/2021     10/15/2021    CO2 23 10/15/2021    ANIONGAP 12 10/15/2021    BUN 14 10/15/2021    CREATININE 1.0 10/15/2021    CALCIUM 8.9 10/15/2021    LABGLOM 55 10/15/2021    GFRAA >59 10/15/2021     HFP:    Lab Results   Component Value Date    PROT 6.1 10/15/2021     CMP:    Lab Results   Component Value Date    GLUCOSE 95 10/15/2021     10/15/2021    K 4.7 10/15/2021     10/15/2021    CO2 23 10/15/2021    BUN 14 10/15/2021    CREATININE 1.0 10/15/2021    ANIONGAP 12 10/15/2021    ALKPHOS 82 10/15/2021    ALT 10 10/15/2021    AST 12 10/15/2021    BILITOT <0.2 10/15/2021    LABALBU 3.8 10/15/2021    LABGLOM 55 10/15/2021    GFRAA >59 10/15/2021    PROT 6.1 10/15/2021    CALCIUM 8.9 10/15/2021     PT/INR:    Lab Results   Component Value Date    PROTIME 14.5 10/03/2021    INR 1.11 10/03/2021     PTT:   Lab Results   Component Value Date    APTT 28.4 10/03/2021     FLP:    Lab Results   Component Value Date    CHOL 166 06/01/2020    TRIG 106 06/01/2020    HDL 29 06/01/2020     U/A:    Lab Results   Component Value Date    COLORU YELLOW 10/13/2021    SPECGRAV 1.018 10/13/2021    PHUR 5.0 10/13/2021    PROTEINU 100 10/13/2021    GLUCOSEU Negative 10/13/2021    KETUA 15 10/13/2021    BILIRUBINUR Negative 10/13/2021    UROBILINOGEN 1.0 10/13/2021    NITRU Negative 10/13/2021    LEUKOCYTESUR MODERATE 10/13/2021     TSH:    Lab Results   Component Value Date    TSH 2.900 05/31/2020       Radiology Last 7 Days:  CT HEAD WO CONTRAST    Result Date: 10/13/2021  1. No acute intracranial abnormality is identified. There is evidence previous craniotomy with aneurysm repair at the right MCA bifurcation. Small chronic lacunar infarct in the right caudate nucleus appears stable. Chronic small vessel white matter ischemic changes are also present. There is diffuse cerebral atrophy. Signed by Dr Edvin Cantrell. Grazyna    XR CHEST PORTABLE    Result Date: 10/13/2021  Impression: Shallow inspiration, no acute findings. Signed by Dr Edvin Cantrell. Cesariohoregina    Physical Exam  Vitals and nursing note reviewed. Constitutional:       General: She is not in acute distress. Appearance: She is not toxic-appearing. HENT:      Head: Normocephalic and atraumatic. Nose: Nose normal.   Eyes:      Conjunctiva/sclera: Conjunctivae normal.   Cardiovascular:      Rate and Rhythm: Normal rate and regular rhythm. Pulses: Normal pulses. Pulmonary:      Effort: Pulmonary effort is normal. No respiratory distress. Breath sounds: No wheezing. Abdominal:      General: Bowel sounds are normal.      Tenderness: There is no guarding or rebound. Musculoskeletal:      Cervical back: Normal range of motion. Right lower leg: No edema. Left lower leg: No edema. Skin:     General: Skin is warm. Capillary Refill: Capillary refill takes less than 2 seconds. Neurological:      General: No focal deficit present. Mental Status: She is alert. Motor: Weakness present. Psychiatric:         Mood and Affect: Mood normal.             Discharge Plan   Disposition: Home    Provider Follow-Up:   No follow-up provider specified.        Patient Instructions   Diet: cardiac diet    Activity: activity as tolerated      Discharge Medications         Medication List      START taking these medications    metroNIDAZOLE 500 MG tablet  Commonly known as: FLAGYL  Take 1 tablet by mouth 3 times daily for 5 days     zonisamide 100 MG capsule  Commonly known as: ZONEGRAN  Take 1 capsule by mouth nightly        CHANGE how you take these medications    losartan 25 MG tablet  Commonly known as: COZAAR  Take 1 tablet by mouth daily  What changed:   · medication strength  · how much to take  · how to take this  · when to take this  · additional instructions     metoprolol tartrate 25 MG tablet  Commonly known as: LOPRESSOR  Take 0.5 tablets by mouth daily  What changed: See the new instructions. CONTINUE taking these medications    aspirin 81 MG EC tablet  Take 1 tablet by mouth nightly     atorvastatin 40 MG tablet  Commonly known as: LIPITOR  TAKE 1 TABLET BY MOUTH AT NIGHT. Blood Pressure Kit  1 Units by Does not apply route daily     escitalopram 20 MG tablet  Commonly known as: LEXAPRO  TAKE 1 TABLET BY MOUTH DAILY AT BEDTIME.     pantoprazole 20 MG tablet  Commonly known as: PROTONIX  TAKE 1 TABLET BY MOUTH ONCE DAILY. Where to Get Your Medications      These medications were sent to [facility], [address] [facility], [address]  [address] [address], [address] [address]    Phone: [phone]   · losartan 25 MG tablet  · metoprolol tartrate 25 MG tablet  · metroNIDAZOLE 500 MG tablet  · zonisamide 100 MG capsule       EMR Dragon/Transcription disclaimer:   Much of this encounter note is an electronic transcription/translation of spoken language to printed text.  The electronic translation of spoken language may permit erroneous, or at times, nonsensical words or phrases to be inadvertently transcribed; although attempts have made to review the note for such errors, some may still exist.    Electronically signed by   Lucas Mcmahon MD   Internal Medicine Hospitalist  On 10/15/2021  At 12:17 PM

## 2021-10-15 NOTE — PROGRESS NOTES
Patient:   Kendrick Goins  MR#:    [id number]   Room:    [address] [place] [address]   YOB: 1955  Date of Progress Note: 10/15/2021  Time of Note                           7:46 AM  Consulting Physician:   Topher Slater M.D. Attending Physician:  Austin Noonan MD     CHIEF COMPLAINT: near syncope, recurrent  ? Subjective  This 78 y.o. female who presents with near syncope. She was admitted a couple of weeks ago with a similar thing but actual syncope. Both times she complained of blurry vision, nausea and lightheadedness. The last time her blood pressure was in the 80s and this time in the ED it was in the 90s. She has had visits in the past for dizziness and elevated blood pressure. She had a right MCA aneurysm clipping in 2019 and has had trouble intermittently of one type of another since that time. She had an EEG about a year or so ago which was unremarkable. This admission she also appears to have a urinary tract infection along with an elevated lactic acid. No complaints today. No problems since admission. REVIEW OF SYSTEMS:  Constitutional: No fevers No chills  Neck:No stiffness  Respiratory: No shortness of breath  Cardiovascular: No chest pain No palpitations  Gastrointestinal: No abdominal pain    Genitourinary: No Dysuria  Neurological: No headache, no confusion      PHYSICAL EXAM:  /89   Pulse 82   Temp 96.6 °F (35.9 °C) (Temporal)   Resp 16   Ht 5' 2\" (1.575 m)   Wt 154 lb 6 oz (70 kg)   SpO2 94%   BMI 28.24 kg/m²     Constitutional: she appears well-developed and well-nourished. Eyes - conjunctiva normal.  Pupils react to light  Ear, nose, throat -hearing intact to voice.  No scars, masses, or lesions over external nose or ears, no atrophy of tongue  Neck-symmetric, no masses noted, no jugular vein distension  Respiration- chest wall appears symmetric, good expansion,   normal effort without use of accessory muscles  Cardiovascular- RRR  Musculoskeletal - no significant wasting of muscles noted, no bony deformities, gait no gross ataxia  Extremities-no clubbing, cyanosis or edema  Skin - warm, dry, and intact. No rash, erythema, or pallor. Psychiatric - mood, affect, and behavior appear normal.      Neurology  NEUROLOGICAL EXAM:      Mental status   Awake, alert, fluent oriented x 3 appropriate affect  Attention and concentration appear appropriate  Recent and remote memory appears unremarkable  Speech normal without dysarthria  No clear issues with language       Cranial Nerves   CN II- Visual fields grossly unremarkable  CN III, IV,VI-EOMI, No nystagmus, conjugate eye movements, no ptosis  CN VII-no facial asymmetry  CN VIII-Hearing intact   CN IX and X- Palate elevates in midline  CN XI-good shoulder shrug  CN XII-Tongue midline with no fasciculations or fibrillations          Motor function  Antigravity x 4     Sensory function Intact to light touch     Cerebellar F-N intact     Tremor None present     Gait                  Not tested           Nursing/pcp notes, imaging,labs and vitals reviewed.      PT,OT and/or speech notes reviewed    Lab Results   Component Value Date    WBC 7.7 10/15/2021    HGB 11.1 (L) 10/15/2021    HCT 36.4 (L) 10/15/2021    .0 (H) 10/15/2021     10/15/2021     Lab Results   Component Value Date     10/15/2021    K 4.7 10/15/2021     10/15/2021    CO2 23 10/15/2021    BUN 14 10/15/2021    CREATININE 1.0 (H) 10/15/2021    GLUCOSE 95 10/15/2021    CALCIUM 8.9 10/15/2021    PROT 6.1 (L) 10/15/2021    LABALBU 3.8 10/15/2021    BILITOT <0.2 10/15/2021    ALKPHOS 82 10/15/2021    AST 12 10/15/2021    ALT 10 10/15/2021    LABGLOM 55 (A) 10/15/2021    GFRAA >59 10/15/2021    GLOB 3.5 09/06/2016     Lab Results   Component Value Date    INR 1.11 10/03/2021    INR 1.06 05/31/2020    INR 0.91 05/07/2019     Lab Results   Component Value Date    CRP 1.95 (H) 02/24/2019     DATE OF EEG:  10/13/2021  Indication for test-altered level of consciousness  SUMMARY:  The waking background consists of a rhythmic and symmetric  well-formed and well-regulated 8 Hz activity. Intermittent right  frontotemporal slowing and poorly formed sharp activity is noted. Stage  II sleep is not seen. Hyperventilation is not performed. Photic  stimulation produced no abnormalities.     IMPRESSION:  Abnormal EEG due to intermittent right frontal central  slowing and poorly formed sharp waves. This is potentially  epileptogenic, although it could be consistent with her right MCA  aneurysm clipping. Correlate clinically.           RECORD REVIEW: Previous medical records, medications were reviewed at today's visit    IMPRESSION:  Syncope/near syncope. Currently her symptoms are suspicious as being related to hypotension. Would recommend adjusting her blood pressure medications to avoid this and continuing IV fluid resuscitation. Treating urinary tract infection as you are doing. All of her episodes of near syncope may not have the same cause. EEG show some abnormalities which could be conducive to seizures. Now on Zonegran 100 mg a day and tolerating it. Consider loop recorder. Follow-up as an outpatient. Okay with me for her to go home today. Call Dr. Brittany Perkins over the weekend if needed.

## 2021-10-15 NOTE — PROGRESS NOTES
VI PHYSICAL THERAPY EVALUATION      Milton Laughlin    : 1955  MRN: [id number]   PHYSICIAN:  Lucas Mcmahon MD  Primary Problem    Patient Active Problem List   Diagnosis    [facility] stroke ([facility]    HTN (hypertension)    Anxiety    Cerebrovascular accident (CVA) ([facility]    Dizziness    Nonintractable episodic headache    Aneurysm of middle cerebral artery    TIA (transient ischemic attack)    Brain aneurysm    Pneumonia    Seizures ([facility]    Post-menopausal    Need for prophylactic vaccination and inoculation against varicella    At high risk for falls    Other fatigue    Transient alteration of awareness    Other complicated headache syndrome    History of cerebral aneurysm repair    Palpitations    Near syncope    Cold sweat    Nausea    Depression    Support system deficit    Gastroesophageal reflux disease without esophagitis    Hypercholesterolemia    Abnormal finding of blood chemistry, unspecified     Hypotension due to hypovolemia    BILL (acute kidney injury) ([facility]    Dehydration    Sepsis secondary to UTI [facility])       Rehabilitation Diagnosis:     Septicemia ([facility] [A41.9]  Lactic acid acidosis [E87.2]  BILL (acute kidney injury) ([facility] [N17.9]  Near syncope [R55]       SERVICE DATE: 10/15/2021        SUBJECTIVE: Patient agrees that they are safe with mobility and do not require physical therapy services. OBJECTIVE:    Orientation is Within normal limits       Reports no pain        ACTIVE ROM:       All major lower extremity joints are within functional limits      STRENGTH     Both lower extremities are grossly within functional limits.     TRANSFERS   Sit to stand   Independent      Bed to chair   Independent     Bed to mobility   Supine to sit   Independent       Roll     Independent     Scoot Side to side / Up and down   Independent    AMBULATION   Distance: 40' in room     Device: NONE     Assistance: Independent       Comment: no deviations    BALANCE   Sitting Good     Standing    Good    ASSESSMENT      Independent and safe with all functional mobility       PLAN: Discharge from skilled physical therapy      GOALS   Independent and safe with all functional mobility (MET)            Electronically signed by Maira Amaya PT on 10/15/2021 at 11:39 AM

## 2021-10-15 NOTE — PROGRESS NOTES
Occupational Therapy   Occupational Therapy Initial Assessment  Date: 10/15/2021   Patient Name: Whit Kitchen  MRN: [id number]     : 1955    Date of Service: 10/15/2021    Discharge Recommendations:          Assessment   Assessment: Pt I with self care and tfers. OT eval only  Treatment Diagnosis: Near syncope  Prognosis: Good  Decision Making: Low Complexity  REQUIRES OT FOLLOW UP: No  Activity Tolerance  Activity Tolerance: Patient Tolerated treatment well           Patient Diagnosis(es): The primary encounter diagnosis was Near syncope. Diagnoses of BILL (acute kidney injury) ([facility] Lactic acid acidosis, and Septicemia ([facility] were also pertinent to this visit. has a past medical history of Anxiety, Arthritis, Cerebral aneurysm, Former smoker, GERD (gastroesophageal reflux disease), and Hypertension. has a past surgical history that includes Hysterectomy; Cholecystectomy; Hemorrhoid surgery; craniotomy (Right, 2019); and brain surgery.     Treatment Diagnosis: Near syncope      Restrictions       Subjective   General  Chart Reviewed: Yes  Patient assessed for rehabilitation services?: Yes  Diagnosis: Near syncope, blurry vision  General Comment  Comments: Pt. states she feels fine and is ready to go home  Patient Currently in Pain: Denies  Vital Signs  Patient Currently in Pain: Denies  Social/Functional History  Social/Functional History  Lives With: Alone  Type of Home: House  ADL Assistance: Independent  Ambulation Assistance: Independent  Transfer Assistance: Independent       Objective   Vision: Within Functional Limits  Hearing: Within functional limits    Orientation  Overall Orientation Status: Within Normal Limits        ADL  Feeding: Independent  Grooming: Independent  UE Bathing: Independent  LE Bathing: Independent  UE Dressing: Independent  LE Dressing: Independent  Toileting: Independent           Transfers  Stand Step Transfers: Independent  Sit to stand: Independent Cognition  Overall Cognitive Status: WNL                 LUE AROM (degrees)  LUE AROM : WNL  RUE AROM (degrees)  RUE AROM : WNL  LUE Strength  Gross LUE Strength: WFL  RUE Strength  Gross RUE Strength: WFL                   Plan   Plan  Times per week: 3-5x/week  Times per day: Daily    G-Code     OutComes Score                                                  AM-PAC Score             Goals  Short term goals  Time Frame for Short term goals: OT eval only  Long term goals  Time Frame for Long term goals : OT eval only       Therapy Time   Individual Concurrent Group Co-treatment   Time In           Time Out           Minutes                   Lakisha Chang, OT

## 2021-10-18 ENCOUNTER — TELEPHONE (OUTPATIENT)
Dept: PRIMARY CARE CLINIC | Age: 66
End: 2021-10-18

## 2021-10-18 LAB
BLOOD CULTURE, ROUTINE: NORMAL
CULTURE, BLOOD 2: NORMAL

## 2021-10-18 NOTE — PROGRESS NOTES
Physician Progress Note      Nilay Burnham  CSN #:                  [id number]  :                       1955  ADMIT DATE:       10/3/2021 8:49 AM  DISCH DATE:        10/6/2021 4:08 PM  RESPONDING  PROVIDER #:        Nuvia BURT          QUERY TEXT:    Patient admitted with syncope. . If possible, please document in progress notes   and discharge summary if you are evaluating and/or treating any of the   following:[[Syncope probably due to dehydration[de-identified] The syncope is probably due   to dehydration    The medical record reflects the following:  Risk Factors: hypotension, prior aneuysm clipping, diaphoresis  Clinical Indicators: BP ly 98/66, sit 95/66, standing 104/71,  Creat 1.4/0.8   BP on arrival to ED 91/69  GCS 15  EKG sinus rhythm  Treatment: Neuro consult--refused EEG as IP but is to have as OP, LR 1L bolus   x1, NS 1L bolus, anti-hypertensives placed on hold  DC home on Losartan and   Loopressor. Thanks, Tarun Richards BSN  Options provided:  -- Syncope probably due to orthostatic hypotension  -- Syncope probably due to other hypotension  -- Other - I will add my own diagnosis  -- Disagree - Not applicable / Not valid  -- Disagree - Clinically unable to determine / Unknown  -- Refer to Clinical Documentation Reviewer    PROVIDER RESPONSE TEXT:    The syncope is probably due to orthostatic hypotension .     Query created by: Domenic Fabry on 10/11/2021 7:50 PM      Electronically signed by:  Chang Ferris 10/18/2021 8:05 AM

## 2021-10-18 NOTE — TELEPHONE ENCOUNTER
[facility] Transitions Initial Follow Up Call    Outreach made within 2 business days of discharge: Yes    Patient: Gilford Alexandria   Patient : 1955   MRN: [id number]    Reason for Admission: Sepsis secondary to UTI [facility])  Active Problems:    Near syncope    Hypotension due to hypovolemia    BILL (acute kidney injury)   Discharge Date: 10/15/21       Spoke with: Amber Nelson     Discharge department/facility: [facility]    TCM Interactive Patient Contact:  Was patient able to fill all prescriptions: Yes  Was patient instructed to bring all medications to the follow-up visit: Yes  Is patient taking all medications as directed in the discharge summary? Yes  Does patient understand their discharge instructions: Yes  Does patient have questions or concerns that need addressed prior to 7-14 day follow up office visit: no    Spoke with patient. She states she is feeling better. She has not had any further dizziness at this time. Her appetite is good. She is having normal bladder and bowel function. She denies any fever, chills or signs of infection. han has an appointment with her PCP 10/21.     Scheduled appointment with PCP within 7-14 days    Follow Up  Future Appointments   Date Time Provider Mila Urrutia   10/21/2021 11:30 AM LIGIA Walker [facility]   2021 11:00 AM MD CHRIS Joyce [facility] NEURO [address] [place] [address]

## 2021-10-19 DIAGNOSIS — K21.9 GASTROESOPHAGEAL REFLUX DISEASE WITHOUT ESOPHAGITIS: ICD-10-CM

## 2021-10-19 RX ORDER — PANTOPRAZOLE SODIUM 20 MG/1
TABLET, DELAYED RELEASE ORAL
Qty: 30 TABLET | Refills: 0 | Status: SHIPPED | OUTPATIENT
Start: 2021-10-19 | End: 2021-11-16

## 2021-10-25 DIAGNOSIS — F41.9 ANXIETY: ICD-10-CM

## 2021-10-25 DIAGNOSIS — F32.A DEPRESSION, UNSPECIFIED DEPRESSION TYPE: ICD-10-CM

## 2021-10-26 RX ORDER — ESCITALOPRAM OXALATE 20 MG/1
TABLET ORAL
Qty: 30 TABLET | Refills: 0 | Status: SHIPPED | OUTPATIENT
Start: 2021-10-26 | End: 2021-11-26

## 2021-10-26 RX ORDER — AMLODIPINE BESYLATE 5 MG/1
TABLET ORAL
Qty: 60 TABLET | Refills: 2 | Status: SHIPPED | OUTPATIENT
Start: 2021-10-26

## 2021-11-02 PROBLEM — E86.0 DEHYDRATION: Status: RESOLVED | Noted: 2021-10-03 | Resolved: 2021-11-02

## 2021-11-10 ENCOUNTER — TELEPHONE (OUTPATIENT)
Dept: PRIMARY CARE CLINIC | Age: 66
End: 2021-11-10

## 2021-11-10 RX ORDER — VARENICLINE TARTRATE 1 MG/1
1 TABLET, FILM COATED ORAL 2 TIMES DAILY
Qty: 180 TABLET | Refills: 0 | Status: SHIPPED | OUTPATIENT
Start: 2021-11-10 | End: 2022-04-15 | Stop reason: ALTCHOICE

## 2021-11-10 NOTE — TELEPHONE ENCOUNTER
Sandy Jones called and asked about us sending in Chantix patch for patient. Per verbal order of LIGIA Rizzo, I sent it the generic Chantix instead due to pt having history of stroke, aneurysm, etc.   [facility] thanked me and FELIPE

## 2021-11-16 DIAGNOSIS — K21.9 GASTROESOPHAGEAL REFLUX DISEASE WITHOUT ESOPHAGITIS: ICD-10-CM

## 2021-11-16 RX ORDER — PANTOPRAZOLE SODIUM 20 MG/1
TABLET, DELAYED RELEASE ORAL
Qty: 30 TABLET | Refills: 0 | Status: SHIPPED | OUTPATIENT
Start: 2021-11-16 | End: 2022-01-10 | Stop reason: SDUPTHER

## 2021-11-26 DIAGNOSIS — F32.A DEPRESSION, UNSPECIFIED DEPRESSION TYPE: ICD-10-CM

## 2021-11-26 DIAGNOSIS — F41.9 ANXIETY: ICD-10-CM

## 2021-11-26 RX ORDER — ESCITALOPRAM OXALATE 20 MG/1
TABLET ORAL
Qty: 30 TABLET | Refills: 0 | Status: SHIPPED | OUTPATIENT
Start: 2021-11-26 | End: 2022-01-10 | Stop reason: SDUPTHER

## 2022-01-01 NOTE — PROGRESS NOTES
Problem: Adult Inpatient Plan of Care  Goal: Plan of Care Review  Outcome: Ongoing, Progressing     Problem: Bleeding (Sepsis/Septic Shock)  Goal: Absence of Bleeding  Outcome: Ongoing, Progressing     Problem: Glycemic Control Impaired (Sepsis/Septic Shock)  Goal: Blood Glucose Level Within Desired Range  Outcome: Ongoing, Progressing     Problem: Fluid and Electrolyte Imbalance (Acute Kidney Injury/Impairment)  Goal: Fluid and Electrolyte Balance  Outcome: Ongoing, Progressing      BP now 127/78, MAP 93.

## 2022-01-10 DIAGNOSIS — F41.9 ANXIETY: ICD-10-CM

## 2022-01-10 DIAGNOSIS — F32.A DEPRESSION, UNSPECIFIED DEPRESSION TYPE: ICD-10-CM

## 2022-01-10 DIAGNOSIS — K21.9 GASTROESOPHAGEAL REFLUX DISEASE WITHOUT ESOPHAGITIS: ICD-10-CM

## 2022-01-10 RX ORDER — PANTOPRAZOLE SODIUM 20 MG/1
TABLET, DELAYED RELEASE ORAL
Qty: 30 TABLET | Refills: 0 | Status: SHIPPED | OUTPATIENT
Start: 2022-01-10 | End: 2022-03-17

## 2022-01-10 RX ORDER — ESCITALOPRAM OXALATE 20 MG/1
TABLET ORAL
Qty: 30 TABLET | Refills: 0 | Status: SHIPPED | OUTPATIENT
Start: 2022-01-10 | End: 2022-02-15 | Stop reason: SDUPTHER

## 2022-02-14 ENCOUNTER — TELEPHONE (OUTPATIENT)
Dept: INTERNAL MEDICINE | Age: 67
End: 2022-02-14

## 2022-02-14 NOTE — TELEPHONE ENCOUNTER
I called patient back and she said she was having really bad anxiety and requested medication be sent in. I told patient she needed to schedule an appt with LIGIA Flores to discuss this. Patient's appointment is scheduled for tomorrow for a virtual visit at 8:15. She thanked me and VU.

## 2022-02-14 NOTE — TELEPHONE ENCOUNTER
----- Message from Calleen Seek sent at 2/14/2022  4:07 PM CST -----  Subject: Message to Provider    QUESTIONS  Information for Provider? Pt called requesting nurse or Lucretia Damon   regarding urgent matter and would not provide details to CS, Pt was sent   to A/H line. ---------------------------------------------------------------------------  --------------  Lizette Lopez INFO  What is the best way for the office to contact you? Do not leave any   message, patient will call back for answer  Preferred Call Back Phone Number?  [phone]  ---------------------------------------------------------------------------  --------------  SCRIPT ANSWERS  undefined

## 2022-02-15 ENCOUNTER — VIRTUAL VISIT (OUTPATIENT)
Dept: PRIMARY CARE CLINIC | Age: 67
End: 2022-02-15
Payer: MEDICARE

## 2022-02-15 ENCOUNTER — CARE COORDINATION (OUTPATIENT)
Dept: CARE COORDINATION | Age: 67
End: 2022-02-15

## 2022-02-15 DIAGNOSIS — F32.A DEPRESSION, UNSPECIFIED DEPRESSION TYPE: ICD-10-CM

## 2022-02-15 DIAGNOSIS — Z13.31 POSITIVE DEPRESSION SCREENING: ICD-10-CM

## 2022-02-15 DIAGNOSIS — Z91.81 AT HIGH RISK FOR FALLS: ICD-10-CM

## 2022-02-15 DIAGNOSIS — I10 ESSENTIAL HYPERTENSION: ICD-10-CM

## 2022-02-15 DIAGNOSIS — F41.9 ANXIETY: Primary | ICD-10-CM

## 2022-02-15 DIAGNOSIS — R11.0 NAUSEA: ICD-10-CM

## 2022-02-15 DIAGNOSIS — F41.0 PANIC ATTACKS: ICD-10-CM

## 2022-02-15 PROCEDURE — 99214 OFFICE O/P EST MOD 30 MIN: CPT | Performed by: NURSE PRACTITIONER

## 2022-02-15 RX ORDER — ONDANSETRON 4 MG/1
4 TABLET, FILM COATED ORAL 3 TIMES DAILY PRN
Qty: 30 TABLET | Refills: 0 | Status: SHIPPED | OUTPATIENT
Start: 2022-02-15 | End: 2022-04-15 | Stop reason: ALTCHOICE

## 2022-02-15 RX ORDER — CLONAZEPAM 0.5 MG/1
0.25 TABLET ORAL 2 TIMES DAILY PRN
Qty: 28 TABLET | Refills: 0 | Status: SHIPPED | OUTPATIENT
Start: 2022-02-15 | End: 2022-03-23

## 2022-02-15 RX ORDER — ESCITALOPRAM OXALATE 20 MG/1
TABLET ORAL
Qty: 30 TABLET | Refills: 1 | Status: SHIPPED | OUTPATIENT
Start: 2022-02-15 | End: 2022-04-15 | Stop reason: SDUPTHER

## 2022-02-15 ASSESSMENT — PATIENT HEALTH QUESTIONNAIRE - PHQ9
9. THOUGHTS THAT YOU WOULD BE BETTER OFF DEAD, OR OF HURTING YOURSELF: 0
SUM OF ALL RESPONSES TO PHQ QUESTIONS 1-9: 24
SUM OF ALL RESPONSES TO PHQ9 QUESTIONS 1 & 2: 6
7. TROUBLE CONCENTRATING ON THINGS, SUCH AS READING THE NEWSPAPER OR WATCHING TELEVISION: 3
8. MOVING OR SPEAKING SO SLOWLY THAT OTHER PEOPLE COULD HAVE NOTICED. OR THE OPPOSITE, BEING SO FIGETY OR RESTLESS THAT YOU HAVE BEEN MOVING AROUND A LOT MORE THAN USUAL: 3
10. IF YOU CHECKED OFF ANY PROBLEMS, HOW DIFFICULT HAVE THESE PROBLEMS MADE IT FOR YOU TO DO YOUR WORK, TAKE CARE OF THINGS AT HOME, OR GET ALONG WITH OTHER PEOPLE: 3
4. FEELING TIRED OR HAVING LITTLE ENERGY: 3
1. LITTLE INTEREST OR PLEASURE IN DOING THINGS: 3
SUM OF ALL RESPONSES TO PHQ QUESTIONS 1-9: 24
6. FEELING BAD ABOUT YOURSELF - OR THAT YOU ARE A FAILURE OR HAVE LET YOURSELF OR YOUR FAMILY DOWN: 3
2. FEELING DOWN, DEPRESSED OR HOPELESS: 3
SUM OF ALL RESPONSES TO PHQ QUESTIONS 1-9: 24
3. TROUBLE FALLING OR STAYING ASLEEP: 3
SUM OF ALL RESPONSES TO PHQ QUESTIONS 1-9: 24
5. POOR APPETITE OR OVEREATING: 3

## 2022-02-15 ASSESSMENT — ENCOUNTER SYMPTOMS
CONSTIPATION: 0
DIARRHEA: 0
WHEEZING: 0
BLOOD IN STOOL: 0
RHINORRHEA: 0
CHEST TIGHTNESS: 0
SORE THROAT: 0
VOMITING: 0
TROUBLE SWALLOWING: 0
ABDOMINAL PAIN: 0
COUGH: 0
VOICE CHANGE: 0
EYE REDNESS: 0
SHORTNESS OF BREATH: 0
NAUSEA: 0

## 2022-02-15 ASSESSMENT — COLUMBIA-SUICIDE SEVERITY RATING SCALE - C-SSRS
6. HAVE YOU EVER DONE ANYTHING, STARTED TO DO ANYTHING, OR PREPARED TO DO ANYTHING TO END YOUR LIFE?: NO
1. WITHIN THE PAST MONTH, HAVE YOU WISHED YOU WERE DEAD OR WISHED YOU COULD GO TO SLEEP AND NOT WAKE UP?: NO
2. HAVE YOU ACTUALLY HAD ANY THOUGHTS OF KILLING YOURSELF?: NO

## 2022-02-15 NOTE — PATIENT INSTRUCTIONS
Zofran as needed for nausea    Restart Lexapro 20 mg    Take Klonopin as needed for panic attacks    I will reach out to [facility] to see what assistance is available for housing.

## 2022-02-15 NOTE — PROGRESS NOTES
Asha Butts (:  1955) is a 78 y.o. female,Established patient, here for evaluation of the following chief complaint(s): Anxiety (patient has personal problems going on right now ) and Emesis (pt thinks this is because of her nerves)         ASSESSMENT/PLAN:  1. Anxiety  -     escitalopram (LEXAPRO) 20 MG tablet; TAKE 1 TABLET BY MOUTH DAILY AT BEDTIME., Disp-30 tablet, R-1Normal  2. Depression, unspecified depression type  -     escitalopram (LEXAPRO) 20 MG tablet; TAKE 1 TABLET BY MOUTH DAILY AT BEDTIME., Disp-30 tablet, R-1Normal  3. Panic attacks  -     clonazePAM (KLONOPIN) 0.5 MG tablet; Take 0.5 tablets by mouth 2 times daily as needed for Anxiety for up to 14 days. , Disp-28 tablet, R-0Normal  4. Nausea  -     ondansetron (ZOFRAN) 4 MG tablet; Take 1 tablet by mouth 3 times daily as needed for Nausea or Vomiting, Disp-30 tablet, R-0Normal  5. Essential hypertension  6. At high risk for falls      Return in about 1 week (around 2022). SUBJECTIVE/OBJECTIVE:  HPI    Patient reports overt anxiety and depression. Patient reports that she has left her house less than 3 times in the last year. She states that she does not have much social support within that area and her children do not live nearby. Patient has had major medical issues within the last 5 years. Her emotional and mental distress is concerning. She denies SI or HI. Patient is crying during visit/sobbing due to to eviction notice. Patient reports she has not kept her apartment clean due to her state of depression. She states that she has let her self go along with her apartment. She notes she has nowhere to go. Patient's blood pressure is elevated today she feels this is related to her emotional state. Patient states she ran out of Lexapro over 1 week ago. She does note nausea and vomiting. She request medication for treatment. She request refill of Lexapro and as needed medication to take for panic attacks.       Review of Systems   Constitutional: Negative for activity change, appetite change, fatigue, fever and unexpected weight change. HENT: Negative for congestion, ear pain, nosebleeds, rhinorrhea, sore throat, trouble swallowing and voice change. Eyes: Negative for redness and visual disturbance. Respiratory: Negative for cough, chest tightness, shortness of breath and wheezing. Cardiovascular: Negative for chest pain, palpitations and leg swelling. Gastrointestinal: Negative for abdominal pain, blood in stool, constipation, diarrhea, nausea and vomiting. Endocrine: Negative for polydipsia, polyphagia and polyuria. Genitourinary: Negative for dysuria, frequency and urgency. Musculoskeletal: Negative for myalgias. Skin: Negative for rash and wound. Neurological: Negative for dizziness, speech difficulty, light-headedness and headaches. Psychiatric/Behavioral: Positive for dysphoric mood and sleep disturbance. Negative for agitation, confusion, self-injury and suicidal ideas. The patient is nervous/anxious.         Patient-Reported Vitals 2/15/2022   Patient-Reported Weight 140 lb   Patient-Reported Height 5'2\"   Patient-Reported Systolic 181   Patient-Reported Diastolic 124   Patient-Reported Pulse 88        Physical Exam    [INSTRUCTIONS:  \"[x]\" Indicates a positive item  \"[]\" Indicates a negative item  -- DELETE ALL ITEMS NOT EXAMINED]    Constitutional: [x] Appears well-developed and well-nourished [x] No apparent distress      [] Abnormal -     Mental status: [x] Alert and awake  [x] Oriented to person/place/time [x] Able to follow commands    [] Abnormal -     Eyes:   EOM    [x]  Normal    [] Abnormal -   Sclera  [x]  Normal    [] Abnormal -          Discharge [x]  None visible   [] Abnormal -     HENT: [x] Normocephalic, atraumatic  [] Abnormal -   [x] Mouth/Throat: Mucous membranes are moist    External Ears [x] Normal  [] Abnormal -    Neck: [x] No visualized mass [] Abnormal - Pulmonary/Chest: [x] Respiratory effort normal   [x] No visualized signs of difficulty breathing or respiratory distress        [] Abnormal -      Musculoskeletal:   [x] Normal gait with no signs of ataxia         [x] Normal range of motion of neck        [] Abnormal -     Neurological:        [x] No Facial Asymmetry (Cranial nerve 7 motor function) (limited exam due to video visit)          [x] No gaze palsy        [] Abnormal -          Skin:        [x] No significant exanthematous lesions or discoloration noted on facial skin         [] Abnormal -            Psychiatric:       [x] Normal Affect [] Abnormal -        [x] No Hallucinations    Other pertinent observable physical exam findings:-          Patient Instructions   Zofran as needed for nausea    Restart Lexapro 20 mg    Take Klonopin as needed for panic attacks    I will reach out to [facility] to see what assistance is available for housing. On this date 2/15/2022 I have spent 30 minutes reviewing previous notes, test results and face to face (virtual) with the patient discussing the diagnosis and importance of compliance with the treatment plan as well as documenting on the day of the visitDg Janice Shah, was evaluated through a synchronous (real-time) audio-video encounter. The patient (or guardian if applicable) is aware that this is a billable service. Verbal consent to proceed has been obtained within the past 12 months. The visit was conducted pursuant to the emergency declaration under the [address] [place] [address], [address] [place] [address] authority and the [employer] General Act. Patient identification was verified, and a caregiver was present when appropriate. The patient was located in a state where the provider was credentialed to provide care. An electronic signature was used to authenticate this note.     --LIGIA Smith       On the basis of positive falls risk screening, assessment and plan is as follows: home safety tips provided. PHQ-9 score today: (PHQ-9 Total Score: 24), additional evaluation and assessment performed, follow-up plan includes but not limited to: Medication management and Referral to /Specialist  for evaluation and management.

## 2022-02-15 NOTE — CARE COORDINATION
AUTHOR:  Whit Guerrero / Health  [facility]) / Community Health Worker (CHW)    Received an in [employer] from LIGIA Mehta, PCP, stating patient is very depressed, and she is receiving an eviction notice today from her landlord. [place] asked for my assistance in addressing the patient's situation. Submitted by Cesia/CHW    Araselijorge Andrew. Collected and discussed the following:    - Patient stated Milka Mancilla is coming over today to bring eviction notice. - Patient is current with rent. - Snehal did a home inspection around [place], and did not like her findings. She asked her  to assist Judah Oppenheim in getting the apartment cleaned. Rudy Oppenheim stated the apartment does not look like it did when the landlady did an inspection. She stated it is in better conditions, \"just looks lived in\". - Asked patient if she discussed her situation with her son. She stated the landlady called him on [place] Day to let him know of her findings and would be evicting the patient. - Patient stated the landlady has not been back to the apartment since the initial home inspection.     - Patient stated the landlady told her she was going to the courthouse to get the eviction notice. Naa Amezcua does not have local family or friends to help her or where she can stay. She does not have a car. - This HC asked her if she was using [employer] (Sanjeev Polk / [phone]) for transportation to her appointments. Patient stated she cancels her appointments. She said she has not left her home except for a few times in the last three years. - This HC addressed transportation with the patient in 2020. Completed a PATS ADA application. Patient should be able to ride PATS for appointments. - Provided the patient with name and number to [facility], Michell tobin, [phone]. Asked her to call them this morning before the landlady arrives.   Cayla Lawson it would be beneficial for her to be educated about the process so she is prepared. - Provided Vivien Heller with the name and number to [facility] (Vanessa Martell / [phone] / [phone]). Asked her to call them after she gets her notice. She needs to stated that she does not have family nor friends to help her. She does not have resources. She has to ask for help from [facility]PS. She verbalized understanding.   - Informed patient about a women's shelter in [facility]. Told her she would have to leave during the day from 0900 to 1600, and she will need to be able to do housework to live there. Patient stated, \"I don't know about that. \"  - Asked the patient if she was receiving mental health services. She stated she was not, and she does not want to address her depression until she figures out her living arrangements. Valerie Ivey this is a conversation she will need with Silvestre Spangler in the future. - Gave the patient my name and number. Asked her to call me after she gets her notice, and after she speaks with [address] [place] [address] [facility]. She verbalized understanding.      Submitted by Cesia/ANGIE

## 2022-02-17 NOTE — CARE COORDINATION
AUTHOR:  Abi Mccarthy / Health  [facility]) / [address] Worker (CHW)    Telephoned the patient to follow-up on the outcome of her dara's visit and her call to [employer]. No answer. Has voice messaging but mailbox is full. Unable to leave a message asking for a call back. Patient has active MyChart. Sending her a message to let her know I would like to speak with her about Tuesday's activities.     Submitted by Cesia/ANGIE

## 2022-02-18 NOTE — CARE COORDINATION
AUTHOR:  Jody Ortiz / Health  [facility]) / [address] Worker (CHW)    Telephoned the patient. She answered the call. HC asked how patient's visit with her landlady went. Patient stated she is feeling better about her situation.      - Dary Thompson is still going to evict the patient, but the landlady is helping the patient find new housing.  - [email or web address] provided the patient with the name and number to [facility], [phone]. Patient stated she will call. Asked patient to contact this [email or web address]. if she needs additional assistance. Patient verbalized understanding. She stated her landlady is being kind and helpful.     Submitted by Cesia/ANGIE

## 2022-03-17 DIAGNOSIS — K21.9 GASTROESOPHAGEAL REFLUX DISEASE WITHOUT ESOPHAGITIS: ICD-10-CM

## 2022-03-17 DIAGNOSIS — F41.0 PANIC ATTACKS: ICD-10-CM

## 2022-03-17 RX ORDER — PANTOPRAZOLE SODIUM 20 MG/1
TABLET, DELAYED RELEASE ORAL
Qty: 30 TABLET | Refills: 0 | Status: SHIPPED | OUTPATIENT
Start: 2022-03-17 | End: 2022-04-15 | Stop reason: SDUPTHER

## 2022-03-17 NOTE — TELEPHONE ENCOUNTER
Asha Butts called to request a refill on her medication. Last office visit : 2/15/2022   Next office visit : Visit date not found     Last UDS:   Amphetamine Screen, Urine   Date Value Ref Range Status   10/03/2021 Negative Negative <1000 ng/mL Final     Benzodiazepine Screen, Urine   Date Value Ref Range Status   10/03/2021 Negative Negative <100 ng/mL Final     Cocaine Metabolite Screen, Urine   Date Value Ref Range Status   10/03/2021 Negative Negative <300 ng/mL Final     Opiate Scrn, Ur   Date Value Ref Range Status   10/03/2021 Negative Negative < 300 ng/mL Final       Last Arthuro Andrade: 3/17/22  Medication Contract: needs updated   Last Fill: 2/15/22     Requested Prescriptions     Pending Prescriptions Disp Refills    clonazePAM (KLONOPIN) 0.5 MG tablet [Pharmacy Med Name: CLONAZEPAM 0.5MG TABLET] 28 tablet 0     Sig: TAKE 1/2 TABLET TWICE DAILY AS NEEDED FOR ANXIETY     Signed Prescriptions Disp Refills    pantoprazole (PROTONIX) 20 MG tablet 30 tablet 0     Sig: TAKE 1 TABLET BY MOUTH ONCE DAILY. Authorizing Provider: Jem Broussard     Ordering User: Fina Meredith         Please approve or refuse this medication.    Lianne Nguyen

## 2022-03-23 RX ORDER — CLONAZEPAM 0.5 MG/1
TABLET ORAL
Qty: 28 TABLET | Refills: 0 | Status: ON HOLD
Start: 2022-03-23 | End: 2022-04-11 | Stop reason: HOSPADM
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
## 2022-03-30 ENCOUNTER — APPOINTMENT (OUTPATIENT)
Dept: GENERAL RADIOLOGY | Age: 67
End: 2022-03-30
Payer: MEDICARE

## 2022-03-30 ENCOUNTER — HOSPITAL ENCOUNTER (EMERGENCY)
Age: 67
Discharge: HOME | End: 2022-03-30
Attending: EMERGENCY MEDICINE
Payer: MEDICARE

## 2022-03-30 ENCOUNTER — APPOINTMENT (OUTPATIENT)
Dept: CT IMAGING | Age: 67
End: 2022-03-30
Payer: MEDICARE

## 2022-03-30 VITALS
DIASTOLIC BLOOD PRESSURE: 89 MMHG | OXYGEN SATURATION: 95 % | BODY MASS INDEX: 25.76 KG/M2 | RESPIRATION RATE: 18 BRPM | HEART RATE: 82 BPM | TEMPERATURE: 97.7 F | HEIGHT: 62 IN | SYSTOLIC BLOOD PRESSURE: 120 MMHG | WEIGHT: 140 LBS

## 2022-03-30 DIAGNOSIS — M54.31 SCIATICA OF RIGHT SIDE: Primary | ICD-10-CM

## 2022-03-30 PROCEDURE — 72131 CT LUMBAR SPINE W/O DYE: CPT

## 2022-03-30 PROCEDURE — 72170 X-RAY EXAM OF PELVIS: CPT

## 2022-03-30 PROCEDURE — 6360000002 HC RX W HCPCS: Performed by: EMERGENCY MEDICINE

## 2022-03-30 PROCEDURE — 73552 X-RAY EXAM OF FEMUR 2/>: CPT

## 2022-03-30 PROCEDURE — 96372 THER/PROPH/DIAG INJ SC/IM: CPT

## 2022-03-30 PROCEDURE — 99283 EMERGENCY DEPT VISIT LOW MDM: CPT

## 2022-03-30 RX ORDER — DEXAMETHASONE SODIUM PHOSPHATE 10 MG/ML
8 INJECTION, SOLUTION INTRAMUSCULAR; INTRAVENOUS ONCE
Status: COMPLETED | OUTPATIENT
Start: 2022-03-30 | End: 2022-03-30

## 2022-03-30 RX ORDER — HYDROMORPHONE HYDROCHLORIDE 1 MG/ML
1 INJECTION, SOLUTION INTRAMUSCULAR; INTRAVENOUS; SUBCUTANEOUS ONCE
Status: COMPLETED | OUTPATIENT
Start: 2022-03-30 | End: 2022-03-30

## 2022-03-30 RX ORDER — METHYLPREDNISOLONE 4 MG/1
TABLET ORAL
Qty: 1 KIT | Refills: 0 | Status: SHIPPED | OUTPATIENT
Start: 2022-03-30 | End: 2022-04-05 | Stop reason: ALTCHOICE

## 2022-03-30 RX ORDER — TIZANIDINE 2 MG/1
2 TABLET ORAL 3 TIMES DAILY PRN
Qty: 15 TABLET | Refills: 0 | Status: SHIPPED | OUTPATIENT
Start: 2022-03-30 | End: 2022-04-15 | Stop reason: ALTCHOICE

## 2022-03-30 RX ADMIN — HYDROMORPHONE HYDROCHLORIDE 1 MG: 1 INJECTION, SOLUTION INTRAMUSCULAR; INTRAVENOUS; SUBCUTANEOUS at 12:41

## 2022-03-30 RX ADMIN — DEXAMETHASONE SODIUM PHOSPHATE 8 MG: 10 INJECTION, SOLUTION INTRAMUSCULAR; INTRAVENOUS at 12:39

## 2022-03-30 ASSESSMENT — ENCOUNTER SYMPTOMS
DIARRHEA: 0
SHORTNESS OF BREATH: 0
ABDOMINAL PAIN: 0
NAUSEA: 0
VOMITING: 0
BACK PAIN: 1

## 2022-03-30 ASSESSMENT — PAIN SCALES - GENERAL: PAINLEVEL_OUTOF10: 10

## 2022-03-30 NOTE — ED NOTES
Patient reports right sided lower back pain that radiates down leg.  She reports that pain worsens with movement and subsides with rest. Denies any further symptoms      Humera Abraham RN  03/30/22 8848

## 2022-03-30 NOTE — ED PROVIDER NOTES
[facility] EMERGENCY DEPT  eMERGENCY dEPARTMENT eNCOUnter      Pt Name: Flakito Esquivel  MRN: [id number]  [place] 1955  Date of evaluation: 3/30/2022  Provider: Racheal Gill MD    [address] [place] [address]       Chief Complaint   Patient presents with    Hip Pain     right hip pain started last night         HISTORY OF PRESENT ILLNESS   (Location/Symptom, Timing/Onset,Context/Setting, Quality, Duration, Modifying Factors, Severity)  Note limiting factors. Flakito Esquivel is a 78 y.o. female who presents to the emergency department for right lumbar lower back pain since yesterday evening. No injuries. Pain radiates down into right hip and right leg. No weakness or numbness. HPI    NursingNotes were reviewed. REVIEW OF SYSTEMS    (2-9 systems for level 4, 10 or more for level 5)     Review of Systems   Constitutional: Negative for chills and fever. Respiratory: Negative for shortness of breath. Cardiovascular: Negative for chest pain. Gastrointestinal: Negative for abdominal pain, diarrhea, nausea and vomiting. Genitourinary: Negative for difficulty urinating, dysuria, flank pain, frequency and urgency. Musculoskeletal: Positive for back pain. Negative for neck pain. Neurological: Negative for weakness, numbness and headaches. All other systems reviewed and are negative.            PAST MEDICALHISTORY     Past Medical History:   Diagnosis Date    Anxiety     Arthritis     Cerebral aneurysm     Former smoker     GERD (gastroesophageal reflux disease)     Hypertension          SURGICAL HISTORY       Past Surgical History:   Procedure Laterality Date    BRAIN SURGERY      Aneurysm partial clip     CHOLECYSTECTOMY      CRANIOTOMY Right 5/7/2019    RIGHT CRANIOTOMY FOR CLIPPING OF MIDDLE CEREBRAL ARTERY ANEURYSM performed by Michelle Amor MD at [address] [facility]     Discharge Medication List as of 3/30/2022 12:52 PM      CONTINUE these medications which have NOT CHANGED    Details   clonazePAM (KLONOPIN) 0.5 MG tablet TAKE 1/2 TABLET TWICE DAILY AS NEEDED FOR ANXIETY, Disp-28 tablet, R-0Normal      pantoprazole (PROTONIX) 20 MG tablet TAKE 1 TABLET BY MOUTH ONCE DAILY. , Disp-30 tablet, R-0Normal      escitalopram (LEXAPRO) 20 MG tablet TAKE 1 TABLET BY MOUTH DAILY AT BEDTIME., Disp-30 tablet, R-1Normal      ondansetron (ZOFRAN) 4 MG tablet Take 1 tablet by mouth 3 times daily as needed for Nausea or Vomiting, Disp-30 tablet, R-0Normal      varenicline (CHANTIX) 1 MG tablet Take 1 tablet by mouth 2 times daily, Disp-180 tablet, R-0Normal      amLODIPine (NORVASC) 5 MG tablet TAKE 1 TABLET BY MOUTH TWICE DAILY. , Disp-60 tablet, R-2Normal      metoprolol tartrate (LOPRESSOR) 25 MG tablet Take 0.5 tablets by mouth daily, Disp-60 tablet, R-3Normal      losartan (COZAAR) 25 MG tablet Take 1 tablet by mouth daily, Disp-30 tablet, R-3Normal      zonisamide (ZONEGRAN) 100 MG capsule Take 1 capsule by mouth nightly, Disp-30 capsule, R-3Normal      atorvastatin (LIPITOR) 40 MG tablet TAKE 1 TABLET BY MOUTH AT NIGHT. , Disp-30 tablet, R-2Normal      Blood Pressure KIT DAILY Starting Thu 4/22/2021, Until Tue 2/15/2022, For 30 doses, Disp-1 kit, R-0, Normal      aspirin 81 MG EC tablet Take 1 tablet by mouth nightly, Disp-30 tablet, R-2Normal             ALLERGIES     Patient has no known allergies.     FAMILY HISTORY       Family History   Problem Relation Age of Onset    Heart Disease Mother     High Blood Pressure Father     Arthritis Father           SOCIAL HISTORY       Social History     Socioeconomic History    Marital status:      Spouse name: None    Number of children: None    Years of education: None    Highest education level: None   Occupational History    None   Tobacco Use    Smoking status: Former Smoker     Packs/day: 1.00     Years: 20.00     Pack years: 20.00     Types: Cigarettes     Quit date: 3/6/2019     Years since quitting: 3.0    Smokeless tobacco: Never Used   Vaping Use    Vaping Use: Never used   Substance and Sexual Activity    Alcohol use: Not Currently    Drug use: No    Sexual activity: None   Other Topics Concern    None   Social History Narrative    None     Social Determinants of Health     Financial Resource Strain:     Difficulty of Paying Living Expenses: Not on file   Food Insecurity:     Worried About Running Out of Food in the Last Year: Not on file    Yifan of Food in the Last Year: Not on file   Transportation Needs:     Lack of Transportation (Medical): Not on file    Lack of Transportation (Non-Medical):  Not on file   Physical Activity:     Days of Exercise per Week: Not on file    Minutes of Exercise per Session: Not on file   Stress:     Feeling of Stress : Not on file   Social Connections:     Frequency of Communication with Friends and Family: Not on file    Frequency of Social Gatherings with Friends and Family: Not on file    Attends Rastafari Services: Not on file    Active Member of [address] [place] [address] or Organizations: Not on file    Attends Club or Organization Meetings: Not on file    Marital Status: Not on file   Intimate Partner Violence:     Fear of Current or Ex-Partner: Not on file    Emotionally Abused: Not on file    Physically Abused: Not on file    Sexually Abused: Not on file   Housing Stability:     Unable to Pay for Housing in the Last Year: Not on file    Number of [place] in the Last Year: Not on file    Unstable Housing in the Last Year: Not on file       SCREENINGS    Sukumar Coma Scale  Eye Opening: Spontaneous  Best Verbal Response: Oriented  Best Motor Response: Obeys commands  Sukumar Coma Scale Score: 15        PHYSICAL EXAM    (up to 7 for level 4, 8 or more for level 5)     ED Triage Vitals [03/30/22 1059]   BP Temp Temp Source Pulse Resp SpO2 Height Weight   112/61 97.7 °F (36.5 °C) Oral 82 18 95 % 5' 2\" (1.575 m) 140 lb (63.5 kg)       Physical Exam  Vitals and nursing note reviewed. Constitutional:       General: She is not in acute distress. Appearance: Normal appearance. She is well-developed. She is not ill-appearing or diaphoretic. HENT:      Head: Normocephalic and atraumatic. Right Ear: External ear normal.      Left Ear: External ear normal.   Eyes:      Conjunctiva/sclera: Conjunctivae normal.   Neck:      Trachea: No tracheal deviation. Cardiovascular:      Rate and Rhythm: Normal rate and regular rhythm. Pulses: Normal pulses. Heart sounds: Normal heart sounds. No murmur heard. Pulmonary:      Effort: No respiratory distress. Breath sounds: Normal breath sounds. No wheezing or rales. Abdominal:      Palpations: Abdomen is soft. There is no mass. Tenderness: There is no abdominal tenderness. Musculoskeletal:         General: Normal range of motion. Cervical back: Normal range of motion. No bony tenderness. Thoracic back: No bony tenderness. Lumbar back: No bony tenderness. Back:         Legs:       Comments: Pt can ambulate has mild antalgic gait    FROM of all 4 extrem   Skin:     General: Skin is warm and dry. Neurological:      Mental Status: She is alert and oriented to person, place, and time. GCS: GCS eye subscore is 4. GCS verbal subscore is 5. GCS motor subscore is 6. Sensory: No sensory deficit. Motor: No weakness. Comments: No saddle anesthesia         DIAGNOSTIC RESULTS         RADIOLOGY:  Non-plain film images such as CT, Ultrasound and MRI are read by the radiologist. Plain radiographic images are visualized and preliminarily interpreted bythe emergency physician with the below findings:      Wilfred   Final Result   1. No evidence of acute injury in the osseous lumbar spine. Signed by Dr Leelee Castro      XR PELVIS (1-2 VIEWS)   Final Result   1. No acute osseous pathology identified of the pelvis.    Signed by Dr Kristan Bell Jake      XR FEMUR RIGHT (MIN 2 VIEWS)   Final Result   1. No acute osseous pathology of the right femur. Signed by Dr Jef Sinclair:  Labs Reviewed - No data to display    All other labs were within normal range or not returned as of this dictation. EMERGENCY DEPARTMENT COURSE and DIFFERENTIAL DIAGNOSIS/MDM:   Vitals:    Vitals:    03/30/22 1059 03/30/22 1130 03/30/22 1245   BP: 112/61 100/72 120/89   Pulse: 82     Resp: 18     Temp: 97.7 °F (36.5 °C)     TempSrc: Oral     SpO2: 95% 92% 95%   Weight: 140 lb (63.5 kg)     Height: 5' 2\" (1.575 m)         MDM  Number of Diagnoses or Management Options     Amount and/or Complexity of Data Reviewed  Tests in the radiology section of CPT®: ordered and reviewed  Independent visualization of images, tracings, or specimens: yes      Pt can ambulate, feeling better after meds here, neg imaging, suspect sciatica causing pain, understands follow up and return precautions      CONSULTS:  None    PROCEDURES:  Unless otherwise noted below, none     Procedures    FINAL IMPRESSION      1.  Sciatica of right side          DISPOSITION/PLAN   DISPOSITION Decision To Discharge 03/30/2022 12:32:55 PM      PAT[facility] [address] [id number] [phone]    Schedule an appointment as soon as possible for a visit in 1 week      [facility] EMERGENCY DEPT  [facility]  [phone]    As needed, If symptoms worsen      DISCHARGE MEDICATIONS:  Discharge Medication List as of 3/30/2022 12:52 PM      START taking these medications    Details   methylPREDNISolone (MEDROL, MICHELLE,) 4 MG tablet Take by mouth., Disp-1 kit, R-0Normal      tiZANidine (ZANAFLEX) 2 MG tablet Take 1 tablet by mouth 3 times daily as needed (spasm/pain), Disp-15 tablet, R-0Normal                (Please note that portions of this note were completed with a voice recognition program.  Efforts were made to edit thedictations but occasionally words are mis-transcribed.)    Chavez Alicia MD (electronically signed)  Attending Emergency Physician        Yury De La Vega MD  03/30/22 9492

## 2022-04-04 ENCOUNTER — TELEPHONE (OUTPATIENT)
Dept: PRIMARY CARE CLINIC | Age: 67
End: 2022-04-04

## 2022-04-04 NOTE — TELEPHONE ENCOUNTER
I tried to call patient to reschedule her appointment tomorrow with LIGIA Richardson. I was unable to leave a message because her voicemail box was not set up.

## 2022-04-04 NOTE — TELEPHONE ENCOUNTER
I called patient back and she has some personal problems going on. She has been to the ER with back pain and is wanting medication. I have scheduled a virtual appointment with LIGIA Jo for tomorrow at 10:00 am. Patient agreed & VU.

## 2022-04-04 NOTE — TELEPHONE ENCOUNTER
----- Message from Lakesha Villaseñor sent at 4/4/2022  7:51 AM CDT -----  Subject: Message to Provider    QUESTIONS  Information for Provider? Patient states that she needs to speak with the   PCP she wont state the reason she said that it is important that she   speaks with her.  ---------------------------------------------------------------------------  --------------  CALL BACK INFO  What is the best way for the office to contact you? OK to leave message on   voicemail  Preferred Call Back Phone Number? [phone]  ---------------------------------------------------------------------------  --------------  SCRIPT ANSWERS  Relationship to Patient?  Self

## 2022-04-05 ENCOUNTER — TELEMEDICINE (OUTPATIENT)
Dept: PRIMARY CARE CLINIC | Age: 67
End: 2022-04-05
Payer: MEDICARE

## 2022-04-05 DIAGNOSIS — M54.41 ACUTE BILATERAL LOW BACK PAIN WITH RIGHT-SIDED SCIATICA: Primary | ICD-10-CM

## 2022-04-05 DIAGNOSIS — M62.838 MUSCLE SPASM: ICD-10-CM

## 2022-04-05 PROCEDURE — 99443 PR PHYS/QHP TELEPHONE EVALUATION 21-30 MIN: CPT | Performed by: NURSE PRACTITIONER

## 2022-04-05 RX ORDER — PREDNISONE 10 MG/1
10 TABLET ORAL 2 TIMES DAILY
Qty: 10 TABLET | Refills: 0 | Status: ON HOLD
Start: 2022-04-05 | End: 2022-04-11 | Stop reason: HOSPADM

## 2022-04-05 RX ORDER — BACLOFEN 10 MG/1
10 TABLET ORAL 3 TIMES DAILY
Qty: 30 TABLET | Refills: 0 | Status: SHIPPED | OUTPATIENT
Start: 2022-04-05 | End: 2022-04-15 | Stop reason: SDUPTHER

## 2022-04-05 ASSESSMENT — ENCOUNTER SYMPTOMS
COUGH: 0
CHEST TIGHTNESS: 0
BACK PAIN: 1
SORE THROAT: 0
VOMITING: 0
DIARRHEA: 0
COLOR CHANGE: 0
NAUSEA: 0
ABDOMINAL PAIN: 0
SHORTNESS OF BREATH: 0

## 2022-04-05 NOTE — PROGRESS NOTES
[address] [address]     Phone:  [phone]  Fax:  [phone]      2022    TELEHEALTH EVALUATION -- Audio/Visual (During [facility]-21 public health emergency)    HPI:    Chief Complaint   Patient presents with    Back Pain     patient said she can barely walk and is in severe pain. she was recently in the hospital due to sciatic pain. she was given a muscle relaxer and steroids, but she said that did not help at all. Sandra Yang (:  1955) has requested an audio/video evaluation for the following concern(s): Patient reports she went to the ER 3- and was diagnosed with sciatica of right side and now has pain in her back right leg that constant. Denies any injuries, heavy lifting, pushing or pulling. Reports she initially had some relief with the medrol dose pack. Reports it is worse when she gets up and moves around. Rates pain 10/10 and describes the pain as a shooting pain. Took tizanidine without much relief. Has not done any exercises. Reports she has worked every day for the past 2 weeks and has just started a new job. Review of Systems   Constitutional: Negative for activity change and fever. HENT: Negative for congestion, ear pain and sore throat. Respiratory: Negative for cough, chest tightness and shortness of breath. Cardiovascular: Negative for chest pain. Gastrointestinal: Negative for abdominal pain, diarrhea, nausea and vomiting. Genitourinary: Negative for frequency and urgency. Musculoskeletal: Positive for back pain and myalgias. Negative for arthralgias. Skin: Negative for color change. Neurological: Negative for dizziness, weakness and numbness. Psychiatric/Behavioral: Negative for agitation. The patient is not nervous/anxious. Prior to Visit Medications    Medication Sig Taking?  Authorizing Provider   predniSONE (DELTASONE) 10 MG tablet Take 1 tablet by mouth 2 times daily for 5 days Yes Ke Nunes LIGIA Clayton CNP   baclofen (LIORESAL) 10 MG tablet Take 1 tablet by mouth 3 times daily Yes LIGIA Jacques CNP   tiZANidine (ZANAFLEX) 2 MG tablet Take 1 tablet by mouth 3 times daily as needed (spasm/pain) Yes Fariba Jenkins MD   clonazePAM (KLONOPIN) 0.5 MG tablet TAKE 1/2 TABLET TWICE DAILY AS NEEDED FOR ANXIETY Yes LIGIA Peraza   pantoprazole (PROTONIX) 20 MG tablet TAKE 1 TABLET BY MOUTH ONCE DAILY. Yes LIGIA Peraza   escitalopram (LEXAPRO) 20 MG tablet TAKE 1 TABLET BY MOUTH DAILY AT BEDTIME. Yes LIGIA Peraza   amLODIPine (NORVASC) 5 MG tablet TAKE 1 TABLET BY MOUTH TWICE DAILY. Yes LIGIA Peraza   metoprolol tartrate (LOPRESSOR) 25 MG tablet Take 0.5 tablets by mouth daily Yes Shawanda Monaco MD   losartan (COZAAR) 25 MG tablet Take 1 tablet by mouth daily Yes Shawanda Monaco MD   atorvastatin (LIPITOR) 40 MG tablet TAKE 1 TABLET BY MOUTH AT NIGHT. Yes LIGIA Peraza   Blood Pressure KIT 1 Units by Does not apply route daily Yes LIGIA Peraza   aspirin 81 MG EC tablet Take 1 tablet by mouth nightly Yes LIGIA Peraza   ondansetron (ZOFRAN) 4 MG tablet Take 1 tablet by mouth 3 times daily as needed for Nausea or Vomiting  Patient not taking: Reported on 4/5/2022  LIGIA Peraza   varenicline (CHANTIX) 1 MG tablet Take 1 tablet by mouth 2 times daily  Patient not taking: Reported on 2/15/2022  LIGIA Peraza   zonisamide (ZONEGRAN) 100 MG capsule Take 1 capsule by mouth nightly  Patient not taking: Reported on 2/15/2022  Shawanda Monaco MD       Social History     Tobacco Use    Smoking status: Former Smoker     Packs/day: 1.00     Years: 20.00     Pack years: 20.00     Types: Cigarettes     Quit date: 3/6/2019     Years since quitting: 3.0    Smokeless tobacco: Never Used   Vaping Use    Vaping Use: Never used   Substance Use Topics    Alcohol use: Not Currently    Drug use:  No No Known Allergies,   Past Medical History:   Diagnosis Date    Anxiety     Arthritis     Cerebral aneurysm     Former smoker     GERD (gastroesophageal reflux disease)     Hypertension    ,   Past Surgical History:   Procedure Laterality Date    BRAIN SURGERY      Aneurysm partial clip     CHOLECYSTECTOMY      CRANIOTOMY Right 5/7/2019    RIGHT CRANIOTOMY FOR CLIPPING OF MIDDLE CEREBRAL ARTERY ANEURYSM performed by Reinaldo Velez MD at [address] [place] [address]     ,   Family History   Problem Relation Age of Onset    Heart Disease Mother     High Blood Pressure Father     Arthritis Father        PHYSICAL EXAMINATION:  [ INSTRUCTIONS:  \"[x]\" Indicates a positive item  \"[]\" Indicates a negative item  -- DELETE ALL ITEMS NOT EXAMINED]  Vital Signs: (As obtained by patient/caregiver at home)        Constitutional: [x] Appears well-developed and well-nourished [x] No apparent distress      [] Abnormal   Mental status  [x] Alert and awake  [x] Oriented to person/place/time [x]Able to follow commands        Eyes:  EOM    [x]  Normal  [] Abnormal-  Sclera  [x]  Normal  [] Abnormal -         Discharge []  None visible  [] Abnormal -    HENT:   [x] Normocephalic, atraumatic.   [] Abnormal   [x] Mouth/Throat: Mucous membranes are moist.     External Ears [x] Normal  [] Abnormal-    Neck: [x] No visualized mass     Pulmonary/Chest: [x] Respiratory effort normal.  [x] No visualized signs of difficulty breathing or respiratory distress        [] Abnormal      Musculoskeletal:   [x] Normal gait with no signs of ataxia         [x] Normal range of motion of neck        [] Abnormal       Neurological:        [x] No Facial Asymmetry (Cranial nerve 7 motor function) (limited exam to video visit)          [] No gaze palsy        [] Abnormal         Skin:        [x] No significant exanthematous lesions or discoloration noted on facial skin         [] Abnormal            Psychiatric:       [x] Normal Affect [] Abnormal        [] No Hallucinations    Other pertinent observable physical exam findings:-    Due to this being a TeleHealth encounter, evaluation of the following organ systems is limited: Vitals/Constitutional/EENT/Resp/CV/GI//MS/Neuro/Skin/Heme-Lymph-Imm. ASSESSMENT/PLAN:  1. Acute bilateral low back pain with right-sided sciatica  Exercises discussed with patients. Patient verbalized understanding.     - predniSONE (DELTASONE) 10 MG tablet; Take 1 tablet by mouth 2 times daily for 5 days  Dispense: 10 tablet; Refill: 0    2. Muscle spasm    - baclofen (LIORESAL) 10 MG tablet; Take 1 tablet by mouth 3 times daily  Dispense: 30 tablet; Refill: 0      Return in about 1 week (around 4/12/2022), or if symptoms worsen or fail to improve. An  electronic signature was used to authenticate this note. --LIGIA Lopez - CNP on 4/5/2022 at 4:16 PM  [id number]}    Pursuant to the emergency declaration under the [facility], [facility] waiver authority and the [employer] Act, this Virtual  Visit was conducted, with patient's consent, to reduce the patient's risk of exposure to COVID-19 and provide continuity of care for an established patient. Services were provided through a video synchronous discussion virtually to substitute for in-person clinic visit.

## 2022-04-10 ENCOUNTER — HOSPITAL ENCOUNTER (OUTPATIENT)
Age: 67
Setting detail: OBSERVATION
Discharge: LEFT BEFORE DISCHARGE | End: 2022-04-11
Attending: EMERGENCY MEDICINE | Admitting: HOSPITALIST
Payer: MEDICARE

## 2022-04-10 ENCOUNTER — APPOINTMENT (OUTPATIENT)
Dept: GENERAL RADIOLOGY | Age: 67
End: 2022-04-10
Payer: MEDICARE

## 2022-04-10 ENCOUNTER — APPOINTMENT (OUTPATIENT)
Dept: CT IMAGING | Age: 67
End: 2022-04-10
Payer: MEDICARE

## 2022-04-10 ENCOUNTER — NURSE TRIAGE (OUTPATIENT)
Dept: CALL CENTER | Facility: HOSPITAL | Age: 67
End: 2022-04-10

## 2022-04-10 DIAGNOSIS — F41.0 PANIC ATTACKS: ICD-10-CM

## 2022-04-10 DIAGNOSIS — N17.9 AKI (ACUTE KIDNEY INJURY) (HCC): ICD-10-CM

## 2022-04-10 DIAGNOSIS — F41.9 ACUTE ANXIETY: ICD-10-CM

## 2022-04-10 DIAGNOSIS — R41.0 CONFUSED: ICD-10-CM

## 2022-04-10 DIAGNOSIS — R07.89 ATYPICAL CHEST PAIN: Primary | ICD-10-CM

## 2022-04-10 DIAGNOSIS — Z86.79 HISTORY OF INTRACRANIAL ANEURYSM: ICD-10-CM

## 2022-04-10 PROBLEM — R06.09 DYSPNEA ON EXERTION: Status: ACTIVE | Noted: 2022-04-10

## 2022-04-10 LAB
ALBUMIN SERPL-MCNC: 4.9 G/DL (ref 3.5–5.2)
ALP BLD-CCNC: 93 U/L (ref 35–104)
ALT SERPL-CCNC: 10 U/L (ref 5–33)
AMMONIA: 15 UMOL/L (ref 11–51)
AMPHETAMINE SCREEN, URINE: NEGATIVE
ANION GAP SERPL CALCULATED.3IONS-SCNC: 20 MMOL/L (ref 7–19)
AST SERPL-CCNC: 17 U/L (ref 5–32)
BACTERIA: NEGATIVE /HPF
BARBITURATE SCREEN URINE: NEGATIVE
BASOPHILS ABSOLUTE: 0 K/UL (ref 0–0.2)
BASOPHILS RELATIVE PERCENT: 0.1 % (ref 0–1)
BENZODIAZEPINE SCREEN, URINE: NEGATIVE
BILIRUB SERPL-MCNC: 0.3 MG/DL (ref 0.2–1.2)
BILIRUBIN URINE: NEGATIVE
BLOOD, URINE: NEGATIVE
BUN BLDV-MCNC: 49 MG/DL (ref 8–23)
C-REACTIVE PROTEIN: 0.7 MG/DL (ref 0–0.5)
CALCIUM SERPL-MCNC: 10.4 MG/DL (ref 8.8–10.2)
CANNABINOID SCREEN URINE: NEGATIVE
CHLORIDE BLD-SCNC: 97 MMOL/L (ref 98–111)
CLARITY: CLEAR
CO2: 22 MMOL/L (ref 22–29)
COCAINE METABOLITE SCREEN URINE: NEGATIVE
COLOR: YELLOW
CREAT SERPL-MCNC: 1.7 MG/DL (ref 0.5–0.9)
CRYSTALS, UA: ABNORMAL /HPF
D DIMER: 0.69 UG/ML FEU (ref 0–0.48)
EOSINOPHILS ABSOLUTE: 0 K/UL (ref 0–0.6)
EOSINOPHILS RELATIVE PERCENT: 0.1 % (ref 0–5)
EPITHELIAL CELLS, UA: 4 /HPF (ref 0–5)
ETHANOL: <10 MG/DL (ref 0–0.08)
FERRITIN: 435.5 NG/ML (ref 13–150)
FOLATE: 8.8 NG/ML (ref 4.8–37.3)
GFR AFRICAN AMERICAN: 36
GFR NON-AFRICAN AMERICAN: 30
GLUCOSE BLD-MCNC: 109 MG/DL (ref 74–109)
GLUCOSE URINE: NEGATIVE MG/DL
HCT VFR BLD CALC: 37 % (ref 37–47)
HEMOGLOBIN: 11.8 G/DL (ref 12–16)
HYALINE CASTS: 1 /HPF (ref 0–8)
IMMATURE GRANULOCYTES #: 0.1 K/UL
IRON SATURATION: 12 % (ref 14–50)
IRON: 28 UG/DL (ref 37–145)
KETONES, URINE: NEGATIVE MG/DL
LEUKOCYTE ESTERASE, URINE: ABNORMAL
LYMPHOCYTES ABSOLUTE: 1.1 K/UL (ref 1.1–4.5)
LYMPHOCYTES RELATIVE PERCENT: 10.8 % (ref 20–40)
Lab: NORMAL
MAGNESIUM: 1.5 MG/DL (ref 1.6–2.4)
MCH RBC QN AUTO: 32.2 PG (ref 27–31)
MCHC RBC AUTO-ENTMCNC: 31.9 G/DL (ref 33–37)
MCV RBC AUTO: 100.8 FL (ref 81–99)
MONOCYTES ABSOLUTE: 0.5 K/UL (ref 0–0.9)
MONOCYTES RELATIVE PERCENT: 5.1 % (ref 0–10)
NEUTROPHILS ABSOLUTE: 8.7 K/UL (ref 1.5–7.5)
NEUTROPHILS RELATIVE PERCENT: 83.2 % (ref 50–65)
NITRITE, URINE: NEGATIVE
OPIATE SCREEN URINE: NEGATIVE
PDW BLD-RTO: 13.4 % (ref 11.5–14.5)
PH UA: 6 (ref 5–8)
PHOSPHORUS: 4.3 MG/DL (ref 2.5–4.5)
PLATELET # BLD: 337 K/UL (ref 130–400)
PMV BLD AUTO: 9.8 FL (ref 9.4–12.3)
POTASSIUM SERPL-SCNC: 4.1 MMOL/L (ref 3.5–5)
PRO-BNP: 699 PG/ML (ref 0–900)
PROTEIN UA: NEGATIVE MG/DL
RBC # BLD: 3.67 M/UL (ref 4.2–5.4)
RBC UA: 1 /HPF (ref 0–4)
SEDIMENTATION RATE, ERYTHROCYTE: 33 MM/HR (ref 0–25)
SODIUM BLD-SCNC: 139 MMOL/L (ref 136–145)
SPECIFIC GRAVITY UA: 1.01 (ref 1–1.03)
TOTAL CK: 116 U/L (ref 26–192)
TOTAL IRON BINDING CAPACITY: 225 UG/DL (ref 250–400)
TOTAL PROTEIN: 7.6 G/DL (ref 6.6–8.7)
TROPONIN: <0.01 NG/ML (ref 0–0.03)
TROPONIN: <0.01 NG/ML (ref 0–0.03)
TSH SERPL DL<=0.05 MIU/L-ACNC: 0.5 UIU/ML (ref 0.27–4.2)
UROBILINOGEN, URINE: 0.2 E.U./DL
VITAMIN B-12: 411 PG/ML (ref 211–946)
WBC # BLD: 10.5 K/UL (ref 4.8–10.8)
WBC UA: 10 /HPF (ref 0–5)

## 2022-04-10 PROCEDURE — 6370000000 HC RX 637 (ALT 250 FOR IP): Performed by: HOSPITALIST

## 2022-04-10 PROCEDURE — 85652 RBC SED RATE AUTOMATED: CPT

## 2022-04-10 PROCEDURE — 84484 ASSAY OF TROPONIN QUANT: CPT

## 2022-04-10 PROCEDURE — 82140 ASSAY OF AMMONIA: CPT

## 2022-04-10 PROCEDURE — 83550 IRON BINDING TEST: CPT

## 2022-04-10 PROCEDURE — 96366 THER/PROPH/DIAG IV INF ADDON: CPT

## 2022-04-10 PROCEDURE — 86140 C-REACTIVE PROTEIN: CPT

## 2022-04-10 PROCEDURE — 80307 DRUG TEST PRSMV CHEM ANLYZR: CPT

## 2022-04-10 PROCEDURE — 85025 COMPLETE CBC W/AUTO DIFF WBC: CPT

## 2022-04-10 PROCEDURE — 96372 THER/PROPH/DIAG INJ SC/IM: CPT

## 2022-04-10 PROCEDURE — 70450 CT HEAD/BRAIN W/O DYE: CPT

## 2022-04-10 PROCEDURE — 74018 RADEX ABDOMEN 1 VIEW: CPT

## 2022-04-10 PROCEDURE — 6370000000 HC RX 637 (ALT 250 FOR IP): Performed by: EMERGENCY MEDICINE

## 2022-04-10 PROCEDURE — 96365 THER/PROPH/DIAG IV INF INIT: CPT

## 2022-04-10 PROCEDURE — G0378 HOSPITAL OBSERVATION PER HR: HCPCS

## 2022-04-10 PROCEDURE — 6360000002 HC RX W HCPCS: Performed by: HOSPITALIST

## 2022-04-10 PROCEDURE — 71045 X-RAY EXAM CHEST 1 VIEW: CPT

## 2022-04-10 PROCEDURE — 84100 ASSAY OF PHOSPHORUS: CPT

## 2022-04-10 PROCEDURE — 87086 URINE CULTURE/COLONY COUNT: CPT

## 2022-04-10 PROCEDURE — 99285 EMERGENCY DEPT VISIT HI MDM: CPT

## 2022-04-10 PROCEDURE — 36415 COLL VENOUS BLD VENIPUNCTURE: CPT

## 2022-04-10 PROCEDURE — 96375 TX/PRO/DX INJ NEW DRUG ADDON: CPT

## 2022-04-10 PROCEDURE — 96374 THER/PROPH/DIAG INJ IV PUSH: CPT

## 2022-04-10 PROCEDURE — 6360000002 HC RX W HCPCS: Performed by: EMERGENCY MEDICINE

## 2022-04-10 PROCEDURE — 93005 ELECTROCARDIOGRAM TRACING: CPT | Performed by: EMERGENCY MEDICINE

## 2022-04-10 PROCEDURE — 82550 ASSAY OF CK (CPK): CPT

## 2022-04-10 PROCEDURE — 80053 COMPREHEN METABOLIC PANEL: CPT

## 2022-04-10 PROCEDURE — 83880 ASSAY OF NATRIURETIC PEPTIDE: CPT

## 2022-04-10 PROCEDURE — 82607 VITAMIN B-12: CPT

## 2022-04-10 PROCEDURE — 82728 ASSAY OF FERRITIN: CPT

## 2022-04-10 PROCEDURE — 82746 ASSAY OF FOLIC ACID SERUM: CPT

## 2022-04-10 PROCEDURE — 2580000003 HC RX 258: Performed by: HOSPITALIST

## 2022-04-10 PROCEDURE — 84443 ASSAY THYROID STIM HORMONE: CPT

## 2022-04-10 PROCEDURE — 83735 ASSAY OF MAGNESIUM: CPT

## 2022-04-10 PROCEDURE — 2580000003 HC RX 258: Performed by: EMERGENCY MEDICINE

## 2022-04-10 PROCEDURE — 83540 ASSAY OF IRON: CPT

## 2022-04-10 PROCEDURE — 82077 ASSAY SPEC XCP UR&BREATH IA: CPT

## 2022-04-10 PROCEDURE — 81001 URINALYSIS AUTO W/SCOPE: CPT

## 2022-04-10 PROCEDURE — 87040 BLOOD CULTURE FOR BACTERIA: CPT

## 2022-04-10 PROCEDURE — 85379 FIBRIN DEGRADATION QUANT: CPT

## 2022-04-10 RX ORDER — MAGNESIUM SULFATE 1 G/100ML
1000 INJECTION INTRAVENOUS ONCE
Status: COMPLETED | OUTPATIENT
Start: 2022-04-10 | End: 2022-04-10

## 2022-04-10 RX ORDER — LACTULOSE 10 G/15ML
20 SOLUTION ORAL 3 TIMES DAILY
Status: DISCONTINUED | OUTPATIENT
Start: 2022-04-10 | End: 2022-04-11

## 2022-04-10 RX ORDER — ESCITALOPRAM OXALATE 10 MG/1
5 TABLET ORAL DAILY
Status: DISCONTINUED | OUTPATIENT
Start: 2022-04-10 | End: 2022-04-11 | Stop reason: HOSPADM

## 2022-04-10 RX ORDER — PANTOPRAZOLE SODIUM 20 MG/1
20 TABLET, DELAYED RELEASE ORAL
Status: DISCONTINUED | OUTPATIENT
Start: 2022-04-11 | End: 2022-04-11 | Stop reason: HOSPADM

## 2022-04-10 RX ORDER — ACETAMINOPHEN 325 MG/1
650 TABLET ORAL EVERY 6 HOURS PRN
Status: DISCONTINUED | OUTPATIENT
Start: 2022-04-10 | End: 2022-04-11 | Stop reason: HOSPADM

## 2022-04-10 RX ORDER — ONDANSETRON 2 MG/ML
4 INJECTION INTRAMUSCULAR; INTRAVENOUS EVERY 6 HOURS PRN
Status: DISCONTINUED | OUTPATIENT
Start: 2022-04-10 | End: 2022-04-11 | Stop reason: HOSPADM

## 2022-04-10 RX ORDER — POLYETHYLENE GLYCOL 3350 17 G/17G
17 POWDER, FOR SOLUTION ORAL DAILY PRN
Status: DISCONTINUED | OUTPATIENT
Start: 2022-04-10 | End: 2022-04-11 | Stop reason: HOSPADM

## 2022-04-10 RX ORDER — ZIPRASIDONE MESYLATE 20 MG/ML
10 INJECTION, POWDER, LYOPHILIZED, FOR SOLUTION INTRAMUSCULAR NIGHTLY
Status: DISCONTINUED | OUTPATIENT
Start: 2022-04-10 | End: 2022-04-11

## 2022-04-10 RX ORDER — 0.9 % SODIUM CHLORIDE 0.9 %
1000 INTRAVENOUS SOLUTION INTRAVENOUS ONCE
Status: COMPLETED | OUTPATIENT
Start: 2022-04-10 | End: 2022-04-10

## 2022-04-10 RX ORDER — SODIUM CHLORIDE 9 MG/ML
INJECTION, SOLUTION INTRAVENOUS CONTINUOUS
Status: DISCONTINUED | OUTPATIENT
Start: 2022-04-10 | End: 2022-04-11 | Stop reason: HOSPADM

## 2022-04-10 RX ORDER — TIZANIDINE 4 MG/1
4 TABLET ORAL ONCE
Status: COMPLETED | OUTPATIENT
Start: 2022-04-10 | End: 2022-04-10

## 2022-04-10 RX ORDER — ALPRAZOLAM 1 MG/1
1 TABLET ORAL EVERY 6 HOURS PRN
Status: DISCONTINUED | OUTPATIENT
Start: 2022-04-10 | End: 2022-04-11

## 2022-04-10 RX ORDER — SODIUM CHLORIDE 9 MG/ML
INJECTION, SOLUTION INTRAVENOUS PRN
Status: DISCONTINUED | OUTPATIENT
Start: 2022-04-10 | End: 2022-04-11 | Stop reason: HOSPADM

## 2022-04-10 RX ORDER — LORAZEPAM 1 MG/1
1 TABLET ORAL ONCE
Status: COMPLETED | OUTPATIENT
Start: 2022-04-10 | End: 2022-04-10

## 2022-04-10 RX ORDER — AMLODIPINE BESYLATE 5 MG/1
5 TABLET ORAL DAILY
Status: DISCONTINUED | OUTPATIENT
Start: 2022-04-10 | End: 2022-04-11

## 2022-04-10 RX ORDER — MORPHINE SULFATE 2 MG/ML
2 INJECTION, SOLUTION INTRAMUSCULAR; INTRAVENOUS
Status: DISCONTINUED | OUTPATIENT
Start: 2022-04-10 | End: 2022-04-11

## 2022-04-10 RX ORDER — ONDANSETRON 4 MG/1
4 TABLET, ORALLY DISINTEGRATING ORAL EVERY 8 HOURS PRN
Status: DISCONTINUED | OUTPATIENT
Start: 2022-04-10 | End: 2022-04-11 | Stop reason: HOSPADM

## 2022-04-10 RX ORDER — NITROGLYCERIN 0.4 MG/1
0.4 TABLET SUBLINGUAL EVERY 5 MIN PRN
Status: DISCONTINUED | OUTPATIENT
Start: 2022-04-10 | End: 2022-04-11 | Stop reason: HOSPADM

## 2022-04-10 RX ORDER — SODIUM CHLORIDE 0.9 % (FLUSH) 0.9 %
5-40 SYRINGE (ML) INJECTION EVERY 12 HOURS SCHEDULED
Status: DISCONTINUED | OUTPATIENT
Start: 2022-04-10 | End: 2022-04-11 | Stop reason: HOSPADM

## 2022-04-10 RX ORDER — ACETAMINOPHEN 650 MG/1
650 SUPPOSITORY RECTAL EVERY 6 HOURS PRN
Status: DISCONTINUED | OUTPATIENT
Start: 2022-04-10 | End: 2022-04-11 | Stop reason: HOSPADM

## 2022-04-10 RX ORDER — LORAZEPAM 2 MG/ML
1 INJECTION INTRAMUSCULAR ONCE
Status: COMPLETED | OUTPATIENT
Start: 2022-04-10 | End: 2022-04-10

## 2022-04-10 RX ORDER — LOSARTAN POTASSIUM 50 MG/1
25 TABLET ORAL DAILY
Status: DISCONTINUED | OUTPATIENT
Start: 2022-04-10 | End: 2022-04-10

## 2022-04-10 RX ORDER — SODIUM CHLORIDE 0.9 % (FLUSH) 0.9 %
5-40 SYRINGE (ML) INJECTION PRN
Status: DISCONTINUED | OUTPATIENT
Start: 2022-04-10 | End: 2022-04-11 | Stop reason: HOSPADM

## 2022-04-10 RX ORDER — ZONISAMIDE 100 MG/1
100 CAPSULE ORAL NIGHTLY
Status: DISCONTINUED | OUTPATIENT
Start: 2022-04-10 | End: 2022-04-11 | Stop reason: HOSPADM

## 2022-04-10 RX ADMIN — ALPRAZOLAM 1 MG: 1 TABLET ORAL at 19:27

## 2022-04-10 RX ADMIN — LACTULOSE 20 G: 20 SOLUTION ORAL at 21:00

## 2022-04-10 RX ADMIN — SODIUM CHLORIDE 1000 ML: 9 INJECTION, SOLUTION INTRAVENOUS at 05:31

## 2022-04-10 RX ADMIN — ESCITALOPRAM OXALATE 5 MG: 10 TABLET ORAL at 15:10

## 2022-04-10 RX ADMIN — AMLODIPINE BESYLATE 5 MG: 5 TABLET ORAL at 15:10

## 2022-04-10 RX ADMIN — LACTULOSE 20 G: 20 SOLUTION ORAL at 15:45

## 2022-04-10 RX ADMIN — ALPRAZOLAM 1 MG: 1 TABLET ORAL at 14:51

## 2022-04-10 RX ADMIN — METOPROLOL TARTRATE 12.5 MG: 25 TABLET, FILM COATED ORAL at 15:10

## 2022-04-10 RX ADMIN — LORAZEPAM 1 MG: 1 TABLET ORAL at 09:12

## 2022-04-10 RX ADMIN — SODIUM CHLORIDE: 9 INJECTION, SOLUTION INTRAVENOUS at 14:55

## 2022-04-10 RX ADMIN — SODIUM CHLORIDE, PRESERVATIVE FREE 1000 MG: 5 INJECTION INTRAVENOUS at 15:10

## 2022-04-10 RX ADMIN — LORAZEPAM 1 MG: 2 INJECTION INTRAMUSCULAR; INTRAVENOUS at 04:54

## 2022-04-10 RX ADMIN — MAGNESIUM SULFATE HEPTAHYDRATE 1000 MG: 1 INJECTION, SOLUTION INTRAVENOUS at 17:10

## 2022-04-10 RX ADMIN — ZONISAMIDE 100 MG: 100 CAPSULE ORAL at 21:00

## 2022-04-10 RX ADMIN — ZIPRASIDONE MESYLATE 10 MG: 20 INJECTION, POWDER, LYOPHILIZED, FOR SOLUTION INTRAMUSCULAR at 21:01

## 2022-04-10 RX ADMIN — TIZANIDINE 4 MG: 4 TABLET ORAL at 12:47

## 2022-04-10 ASSESSMENT — ENCOUNTER SYMPTOMS
BACK PAIN: 0
NAUSEA: 0
DIARRHEA: 0
COUGH: 0
CHEST TIGHTNESS: 1
SORE THROAT: 0
SHORTNESS OF BREATH: 1
RHINORRHEA: 0
GASTROINTESTINAL NEGATIVE: 1
VOMITING: 0
ABDOMINAL PAIN: 0

## 2022-04-10 ASSESSMENT — PAIN DESCRIPTION - LOCATION
LOCATION: GENERALIZED
LOCATION: CHEST

## 2022-04-10 ASSESSMENT — PAIN SCALES - GENERAL
PAINLEVEL_OUTOF10: 10
PAINLEVEL_OUTOF10: 5

## 2022-04-10 NOTE — ED PROVIDER NOTES
[facility] EMERGENCY DEPT  EMERGENCY DEPARTMENT ENCOUNTER      Pt Name: Hilda Dalton  MRN: [id number]  [place] 1955  Date of evaluation: 4/10/2022  Provider: Soledad Bahena MD    [address] [place] [address]       Chief Complaint   Patient presents with    Shortness of Breath    Chest Pain         PHYSICAL EXAM    (up to 7 for level 4, 8 or more for level 5)     ED Triage Vitals [04/10/22 0430]   BP Temp Temp Source Pulse Resp SpO2 Height Weight   (!) 160/80 97.7 °F (36.5 °C) Oral 65 22 100 % -- --       Physical Exam    DIAGNOSTIC RESULTS     EKG: All EKG's are interpreted by the Emergency Department Physician who either signs or Co-signs this chart in the absence of a cardiologist.        RADIOLOGY:   Non-plain film images such as CT, Ultrasound and MRI are read by the radiologist. [facility] radiographicimages are visualized and preliminarily interpreted by the emergency physician with the below findings:        XR CHEST PORTABLE   Final Result   1.. Mild left basilar subsegmental atelectasis. Lungs are otherwise   clear. Signed by Dr Alexander Letters:  [place]pp - Abnormal; Notable for the following components:       Result Value    RBC 3.67 (*)     Hemoglobin 11.8 (*)     .8 (*)     MCH 32.2 (*)     MCHC 31.9 (*)     Neutrophils % 83.2 (*)     Lymphocytes % 10.8 (*)     Neutrophils Absolute 8.7 (*)     All other components within normal limits   COMPREHENSIVE METABOLIC PANEL - Abnormal; Notable for the following components:    Chloride 97 (*)     Anion Gap 20 (*)     BUN 49 (*)     CREATININE 1.7 (*)     GFR Non- 30 (*)     GFR  36 (*)     Calcium 10.4 (*)     All other components within normal limits   BRAIN NATRIURETIC PEPTIDE   TROPONIN   ETHANOL   TROPONIN       All other labs were within normal range or not returned as of this dictation.     EMERGENCY DEPARTMENT COURSE and DIFFERENTIALDIAGNOSIS/MDM:   Vitals:    Vitals:    04/10/22 0430 04/10/22 0606   BP: (!) 160/80 130/74   Pulse: 65 72   Resp: 22 18   Temp: 97.7 °F (36.5 °C)    TempSrc: Oral    SpO2: 100% 91%       [facility]    Reassessment    ED Course as of 04/10/22 0709   Sun Apr 10, 2022   7458 Received patient in checkout from overnight physician. Plan at this time is for repeat troponin and if remains negative discharge home. Patient presented with pain that does not sound cardiac in etiology. She does have risk factors but not a concerning acute presentation and with a reassuring work-up thus far. Does have a mild BILL. Plan for fluids and patient was advised to follow-up in 3 to 5 days for repeat labs. [CLAYTON]   0708 Troponin: <0.01 [CLAYTON]      ED Course User Index  [CLAYTON] Rylee Anderson MD       CONSULTS:  None    PROCEDURES:  Unless otherwise noted below, none     Procedures        FINAL IMPRESSION     1. Atypical chest pain    2. Acute anxiety    3.  BILL (acute kidney injury) [facility])          DISPOSITION/PLAN   DISPOSITION     PATIENT REFERRED TO:  [address]  [address] [place] [phone]    Call in 1 day        DISCHARGE MEDICATIONS:  New Prescriptions    No medications on file          (Please note that portions of this note were completed with a voice recognition program.  Efforts were made to edit the dictations but occasionally words aremis-transcribed.)    Rylee Gray MD (electronically signed)  Emergency Physician          Rylee Anderson MD  04/10/22 8168

## 2022-04-10 NOTE — ED PROVIDER NOTES
[facility] EMERGENCY DE[facility]  eMERGENCY dEPARTMENT eNCOUnter      Pt Name: Simi Pemberton  MRN: [id number]  [place] 1955  Date of evaluation: 4/10/2022  Provider: Taylor Madrigal MD    [address] [place] [address]       Chief Complaint   Patient presents with    Shortness of Breath    Chest Pain         HISTORY OF PRESENT ILLNESS   (Location/Symptom, Timing/Onset,Context/Setting, Quality, Duration, Modifying Factors, Severity)  Note limiting factors. Simi Pemberton is a 78 y.o. female who presents to the emergency department for chest pain and shortness of breath. She tells me she has noticed some chest tightness for the past several days but this morning seem to be worse and she began walking the halls where she lives. She tells me she had significant relief of her symptoms when she puts her hand on the front of her face and closes it to help control her breathing. Admits she has felt very anxious. Denies any cough fever or chills. No prior cardiac history. No history of congestive heart failure or COPD but she is a longtime smoker. Chest discomfort is not exertional.  No diaphoresis. HPI    NursingNotes were reviewed. REVIEW OF SYSTEMS    (2-9 systems for level 4, 10 or more for level 5)     Review of Systems   Constitutional: Negative for chills and fever. HENT: Negative for rhinorrhea and sore throat. Respiratory: Positive for chest tightness and shortness of breath. Negative for cough. Cardiovascular: Positive for chest pain. Negative for palpitations and leg swelling. Gastrointestinal: Negative for abdominal pain, diarrhea, nausea and vomiting. Genitourinary: Negative for dysuria, frequency and urgency. Musculoskeletal: Negative for back pain and neck pain. Neurological: Negative for dizziness and headaches. Psychiatric/Behavioral: The patient is nervous/anxious. All other systems reviewed and are negative.            PAST MEDICALHISTORY     Past Medical History:   Diagnosis Date    Anxiety     Arthritis     Cerebral aneurysm     Former smoker     GERD (gastroesophageal reflux disease)     Hypertension          SURGICAL HISTORY       Past Surgical History:   Procedure Laterality Date    BRAIN SURGERY      Aneurysm partial clip     CHOLECYSTECTOMY      CRANIOTOMY Right 5/7/2019    RIGHT CRANIOTOMY FOR CLIPPING OF MIDDLE CEREBRAL ARTERY ANEURYSM performed by Fish Crane MD at [address] [facility]     Previous Medications    AMLODIPINE (NORVASC) 5 MG TABLET    TAKE 1 TABLET BY MOUTH TWICE DAILY. ASPIRIN 81 MG EC TABLET    Take 1 tablet by mouth nightly    ATORVASTATIN (LIPITOR) 40 MG TABLET    TAKE 1 TABLET BY MOUTH AT NIGHT. BACLOFEN (LIORESAL) 10 MG TABLET    Take 1 tablet by mouth 3 times daily    BLOOD PRESSURE KIT    1 Units by Does not apply route daily    CLONAZEPAM (KLONOPIN) 0.5 MG TABLET    TAKE 1/2 TABLET TWICE DAILY AS NEEDED FOR ANXIETY    ESCITALOPRAM (LEXAPRO) 20 MG TABLET    TAKE 1 TABLET BY MOUTH DAILY AT BEDTIME. LOSARTAN (COZAAR) 25 MG TABLET    Take 1 tablet by mouth daily    METOPROLOL TARTRATE (LOPRESSOR) 25 MG TABLET    Take 0.5 tablets by mouth daily    ONDANSETRON (ZOFRAN) 4 MG TABLET    Take 1 tablet by mouth 3 times daily as needed for Nausea or Vomiting    PANTOPRAZOLE (PROTONIX) 20 MG TABLET    TAKE 1 TABLET BY MOUTH ONCE DAILY. PREDNISONE (DELTASONE) 10 MG TABLET    Take 1 tablet by mouth 2 times daily for 5 days    TIZANIDINE (ZANAFLEX) 2 MG TABLET    Take 1 tablet by mouth 3 times daily as needed (spasm/pain)    VARENICLINE (CHANTIX) 1 MG TABLET    Take 1 tablet by mouth 2 times daily    ZONISAMIDE (ZONEGRAN) 100 MG CAPSULE    Take 1 capsule by mouth nightly       ALLERGIES     Patient has no known allergies.     FAMILY HISTORY       Family History   Problem Relation Age of Onset    Heart Disease Mother     High Blood Pressure Father     Arthritis Father           SOCIAL HISTORY Social History     Socioeconomic History    Marital status:      Spouse name: None    Number of children: None    Years of education: None    Highest education level: None   Occupational History    None   Tobacco Use    Smoking status: Former Smoker     Packs/day: 1.00     Years: 20.00     Pack years: 20.00     Types: Cigarettes     Quit date: 3/6/2019     Years since quitting: 3.0    Smokeless tobacco: Never Used   Vaping Use    Vaping Use: Never used   Substance and Sexual Activity    Alcohol use: Not Currently    Drug use: No    Sexual activity: None   Other Topics Concern    None   Social History Narrative    None     Social Determinants of Health     Financial Resource Strain:     Difficulty of Paying Living Expenses: Not on file   Food Insecurity:     Worried About Running Out of Food in the Last Year: Not on file    Yifan of Food in the Last Year: Not on file   Transportation Needs:     Lack of Transportation (Medical): Not on file    Lack of Transportation (Non-Medical):  Not on file   Physical Activity:     Days of Exercise per Week: Not on file    Minutes of Exercise per Session: Not on file   Stress:     Feeling of Stress : Not on file   Social Connections:     Frequency of Communication with Friends and Family: Not on file    Frequency of Social Gatherings with Friends and Family: Not on file    Attends Voodoo Services: Not on file    Active Member of [address] [place] [address] or Organizations: Not on file    Attends Club or Organization Meetings: Not on file    Marital Status: Not on file   Intimate Partner Violence:     Fear of Current or Ex-Partner: Not on file    Emotionally Abused: Not on file    Physically Abused: Not on file    Sexually Abused: Not on file   Housing Stability:     Unable to Pay for Housing in the Last Year: Not on file    Number of [place] in the Last Year: Not on file    Unstable Housing in the Last Year: Not on file       [facility] Coma Scale  Eye Opening: Spontaneous  Best Verbal Response: Oriented  Best Motor Response: Obeys commands  Sukumar Coma Scale Score: 15        PHYSICAL EXAM    (up to 7 for level 4, 8 or more for level 5)     ED Triage Vitals [04/10/22 0430]   BP Temp Temp Source Pulse Resp SpO2 Height Weight   (!) 160/80 97.7 °F (36.5 °C) Oral 65 22 100 % -- --       Physical Exam  Vitals and nursing note reviewed. Constitutional:       General: She is not in acute distress. Appearance: She is well-developed. She is not diaphoretic. Comments: Anxious, repetitively takes hand in front of face and exhales while closing her hand   HENT:      Head: Normocephalic and atraumatic. Right Ear: External ear normal.      Left Ear: External ear normal.   Eyes:      Conjunctiva/sclera: Conjunctivae normal.   Neck:      Trachea: No tracheal deviation. Cardiovascular:      Rate and Rhythm: Normal rate and regular rhythm. Heart sounds: Normal heart sounds. No murmur heard. Pulmonary:      Effort: No respiratory distress. Breath sounds: Normal breath sounds. No wheezing or rales. Abdominal:      Palpations: Abdomen is soft. There is no mass. Tenderness: There is no abdominal tenderness. Musculoskeletal:         General: Normal range of motion. Cervical back: Normal range of motion. Right lower leg: No edema. Left lower leg: No edema. Skin:     General: Skin is warm and dry. Neurological:      Mental Status: She is alert and oriented to person, place, and time. GCS: GCS eye subscore is 4. GCS verbal subscore is 5. GCS motor subscore is 6.          DIAGNOSTIC RESULTS     EKG: All EKG's areinterpreted by the Emergency Department Physician who either signs or Co-signs this chart in the absence of a cardiologist.    58 normal sinus rhythm no obvious ST changes  nondiagnostic EKG    RADIOLOGY:  Non-plain film images such as CT, Ultrasound and MRI are read by the radiologist. [facility] radiographic images are visualized and preliminarily interpreted bythe emergency physician with the below findings:      XR CHEST PORTABLE    (Results Pending)     No acute disease      LABS:  Labs Reviewed   CBC WITH AUTO DIFFERENTIAL - Abnormal; Notable for the following components:       Result Value    RBC 3.67 (*)     Hemoglobin 11.8 (*)     .8 (*)     MCH 32.2 (*)     MCHC 31.9 (*)     Neutrophils % 83.2 (*)     Lymphocytes % 10.8 (*)     Neutrophils Absolute 8.7 (*)     All other components within normal limits   COMPREHENSIVE METABOLIC PANEL - Abnormal; Notable for the following components:    Chloride 97 (*)     Anion Gap 20 (*)     BUN 49 (*)     CREATININE 1.7 (*)     GFR Non- 30 (*)     GFR  36 (*)     Calcium 10.4 (*)     All other components within normal limits   BRAIN NATRIURETIC PEPTIDE   TROPONIN   ETHANOL   TROPONIN       All other labs were within normal range or not returned as of this dictation. EMERGENCY DEPARTMENT COURSE and DIFFERENTIAL DIAGNOSIS/MDM:   Vitals:    Vitals:    04/10/22 0430   BP: (!) 160/80   Pulse: 65   Resp: 22   Temp: 97.7 °F (36.5 °C)   TempSrc: Oral   SpO2: 100%       MDM    Overall seems most likely anxiety/panic attack.  ekg no acute ischemia. Will check labs but low suspicion. Cont to monitor 0512    Pt resting comfortably feeling much better after 1mg ativan and able to talk easier. First trop neg. Does have BILL giving fluids. Repeat trop [id number]. Assuming this is neg plan for DC with close follow up with PCP and repeat renal function this week. CONSULTS:  None    PROCEDURES:  Unless otherwise noted below, none     Procedures    FINAL IMPRESSION      1. Atypical chest pain    2. Acute anxiety    3.  BILL (acute kidney injury) [facility])          DISPOSITION/PLAN   DISPOSITION        PATIENT REFERRED TO:  [address] [facility] [phone]    Call in 1 day        DISCHARGE MEDICATIONS:  New Prescriptions    No medications on file          (Please note that portions of this note were completed with a voice recognition program.  Efforts were made to edit thedictations but occasionally words are mis-transcribed.)    Kennedy Topete MD (electronically signed)  Attending Emergency Physician        Lennox Adler, MD  04/10/22 9248

## 2022-04-10 NOTE — CARE COORDINATION
ED charge nurse called and asked SW to speak with Pt. SW attempted to speak with Pt down in the ED early this AM but Pt was unable to complete the full assessment. JAY notified ED charge nurse.   Electronically signed by Larry Ernst on 4/10/2022 at 2:46 PM

## 2022-04-10 NOTE — H&P
[id number] [facility]      Hospitalist - History & Physical      PCP: LIGIA Wiley    Date of Admission: 4/10/2022    Date of Service: 4/10/2022    Chief Complaint: Shortness of breath    History Of Present Illness: The patient is a 78 y.o. female who presented to [facility] ER with PMH anxiety, cerebral aneurysm, GERD, HTN, smoker complaining of multiple health concerns. Initially tells ER physician she has noted chest tightness and shortness of breath for the past several days but this morning seem to be worse. She states that she had significant relief of her symptoms when she puts her hand on the front of her face and closes it to help control her breathing. Does admit to feelings of increased anxiety. Of note tells hospitalist staff that she is currently living in a hotel after argument with her landlord. Denies recent illness, fever, chills, nausea, vomiting, and abdominal pain. Patient does not have prior cardiac history or congestive heart failure. Initial troponin and repeat both negative, EKG NSR with no acute ST changes. Initial plan was to hydrate patient for mild BILL and to discharge patient. However patient has become increasingly confused and has been walking out of her ER room trying to wander. 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 Received 1L NS bolus, Ativan 2 mg IV, and Zanaflex 4 mg p.o. while in ER.   Patient is to be admitted to the hospitalist service due to confusion. Past Medical History:        Diagnosis Date    Anxiety     Arthritis     Cerebral aneurysm     Former smoker     GERD (gastroesophageal reflux disease)     Hypertension        Past Surgical History:        Procedure Laterality Date    BRAIN SURGERY      Aneurysm partial clip     CHOLECYSTECTOMY      CRANIOTOMY Right 5/7/2019    RIGHT CRANIOTOMY FOR CLIPPING OF MIDDLE CEREBRAL ARTERY ANEURYSM performed by Ruby Vasquez MD at [place] [id number] Medications:  Prior to Admission medications    Medication Sig Start Date End Date Taking? Authorizing Provider   predniSONE (DELTASONE) 10 MG tablet Take 1 tablet by mouth 2 times daily for 5 days 4/5/22 4/10/22  Pat Points, APRN - CNP   baclofen (LIORESAL) 10 MG tablet Take 1 tablet by mouth 3 times daily 4/5/22   Pat Points, APRN - CNP   tiZANidine (ZANAFLEX) 2 MG tablet Take 1 tablet by mouth 3 times daily as needed (spasm/pain) 3/30/22   Feliberto Jeans, MD   clonazePAM (KLONOPIN) 0.5 MG tablet TAKE 1/2 TABLET TWICE DAILY AS NEEDED FOR ANXIETY 3/23/22 4/22/22  LIGIA Rivero   pantoprazole (PROTONIX) 20 MG tablet TAKE 1 TABLET BY MOUTH ONCE DAILY. 3/17/22   LIGIA Rivero   escitalopram (LEXAPRO) 20 MG tablet TAKE 1 TABLET BY MOUTH DAILY AT BEDTIME. 2/15/22   LIGIA Riveor   ondansetron (ZOFRAN) 4 MG tablet Take 1 tablet by mouth 3 times daily as needed for Nausea or Vomiting  Patient not taking: Reported on 4/5/2022 2/15/22   LIGIA Rivero   varenicline (CHANTIX) 1 MG tablet Take 1 tablet by mouth 2 times daily  Patient not taking: Reported on 2/15/2022 11/10/21   LIGIA Rivero   amLODIPine (NORVASC) 5 MG tablet TAKE 1 TABLET BY MOUTH TWICE DAILY.   10/26/21   LIGIA Rivero   metoprolol tartrate (LOPRESSOR) 25 MG tablet Take 0.5 tablets by mouth daily 10/15/21   Nikhil Dye MD   losartan (COZAAR) 25 MG tablet Take 1 tablet by mouth daily 10/15/21   Seymour Baltazar MD   zonisamide [facility]) 100 MG capsule Take 1 capsule by mouth nightly 10/15/21   Seymour Baltazar MD   atorvastatin (LIPITOR) 40 MG tablet TAKE 1 TABLET BY MOUTH AT NIGHT. 9/15/21   LIGIA Wiley   Blood Pressure KIT 1 Units by Does not apply route daily 4/22/21 4/5/22  LIGIA Wiley   aspirin 81 MG EC tablet Take 1 tablet by mouth nightly 4/22/21 4/5/22  LIGIA Wiley       Allergies:    Patient has no known allergies. Social History:    The patient currently lives hotel  Tobacco:   reports that she quit smoking about 3 years ago. Her smoking use included cigarettes. She has a 20.00 pack-year smoking history. She has never used smokeless tobacco.  Alcohol:   reports previous alcohol use. Illicit Drugs: denies    Family History:      Problem Relation Age of Onset    Heart Disease Mother     High Blood Pressure Father     Arthritis Father        Review of Systems:   Review of Systems   HENT: Negative. Respiratory: Positive for chest tightness and shortness of breath. Cardiovascular: Positive for chest pain. Gastrointestinal: Negative. Genitourinary: Negative. Musculoskeletal: Negative. Psychiatric/Behavioral: Positive for confusion. The patient is nervous/anxious. 14 point review of systems is negative except as specifically addressed above. Physical Examination:  /85   Pulse 72   Temp 97.7 °F (36.5 °C) (Oral)   Resp 18   SpO2 91%   Physical Exam  Constitutional:       General: She is not in acute distress. Appearance: Normal appearance. HENT:      Mouth/Throat:      Mouth: Mucous membranes are moist.      Pharynx: Oropharynx is clear. Eyes:      Extraocular Movements: Extraocular movements intact. Conjunctiva/sclera: Conjunctivae normal.      Pupils: Pupils are equal, round, and reactive to light.    Cardiovascular:      Rate and Rhythm: Normal rate and regular rhythm. Pulses: Normal pulses. Heart sounds: Normal heart sounds. No murmur heard. Pulmonary:      Effort: Pulmonary effort is normal. No respiratory distress. Breath sounds: Normal breath sounds. Chest:      Chest wall: No tenderness. Abdominal:      General: Bowel sounds are normal. There is no distension. Palpations: Abdomen is soft. Tenderness: There is no abdominal tenderness. There is no guarding or rebound. Musculoskeletal:         General: No swelling. Normal range of motion. Cervical back: Normal range of motion and neck supple. No rigidity or tenderness. Right lower leg: No edema. Left lower leg: No edema. Skin:     General: Skin is warm and dry. Neurological:      General: No focal deficit present. Mental Status: She is alert. She is disoriented. Cranial Nerves: No cranial nerve deficit. Motor: Weakness present. Psychiatric:         Mood and Affect: Mood is anxious. Behavior: Behavior normal.          Diagnostic Data:  CBC:  Recent Labs     04/10/22  0445   WBC 10.5   HGB 11.8*   HCT 37.0        BMP:  Recent Labs     04/10/22  0445      K 4.1   CL 97*   CO2 22   BUN 49*   CREATININE 1.7*   CALCIUM 10.4*     Recent Labs     04/10/22  0445   AST 17   ALT 10   BILITOT 0.3   ALKPHOS 93     Cardiac Enzymes:   Recent Labs     04/10/22  0445 04/10/22  0644   TROPONINI <0.01 <0.01     Urinalysis:  Lab Results   Component Value Date    NITRU Negative 04/10/2022    WBCUA 10 04/10/2022    BACTERIA NEGATIVE 04/10/2022    RBCUA 1 04/10/2022    BLOODU Negative 04/10/2022    SPECGRAV 1.009 04/10/2022    GLUCOSEU Negative 04/10/2022       CT HEAD WO CONTRAST    Result Date: 4/10/2022  EXAMINATION: CT head without contrast 4/10/2022 HISTORY: Altered mental status. HISTORY of aneurysm. Dose: 699 mGycm.  All CT scans are performed using dose optimization techniques as appropriate to the performed exam and include at least one of the following: Automated exposure control, adjustment of the mA and/or kV according to size, and the use of the iterative reconstruction technique. Regina Fanning FINDINGS: Multiple contiguous axial images are obtained from the skull base to the vertex per protocol without intravenous contrast enhancement with reformatted images obtained in the sagittal and coronal projections from the original data set. The study is degraded by motion artifact. There is atrophy of the brain with prominence of the subarachnoid spaces and ventricular enlargement. A remote right basal ganglia lacunar infarction is present with encephalomalacia. The patient's undergone previous right frontal and temporal craniotomy for clipping of what appears to be a right MCA trifurcation aneurysm. There is no evidence of intra-axial or subarachnoid hemorrhage on today's exam. There is no mass, mass effect or shift of the midline. No evidence of acute infarct or hemorrhage. 1.. 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 No evidence of recurrent hemorrhage. 2. Atrophy with small vessel disease. Remote right basal ganglia lacunar infarction with encephalomalacia. Signed by Dr Prince Nicholas    Result Date: 4/10/2022  EXAMINATION: Chest one view 4/10/2022 HISTORY: Chest pain and shortness of breath FINDINGS: Today's exam is compared to a previous study of 10/13/2021. Mild left basilar subsegmental atelectasis is present. Lungs are otherwise clear. There is no effusion or free air present. The mediastinal contours are within normal limits. There is an old right posterior lateral fifth rib fracture. 1.. Mild left basilar subsegmental atelectasis. Lungs are otherwise clear.  Signed by Dr Pema Adhikari    Assessment/Plan:    Confusion   -CXR   -CT head   -Neurochecks every 4 hours   -Urinalysis and culture   -IV antibiotic, Rocephin   -Blood cultures pending   -TSH   -CRP, sed rate   -Ethanol    -Ammonia   -UDS   -Daily labs   -Fall precautions   -Monitor on telemetry  Dyspnea on exertion    -CXR    -D-Dimer   -BNP   -CTA chest   -Supplemental oxygen as warranted     -As needed anxiety medication  BILL              - IVFs               - Monitor I's and O's closely              - Monitor labs closely              - Avoid hypotension              - Avoid nephrotoxic agents    DVT prophylaxis Lovenox     Signed:  LIGIA Velez CNP, 4/10/2022 1:20 PM       Attestation Statement     I have independently seen and examined this patient and agree with the asesment and plan by mid level provider                                                      Objective:   Vitals: /64   Pulse 53   Temp 97 °F (36.1 °C) (Temporal)   Resp 18   Ht 5' 2\" (1.575 m)   Wt 130 lb (59 kg)   SpO2 93%   BMI 23.78 kg/m²   General appearance: alert, appears stated age and cooperative, extremely anxious   Skin: Skin color, texture, turgor normal.   HEENT: Head: Normocephalic, no lesions, without obvious abnormality.   Neck: no adenopathy, no carotid bruit, no JVD and supple, symmetrical, trachea midline  Lungs: clear to auscultation bilaterally  Heart: regular rate and rhythm, S1, S2 normal, no murmur, click, rub or gallop  Abdomen: soft, non-tender; bowel sounds normal; no masses,  no organomegaly  Extremities: extremities normal, atraumatic, no cyanosis or edema  Lymphatic: No significant lymph node enlargement papable  Neurologic: Mental status: Alert, oriented, anxious       Assessment & Plan:    Panic attack - zanax prn  UTI- Rocephin  Bradycardia- DC metoprolol  BILL- IVF  Constipation - treat   Hypomagnesemia- replace  Elevated DD- CTA chest  History of CVA    Raphael Wren MD

## 2022-04-10 NOTE — TELEPHONE ENCOUNTER
Pt stated she was having trouble talking and breathing at same time. Hard to get words out. Stated it started today. Hx brain surgery per pt.    Reason for Disposition  • Patient sounds very sick or weak to the triager    Additional Information  • Negative: [1] SEVERE weakness (i.e., unable to walk or barely able to walk, requires support) AND [2] new-onset or worsening  • Negative: [1] Weakness (i.e., paralysis, loss of muscle strength) of the face, arm / hand, or leg / foot on one side of the body AND [2] sudden onset AND [3] present now (Exception: Bell's palsy suspected [i.e., weakness only one side of the face, developing over hours to days, no other symptoms])  • Negative: [1] Numbness (i.e., loss of sensation) of the face, arm / hand, or leg / foot on one side of the body AND [2] sudden onset AND [3] present now  • Negative: [1] Loss of speech or garbled speech AND [2] sudden onset AND [3] present now  • Negative: Difficult to awaken or acting confused (e.g., disoriented, slurred speech)  • Negative: Sounds like a life-threatening emergency to the triager  • Negative: Confusion, disorientation, or hallucinations is main symptom  • Negative: Neck pain is main symptom (and having weakness, numbness, or tingling in arm / hand because of neck pain)  • Negative: Back pain is main symptom (and having weakness, numbness, or tingling in leg because of back pain)  • Negative: Hand pain is main symptom (and having mild weakness, numbness, or tingling in hand related to hand pain)  • Negative: Dizziness is main symptom  • Negative: Vision loss or change is main symptom  • Negative: Followed a head injury within last 3 days  • Negative: Followed a neck injury within last 3 days  • Negative: [1] Tingling in both hands and/or feet AND [2] breathing faster than normal AND [3] feels similar to prior panic attack or hyperventilation episode  • Negative: Weakness in both sides of the body or weakness all over  • Negative:  "Headache  (and neurologic deficit)  • Negative: [1] Back pain AND [2] numbness (loss of sensation) in groin or rectal area  • Negative: [1] Unable to urinate (or only a few drops) > 4 hours AND [2] bladder feels very full (e.g., palpable bladder or strong urge to urinate)  • Negative: [1] Loss of bladder or bowel control (urine or bowel incontinence; wetting self, leaking stool) AND [2] new-onset  • Negative: [1] Weakness (i.e., paralysis, loss of muscle strength) of the face, arm / hand, or leg / foot on one side of the body AND [2] sudden onset AND [3] brief (now gone)  • Negative: [1] Numbness (i.e., loss of sensation) of the face, arm / hand, or leg / foot on one side of the body AND [2] sudden onset AND [3] brief (now gone)  • Negative: [1] Loss of speech or garbled speech AND [2] sudden onset AND [3] brief (now gone)  • Negative: Bell's palsy suspected (i.e., weakness on only one side of the face, developing over hours to days, no other symptoms)    Answer Assessment - Initial Assessment Questions  1. SYMPTOM: \"What is the main symptom you are concerned about?\" (e.g., weakness, numbness)       Speech, can't get words out and can't breathe when trying to talk  2. ONSET: \"When did this start?\" (minutes, hours, days; while sleeping)      tonight  3. LAST NORMAL: \"When was the last time you were normal (no symptoms)?\"     Early afternoon  4. PATTERN \"Does this come and go, or has it been constant since it started?\"  \"Is it present now?\"     Comes and goes  5. CARDIAC SYMPTOMS: \"Have you had any of the following symptoms: chest pain, difficulty breathing, palpitations?\"      unknown  6. NEUROLOGIC SYMPTOMS: \"Have you had any of the following symptoms: headache, dizziness, vision loss, double vision, changes in speech, unsteady on your feet?\"      hx brain sx  7. OTHER SYMPTOMS: \"Do you have any other symptoms?\"      Having trouble catching her breath when speaking  8. PREGNANCY: \"Is there any chance you are " "pregnant?\" \"When was your last menstrual period?\"      no    Protocols used: NEUROLOGIC DEFICIT-ADULT-AH      "

## 2022-04-10 NOTE — PROGRESS NOTES
Pt received to room 519 from ER admitted with CP, anxiety, and BILL. Telephone report from Shaniqua Padilla, [address] [facility]. Pt very anxious on arrival, c/o not being able to breathe and having sharp chest pain radiating down left arm that comes and goes, rated 10/10. O2 sat 99% on RA. Pt asking for more pain medicine or something for anxiety and something to drink. She is alert and oriented x 4 but is exhibiting some confusion. Dr. Kev Bhatti to bedside at this time.

## 2022-04-11 ENCOUNTER — APPOINTMENT (OUTPATIENT)
Dept: CT IMAGING | Age: 67
End: 2022-04-11
Payer: MEDICARE

## 2022-04-11 VITALS
HEART RATE: 53 BPM | RESPIRATION RATE: 18 BRPM | BODY MASS INDEX: 23.92 KG/M2 | TEMPERATURE: 97 F | HEIGHT: 62 IN | DIASTOLIC BLOOD PRESSURE: 64 MMHG | OXYGEN SATURATION: 93 % | SYSTOLIC BLOOD PRESSURE: 116 MMHG | WEIGHT: 130 LBS

## 2022-04-11 LAB
ANION GAP SERPL CALCULATED.3IONS-SCNC: 12 MMOL/L (ref 7–19)
BUN BLDV-MCNC: 18 MG/DL (ref 8–23)
CALCIUM SERPL-MCNC: 8.4 MG/DL (ref 8.8–10.2)
CHLORIDE BLD-SCNC: 108 MMOL/L (ref 98–111)
CO2: 22 MMOL/L (ref 22–29)
CREAT SERPL-MCNC: 1 MG/DL (ref 0.5–0.9)
EKG P AXIS: 63 DEGREES
EKG P-R INTERVAL: 140 MS
EKG Q-T INTERVAL: 450 MS
EKG QRS DURATION: 76 MS
EKG QTC CALCULATION (BAZETT): 449 MS
EKG T AXIS: 38 DEGREES
GFR AFRICAN AMERICAN: >59
GFR NON-AFRICAN AMERICAN: 55
GLUCOSE BLD-MCNC: 78 MG/DL (ref 74–109)
HCT VFR BLD CALC: 31.8 % (ref 37–47)
HEMOGLOBIN: 9.7 G/DL (ref 12–16)
LIPASE: 20 U/L (ref 13–60)
MAGNESIUM: 1.7 MG/DL (ref 1.6–2.4)
MCH RBC QN AUTO: 32.1 PG (ref 27–31)
MCHC RBC AUTO-ENTMCNC: 30.5 G/DL (ref 33–37)
MCV RBC AUTO: 105.3 FL (ref 81–99)
PDW BLD-RTO: 13.7 % (ref 11.5–14.5)
PLATELET # BLD: 244 K/UL (ref 130–400)
PMV BLD AUTO: 9.6 FL (ref 9.4–12.3)
POTASSIUM SERPL-SCNC: 3.6 MMOL/L (ref 3.5–5)
RBC # BLD: 3.02 M/UL (ref 4.2–5.4)
SODIUM BLD-SCNC: 142 MMOL/L (ref 136–145)
TROPONIN: <0.01 NG/ML (ref 0–0.03)
WBC # BLD: 7.2 K/UL (ref 4.8–10.8)

## 2022-04-11 PROCEDURE — 96361 HYDRATE IV INFUSION ADD-ON: CPT

## 2022-04-11 PROCEDURE — 96372 THER/PROPH/DIAG INJ SC/IM: CPT

## 2022-04-11 PROCEDURE — 36415 COLL VENOUS BLD VENIPUNCTURE: CPT

## 2022-04-11 PROCEDURE — 96375 TX/PRO/DX INJ NEW DRUG ADDON: CPT

## 2022-04-11 PROCEDURE — 84484 ASSAY OF TROPONIN QUANT: CPT

## 2022-04-11 PROCEDURE — G0378 HOSPITAL OBSERVATION PER HR: HCPCS

## 2022-04-11 PROCEDURE — 80048 BASIC METABOLIC PNL TOTAL CA: CPT

## 2022-04-11 PROCEDURE — 6370000000 HC RX 637 (ALT 250 FOR IP): Performed by: HOSPITALIST

## 2022-04-11 PROCEDURE — 94761 N-INVAS EAR/PLS OXIMETRY MLT: CPT

## 2022-04-11 PROCEDURE — 83735 ASSAY OF MAGNESIUM: CPT

## 2022-04-11 PROCEDURE — 6360000002 HC RX W HCPCS: Performed by: HOSPITALIST

## 2022-04-11 PROCEDURE — 85027 COMPLETE CBC AUTOMATED: CPT

## 2022-04-11 PROCEDURE — 93010 ELECTROCARDIOGRAM REPORT: CPT | Performed by: INTERNAL MEDICINE

## 2022-04-11 PROCEDURE — 83690 ASSAY OF LIPASE: CPT

## 2022-04-11 PROCEDURE — 71275 CT ANGIOGRAPHY CHEST: CPT

## 2022-04-11 PROCEDURE — 2580000003 HC RX 258: Performed by: HOSPITALIST

## 2022-04-11 PROCEDURE — 2700000000 HC OXYGEN THERAPY PER DAY

## 2022-04-11 PROCEDURE — 6360000004 HC RX CONTRAST MEDICATION: Performed by: HOSPITALIST

## 2022-04-11 RX ORDER — ALPRAZOLAM 1 MG/1
1 TABLET ORAL EVERY 6 HOURS PRN
Qty: 12 TABLET | Refills: 0 | Status: SHIPPED | OUTPATIENT
Start: 2022-04-11 | End: 2022-04-14

## 2022-04-11 RX ORDER — 0.9 % SODIUM CHLORIDE 0.9 %
500 INTRAVENOUS SOLUTION INTRAVENOUS ONCE
Status: DISCONTINUED | OUTPATIENT
Start: 2022-04-11 | End: 2022-04-11 | Stop reason: HOSPADM

## 2022-04-11 RX ORDER — 0.9 % SODIUM CHLORIDE 0.9 %
500 INTRAVENOUS SOLUTION INTRAVENOUS ONCE
Status: COMPLETED | OUTPATIENT
Start: 2022-04-11 | End: 2022-04-11

## 2022-04-11 RX ORDER — FERROUS SULFATE 325(65) MG
325 TABLET ORAL 2 TIMES DAILY WITH MEALS
Qty: 30 TABLET | Refills: 3 | Status: SHIPPED | OUTPATIENT
Start: 2022-04-11 | End: 2022-04-15 | Stop reason: SDUPTHER

## 2022-04-11 RX ORDER — FERROUS SULFATE 325(65) MG
325 TABLET ORAL 2 TIMES DAILY WITH MEALS
Status: DISCONTINUED | OUTPATIENT
Start: 2022-04-11 | End: 2022-04-11 | Stop reason: HOSPADM

## 2022-04-11 RX ORDER — ALPRAZOLAM 0.5 MG/1
0.5 TABLET ORAL EVERY 8 HOURS PRN
Status: DISCONTINUED | OUTPATIENT
Start: 2022-04-11 | End: 2022-04-11 | Stop reason: HOSPADM

## 2022-04-11 RX ORDER — AMOXICILLIN 500 MG/1
500 CAPSULE ORAL 3 TIMES DAILY
Qty: 15 CAPSULE | Refills: 0 | Status: SHIPPED | OUTPATIENT
Start: 2022-04-11 | End: 2022-04-16

## 2022-04-11 RX ADMIN — MORPHINE SULFATE 2 MG: 2 INJECTION, SOLUTION INTRAMUSCULAR; INTRAVENOUS at 03:48

## 2022-04-11 RX ADMIN — ALPRAZOLAM 0.5 MG: 0.5 TABLET ORAL at 08:54

## 2022-04-11 RX ADMIN — FERROUS SULFATE TAB 325 MG (65 MG ELEMENTAL FE) 325 MG: 325 (65 FE) TAB at 08:54

## 2022-04-11 RX ADMIN — ESCITALOPRAM OXALATE 5 MG: 10 TABLET ORAL at 08:52

## 2022-04-11 RX ADMIN — SODIUM CHLORIDE, PRESERVATIVE FREE 10 ML: 5 INJECTION INTRAVENOUS at 08:52

## 2022-04-11 RX ADMIN — ENOXAPARIN SODIUM 30 MG: 100 INJECTION SUBCUTANEOUS at 08:54

## 2022-04-11 RX ADMIN — SODIUM CHLORIDE 500 ML: 9 INJECTION, SOLUTION INTRAVENOUS at 08:52

## 2022-04-11 RX ADMIN — IOPAMIDOL 90 ML: 755 INJECTION, SOLUTION INTRAVENOUS at 08:39

## 2022-04-11 RX ADMIN — PANTOPRAZOLE SODIUM 20 MG: 20 TABLET, DELAYED RELEASE ORAL at 06:03

## 2022-04-11 ASSESSMENT — PAIN DESCRIPTION - ONSET: ONSET: ON-GOING

## 2022-04-11 ASSESSMENT — PAIN SCALES - GENERAL
PAINLEVEL_OUTOF10: 4
PAINLEVEL_OUTOF10: 8
PAINLEVEL_OUTOF10: 1

## 2022-04-11 ASSESSMENT — PAIN DESCRIPTION - PAIN TYPE: TYPE: CHRONIC PAIN

## 2022-04-11 ASSESSMENT — PAIN DESCRIPTION - DIRECTION: RADIATING_TOWARDS: NO

## 2022-04-11 ASSESSMENT — PAIN DESCRIPTION - PROGRESSION: CLINICAL_PROGRESSION: GRADUALLY WORSENING

## 2022-04-11 ASSESSMENT — PAIN DESCRIPTION - LOCATION: LOCATION: BACK

## 2022-04-11 ASSESSMENT — PAIN - FUNCTIONAL ASSESSMENT: PAIN_FUNCTIONAL_ASSESSMENT: ACTIVITIES ARE NOT PREVENTED

## 2022-04-11 ASSESSMENT — PAIN DESCRIPTION - FREQUENCY: FREQUENCY: INTERMITTENT

## 2022-04-11 ASSESSMENT — PAIN DESCRIPTION - DESCRIPTORS: DESCRIPTORS: ACHING

## 2022-04-11 ASSESSMENT — PAIN DESCRIPTION - ORIENTATION: ORIENTATION: LOWER

## 2022-04-11 NOTE — DISCHARGE INSTR - DIET

## 2022-04-11 NOTE — PROGRESS NOTES
Pt c/o sob and anxiety, calling out to desk and leaving bed to come out to hallway. Pt states she doesn't understanding how this works at all. 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 Assisted pt back to bed, obtained vital signs. Vss.  o2 sat 97% on ra. Pt states she doesn't believe what the vital sign machine is saying, \"well, that's just not right\". Insistent she needs her oxygen on. Pt became tearful, stating that her son needs to be called at once. Called pts son, who spoke with her, then updated him on plan of care. Pts anxiety level decreasing after talking with son and dose of po xanax. Will continue to monitor.

## 2022-04-11 NOTE — DISCHARGE SUMMARY
Physician Discharge Summary     Patient ID:  Hermann Michelle  [id number]  73 y.o.  1955    Admit date: 4/10/2022    Discharge date and time: 4/11/2022    Admitting Physician: Calin Barker MD     Discharge Physician: Calin Barker MD     Discharge Diagnoses:     Panic attack- follow up OP - zanax prn  UTI- amoxicillin- follow up on final urine cultures with PCP  Bradycardia- DC metoprolol  BILL- resolved with IVF  Constipation - treated   Hypomagnesemia- replaced  Elevated DD- CTA neg for PE  The thoracic aorta and proximal great vessels demonstrate calcific   plaquing and ectasia. There is borderline aneurysmal dilatation of the   ascending thoracic aorta with a transverse dimension of 3.9 cm. No   evidence of dissection- follow up OP  History of CVA      Discharged Condition: good    Indication for Admission: dyspnea/panic attack    Hospital Course:     79 yo female presented with panic attack and dyspnea. Initial troponin and repeat  negative, EKG NSR with no acute ST changes. CTA chest neg for PE or PNA. Pt refused echo. Treated with xanax for panic attack. Today feels great and has no complaints. Treated with IVF for BILL. Today Cr greatly improved. UA shows possible UTI, pt discharged on amoxicillin, follow up with PCP on final urine cultures. Discharge was on hold for cognitive eval- pt left AMA prior to eval     Consults: none    Significant Diagnostic Studies:     CTA PULMONARY W CONTRAST [[id number]] Resulted: 04/11/22 0914      Order Status: Completed Updated: 04/11/22 0916     Narrative:       CTA PULMONARY W CONTRAST 4/11/2022 8:38 AM   HISTORY: Dyspnea   COMPARISON: 10/3/2021. DLP: 485 mGy cm.  All CT scans are performed using dose optimization   techniques as appropriate to the performed exam and include at least   one of the following: Automated exposure control, adjustment of the mA   and/or kV according to size, and the use of the iterative   reconstruction technique. TECHNIQUE: Helical tomographic images of the chest were obtained after   the administration of intravenous contrast following angiogram   protocol. Additionally, 3D MIP reconstructions in the coronal and   sagittal planes were provided.     FINDINGS:     Pulmonary arteries: There is adequate enhancement of the pulmonary   arteries to evaluate for central and segmental pulmonary emboli. There   are no filling defects within the main, lobar, segmental or visualized   subsegmental pulmonary arteries. The pulmonary arteries are enlarged   suggesting pulmonary arterial hypertension. .   Aorta and great vessels: There is atheromatous calcification of the   thoracic aorta and proximal great vessels with irregular mural   thrombus within the aortic arch and proximal descending thoracic   aorta. . Great vessels are otherwise normal in appearance. . The   ascending thoracic aorta is borderline aneurysmal measuring 3.9 cm in   transverse dimension. Coronary artery calcifications are visualized. Neck base: The imaged portion of the base of the neck appears   unremarkable. Lungs: Bilateral lower lobe and left lingular atelectasis is present. .   The trachea and bronchial tree are patent. Heart: There is mild cardiomegaly. . There is no pericardial effusion. Lymph nodes: No pathologically enlarged mediastinal, hilar, or   axillary lymph nodes are present. Bones and soft tissues: The osseous structures of the thorax and   surrounding soft tissues demonstrate no acute process. Upper abdomen: There are suspected cortical cysts of the kidneys. These cannot be fully evaluated on this CT angiogram. The patient is   status post cholecystectomy. .      Impression:       1. No evidence of pulmonary thromboembolic disease. 2. The thoracic aorta and proximal great vessels demonstrate calcific   plaquing and ectasia. There is borderline aneurysmal dilatation of the   ascending thoracic aorta with a transverse dimension of 3.9 cm. No   evidence of dissection. 3. Left lingular and bilateral lower lobe subsegmental atelectasis. .   Signed by Dr Samir Workman [[id number]]      Order Status: Canceled      CTA PULMONARY W CONTRAST [[id number]]      Order Status: Canceled      XR ABDOMEN (KUB) (SINGLE AP VIEW) [[id number]] Resulted: 04/10/22 1327     Order Status: Completed Updated: 04/10/22 1329     Narrative:       EXAMINATION: KUB radiograph 4/10/2022   HISTORY: Abdominal pain   FINDINGS: KUB radiograph demonstrates mild constipation. There is no   obstruction or free air. The patient's undergone prior   cholecystectomy. Lung bases are clear.     Impression:       . Mild constipation. There is no obstruction or free air. Signed by Dr Sawyer Stanford [[id number]] Resulted: 04/10/22 0905     Order Status: Completed Updated: 04/10/22 0907     Narrative:       EXAMINATION: CT head without contrast 4/10/2022   HISTORY: Altered mental status. HISTORY of aneurysm. Dose: 699 mGycm. All CT scans are performed using dose optimization   techniques as appropriate to the performed exam and include at least   one of the following: Automated exposure control, adjustment of the mA   and/or kV according to size, and the use of the iterative   reconstruction technique. Benjamin Calles FINDINGS: Multiple contiguous axial images are obtained from the skull   base to the vertex per protocol without intravenous contrast   enhancement with reformatted images obtained in the sagittal and   coronal projections from the original data set. The study is degraded by motion artifact. There is atrophy of the   brain with prominence of the subarachnoid spaces and ventricular   enlargement. A remote right basal ganglia lacunar infarction is   present with encephalomalacia. 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  There is no evidence of intra-axial   or subarachnoid hemorrhage on today's exam. There is no mass, mass   effect or shift of the midline. No evidence of acute infarct or   hemorrhage.     Impression:       1. . 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 No evidence of recurrent hemorrhage. 2. Atrophy with small vessel disease. Remote right basal ganglia   lacunar infarction with encephalomalacia. Signed by Dr Yue Awad [[id number]] Resulted: 04/10/22 0705     Order Status: Completed Updated: 04/10/22 0707     Narrative:       EXAMINATION: Chest one view 4/10/2022   HISTORY: Chest pain and shortness of breath   FINDINGS: Today's exam is compared to a previous study of 10/13/2021. Mild left basilar subsegmental atelectasis is present. Lungs are   otherwise clear. There is no effusion or free air present. The   mediastinal contours are within normal limits. There is an old right   posterior lateral fifth rib fracture.     Impression:       1. . Mild left basilar subsegmental atelectasis. Lungs are otherwise   clear.    Signed by Dr Karthik Burt             Discharge Exam:  /64   Pulse 53   Temp 97 °F (36.1 °C) (Temporal)   Resp 18   Ht 5' 2\" (1.575 m)   Wt 130 lb (59 kg)   SpO2 93%   BMI 23.78 kg/m²     General Appearance:    Alert, cooperative, no distress, appears stated age   Head:    Normocephalic, without obvious abnormality, atraumatic           Nose:   Nares normal, septum midline, mucosa normal, no drainage    or sinus tenderness       Neck:   Supple, symmetrical, trachea midline, no adenopathy;     thyroid:  no enlargement/tenderness/nodules; no carotid    bruit or JVD       Lungs:     Clear to auscultation bilaterally, respirations unlabored        Heart:    Regular rate and rhythm, S1 and S2 normal, no murmur, rub or gallop       Abdomen:     Soft, non-tender, bowel sounds active all four quadrants,     no masses, no organomegaly           Extremities:   Extremities normal, atraumatic, no cyanosis or edema       Skin:   Skin color, texture, turgor normal, no rashes or lesions       Neurologic:   CNII-XII intact, normal strength, sensation and reflexes     Throughout, alert and oriented times 3        Discharge Medications:         Medication List      START taking these medications    ALPRAZolam 1 MG tablet  Commonly known as: XANAX  Take 1 tablet by mouth every 6 hours as needed for Sleep or Anxiety for up to 3 days. amoxicillin 500 MG capsule  Commonly known as: AMOXIL  Take 1 capsule by mouth 3 times daily for 5 days     ferrous sulfate 325 (65 Fe) MG tablet  Commonly known as: IRON 325  Take 1 tablet by mouth 2 times daily (with meals)        CONTINUE taking these medications    amLODIPine 5 MG tablet  Commonly known as: NORVASC  TAKE 1 TABLET BY MOUTH TWICE DAILY. aspirin 81 MG EC tablet  Take 1 tablet by mouth nightly     atorvastatin 40 MG tablet  Commonly known as: LIPITOR  TAKE 1 TABLET BY MOUTH AT NIGHT. baclofen 10 MG tablet  Commonly known as: LIORESAL  Take 1 tablet by mouth 3 times daily     Blood Pressure Kit  1 Units by Does not apply route daily     escitalopram 20 MG tablet  Commonly known as: LEXAPRO  TAKE 1 TABLET BY MOUTH DAILY AT BEDTIME. ondansetron 4 MG tablet  Commonly known as: ZOFRAN  Take 1 tablet by mouth 3 times daily as needed for Nausea or Vomiting     pantoprazole 20 MG tablet  Commonly known as: PROTONIX  TAKE 1 TABLET BY MOUTH ONCE DAILY.      tiZANidine 2 MG tablet  Commonly known as: ZANAFLEX  Take 1 tablet by mouth 3 times daily as needed (spasm/pain)     varenicline 1 MG tablet  Commonly known as: Chantix  Take 1 tablet by mouth 2 times daily     zonisamide 100 MG capsule  Commonly known as: ZONEGRAN  Take 1 capsule by mouth nightly        STOP taking these medications clonazePAM 0.5 MG tablet  Commonly known as: KLONOPIN     losartan 25 MG tablet  Commonly known as: COZAAR     metoprolol tartrate 25 MG tablet  Commonly known as: LOPRESSOR     predniSONE 10 MG tablet  Commonly known as: DELTASONE           Where to Get Your Medications      These medications were sent to [facility], [address]  [address] [address]    Phone: [phone]   · ALPRAZolam 1 MG tablet  · amoxicillin 500 MG capsule  · ferrous sulfate 325 (65 Fe) MG tablet           Discharge Instructions: Follow up with LIGIA Iglesias in 3 days for final urine cultures. Take medications as directed. Resume activity as tolerated. Restart metoprolol for HR above 80  Restart losartan for systolic blood pressure above 130    Diet: ADULT DIET;  Regular     Disposition: Patient left AMA    DC obs    RN Dom Gumzan was told to hold the discharge for speech therapy to evaluate the patient cognitive abilities per CM recommendation however patient left AMA     I was notified at 1.48 that patient left without signing [email or web address] papers even though she was supposed to be supervised by RN and security

## 2022-04-11 NOTE — PROGRESS NOTES
RN made aware of the need for a cognitive evaluation before patient could be discharged. Pt unwilling to wait for cognitive eval and discharge papers. Pt refusing to go back to room to wait. Security called to escort pt back to room. Pt still unwilling to wait for full discharge papers. Attending physician messaged about pt desire to leave AMA despite a pending cognitive evaluation. Received no response back. RN also attempted to call and overhead page physician with no response. Pt eloped with no IV access.     Electronically signed by Sunil Patrick RN on 4/11/22 at 1:59 PM CDT

## 2022-04-12 LAB — URINE CULTURE, ROUTINE: NORMAL

## 2022-04-13 ENCOUNTER — TELEPHONE (OUTPATIENT)
Dept: FAMILY MEDICINE CLINIC | Age: 67
End: 2022-04-13

## 2022-04-13 NOTE — TELEPHONE ENCOUNTER
[facility] Initial Follow Up Call    Outreach made within 2 business days of discharge: Yes    Patient: Branden Summers   Patient : 1955   MRN: [id number]   Reason for Admission: Confusion  Discharge Date: 22       Spoke with: Gala Leon    Discharge department/facility: [facility] Interactive Patient Contact:  Was patient able to fill all prescriptions: Yes  Was patient instructed to bring all medications to the follow-up visit: Yes  Is patient taking all medications as directed in the discharge summary? Yes  Does patient understand their discharge instructions: No: left AMA  Does patient have questions or concerns that need addressed prior to 7-14 day follow up office visit: no    Scheduled appointment with PCP within 7-14 days    Spoke with Gala Leon. She states she is living out of the [facility] because she was evicted. She states her back is about the same. She states her appetite is not good and she eats when she takes the antibiotics (for UTI). She is having normal bladder and bowel function. She will have a telephone visit with Eugene Fink on 4/15/22.     Follow Up      Lucille Kim, Alfredo Chacon

## 2022-04-13 NOTE — TELEPHONE ENCOUNTER
[facility] Initial Follow Up Call    Outreach made within 2 business days of discharge: Yes    Patient: Fish Mcarthur   Patient : 1955   MRN: [id number]    Reason for Admission: Confusion  Discharge Date: 22       Spoke with: Unable to reach, mailbox is full, no message left.

## 2022-04-15 ENCOUNTER — TELEMEDICINE (OUTPATIENT)
Dept: PRIMARY CARE CLINIC | Age: 67
End: 2022-04-15
Payer: MEDICARE

## 2022-04-15 DIAGNOSIS — F41.9 ANXIETY: ICD-10-CM

## 2022-04-15 DIAGNOSIS — F41.0 PANIC ATTACKS: Primary | ICD-10-CM

## 2022-04-15 DIAGNOSIS — F32.A DEPRESSION, UNSPECIFIED DEPRESSION TYPE: ICD-10-CM

## 2022-04-15 DIAGNOSIS — D50.8 IRON DEFICIENCY ANEMIA SECONDARY TO INADEQUATE DIETARY IRON INTAKE: ICD-10-CM

## 2022-04-15 DIAGNOSIS — R30.0 DYSURIA: ICD-10-CM

## 2022-04-15 DIAGNOSIS — M62.838 MUSCLE SPASM: ICD-10-CM

## 2022-04-15 DIAGNOSIS — I71.20 THORACIC AORTIC ANEURYSM WITHOUT RUPTURE: ICD-10-CM

## 2022-04-15 DIAGNOSIS — N17.9 AKI (ACUTE KIDNEY INJURY) (HCC): ICD-10-CM

## 2022-04-15 DIAGNOSIS — K21.9 GASTROESOPHAGEAL REFLUX DISEASE WITHOUT ESOPHAGITIS: ICD-10-CM

## 2022-04-15 LAB
BLOOD CULTURE, ROUTINE: NORMAL
CULTURE, BLOOD 2: NORMAL

## 2022-04-15 PROCEDURE — 99496 TRANSJ CARE MGMT HIGH F2F 7D: CPT | Performed by: NURSE PRACTITIONER

## 2022-04-15 PROCEDURE — 1111F DSCHRG MED/CURRENT MED MERGE: CPT | Performed by: NURSE PRACTITIONER

## 2022-04-15 RX ORDER — PANTOPRAZOLE SODIUM 20 MG/1
TABLET, DELAYED RELEASE ORAL
Qty: 30 TABLET | Refills: 2 | Status: SHIPPED | OUTPATIENT
Start: 2022-04-15

## 2022-04-15 RX ORDER — ESCITALOPRAM OXALATE 20 MG/1
TABLET ORAL
Qty: 30 TABLET | Refills: 2 | Status: SHIPPED | OUTPATIENT
Start: 2022-04-15 | End: 2022-07-06

## 2022-04-15 RX ORDER — BACLOFEN 10 MG/1
10 TABLET ORAL 3 TIMES DAILY
Qty: 30 TABLET | Refills: 2 | Status: SHIPPED | OUTPATIENT
Start: 2022-04-15

## 2022-04-15 RX ORDER — FERROUS SULFATE 325(65) MG
325 TABLET ORAL 2 TIMES DAILY WITH MEALS
Qty: 30 TABLET | Refills: 2 | Status: SHIPPED | OUTPATIENT
Start: 2022-04-15

## 2022-04-15 SDOH — ECONOMIC STABILITY: FOOD INSECURITY: WITHIN THE PAST 12 MONTHS, THE FOOD YOU BOUGHT JUST DIDN'T LAST AND YOU DIDN'T HAVE MONEY TO GET MORE.: SOMETIMES TRUE

## 2022-04-15 SDOH — ECONOMIC STABILITY: FOOD INSECURITY: WITHIN THE PAST 12 MONTHS, YOU WORRIED THAT YOUR FOOD WOULD RUN OUT BEFORE YOU GOT MONEY TO BUY MORE.: NEVER TRUE

## 2022-04-15 ASSESSMENT — SOCIAL DETERMINANTS OF HEALTH (SDOH): HOW HARD IS IT FOR YOU TO PAY FOR THE VERY BASICS LIKE FOOD, HOUSING, MEDICAL CARE, AND HEATING?: SOMEWHAT HARD

## 2022-04-15 NOTE — PROGRESS NOTES
Post-Discharge Transitional Care Follow Up      Vero Caputo   YOB: 1955    Date of Office Visit:  4/15/2022  Date of Hospital Admission: 4/10/22  Date of Hospital Discharge: 4/11/22  Readmission Risk Score (high >=14%. Medium >=10%):Readmission Risk Score: 15      Care management risk score Rising risk (score 2-5) and Complex Care (Scores >=6): 4     Non face to face  following discharge, date last encounter closed (first attempt may have been earlier): 4/13/2022  2:13 PM     Call initiated 2 business days of discharge: Yes     Panic attacks  -     [place] DISCHARGE MEDS RECONCILED W/ CURRENT OUTPATIENT MED LIST  Anxiety  -     escitalopram (LEXAPRO) 20 MG tablet; TAKE 1 TABLET BY MOUTH DAILY AT BEDTIME., Disp-30 tablet, R-2Normal  Depression, unspecified depression type  -     escitalopram (LEXAPRO) 20 MG tablet; TAKE 1 TABLET BY MOUTH DAILY AT BEDTIME., Disp-30 tablet, R-2Normal  Thoracic aortic aneurysm without rupture (HCC)  -     Yolande Caruso MD, Cardiology, [place]  Gastroesophageal reflux disease without esophagitis  -     pantoprazole (PROTONIX) 20 MG tablet; TAKE 1 TABLET BY MOUTH ONCE DAILY. , Disp-30 tablet, R-2Normal  Muscle spasm  -     baclofen (LIORESAL) 10 MG tablet; Take 1 tablet by mouth 3 times daily, Disp-30 tablet, R-2Normal  BILL (acute kidney injury) ([facility]  -     [place] DISCHARGE MEDS RECONCILED W/ CURRENT OUTPATIENT MED LIST  -     Comprehensive Metabolic Panel; Future  Dysuria  -     [place] DISCHARGE MEDS RECONCILED W/ CURRENT OUTPATIENT MED LIST  Iron deficiency anemia secondary to inadequate dietary iron intake  -     ferrous sulfate (IRON 325) 325 (65 Fe) MG tablet; Take 1 tablet by mouth 2 times daily (with meals), Disp-30 tablet, R-2Normal  -     CBC with Auto Differential; Future      Medical Decision Making: high complexity  Return in 2 weeks (on 4/29/2022).     On this date 4/15/2022 I have spent 28 minutes reviewing previous notes, test results and face to face with the patient discussing the diagnosis and importance of compliance with the treatment plan as well as documenting on the day of the visit. Subjective:   HPI    Inpatient course: Discharge summary reviewed- see chart. Interval history/Current status: Patient reports she has 3 xanax at home that the hospital sent her home with. She reports her anxiety is better, she is thinking more rationally, she reports her back is still hurting and is taking tylenol. Reports the back pain made her anxious that is what led to the panic attack. Reports she has an apartment available in June at [facility]. She plans to stay in the hotel until she can move to the apartment. Patient has not been able to check her blood pressure at the hotel but has been taking her metoprolol and losartan. She reports she left ama because she was afraid she was going to be sent to the psych [facility]. She reports she was upset with the nurse at the hospital when she was leaving. Denies any current uncontrolled anxiety.      Patient Active Problem List   Diagnosis    Ischemic stroke ([address]    Essential hypertension    Anxiety    Cerebrovascular accident (CVA) ([address]    Dizziness    Nonintractable episodic headache    Aneurysm of middle cerebral artery    TIA (transient ischemic attack)    Brain aneurysm    Pneumonia    Seizures ([address]    Post-menopausal    Need for prophylactic vaccination and inoculation against varicella    At high risk for falls    Other fatigue    Transient alteration of awareness    Other complicated headache syndrome    History of cerebral aneurysm repair    Palpitations    Near syncope    Cold sweat    Nausea    Depression    Support system deficit    Gastroesophageal reflux disease without esophagitis    Hypercholesterolemia    Abnormal finding of blood chemistry, unspecified     Hypotension due to hypovolemia    BILL (acute kidney injury) ([address]    Sepsis secondary to UTI ([facility])    Panic attacks    Confusion    Dyspnea on exertion       Medications listed as ordered at the time of discharge from hospital     Medication List          Accurate as of April 15, 2022 11:59 PM. If you have any questions, ask your nurse or doctor. CONTINUE taking these medications    amLODIPine 5 MG tablet  Commonly known as: NORVASC  TAKE 1 TABLET BY MOUTH TWICE DAILY. amoxicillin 500 MG capsule  Commonly known as: AMOXIL  Take 1 capsule by mouth 3 times daily for 5 days     aspirin 81 MG EC tablet  Take 1 tablet by mouth nightly     atorvastatin 40 MG tablet  Commonly known as: LIPITOR  TAKE 1 TABLET BY MOUTH AT NIGHT. baclofen 10 MG tablet  Commonly known as: LIORESAL  Take 1 tablet by mouth 3 times daily     Blood Pressure Kit  1 Units by Does not apply route daily     escitalopram 20 MG tablet  Commonly known as: LEXAPRO  TAKE 1 TABLET BY MOUTH DAILY AT BEDTIME. ferrous sulfate 325 (65 Fe) MG tablet  Commonly known as: IRON 325  Take 1 tablet by mouth 2 times daily (with meals)     pantoprazole 20 MG tablet  Commonly known as: PROTONIX  TAKE 1 TABLET BY MOUTH ONCE DAILY.         STOP taking these medications    ondansetron 4 MG tablet  Commonly known as: ZOFRAN     tiZANidine 2 MG tablet  Commonly known as: ZANAFLEX     varenicline 1 MG tablet  Commonly known as: Chantix     zonisamide 100 MG capsule  Commonly known as: Isac Hayes           Where to Get Your Medications      These medications were sent to [facility], [address] [facility], [address]  [address] [address]    Phone: [phone]   · baclofen 10 MG tablet  · escitalopram 20 MG tablet  · ferrous sulfate 325 (65 Fe) MG tablet  · pantoprazole 20 MG tablet          Medications marked \"taking\" at this time  Outpatient Medications Marked as Taking for the 4/15/22 encounter (Telemedicine) with LIGIA Art CNP   Medication Sig Dispense Refill    escitalopram (LEXAPRO) 20 MG tablet TAKE 1 TABLET BY MOUTH DAILY AT BEDTIME. 30 tablet 2    ferrous sulfate (IRON 325) 325 (65 Fe) MG tablet Take 1 tablet by mouth 2 times daily (with meals) 30 tablet 2    pantoprazole (PROTONIX) 20 MG tablet TAKE 1 TABLET BY MOUTH ONCE DAILY. 30 tablet 2    baclofen (LIORESAL) 10 MG tablet Take 1 tablet by mouth 3 times daily 30 tablet 2    [] amoxicillin (AMOXIL) 500 MG capsule Take 1 capsule by mouth 3 times daily for 5 days 15 capsule 0    amLODIPine (NORVASC) 5 MG tablet TAKE 1 TABLET BY MOUTH TWICE DAILY. 60 tablet 2    atorvastatin (LIPITOR) 40 MG tablet TAKE 1 TABLET BY MOUTH AT NIGHT. 30 tablet 2        Medications patient taking as of now reconciled against medications ordered at time of hospital discharge: Yes    Review of Systems   Constitutional: Negative for activity change and fever. HENT: Negative for congestion, ear pain and sore throat. Respiratory: Negative for cough, chest tightness and shortness of breath. Cardiovascular: Negative for chest pain. Gastrointestinal: Negative for abdominal pain, diarrhea, nausea and vomiting. Genitourinary: Negative for frequency and urgency. Musculoskeletal: Negative for arthralgias and myalgias. Skin: Negative for color change. Neurological: Negative for dizziness, weakness and numbness. Psychiatric/Behavioral: Positive for dysphoric mood. Negative for agitation. The patient is nervous/anxious. Objective: There were no vitals taken for this visit. Physical Exam  Vitals reviewed. Constitutional:       Appearance: Normal appearance. HENT:      Head: Normocephalic. Right Ear: Tympanic membrane normal.      Left Ear: Tympanic membrane normal.      Nose: Nose normal.      Mouth/Throat:      Mouth: Mucous membranes are moist.      Pharynx: Oropharynx is clear. Eyes:      Extraocular Movements: Extraocular movements intact. Pupils: Pupils are equal, round, and reactive to light.    Cardiovascular:      Rate and Rhythm: Normal rate and regular rhythm. Pulses: Normal pulses. Heart sounds: Normal heart sounds. Pulmonary:      Effort: Pulmonary effort is normal.      Breath sounds: Normal breath sounds. Abdominal:      General: Bowel sounds are normal.      Palpations: Abdomen is soft. Musculoskeletal:         General: Normal range of motion. Cervical back: Normal range of motion. Skin:     General: Skin is warm and dry. Neurological:      Mental Status: She is alert and oriented to person, place, and time. Psychiatric:         Mood and Affect: Mood normal.         Behavior: Behavior normal.         Thought Content: Thought content normal.         Judgment: Judgment normal.         An electronic signature was used to authenticate this note.   --Olive Martinez, LIGIA - CNP

## 2022-04-22 ENCOUNTER — TELEPHONE (OUTPATIENT)
Dept: PRIMARY CARE CLINIC | Age: 67
End: 2022-04-22

## 2022-04-22 NOTE — TELEPHONE ENCOUNTER
From [facility]:  patient is in need of a hospital   follow up appointment. The first available is 6/2. Can you please reach   out to the patient and see if we can squeeze her in on the schedule within   the next week.

## 2022-04-22 NOTE — TELEPHONE ENCOUNTER
Patient had a virtual hospital follow up with LIGIA Nelson on 4/15/22. Pt has not been in hospital since then.

## 2022-04-28 ASSESSMENT — ENCOUNTER SYMPTOMS
ABDOMINAL PAIN: 0
SORE THROAT: 0
SHORTNESS OF BREATH: 0
VOMITING: 0
NAUSEA: 0
COUGH: 0
DIARRHEA: 0
COLOR CHANGE: 0
CHEST TIGHTNESS: 0

## 2022-05-01 ENCOUNTER — HOSPITAL ENCOUNTER (EMERGENCY)
Age: 67
Discharge: HOME | End: 2022-05-02
Attending: EMERGENCY MEDICINE
Payer: MEDICARE

## 2022-05-01 DIAGNOSIS — M54.6 ACUTE THORACIC BACK PAIN, UNSPECIFIED BACK PAIN LATERALITY: Primary | ICD-10-CM

## 2022-05-01 DIAGNOSIS — R93.89 ABNORMAL CT SCAN: ICD-10-CM

## 2022-05-01 PROCEDURE — 99285 EMERGENCY DEPT VISIT HI MDM: CPT

## 2022-05-01 PROCEDURE — 96374 THER/PROPH/DIAG INJ IV PUSH: CPT

## 2022-05-01 ASSESSMENT — PAIN - FUNCTIONAL ASSESSMENT: PAIN_FUNCTIONAL_ASSESSMENT: 0-10

## 2022-05-01 ASSESSMENT — PAIN SCALES - GENERAL: PAINLEVEL_OUTOF10: 10

## 2022-05-02 ENCOUNTER — APPOINTMENT (OUTPATIENT)
Dept: GENERAL RADIOLOGY | Age: 67
End: 2022-05-02
Payer: MEDICARE

## 2022-05-02 ENCOUNTER — APPOINTMENT (OUTPATIENT)
Dept: CT IMAGING | Age: 67
End: 2022-05-02
Payer: MEDICARE

## 2022-05-02 VITALS
WEIGHT: 130 LBS | TEMPERATURE: 97.9 F | OXYGEN SATURATION: 98 % | BODY MASS INDEX: 23.92 KG/M2 | RESPIRATION RATE: 15 BRPM | SYSTOLIC BLOOD PRESSURE: 112 MMHG | DIASTOLIC BLOOD PRESSURE: 69 MMHG | HEIGHT: 62 IN | HEART RATE: 47 BPM

## 2022-05-02 LAB
ALBUMIN SERPL-MCNC: 4 G/DL (ref 3.5–5.2)
ALP BLD-CCNC: 113 U/L (ref 35–104)
ALT SERPL-CCNC: 6 U/L (ref 5–33)
ANION GAP SERPL CALCULATED.3IONS-SCNC: 14 MMOL/L (ref 7–19)
AST SERPL-CCNC: 12 U/L (ref 5–32)
BASOPHILS ABSOLUTE: 0.1 K/UL (ref 0–0.2)
BASOPHILS RELATIVE PERCENT: 0.7 % (ref 0–1)
BILIRUB SERPL-MCNC: 0.3 MG/DL (ref 0.2–1.2)
BUN BLDV-MCNC: 15 MG/DL (ref 8–23)
CALCIUM SERPL-MCNC: 9.6 MG/DL (ref 8.8–10.2)
CHLORIDE BLD-SCNC: 100 MMOL/L (ref 98–111)
CO2: 24 MMOL/L (ref 22–29)
CREAT SERPL-MCNC: 1.1 MG/DL (ref 0.5–0.9)
EKG P AXIS: 45 DEGREES
EKG P-R INTERVAL: 150 MS
EKG Q-T INTERVAL: 456 MS
EKG QRS DURATION: 84 MS
EKG QTC CALCULATION (BAZETT): 442 MS
EKG T AXIS: 29 DEGREES
EOSINOPHILS ABSOLUTE: 0.1 K/UL (ref 0–0.6)
EOSINOPHILS RELATIVE PERCENT: 0.7 % (ref 0–5)
GFR AFRICAN AMERICAN: >59
GFR NON-AFRICAN AMERICAN: 49
GLUCOSE BLD-MCNC: 92 MG/DL (ref 74–109)
HCT VFR BLD CALC: 33.8 % (ref 37–47)
HEMOGLOBIN: 10.5 G/DL (ref 12–16)
IMMATURE GRANULOCYTES #: 0.1 K/UL
LIPASE: 30 U/L (ref 13–60)
LYMPHOCYTES ABSOLUTE: 2.2 K/UL (ref 1.1–4.5)
LYMPHOCYTES RELATIVE PERCENT: 25 % (ref 20–40)
MCH RBC QN AUTO: 31.7 PG (ref 27–31)
MCHC RBC AUTO-ENTMCNC: 31.1 G/DL (ref 33–37)
MCV RBC AUTO: 102.1 FL (ref 81–99)
MONOCYTES ABSOLUTE: 0.9 K/UL (ref 0–0.9)
MONOCYTES RELATIVE PERCENT: 9.6 % (ref 0–10)
NEUTROPHILS ABSOLUTE: 5.7 K/UL (ref 1.5–7.5)
NEUTROPHILS RELATIVE PERCENT: 63.4 % (ref 50–65)
PDW BLD-RTO: 13.3 % (ref 11.5–14.5)
PLATELET # BLD: 303 K/UL (ref 130–400)
PMV BLD AUTO: 9.5 FL (ref 9.4–12.3)
POTASSIUM REFLEX MAGNESIUM: 3.6 MMOL/L (ref 3.5–5)
RBC # BLD: 3.31 M/UL (ref 4.2–5.4)
SODIUM BLD-SCNC: 138 MMOL/L (ref 136–145)
TOTAL PROTEIN: 6.4 G/DL (ref 6.6–8.7)
TROPONIN: <0.01 NG/ML (ref 0–0.03)
WBC # BLD: 9 K/UL (ref 4.8–10.8)

## 2022-05-02 PROCEDURE — 36415 COLL VENOUS BLD VENIPUNCTURE: CPT

## 2022-05-02 PROCEDURE — 96374 THER/PROPH/DIAG INJ IV PUSH: CPT

## 2022-05-02 PROCEDURE — 71045 X-RAY EXAM CHEST 1 VIEW: CPT

## 2022-05-02 PROCEDURE — 6360000004 HC RX CONTRAST MEDICATION: Performed by: EMERGENCY MEDICINE

## 2022-05-02 PROCEDURE — 84484 ASSAY OF TROPONIN QUANT: CPT

## 2022-05-02 PROCEDURE — 93010 ELECTROCARDIOGRAM REPORT: CPT | Performed by: INTERNAL MEDICINE

## 2022-05-02 PROCEDURE — 93005 ELECTROCARDIOGRAM TRACING: CPT | Performed by: EMERGENCY MEDICINE

## 2022-05-02 PROCEDURE — 80053 COMPREHEN METABOLIC PANEL: CPT

## 2022-05-02 PROCEDURE — 83690 ASSAY OF LIPASE: CPT

## 2022-05-02 PROCEDURE — 6360000002 HC RX W HCPCS: Performed by: EMERGENCY MEDICINE

## 2022-05-02 PROCEDURE — 71275 CT ANGIOGRAPHY CHEST: CPT

## 2022-05-02 PROCEDURE — 85025 COMPLETE CBC W/AUTO DIFF WBC: CPT

## 2022-05-02 RX ORDER — METOCLOPRAMIDE HYDROCHLORIDE 5 MG/ML
10 INJECTION INTRAMUSCULAR; INTRAVENOUS ONCE
Status: COMPLETED | OUTPATIENT
Start: 2022-05-02 | End: 2022-05-02

## 2022-05-02 RX ADMIN — METOCLOPRAMIDE 10 MG: 5 INJECTION, SOLUTION INTRAMUSCULAR; INTRAVENOUS at 01:14

## 2022-05-02 RX ADMIN — IOPAMIDOL 90 ML: 755 INJECTION, SOLUTION INTRAVENOUS at 01:43

## 2022-05-02 ASSESSMENT — ENCOUNTER SYMPTOMS
VOICE CHANGE: 0
VOMITING: 0
EYE PAIN: 0
RHINORRHEA: 0
ABDOMINAL PAIN: 0
EYE REDNESS: 0
COUGH: 0
DIARRHEA: 0
SHORTNESS OF BREATH: 0
BACK PAIN: 1

## 2022-05-02 NOTE — ED TRIAGE NOTES
Pt arrived via [facility] with c/o upper back pain (between shoulders x 2 months) pt reports pain became increasingly worse. Pt reports she was diagnosed with a 3.9 cm AAA during her last ER visit here 4/15/22 and reports she is to f/u with cardiology in June, 2022. VSS, pt afebrile, pt reports pain is 10/10 and denies taking anything for the pain PTA. VSS, pt afebrile, NSR on cardiac monitor, call light within reach.

## 2022-05-02 NOTE — ED PROVIDER NOTES
Patient was seen by Dr. Katlyn Barnett. Presented with upper back pain has been present for a few weeks. CTA was performed that showed ascending aorta slightly larger but no signs of aneurysm. Ultimately, Dr. Katlyn Barnett has cleared patient medically and feels that she can be discharged but patient was recently evicted from her home and he said no reason for medical admission and that patient can be discharged pending evaluation by social work. Currently, patient resting comfortably awaiting evaluation by social work. Plan will be for discharge after this. Social work has talked to the patient and arranged for her to stay in a group home in [place]. Told her about her CT results and recommend she follow-up CT surgery for this.   Told to also follow-up with primary care and return to the ER for change or worsening symptoms or new concerns     He Gonsales MD  05/02/22 2605

## 2022-05-02 NOTE — ED PROVIDER NOTES
[facility] EMERGENCY DEPT  EMERGENCY DEPARTMENT ENCOUNTER      Pt Name: Katelynn Mcknight  MRN: [id number]  [place] 1955  Date of evaluation: 5/1/2022  Provider: Vickie Payan MD    [address] [place] [address]       Chief Complaint   Patient presents with    Back Pain     upper back x 2 months, worse today         HISTORY OF PRESENT ILLNESS   (Location/Symptom, Timing/Onset,Context/Setting, Quality, Duration, Modifying Factors, Severity)  Note limiting factors. Katelynn Mcknight is a 78 y.o. female who presents to the emergency department with complaint of upper back pain that has been present over the last few weeks and gradually worsening. No specific preceding events. States it seems to get worse when she walks but otherwise no change with specific positions or movements. Denies any preceding injuries. States she was here recently and was told that she had a new aortic aneurysm and she has been very anxious about that and was concerned that it is the cause of the pain. Denies any associated shortness of breath or chest pain. Pain does not radiate anywhere besides the mid thoracic back. Patient also goes on to state that she was just evicted from her current living situation and does not have anywhere to go. Arrives with all of her belongings and suitcases. Patient was hospitalized here a few weeks ago and was having living situation difficulty at that time. Planned to have social work help with placement but then patient left AMA. Since then patient has followed up with her primary care doctor. She does have a plan for a place to stay in June but does not know where she is going to do until the    [facility]    NursingNotes were reviewed. REVIEW OF SYSTEMS    (2-9 systems for level 4, 10 or more for level 5)     Review of Systems   Constitutional: Negative for fatigue and fever. HENT: Negative for congestion, rhinorrhea and voice change. Eyes: Negative for pain and redness.    Respiratory: Negative for cough and shortness of breath. Cardiovascular: Negative for chest pain. Gastrointestinal: Negative for abdominal pain, diarrhea and vomiting. Endocrine: Negative. Genitourinary: Negative. Musculoskeletal: Positive for back pain. Negative for arthralgias and gait problem. Skin: Negative for rash and wound. Neurological: Negative for weakness and headaches. Hematological: Negative. Psychiatric/Behavioral: Negative. All other systems reviewed and are negative. A complete review of systems was performed and is negative except as noted above in the HPI. PAST MEDICAL HISTORY     Past Medical History:   Diagnosis Date    Anxiety     Arthritis     Cerebral aneurysm     Former smoker     GERD (gastroesophageal reflux disease)     Hypertension          SURGICAL HISTORY       Past Surgical History:   Procedure Laterality Date    BRAIN SURGERY      Aneurysm partial clip     CHOLECYSTECTOMY      CRANIOTOMY Right 5/7/2019    RIGHT CRANIOTOMY FOR CLIPPING OF MIDDLE CEREBRAL ARTERY ANEURYSM performed by Peg Welch MD at [address]       Previous Medications    AMLODIPINE (NORVASC) 5 MG TABLET    TAKE 1 TABLET BY MOUTH TWICE DAILY. ASPIRIN 81 MG EC TABLET    Take 1 tablet by mouth nightly    ATORVASTATIN (LIPITOR) 40 MG TABLET    TAKE 1 TABLET BY MOUTH AT NIGHT. BACLOFEN (LIORESAL) 10 MG TABLET    Take 1 tablet by mouth 3 times daily    BLOOD PRESSURE KIT    1 Units by Does not apply route daily    ESCITALOPRAM (LEXAPRO) 20 MG TABLET    TAKE 1 TABLET BY MOUTH DAILY AT BEDTIME. FERROUS SULFATE (IRON 325) 325 (65 FE) MG TABLET    Take 1 tablet by mouth 2 times daily (with meals)    PANTOPRAZOLE (PROTONIX) 20 MG TABLET    TAKE 1 TABLET BY MOUTH ONCE DAILY. ALLERGIES     Patient has no known allergies.     FAMILY HISTORY       Family History   Problem Relation Age of Onset    Heart Disease Mother     High Blood Pressure Father     Arthritis Father           SOCIAL HISTORY       Social History     Socioeconomic History    Marital status:      Spouse name: None    Number of children: None    Years of education: None    Highest education level: None   Occupational History    None   Tobacco Use    Smoking status: Former Smoker     Packs/day: 1.00     Years: 20.00     Pack years: 20.00     Types: Cigarettes     Quit date: 3/6/2019     Years since quitting: 3.1    Smokeless tobacco: Never Used   Vaping Use    Vaping Use: Never used   Substance and Sexual Activity    Alcohol use: Not Currently    Drug use: No    Sexual activity: None   Other Topics Concern    None   Social History Narrative    None     Social Determinants of Health     Financial Resource Strain: Medium Risk    Difficulty of Paying Living Expenses: Somewhat hard   Food Insecurity: Food Insecurity Present    Worried About Running Out of Food in the Last Year: Never true    Yifan of Food in the Last Year: Sometimes true   Transportation Needs:     Lack of Transportation (Medical): Not on file    Lack of Transportation (Non-Medical):  Not on file   Physical Activity:     Days of Exercise per Week: Not on file    Minutes of Exercise per Session: Not on file   Stress:     Feeling of Stress : Not on file   Social Connections:     Frequency of Communication with Friends and Family: Not on file    Frequency of Social Gatherings with Friends and Family: Not on file    Attends Confucianism Services: Not on file    Active Member of Clubs or Organizations: Not on file    Attends Club or Organization Meetings: Not on file    Marital Status: Not on file   Intimate Partner Violence:     Fear of Current or Ex-Partner: Not on file    Emotionally Abused: Not on file    Physically Abused: Not on file    Sexually Abused: Not on file   Housing Stability:     Unable to Pay for Housing in the Last Year: Not on file    Number of [facility] in the Last Year: Not on file    Unstable Housing in the Last Year: Not on file       SCREENINGS    [place] Coma Scale  Eye Opening: Spontaneous  Best Verbal Response: Oriented  Best Motor Response: Obeys commands  S[place]umar Coma Scale Score: 15        PHYSICAL EXAM    (up to 7 for level 4, 8 or more for level 5)     ED Triage Vitals   BP Temp Temp Source Pulse Resp SpO2 Height Weight   05/01/22 2357 05/01/22 2352 05/01/22 2352 05/01/22 2352 05/01/22 2352 05/01/22 2352 05/01/22 2352 05/01/22 2352   133/70 97.8 °F (36.6 °C) Oral 71 18 98 % 5' 2\" (1.575 m) 130 lb (59 kg)       Physical Exam  Vitals and nursing note reviewed. Constitutional:       General: She is not in acute distress. Appearance: She is well-developed. She is not diaphoretic. HENT:      Head: Normocephalic and atraumatic. Eyes:      General: No scleral icterus. Neck:      Vascular: No JVD. Cardiovascular:      Rate and Rhythm: Normal rate and regular rhythm. Pulses:           Radial pulses are 2+ on the right side and 2+ on the left side. Dorsalis pedis pulses are 2+ on the right side and 2+ on the left side. Heart sounds: Normal heart sounds. No murmur heard. No friction rub. No gallop. Pulmonary:      Effort: Pulmonary effort is normal. No accessory muscle usage or respiratory distress. Breath sounds: Normal breath sounds. No stridor. No decreased breath sounds, wheezing, rhonchi or rales. Chest:      Chest wall: No tenderness. Abdominal:      General: There is no distension. Palpations: Abdomen is soft. Tenderness: There is no abdominal tenderness. There is no guarding or rebound. Musculoskeletal:         General: No deformity. Normal range of motion. Right lower leg: No edema. Left lower leg: No edema. Skin:     General: Skin is warm and dry. Coloration: Skin is not pale. Findings: No erythema.    Neurological:      Mental Status: She is alert and oriented to person, place, and time. GCS: GCS eye subscore is 4. GCS verbal subscore is 5. GCS motor subscore is 6. Cranial Nerves: No cranial nerve deficit. Motor: No abnormal muscle tone. Coordination: Coordination normal.   Psychiatric:         Behavior: Behavior normal.         Judgment: Judgment normal.         DIAGNOSTIC RESULTS     EKG: All EKG's are interpreted by the Emergency Department Physician who either signs or Co-signs this chart in the absence of a cardiologist.      RADIOLOGY:   Non-plain film images such as CT, Ultrasound and MRI are read by the radiologist. Lorie Kilo images are visualized and preliminarily interpreted by the emergency physician with the below findings:        Interpretation per the Radiologist below, if available at the time of this note:    XR CHEST PORTABLE    (Results Pending)   CTA CHEST W WO CONTRAST    (Results Pending)         ED BEDSIDE ULTRASOUND:   Performed by ED Physician - none    LABS:  Labs Reviewed   CBC WITH AUTO DIFFERENTIAL - Abnormal; Notable for the following components:       Result Value    RBC 3.31 (*)     Hemoglobin 10.5 (*)     Hematocrit 33.8 (*)     .1 (*)     MCH 31.7 (*)     MCHC 31.1 (*)     All other components within normal limits   COMPREHENSIVE METABOLIC PANEL W/ REFLEX TO MG FOR LOW K - Abnormal; Notable for the following components:    CREATININE 1.1 (*)     GFR Non- 49 (*)     Total Protein 6.4 (*)     Alkaline Phosphatase 113 (*)     All other components within normal limits   LIPASE   TROPONIN       All other labs were within normal range or not returned as of this dictation.     EMERGENCY DEPARTMENT COURSE and DIFFERENTIALDIAGNOSIS/MDM:   Vitals:    Vitals:    05/01/22 2357 05/02/22 0002 05/02/22 0200 05/02/22 0420   BP: 133/70 114/77 115/70 112/69   Pulse:   63 (!) 47   Resp:   16 15   Temp:   97.9 °F (36.6 °C)    TempSrc:   Oral    SpO2:   98% 98%   Weight:       Height:           MDM  ED Course as of 05/02/22 0636 Mon May 02, 2022   0226 EKG shows sinus rhythm with a rate of 53. Low voltage inferiorly and laterally. No evidence of acute ischemia or infarction. Normal intervals. [CLAYTON]   7720 CTACHEST:  No pulmonaryarteryfilling defects to suggest pulmonaryarterythrombosis. No thoracic aortic aneurysmor dissection is present. Ascending aorta is 3.8 cmAP diameter. Diffuse  calcified and noncalcified aortic atherosclerotic plaque. Rawleigh Billing Heart is mildlyenlarged. No evidence for right heart strain. No pericardial fluid or thickening. Calcified  right hilar lymph nodes. Mild bilateral dependent atelectasis. No definite lobar pneumonia. Late increase in peripheral nodular  infiltrates versus atelectasis in the right lower lobe. Mild emphysematous changes at the lung apices. No pleural effusion or pneumothorax. No acute fracture is identified. Degenerative changes of the thoracic spine. Limited images of the upper abdomen demonstrates cholecystectomyclips. Bilateral renal cysts. .  Radiologist: Marshall Cranker, M.D. [CLAYTON]      ED Course User Index  [CLAYTON] Gila Waterman MD     Pain is located in the thoracic back. No signs of cardiac ischemia or other cardiopulmonary etiology of symptoms. CTA performed and shows no signs of vascular or other etiology of pain. After evaluation here, patient found to be medically clear with no serious etiology of back pain identified. Suspect likely musculoskeletal cause though initially did not seem to be any specific exacerbating or relieving factors to suggest musculoskeletal cause. Plan for social work evaluation to help with social situation. CONSULTS:  IP CONSULT TO SOCIAL WORK    PROCEDURES:  Unless otherwise notedbelow, none     Procedures    FINAL IMPRESSION     1. Acute thoracic back pain, unspecified back pain laterality          DISPOSITION/PLAN   DISPOSITION Ed Observation 05/02/2022 03:41:39 AM      PATIENT REFERRED TO:  No follow-up provider specified.     DISCHARGE MEDICATIONS:  New Prescriptions    No medications on file          (Please note that portions of this note were completed with a voice recognition program.  Efforts were made to edit the dictations butoccasionally words are mis-transcribed.)    Luis Corley MD (electronically signed)  Emergency Physician          Luis Corley., MD  05/02/22 5444

## 2022-05-02 NOTE — ED NOTES
Clear, yellow urine sample obtained and sent to lab.  Pt requesting an antiemetic prior to CTA d/t nausea/ vomiting after IV contrast many years ago, pt denies any other reactions to contrast.      Rome Metz RN  05/02/22 0112

## 2022-05-02 NOTE — CARE COORDINATION
JAY met with pt at bedside re: homelessness. Pt states she has been staying at the [facility]. She states she ran out of money and can no longer stay there. She states she is on a waiting list for a local apartment complex and is hopeful she will be in by June. Pt states she does not have any local support and has no one to stay with until June. Placed call to [facility] - no beds. Placed call to [facility] in [place] who states they have 2 beds available. Will provide pt a voucher to shelter. Explained to pt this will be the only voucher we will provide. She understood.

## 2022-05-03 ENCOUNTER — TELEPHONE (OUTPATIENT)
Dept: PRIMARY CARE CLINIC | Age: 67
End: 2022-05-03

## 2022-05-03 NOTE — TELEPHONE ENCOUNTER
----- Message from [facility] sent at 5/3/2022  7:49 AM CDT -----  Subject: Message to Provider    QUESTIONS  Information for Provider? Pt called to let you know she is in a shelter   and has no phone service and this is why she had to cancel her apr today. She will reschedule when she has phone service again.  ---------------------------------------------------------------------------  --------------  [address]  What is the best way for the office to contact you? Do not leave any   message, patient will call back for answer  Preferred Call Back Phone Number? [phone]  ---------------------------------------------------------------------------  --------------  SCRIPT ANSWERS  Relationship to Patient?  Self

## 2022-05-11 ENCOUNTER — CARE COORDINATION (OUTPATIENT)
Dept: CARE COORDINATION | Age: 67
End: 2022-05-11

## 2022-05-11 NOTE — CARE COORDINATION
AUTHOR: Lizzy Fish, Health  [facility]) / Community Health Worker (eJsus Che)    Received an in basket from [place], [address] [place] [address] Drive, [facility], stating patient is living in a [employer]. Isaac Rosado asked this [facility]. to assist patient if finding housing. Reviewed patient's medical record. This HC spoke with Cliff Chavez in February 2022 about this issue and other needs. Unfortunately, documentation states the patient is currently without a telephone. This HC is sending the patient a Click Contacthart message asking her to review her email. Sending her resources information. Also, asking her to check with shelter to ask if they have a  who could help her. Will provide this HC's name and number. Submitted by Lizzy Fish Health  [facility]) / Community Health Worker (CHW)    [address] email message. Email listed is patient's former employer's email for her. She does not work for [facility] [facility] [employer]) at this time. Will send a [employer] message with resource info:    - Call Ginny Rinaldi Susan, [phone].  - Call [address], [place], [address] Need to speak with [facility]. - Use Internet; go to [employer]) to search for assistance. - Call local [facility]  for Mila Duarte is located to ask for assistance. - If in the [facility], call the [employer] on Aging (Shiv Hubbard 0791). The number is [phone] or [phone].  - Contact Mann Mejia with the [facility], [address]. - If in [facility], take the [employer] Bus to the [employer] [place], [place] [address], Phone [phone]. They have  on Tuesdays and Thursdays.  - Ask the Shelter if they have a  who can help. Submitted by Lizzy Fish Health  [facility]) / Community Health Worker (CHW)    MyChart message sent.     Submitted by Lizzy Fish, Health  [facility]) / Community Health Worker (CHW)

## 2022-06-06 ENCOUNTER — TELEPHONE (OUTPATIENT)
Dept: CARDIOLOGY CLINIC | Age: 67
End: 2022-06-06

## 2022-06-24 ENCOUNTER — TELEPHONE (OUTPATIENT)
Dept: PRIMARY CARE CLINIC | Age: 67
End: 2022-06-24

## 2022-06-24 NOTE — TELEPHONE ENCOUNTER
Contacted by [address] [place] [address] in regards to this pt. Spoke with Levora Kehr APRN provider at [facility]. She reported that pt is currently living in homeless shelter in [place]. Wanting to get back to [place]. [place] reported that the [facility] did have a  on staff to help patients but he was not in the office at the time of phone call. She was wanting to arrange transport back to [place] from [place] for pt. Discussed with Vicenta that pt was discharged from [facility] and taken to [place] to live in group home. We also discussed that pt did not have a place to stay here in [place]. She reported that pt is currently residing at the [address] [address]. Phone number [phone]. Pt cell phone number [phone]. I gave your office number to this nurse practitioner so she could have the  reach out to you next week to discuss this pt. Not sure if there is a place where she can stay and work in the area. Would the [address] take her? The name of the  that will be reaching out is Nolberto Leonard. She did not provide me with his number.

## 2022-06-24 NOTE — TELEPHONE ENCOUNTER
Lenka Wayne from [facility] called and said LIGIA Wolfe had some questions regarding patient. The number is [phone].

## 2022-06-27 ENCOUNTER — CARE COORDINATION (OUTPATIENT)
Dept: CARE COORDINATION | Age: 67
End: 2022-06-27

## 2022-06-27 NOTE — TELEPHONE ENCOUNTER
Thank you for the heads-up. 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 I don't know what her financial situation is, and if she can even apply with the housing authority. She's better off staying right where she is. I know it is not ideal, but if she leaves, she loses a bed, and take a huge hunt living on the street. I'll call the patient.

## 2022-06-28 NOTE — CARE COORDINATION
Telephoned patient on new number. No answer. No voice messaging. Telephoned [facility]. Was told the patient was not in. Left a voice message asking for the patient to return this HC's call. Submitted by [facility]) / Community Health Worker (CHW)    Routing message to Ramu Lawrence for [facility], to let her know patient may call her.      - Housing . ...   - Patient can call the [place] [address] Office to apply and be put on the waiting list.  The number is [phone]. - Patient can call [facility] in [address] [place] [address] to ask about vacancy. The number is [phone]. No other housing or shelter opportunities available in the [facility].      Submitted by [facility]  [facility]) / Community Health Worker (CHW)

## 2022-06-28 NOTE — CARE COORDINATION
AUTHOR: Kelley Ferrara Health Coach [facility]) / Community Health Worker (Jesus Che)    Received an in [employer] from Jono Irizarry, [address], [facility], stating the APRN from the [employer] in [place] called her to stay that the patient is staying at their Emergency Shelter but patient wants to come back to [place]. Jono Irizarry mentioned that she informed the caller that there is not available housing in [place] for the patient. Jono Irizarry asked this [facility] to follow-up with Jacquelyn Yun. Submitted by Kelley Ferrara Health Coach [facility]) / Community Health Worker (Jesus Che)    [facility]. telephoned Dimple Turner with the [facility] in [place]. Patient needs to be able to housework to stay at that facility, and she will need to be away from the facility between 0900 and 1700 daily. Patient is unable to do housework. Dimple Turner suggest the patient contact [facility] in [facility] about Assisted Living. Phone # [phone]. Dimple Turner also stated the patient can contact [facility] ([address]). The phone number is [phone]    Submitted by Kelley Ferrara, [address] [facility]) / Community Health Worker (CHW)    Michelle Vasquez at [facility] in [facility]. She has availability. She asked for a referral to be faxed to her: [phone], or have patient call, [phone]. Submitted by Kelley Ferrara Health Coach [facility]) / [facility] Worker (CHW)    Telephoned patient at number listed for her, [phone]. Number disconnected. Found another number for the patient in her medical record. Called it. No answer. No voice messaging. Submitted by Kelley Ferrara Health Coach [facility]) / Community Health Worker (CHW)    Telephoned patient at the [employer]. Was told she was not there at the time of the call. Left message asking for a call back.     Submitted by Kelley Ferrara Health Coach [facility]) / Community Health Worker (CHW)

## 2022-07-06 DIAGNOSIS — F32.A DEPRESSION, UNSPECIFIED DEPRESSION TYPE: ICD-10-CM

## 2022-07-06 DIAGNOSIS — F41.9 ANXIETY: ICD-10-CM

## 2022-07-06 RX ORDER — ESCITALOPRAM OXALATE 20 MG/1
TABLET ORAL
Qty: 30 TABLET | Refills: 0 | Status: SHIPPED | OUTPATIENT
Start: 2022-07-06

## 2022-10-21 ENCOUNTER — TELEPHONE (OUTPATIENT)
Dept: PRIMARY CARE CLINIC | Age: 67
End: 2022-10-21

## 2022-10-21 NOTE — TELEPHONE ENCOUNTER
Attempted to contact patient to check them in for their virtual visit. Unable to reach them via the listed home and mobile number. I called the other listed phone number, which was for a shelter. I left a message for the patient to call back.

## 2022-11-11 ENCOUNTER — TELEMEDICINE (OUTPATIENT)
Dept: PRIMARY CARE CLINIC | Age: 67
End: 2022-11-11
Payer: MEDICARE

## 2022-11-11 DIAGNOSIS — I10 ESSENTIAL HYPERTENSION: Primary | ICD-10-CM

## 2022-11-11 DIAGNOSIS — M62.838 MUSCLE SPASM: ICD-10-CM

## 2022-11-11 DIAGNOSIS — E78.00 HYPERCHOLESTEROLEMIA: ICD-10-CM

## 2022-11-11 DIAGNOSIS — I10 ESSENTIAL HYPERTENSION: ICD-10-CM

## 2022-11-11 DIAGNOSIS — F41.9 ANXIETY: ICD-10-CM

## 2022-11-11 DIAGNOSIS — K21.9 GASTROESOPHAGEAL REFLUX DISEASE WITHOUT ESOPHAGITIS: ICD-10-CM

## 2022-11-11 DIAGNOSIS — F32.A DEPRESSION, UNSPECIFIED DEPRESSION TYPE: ICD-10-CM

## 2022-11-11 DIAGNOSIS — I63.9 CEREBROVASCULAR ACCIDENT (CVA), UNSPECIFIED MECHANISM (HCC): ICD-10-CM

## 2022-11-11 PROCEDURE — 1123F ACP DISCUSS/DSCN MKR DOCD: CPT | Performed by: NURSE PRACTITIONER

## 2022-11-11 PROCEDURE — 99214 OFFICE O/P EST MOD 30 MIN: CPT | Performed by: NURSE PRACTITIONER

## 2022-11-11 RX ORDER — PANTOPRAZOLE SODIUM 20 MG/1
TABLET, DELAYED RELEASE ORAL
Qty: 30 TABLET | Refills: 2 | Status: SHIPPED | OUTPATIENT
Start: 2022-11-11 | End: 2022-11-11 | Stop reason: SDUPTHER

## 2022-11-11 RX ORDER — BUSPIRONE HYDROCHLORIDE 10 MG/1
10 TABLET ORAL 3 TIMES DAILY PRN
Qty: 90 TABLET | Refills: 0 | Status: SHIPPED | OUTPATIENT
Start: 2022-11-11 | End: 2022-12-11

## 2022-11-11 RX ORDER — ESCITALOPRAM OXALATE 20 MG/1
TABLET ORAL
Qty: 30 TABLET | Refills: 2 | Status: SHIPPED | OUTPATIENT
Start: 2022-11-11 | End: 2022-11-11 | Stop reason: SDUPTHER

## 2022-11-11 RX ORDER — BACLOFEN 10 MG/1
10 TABLET ORAL 3 TIMES DAILY PRN
Qty: 30 TABLET | Refills: 2 | Status: SHIPPED | OUTPATIENT
Start: 2022-11-11 | End: 2022-12-11

## 2022-11-11 RX ORDER — AMLODIPINE BESYLATE 5 MG/1
TABLET ORAL
Qty: 60 TABLET | Refills: 2 | Status: SHIPPED | OUTPATIENT
Start: 2022-11-11

## 2022-11-11 RX ORDER — ESCITALOPRAM OXALATE 20 MG/1
TABLET ORAL
Qty: 30 TABLET | Refills: 2 | Status: SHIPPED | OUTPATIENT
Start: 2022-11-11

## 2022-11-11 RX ORDER — PANTOPRAZOLE SODIUM 20 MG/1
TABLET, DELAYED RELEASE ORAL
Qty: 30 TABLET | Refills: 2 | Status: SHIPPED | OUTPATIENT
Start: 2022-11-11

## 2022-11-11 RX ORDER — BUSPIRONE HYDROCHLORIDE 10 MG/1
10 TABLET ORAL 3 TIMES DAILY PRN
Qty: 90 TABLET | Refills: 0 | Status: SHIPPED | OUTPATIENT
Start: 2022-11-11 | End: 2022-11-11 | Stop reason: SDUPTHER

## 2022-11-11 RX ORDER — ATORVASTATIN CALCIUM 40 MG/1
TABLET, FILM COATED ORAL
Qty: 30 TABLET | Refills: 2 | Status: SHIPPED | OUTPATIENT
Start: 2022-11-11

## 2022-11-11 RX ORDER — ATORVASTATIN CALCIUM 40 MG/1
TABLET, FILM COATED ORAL
Qty: 30 TABLET | Refills: 2 | Status: SHIPPED | OUTPATIENT
Start: 2022-11-11 | End: 2022-11-11 | Stop reason: SDUPTHER

## 2022-11-11 RX ORDER — AMLODIPINE BESYLATE 5 MG/1
TABLET ORAL
Qty: 60 TABLET | Refills: 2 | Status: SHIPPED | OUTPATIENT
Start: 2022-11-11 | End: 2022-11-11 | Stop reason: SDUPTHER

## 2022-11-11 RX ORDER — BACLOFEN 10 MG/1
10 TABLET ORAL 3 TIMES DAILY PRN
Qty: 30 TABLET | Refills: 2 | Status: SHIPPED | OUTPATIENT
Start: 2022-11-11 | End: 2022-11-11 | Stop reason: SDUPTHER

## 2022-11-11 ASSESSMENT — ENCOUNTER SYMPTOMS
TROUBLE SWALLOWING: 0
EYE REDNESS: 0
COUGH: 0
CHEST TIGHTNESS: 0
WHEEZING: 0
VOMITING: 0
NAUSEA: 0
SORE THROAT: 0
BLOOD IN STOOL: 0
ABDOMINAL PAIN: 0
DIARRHEA: 0
SHORTNESS OF BREATH: 0
RHINORRHEA: 0
CONSTIPATION: 0
VOICE CHANGE: 0

## 2022-11-11 NOTE — PROGRESS NOTES
Nelli Malcolm (:  1955) is a 75 y.o. female,Established patient, here for evaluation of the following chief complaint(s): Discuss Medications (Has been off all medication for 4 months )         ASSESSMENT/PLAN:  1. Essential hypertension  -     amLODIPine (NORVASC) 5 MG tablet; TAKE 1 TABLET BY MOUTH TWICE DAILY. , Disp-60 tablet, R-2Normal  2. Anxiety  -     escitalopram (LEXAPRO) 20 MG tablet; TAKE 1 TABLET DAILY AT BEDTIME, Disp-30 tablet, R-2Normal  -     busPIRone (BUSPAR) 10 MG tablet; Take 1 tablet by mouth 3 times daily as needed (anxiety), Disp-90 tablet, R-0Normal  3. Depression, unspecified depression type  -     escitalopram (LEXAPRO) 20 MG tablet; TAKE 1 TABLET DAILY AT BEDTIME, Disp-30 tablet, R-2Normal  4. Gastroesophageal reflux disease without esophagitis  -     pantoprazole (PROTONIX) 20 MG tablet; TAKE 1 TABLET BY MOUTH ONCE DAILY. , Disp-30 tablet, R-2Normal  5. Muscle spasm  -     baclofen (LIORESAL) 10 MG tablet; Take 1 tablet by mouth 3 times daily as needed (anxiety), Disp-30 tablet, R-2Normal  6. Cerebrovascular accident (CVA), unspecified mechanism ([address]  -     atorvastatin (LIPITOR) 40 MG tablet; QHS, Disp-30 tablet, R-2Normal  7. Hypercholesterolemia  -     atorvastatin (LIPITOR) 40 MG tablet; QHS, Disp-30 tablet, R-2Normal    No follow-ups on file. SUBJECTIVE/OBJECTIVE:  HPI    Living in homeless shelter in [place]. States that she is out of medication. Request refills. Pt also request something to help with anxiety. Review of Systems   Constitutional:  Negative for activity change, appetite change, fatigue, fever and unexpected weight change. HENT:  Negative for congestion, ear pain, nosebleeds, rhinorrhea, sore throat, trouble swallowing and voice change. Eyes:  Negative for redness and visual disturbance. Respiratory:  Negative for cough, chest tightness, shortness of breath and wheezing.     Cardiovascular:  Negative for chest pain, palpitations and leg swelling. Gastrointestinal:  Negative for abdominal pain, blood in stool, constipation, diarrhea, nausea and vomiting. Endocrine: Negative for polydipsia, polyphagia and polyuria. Genitourinary:  Negative for dysuria, frequency and urgency. Musculoskeletal:  Negative for myalgias. Skin:  Negative for rash and wound. Neurological:  Negative for dizziness, speech difficulty, light-headedness and headaches. Psychiatric/Behavioral:  Negative for agitation, confusion, self-injury and suicidal ideas. The patient is not nervous/anxious. Patient-Reported Vitals 4/15/2022   Patient-Reported Weight 130 pounds   Patient-Reported Height 5'2   Patient-Reported Systolic -   Patient-Reported Diastolic -   Patient-Reported Pulse -        Physical Exam  Vitals and nursing note reviewed.        [INSTRUCTIONS:  \"[x]\" Indicates a positive item  \"[]\" Indicates a negative item  -- DELETE ALL ITEMS NOT EXAMINED]    Constitutional: [x] Appears well-developed and well-nourished [x] No apparent distress      [] Abnormal -     Mental status: [x] Alert and awake  [x] Oriented to person/place/time [x] Able to follow commands    [] Abnormal -     Eyes:   EOM    [x]  Normal    [] Abnormal -   Sclera  [x]  Normal    [] Abnormal -          Discharge [x]  None visible   [] Abnormal -     HENT: [x] Normocephalic, atraumatic  [] Abnormal -   [x] Mouth/Throat: Mucous membranes are moist    External Ears [x] Normal  [] Abnormal -    Neck: [x] No visualized mass [] Abnormal -     Pulmonary/Chest: [x] Respiratory effort normal   [x] No visualized signs of difficulty breathing or respiratory distress        [] Abnormal -      Musculoskeletal:   [x] Normal gait with no signs of ataxia         [x] Normal range of motion of neck        [] Abnormal -     Neurological:        [x] No Facial Asymmetry (Cranial nerve 7 motor function) (limited exam due to video visit)          [x] No gaze palsy        [] Abnormal -          Skin:        [x] No significant exanthematous lesions or discoloration noted on facial skin         [] Abnormal -            Psychiatric:       [x] Normal Affect [] Abnormal -        [x] No Hallucinations    Other pertinent observable physical exam findings:-      There are no Patient Instructions on file for this visit. On this date 11/11/2022 I have spent 30 minutes reviewing previous notes, test results and face to face (virtual) with the patient discussing the diagnosis and importance of compliance with the treatment plan as well as documenting on the day of the visit. Christineblake Felix, was evaluated through a synchronous (real-time) audio-video encounter. The patient (or guardian if applicable) is aware that this is a billable service. Verbal consent to proceed has been obtained within the past 12 months. The visit was conducted pursuant to the emergency declaration under the [address] [place] [address], [address] [place] [address] authority and the [employer] and [employer] Act. Patient identification was verified, and a caregiver was present when appropriate. The patient was located in a state where the provider was credentialed to provide care. An electronic signature was used to authenticate this note.     --LIGIA Watson

## 2022-11-11 NOTE — TELEPHONE ENCOUNTER
Tutu Amaro called to request a refill on her medication. Last office visit : 11/11/2022   Next office visit : Visit date not found     Requested Prescriptions     Pending Prescriptions Disp Refills    escitalopram (LEXAPRO) 20 MG tablet 30 tablet 2     Sig: TAKE 1 TABLET DAILY AT BEDTIME    pantoprazole (PROTONIX) 20 MG tablet 30 tablet 2     Sig: TAKE 1 TABLET BY MOUTH ONCE DAILY. baclofen (LIORESAL) 10 MG tablet 30 tablet 2     Sig: Take 1 tablet by mouth 3 times daily as needed (anxiety)    atorvastatin (LIPITOR) 40 MG tablet 30 tablet 2     Sig: QHS    amLODIPine (NORVASC) 5 MG tablet 60 tablet 2     Sig: TAKE 1 TABLET BY MOUTH TWICE DAILY.     busPIRone (BUSPAR) 10 MG tablet 90 tablet 0     Sig: Take 1 tablet by mouth 3 times daily as needed (anxiety)            Rajni Green MA  Original got printed on green blanks

## (undated) DEVICE — BLADE CLIPPER SURG SENSICLIP

## (undated) DEVICE — TURNOVER KIT RM INF CTRL TECH

## (undated) DEVICE — GARMENT,MEDLINE,DVT,INT,CALF,MED, GEN2: Brand: MEDLINE

## (undated) DEVICE — DRESSING,GAUZE,XEROFORM,CURAD,1"X8",ST: Brand: CURAD

## (undated) DEVICE — 3 ML SYRINGE LUER-LOCK TIP: Brand: MONOJECT

## (undated) DEVICE — AIRLIFE™ ADULT OXYGEN MASK VINYL, UNDER THE CHIN STYLE, HIGH CONCENTRATION REBREATHER MASK (NO VALVES) WITH 7 FEET (2.1 M) CRUSH RESISTANT OXYGEN TUBING: Brand: AIRLIFE™ ADULT OXYGEN MASK VINYL, UNDER THE CHIN STYLE

## (undated) DEVICE — PLATE ES AD W 9FT CRD 2

## (undated) DEVICE — 3L THIN WALL CAN: Brand: CRD

## (undated) DEVICE — KIT CATHETER 20GA L12CM ART CUST

## (undated) DEVICE — PRO PADZ STERILE MULTI-FUNCTION ELECTRODES WITH 54 INCH LEAD WIRES - 1 PAIR: Brand: PRO PADZ

## (undated) DEVICE — PRE OP PACK: Brand: MEDLINE INDUSTRIES, INC.

## (undated) DEVICE — SUTURE NRLN SZ 4-0 L18IN NONABSORBABLE BLK L13MM TF 1/2 CIR C584D

## (undated) DEVICE — SPONGE GZ W4XL4IN COT 12 PLY TYP VII WVN C FLD DSGN

## (undated) DEVICE — PAD,NON-ADHERENT,3X8,STERILE,LF,1/PK: Brand: MEDLINE

## (undated) DEVICE — SYRINGE CATH TIP 50ML

## (undated) DEVICE — PRESSURE MONITORING SET: Brand: TRUWAVE, VAMP PLUS

## (undated) DEVICE — PRESSURE TUBING: Brand: TRUWAVE

## (undated) DEVICE — CHLORAPREP 26ML ORANGE

## (undated) DEVICE — COVER LT HNDL CAM BLU DISP W/ SURG CTRL

## (undated) DEVICE — MAYFIELD® DISPOSABLE ADULT SKULL PIN (PLASTIC BASE): Brand: MAYFIELD®

## (undated) DEVICE — CABLE BPLR L12FT FLYING LD DISPOSABLE

## (undated) DEVICE — SURE SET-DOUBLE BASIN-LF: Brand: MEDLINE INDUSTRIES, INC.

## (undated) DEVICE — TOOL F2/8TA23 LEGEND 8CM 2.3MM TAPER: Brand: MIDAS REX™

## (undated) DEVICE — PROTECTOR ULN NRV PUR FOAM HK LOOP STRP ANATOMICALLY

## (undated) DEVICE — MARKER,SKIN,WI/RULER AND LABELS: Brand: MEDLINE

## (undated) DEVICE — 3M™ WARMING BLANKET, LOWER BODY, 10 PER CASE, 42568: Brand: BAIR HUGGER™

## (undated) DEVICE — JEWISH CRANI PACK: Brand: MEDLINE INDUSTRIES, INC.

## (undated) DEVICE — SYRINGE MED 10ML TRNSLUC BRL PLUNG BLK MRK POLYPR CTRL

## (undated) DEVICE — STAPLER SKIN H3.9MM WIRE DIA0.58MM CRWN 6.9MM 35 STPL ROT

## (undated) DEVICE — Device: Brand: DURA-CUF

## (undated) DEVICE — DRAIN SURG FLAT W7MMXL20CM FULL PERF

## (undated) DEVICE — SURGICAL SET UP - SURE SET: Brand: MEDLINE INDUSTRIES, INC.

## (undated) DEVICE — HOOK RETRCT 12MM S STL BLNT E STAY LONE STAR

## (undated) DEVICE — TOOL 10BA50-MN LEGEND 10CM 5MM BA: Brand: MIDAS REX™

## (undated) DEVICE — RESERVOIR,SUCTION,100CC,SILICONE: Brand: MEDLINE

## (undated) DEVICE — CODMAN® SURGICAL PATTIES 1/2" X 1/2" (1.27CM X 1.27CM): Brand: CODMAN®

## (undated) DEVICE — SOLUTION IV 1000ML 0.9% SOD CHL

## (undated) DEVICE — SOLUTION IV 500ML 0.9% SOD CHL PH 5 INJ USP VIAFLX PLAS

## (undated) DEVICE — SUTURE VCRL SZ 2-0 L18IN ABSRB VLT L26MM SH 1/2 CIR J775D